# Patient Record
Sex: FEMALE | Race: WHITE | NOT HISPANIC OR LATINO | Employment: OTHER | URBAN - METROPOLITAN AREA
[De-identification: names, ages, dates, MRNs, and addresses within clinical notes are randomized per-mention and may not be internally consistent; named-entity substitution may affect disease eponyms.]

---

## 2017-01-07 ENCOUNTER — TRANSCRIBE ORDERS (OUTPATIENT)
Dept: ADMINISTRATIVE | Facility: HOSPITAL | Age: 82
End: 2017-01-07

## 2017-01-07 DIAGNOSIS — Z13.9 SCREENING: Primary | ICD-10-CM

## 2017-01-07 DIAGNOSIS — Z12.31 VISIT FOR SCREENING MAMMOGRAM: Primary | ICD-10-CM

## 2017-01-25 ENCOUNTER — PREP FOR SURGERY (OUTPATIENT)
Dept: GASTROENTEROLOGY | Facility: CLINIC | Age: 82
End: 2017-01-25

## 2017-01-25 ENCOUNTER — OFFICE VISIT (OUTPATIENT)
Dept: GASTROENTEROLOGY | Facility: CLINIC | Age: 82
End: 2017-01-25

## 2017-01-25 VITALS
RESPIRATION RATE: 15 BRPM | TEMPERATURE: 98.6 F | DIASTOLIC BLOOD PRESSURE: 71 MMHG | BODY MASS INDEX: 24.07 KG/M2 | SYSTOLIC BLOOD PRESSURE: 145 MMHG | HEART RATE: 70 BPM | HEIGHT: 64 IN | WEIGHT: 141 LBS

## 2017-01-25 DIAGNOSIS — R13.14 PHARYNGOESOPHAGEAL DYSPHAGIA: Primary | Chronic | ICD-10-CM

## 2017-01-25 DIAGNOSIS — R10.13 EPIGASTRIC PAIN: Chronic | ICD-10-CM

## 2017-01-25 DIAGNOSIS — R11.0 NAUSEA: ICD-10-CM

## 2017-01-25 DIAGNOSIS — R10.13 EPIGASTRIC PAIN: ICD-10-CM

## 2017-01-25 DIAGNOSIS — R13.14 PHARYNGOESOPHAGEAL DYSPHAGIA: Primary | ICD-10-CM

## 2017-01-25 PROCEDURE — 99214 OFFICE O/P EST MOD 30 MIN: CPT | Performed by: NURSE PRACTITIONER

## 2017-01-25 RX ORDER — MELATONIN
1000 DAILY
COMMUNITY
End: 2018-09-20

## 2017-01-25 RX ORDER — CARBOXYMETHYLCELLULOSE SODIUM 10 MG/ML
GEL OPHTHALMIC 3 TIMES DAILY PRN
COMMUNITY
End: 2017-01-26

## 2017-01-25 RX ORDER — SODIUM CHLORIDE 0.9 % (FLUSH) 0.9 %
1-10 SYRINGE (ML) INJECTION AS NEEDED
Status: CANCELLED | OUTPATIENT
Start: 2017-01-25

## 2017-01-25 RX ORDER — METHIMAZOLE 5 MG/1
5 TABLET ORAL EVERY OTHER DAY
COMMUNITY

## 2017-01-25 RX ORDER — LANOLIN ALCOHOL/MO/W.PET/CERES
1000 CREAM (GRAM) TOPICAL DAILY
COMMUNITY
End: 2018-09-20

## 2017-01-25 RX ORDER — SODIUM CHLORIDE 9 MG/ML
70 INJECTION, SOLUTION INTRAVENOUS CONTINUOUS PRN
Status: CANCELLED | OUTPATIENT
Start: 2017-01-25

## 2017-01-25 RX ORDER — METOPROLOL SUCCINATE 50 MG/1
50 TABLET, EXTENDED RELEASE ORAL DAILY
COMMUNITY

## 2017-01-25 RX ORDER — HYDROCHLOROTHIAZIDE 12.5 MG/1
12.5 CAPSULE, GELATIN COATED ORAL DAILY
COMMUNITY

## 2017-01-25 RX ORDER — ESTRADIOL 0.5 MG/1
0.5 TABLET ORAL 2 TIMES WEEKLY
COMMUNITY
End: 2023-03-02 | Stop reason: SDUPTHER

## 2017-01-25 NOTE — PATIENT INSTRUCTIONS
1. The patient has been advised to eat and take medications in upright position.  She should chew well.  After a few bites the patient should drink some water.  Avoid larger bites and chunks of meat.  2. Anti-reflux measures.  3. Continue Ranitidine 300 mg daily.  4. Upper endoscopy-EGD: Description of the procedure, risks, benefits, alternatives and options, including nonoperative options, were discussed with the patient in detail. The patient understands and wishes to proceed.  5. Possible colonoscopy in the future. Last colonoscopy was in 2012 with polyps.

## 2017-01-25 NOTE — LETTER
"January 25, 2017     Gary Oneil MD  [address]  [facility] [address]    Patient: Addie Perez   YOB: 1934   Date of Visit: 1/25/2017       Dear Dr. Thad MD:    Addie Perez was in my office today. Below is a copy of my note.    If you have questions, please do not hesitate to call me. I look forward to following Addie along with you.         Sincerely,        JOHAN Mclain        CC: No Recipients    [address] [address]    (H) [phone]  (W)     Chief Complaint   Patient presents with   • Difficulty Swallowing   • Nausea   • Abdominal Pain     The patient is here for evaluation of difficulty swallowing. She states she has a history of intermittent dysphagia with solids. No problems with liquids. The patient states this started over 1 month ago. She has not choked on foods in the past. She states she has a history of Schatzki's ring in the past with dilation, which helped significantly. She states she has a diverticulum and has to eat slowly, chew foods well and sit upright to eat. She has occasional nausea that is improved after eating. The patient states she has been having a cramping in her epigastric area, which is helped with eating. She is taking Ranitidine daily. She states she does not have heartburn symptoms. There is no history of bright red blood per rectum or melena. The patient denies change in bowel habits or constipation or diarrhea. Her last colonoscopy was in 2012. The patient is unsure of family history of colon cancer. Possibly her grandmother had colon cancer, but it could have been \"female cancer\", the patient's family is unsure.     Difficulty Swallowing   This is a recurrent problem. The current episode started more than 1 month ago. The problem occurs intermittently. The problem has been unchanged. Associated symptoms include abdominal pain, arthralgias, fatigue and nausea. Pertinent negatives include no chest pain, chills, coughing, " fever, headaches, joint swelling, myalgias, rash, vertigo or vomiting. The symptoms are aggravated by eating. She has tried nothing for the symptoms.   Nausea   This is a recurrent problem. The current episode started more than 1 month ago. The problem occurs intermittently. The problem has been unchanged. Associated symptoms include abdominal pain, arthralgias, fatigue and nausea. Pertinent negatives include no chest pain, chills, coughing, fever, headaches, joint swelling, myalgias, rash, vertigo or vomiting. Nothing aggravates the symptoms. She has tried eating for the symptoms. The treatment provided mild relief.   Abdominal Pain   This is a recurrent problem. The current episode started more than 1 month ago. The onset quality is gradual. The problem occurs intermittently (3-4 days per week). The most recent episode lasted 1 hour. The problem has been unchanged. The pain is located in the epigastric region. The pain is at a severity of 5/10. The pain is mild. The quality of the pain is cramping. The abdominal pain does not radiate. Associated symptoms include arthralgias and nausea. Pertinent negatives include no constipation, diarrhea, dysuria, fever, headaches, hematochezia, hematuria, melena, myalgias or vomiting. Nothing aggravates the pain. The pain is relieved by eating. She has tried H2 blockers for the symptoms. The treatment provided moderate relief.     Review of Systems   Constitutional: Positive for fatigue. Negative for appetite change, chills, fever and unexpected weight change.   HENT: Positive for trouble swallowing. Negative for mouth sores and nosebleeds.    Eyes: Negative for discharge and redness.   Respiratory: Negative for apnea, cough and shortness of breath.    Cardiovascular: Negative for chest pain, palpitations and leg swelling.   Gastrointestinal: Positive for abdominal pain and nausea. Negative for abdominal distention, anal bleeding, blood in stool, constipation, diarrhea,  hematochezia, melena and vomiting.   Endocrine: Positive for cold intolerance. Negative for heat intolerance and polydipsia.   Genitourinary: Negative for dysuria, hematuria and urgency.   Musculoskeletal: Positive for arthralgias. Negative for back pain, joint swelling and myalgias.   Skin: Negative for rash.   Allergic/Immunologic: Negative for food allergies and immunocompromised state.   Neurological: Positive for light-headedness. Negative for dizziness, vertigo, seizures, syncope and headaches.   Hematological: Negative for adenopathy. Does not bruise/bleed easily.   Psychiatric/Behavioral: Negative for dysphoric mood. The patient is not nervous/anxious and is not hyperactive.      Patient Active Problem List   Diagnosis   • Abdominal pain   • Back ache   • Can't get food down   • Chronic gastritis   • Elevated cholesterol   • BP (high blood pressure)   • Chronic nausea   • Controlled diabetes mellitus   • Pharyngoesophageal dysphagia   • Epigastric pain   • Nausea     Past Medical History   Diagnosis Date   • Back pain    • Cystitis    • Diabetes mellitus      pre diabetic   • History of colonic polyps    • Hypercholesterolemia    • Hypertension    • Neoplasm of kidney    • Sinusitis    • Urinary tract infection    • Vertigo      Past Surgical History   Procedure Laterality Date   • Back surgery     • Gallbladder surgery  2009   • Dilatation and curettage     • Hemorrhoidectomy  1973   • Tonsillectomy  1946   • Colonoscopy  2012   • Upper gastrointestinal endoscopy  2014     Family History   Problem Relation Age of Onset   • Colon cancer Paternal Grandmother      Social History   Substance Use Topics   • Smoking status: Never Smoker   • Smokeless tobacco: Never Used   • Alcohol use No       Current Outpatient Prescriptions:   •  acetaminophen (TYLENOL) 325 MG tablet, Take by mouth daily, Disp: , Rfl:   •  carboxymethylcellulose sod 1 % gel eye gel, 3 (Three) Times a Day As Needed., Disp: , Rfl:   •   "cholecalciferol (VITAMIN D3) 1000 UNITS tablet, Take 1,000 Units by mouth Daily., Disp: , Rfl:   •  CRANBERRY EXTRACT PO, Take  by mouth., Disp: , Rfl:   •  estradiol (ESTRACE) 0.5 MG tablet, Take 0.5 mg by mouth Daily., Disp: , Rfl:   •  fexofenadine (ALLEGRA) 180 MG tablet, Take by mouth daily, Disp: , Rfl:   •  Fish Oil-Cholecalciferol (FISH OIL + D3 PO), Take by mouth daily, Disp: , Rfl:   •  fluticasone (FLONASE) 50 MCG/ACT nasal spray, into each nostril daily, Disp: , Rfl:   •  hydrochlorothiazide (MICROZIDE) 12.5 MG capsule, Take 12.5 mg by mouth Daily., Disp: , Rfl:   •  methIMAzole (TAPAZOLE) 5 MG tablet, Take 5 mg by mouth Daily., Disp: , Rfl:   •  metoprolol succinate XL (TOPROL-XL) 50 MG 24 hr tablet, Take 50 mg by mouth Daily., Disp: , Rfl:   •  Multiple Vitamins-Minerals (OCUVITE ADULT 50+ PO), Take by mouth daily, Disp: , Rfl:   •  ranitidine (ZANTAC) 300 MG tablet, Take by mouth daily, Disp: , Rfl:   •  vitamin B-12 (CYANOCOBALAMIN) 1000 MCG tablet, Take 1,000 mcg by mouth Daily., Disp: , Rfl:   •  simvastatin (ZOCOR) 20 MG tablet, Take 10 mg by mouth Every Night., Disp: , Rfl:      Allergies   Allergen Reactions   • Codeine Nausea Only   • Escitalopram Nausea Only   • Gabapentin Nausea Only   • Ibuprofen Nausea Only   • Levofloxacin Nausea Only   • Lexapro [Escitalopram Oxalate]    • Naproxen Nausea Only   • Nitrofurantoin Nausea Only   • Other    • Sulfa Antibiotics Nausea Only     Visit Vitals   • /71   • Pulse 70   • Temp 98.6 °F (37 °C)   • Resp 15   • Ht 64\" (162.6 cm)   • Wt 141 lb (64 kg)   • BMI 24.2 kg/m2     Physical Exam   Constitutional: She is oriented to person, place, and time. She appears well-developed and well-nourished. No distress.   HENT:   Head: Normocephalic and atraumatic.   Right Ear: Hearing and external ear normal.   Left Ear: Hearing and external ear normal.   Nose: Nose normal.   Mouth/Throat: Oropharynx is clear and moist and mucous membranes are normal. Mucous " membranes are not pale, not dry and not cyanotic. No oral lesions. No oropharyngeal exudate.   Eyes: Conjunctivae and EOM are normal. Right eye exhibits no discharge. Left eye exhibits no discharge.   Neck: Trachea normal. Neck supple. No JVD present. No edema present. No thyroid mass and no thyromegaly present.   Cardiovascular: Normal rate, regular rhythm, S2 normal and normal heart sounds.  Exam reveals no gallop, no S3 and no friction rub.    No murmur heard.  Pulmonary/Chest: Effort normal and breath sounds normal. No respiratory distress. She exhibits no tenderness.   Abdominal: Normal appearance and bowel sounds are normal. She exhibits no distension, no ascites and no mass. There is no splenomegaly or hepatomegaly. There is no tenderness. There is no rigidity, no rebound and no guarding. No hernia.       Vascular Status -  Her exam exhibits no right foot edema. Her exam exhibits no left foot edema.  Lymphadenopathy:     She has no cervical adenopathy.        Left: No supraclavicular adenopathy present.   Neurological: She is alert and oriented to person, place, and time. She has normal strength. No cranial nerve deficit or sensory deficit.   Skin: No rash noted. She is not diaphoretic. No cyanosis. No pallor. Nails show no clubbing.   Psychiatric: She has a normal mood and affect.   Nursing note and vitals reviewed.    Procedures:    Upon review of records:    EGD dated 4/15/2014 reveals Schatzki's ring post dilation to 18 mm serially.  Small diverticulum within the lower portion of the esophagus.  Sliding hiatal hernia less than 3 cm.  Erythematous gastritis with antral deformity. Portion of duodenum biopsy reveals unremarkable small bowel mucosa, no increased inflammation or adenomatous changes are identified.  Antrum and body biopsy reveals mild chronic reactive gastritis, no atypia or organisms are identified.    5/21/2012 Colonoscopy per Dr. Gallegos with polyps. Pathology unavailable.    Ora was seen  today for difficulty swallowing, nausea and abdominal pain.    Diagnoses and all orders for this visit:    Pharyngoesophageal dysphagia  Comments:  Differentials include Schatzki's ring, esophagitis, esophageal dysmotility. Of interest, patient has history of esophageal diverticulum.    Epigastric pain  Comments:  Differentials include peptic ulcer disease, pancreatobiliary disease.    Nausea  Comments:  Differentials include peptic ulcer disease, pancreatobiliary disease.      Plan   Patient Instructions   1. The patient has been advised to eat and take medications in upright position.  She should chew well.  After a few bites the patient should drink some water.  Avoid larger bites and chunks of meat.  2. Anti-reflux measures.  3. Continue Ranitidine 300 mg daily.  4. Upper endoscopy-EGD: Description of the procedure, risks, benefits, alternatives and options, including nonoperative options, were discussed with the patient in detail. The patient understands and wishes to proceed.  5. Possible colonoscopy in the future. Last colonoscopy was in 2012 with polyps.    Patient Care Team:  Gary Oneil MD as PCP - General    JOHAN Mclain

## 2017-01-25 NOTE — PROGRESS NOTES
"[address] [address]    (H) [phone]  (W)     Chief Complaint   Patient presents with   • Difficulty Swallowing   • Nausea   • Abdominal Pain     The patient is here for evaluation of difficulty swallowing. She states she has a history of intermittent dysphagia with solids. No problems with liquids. The patient states this started over 1 month ago. She has not choked on foods in the past. She states she has a history of Schatzki's ring in the past with dilation, which helped significantly. She states she has a diverticulum and has to eat slowly, chew foods well and sit upright to eat. She has occasional nausea that is improved after eating. The patient states she has been having a cramping in her epigastric area, which is helped with eating. She is taking Ranitidine daily. She states she does not have heartburn symptoms. There is no history of bright red blood per rectum or melena. The patient denies change in bowel habits or constipation or diarrhea. Her last colonoscopy was in 2012. The patient is unsure of family history of colon cancer. Possibly her grandmother had colon cancer, but it could have been \"female cancer\", the patient's family is unsure.     Difficulty Swallowing   This is a recurrent problem. The current episode started more than 1 month ago. The problem occurs intermittently. The problem has been unchanged. Associated symptoms include abdominal pain, arthralgias, fatigue and nausea. Pertinent negatives include no chest pain, chills, coughing, fever, headaches, joint swelling, myalgias, rash, vertigo or vomiting. The symptoms are aggravated by eating. She has tried nothing for the symptoms.   Nausea   This is a recurrent problem. The current episode started more than 1 month ago. The problem occurs intermittently. The problem has been unchanged. Associated symptoms include abdominal pain, arthralgias, fatigue and nausea. Pertinent negatives include no chest pain, chills, coughing, " fever, headaches, joint swelling, myalgias, rash, vertigo or vomiting. Nothing aggravates the symptoms. She has tried eating for the symptoms. The treatment provided mild relief.   Abdominal Pain   This is a recurrent problem. The current episode started more than 1 month ago. The onset quality is gradual. The problem occurs intermittently (3-4 days per week). The most recent episode lasted 1 hour. The problem has been unchanged. The pain is located in the epigastric region. The pain is at a severity of 5/10. The pain is mild. The quality of the pain is cramping. The abdominal pain does not radiate. Associated symptoms include arthralgias and nausea. Pertinent negatives include no constipation, diarrhea, dysuria, fever, headaches, hematochezia, hematuria, melena, myalgias or vomiting. Nothing aggravates the pain. The pain is relieved by eating. She has tried H2 blockers for the symptoms. The treatment provided moderate relief.     Review of Systems   Constitutional: Positive for fatigue. Negative for appetite change, chills, fever and unexpected weight change.   HENT: Positive for trouble swallowing. Negative for mouth sores and nosebleeds.    Eyes: Negative for discharge and redness.   Respiratory: Negative for apnea, cough and shortness of breath.    Cardiovascular: Negative for chest pain, palpitations and leg swelling.   Gastrointestinal: Positive for abdominal pain and nausea. Negative for abdominal distention, anal bleeding, blood in stool, constipation, diarrhea, hematochezia, melena and vomiting.   Endocrine: Positive for cold intolerance. Negative for heat intolerance and polydipsia.   Genitourinary: Negative for dysuria, hematuria and urgency.   Musculoskeletal: Positive for arthralgias. Negative for back pain, joint swelling and myalgias.   Skin: Negative for rash.   Allergic/Immunologic: Negative for food allergies and immunocompromised state.   Neurological: Positive for light-headedness. Negative for  dizziness, vertigo, seizures, syncope and headaches.   Hematological: Negative for adenopathy. Does not bruise/bleed easily.   Psychiatric/Behavioral: Negative for dysphoric mood. The patient is not nervous/anxious and is not hyperactive.      Patient Active Problem List   Diagnosis   • Abdominal pain   • Back ache   • Can't get food down   • Chronic gastritis   • Elevated cholesterol   • BP (high blood pressure)   • Chronic nausea   • Controlled diabetes mellitus   • Pharyngoesophageal dysphagia   • Epigastric pain   • Nausea     Past Medical History   Diagnosis Date   • Back pain    • Cystitis    • Diabetes mellitus      pre diabetic   • History of colonic polyps    • Hypercholesterolemia    • Hypertension    • Neoplasm of kidney    • Sinusitis    • Urinary tract infection    • Vertigo      Past Surgical History   Procedure Laterality Date   • Back surgery     • Gallbladder surgery  2009   • Dilatation and curettage     • Hemorrhoidectomy  1973   • Tonsillectomy  1946   • Colonoscopy  2012   • Upper gastrointestinal endoscopy  2014     Family History   Problem Relation Age of Onset   • Colon cancer Paternal Grandmother      Social History   Substance Use Topics   • Smoking status: Never Smoker   • Smokeless tobacco: Never Used   • Alcohol use No       Current Outpatient Prescriptions:   •  acetaminophen (TYLENOL) 325 MG tablet, Take by mouth daily, Disp: , Rfl:   •  carboxymethylcellulose sod 1 % gel eye gel, 3 (Three) Times a Day As Needed., Disp: , Rfl:   •  cholecalciferol (VITAMIN D3) 1000 UNITS tablet, Take 1,000 Units by mouth Daily., Disp: , Rfl:   •  CRANBERRY EXTRACT PO, Take  by mouth., Disp: , Rfl:   •  estradiol (ESTRACE) 0.5 MG tablet, Take 0.5 mg by mouth Daily., Disp: , Rfl:   •  fexofenadine (ALLEGRA) 180 MG tablet, Take by mouth daily, Disp: , Rfl:   •  Fish Oil-Cholecalciferol (FISH OIL + D3 PO), Take by mouth daily, Disp: , Rfl:   •  fluticasone (FLONASE) 50 MCG/ACT nasal spray, into each  "nostril daily, Disp: , Rfl:   •  hydrochlorothiazide (MICROZIDE) 12.5 MG capsule, Take 12.5 mg by mouth Daily., Disp: , Rfl:   •  methIMAzole (TAPAZOLE) 5 MG tablet, Take 5 mg by mouth Daily., Disp: , Rfl:   •  metoprolol succinate XL (TOPROL-XL) 50 MG 24 hr tablet, Take 50 mg by mouth Daily., Disp: , Rfl:   •  Multiple Vitamins-Minerals (OCUVITE ADULT 50+ PO), Take by mouth daily, Disp: , Rfl:   •  ranitidine (ZANTAC) 300 MG tablet, Take by mouth daily, Disp: , Rfl:   •  vitamin B-12 (CYANOCOBALAMIN) 1000 MCG tablet, Take 1,000 mcg by mouth Daily., Disp: , Rfl:   •  simvastatin (ZOCOR) 20 MG tablet, Take 10 mg by mouth Every Night., Disp: , Rfl:      Allergies   Allergen Reactions   • Codeine Nausea Only   • Escitalopram Nausea Only   • Gabapentin Nausea Only   • Ibuprofen Nausea Only   • Levofloxacin Nausea Only   • Lexapro [Escitalopram Oxalate]    • Naproxen Nausea Only   • Nitrofurantoin Nausea Only   • Other    • Sulfa Antibiotics Nausea Only     Visit Vitals   • /71   • Pulse 70   • Temp 98.6 °F (37 °C)   • Resp 15   • Ht 64\" (162.6 cm)   • Wt 141 lb (64 kg)   • BMI 24.2 kg/m2     Physical Exam   Constitutional: She is oriented to person, place, and time. She appears well-developed and well-nourished. No distress.   HENT:   Head: Normocephalic and atraumatic.   Right Ear: Hearing and external ear normal.   Left Ear: Hearing and external ear normal.   Nose: Nose normal.   Mouth/Throat: Oropharynx is clear and moist and mucous membranes are normal. Mucous membranes are not pale, not dry and not cyanotic. No oral lesions. No oropharyngeal exudate.   Eyes: Conjunctivae and EOM are normal. Right eye exhibits no discharge. Left eye exhibits no discharge.   Neck: Trachea normal. Neck supple. No JVD present. No edema present. No thyroid mass and no thyromegaly present.   Cardiovascular: Normal rate, regular rhythm, S2 normal and normal heart sounds.  Exam reveals no gallop, no S3 and no friction rub.    No " murmur heard.  Pulmonary/Chest: Effort normal and breath sounds normal. No respiratory distress. She exhibits no tenderness.   Abdominal: Normal appearance and bowel sounds are normal. She exhibits no distension, no ascites and no mass. There is no splenomegaly or hepatomegaly. There is no tenderness. There is no rigidity, no rebound and no guarding. No hernia.       Vascular Status -  Her exam exhibits no right foot edema. Her exam exhibits no left foot edema.  Lymphadenopathy:     She has no cervical adenopathy.        Left: No supraclavicular adenopathy present.   Neurological: She is alert and oriented to person, place, and time. She has normal strength. No cranial nerve deficit or sensory deficit.   Skin: No rash noted. She is not diaphoretic. No cyanosis. No pallor. Nails show no clubbing.   Psychiatric: She has a normal mood and affect.   Nursing note and vitals reviewed.    Procedures:    Upon review of records:    EGD dated 4/15/2014 reveals Schatzki's ring post dilation to 18 mm serially.  Small diverticulum within the lower portion of the esophagus.  Sliding hiatal hernia less than 3 cm.  Erythematous gastritis with antral deformity. Portion of duodenum biopsy reveals unremarkable small bowel mucosa, no increased inflammation or adenomatous changes are identified.  Antrum and body biopsy reveals mild chronic reactive gastritis, no atypia or organisms are identified.    5/21/2012 Colonoscopy per Dr. Gallegos with polyps. Pathology unavailable.    Addie was seen today for difficulty swallowing, nausea and abdominal pain.    Diagnoses and all orders for this visit:    Pharyngoesophageal dysphagia  Comments:  Differentials include Schatzki's ring, esophagitis, esophageal dysmotility. Of interest, patient has history of esophageal diverticulum.    Epigastric pain  Comments:  Differentials include peptic ulcer disease, pancreatobiliary disease.    Nausea  Comments:  Differentials include peptic ulcer disease,  pancreatobiliary disease.      Plan   Patient Instructions   1. The patient has been advised to eat and take medications in upright position.  She should chew well.  After a few bites the patient should drink some water.  Avoid larger bites and chunks of meat.  2. Anti-reflux measures.  3. Continue Ranitidine 300 mg daily.  4. Upper endoscopy-EGD: Description of the procedure, risks, benefits, alternatives and options, including nonoperative options, were discussed with the patient in detail. The patient understands and wishes to proceed.  5. Possible colonoscopy in the future. Last colonoscopy was in 2012 with polyps.    Patient Care Team:  Gary Oneil MD as PCP - General    JOHAN Mclain

## 2017-01-31 ENCOUNTER — HOSPITAL ENCOUNTER (OUTPATIENT)
Facility: HOSPITAL | Age: 82
Setting detail: HOSPITAL OUTPATIENT SURGERY
Discharge: HOME | End: 2017-01-31
Attending: INTERNAL MEDICINE | Admitting: INTERNAL MEDICINE

## 2017-01-31 ENCOUNTER — ANESTHESIA (OUTPATIENT)
Dept: GASTROENTEROLOGY | Facility: HOSPITAL | Age: 82
End: 2017-01-31

## 2017-01-31 ENCOUNTER — ANESTHESIA EVENT (OUTPATIENT)
Dept: GASTROENTEROLOGY | Facility: HOSPITAL | Age: 82
End: 2017-01-31

## 2017-01-31 VITALS
OXYGEN SATURATION: 98 % | SYSTOLIC BLOOD PRESSURE: 150 MMHG | DIASTOLIC BLOOD PRESSURE: 76 MMHG | TEMPERATURE: 97.2 F | RESPIRATION RATE: 18 BRPM | BODY MASS INDEX: 24.07 KG/M2 | WEIGHT: 141 LBS | HEIGHT: 64 IN | HEART RATE: 59 BPM

## 2017-01-31 DIAGNOSIS — R11.0 NAUSEA: ICD-10-CM

## 2017-01-31 DIAGNOSIS — R13.14 PHARYNGOESOPHAGEAL DYSPHAGIA: ICD-10-CM

## 2017-01-31 DIAGNOSIS — R10.13 EPIGASTRIC PAIN: ICD-10-CM

## 2017-01-31 LAB — GLUCOSE BLDC GLUCOMTR-MCNC: 96 MG/DL (ref 70–130)

## 2017-01-31 PROCEDURE — 43239 EGD BIOPSY SINGLE/MULTIPLE: CPT | Performed by: INTERNAL MEDICINE

## 2017-01-31 PROCEDURE — 43249 ESOPH EGD DILATION <30 MM: CPT | Performed by: INTERNAL MEDICINE

## 2017-01-31 PROCEDURE — 25010000002 ONDANSETRON PER 1 MG: Performed by: NURSE ANESTHETIST, CERTIFIED REGISTERED

## 2017-01-31 PROCEDURE — C1726 CATH, BAL DIL, NON-VASCULAR: HCPCS | Performed by: INTERNAL MEDICINE

## 2017-01-31 PROCEDURE — 88305 TISSUE EXAM BY PATHOLOGIST: CPT | Performed by: INTERNAL MEDICINE

## 2017-01-31 PROCEDURE — 82962 GLUCOSE BLOOD TEST: CPT

## 2017-01-31 PROCEDURE — 25010000002 PROPOFOL 10 MG/ML EMULSION: Performed by: NURSE ANESTHETIST, CERTIFIED REGISTERED

## 2017-01-31 RX ORDER — SODIUM CHLORIDE 0.9 % (FLUSH) 0.9 %
1-10 SYRINGE (ML) INJECTION AS NEEDED
Status: DISCONTINUED | OUTPATIENT
Start: 2017-01-31 | End: 2017-01-31 | Stop reason: HOSPADM

## 2017-01-31 RX ORDER — PANTOPRAZOLE SODIUM 40 MG/10ML
INJECTION, POWDER, LYOPHILIZED, FOR SOLUTION INTRAVENOUS
Status: COMPLETED
Start: 2017-01-31 | End: 2017-01-31

## 2017-01-31 RX ORDER — ONDANSETRON 2 MG/ML
INJECTION INTRAMUSCULAR; INTRAVENOUS AS NEEDED
Status: DISCONTINUED | OUTPATIENT
Start: 2017-01-31 | End: 2017-01-31 | Stop reason: SURG

## 2017-01-31 RX ORDER — PANTOPRAZOLE SODIUM 40 MG/10ML
40 INJECTION, POWDER, LYOPHILIZED, FOR SOLUTION INTRAVENOUS ONCE
Status: COMPLETED | OUTPATIENT
Start: 2017-01-31 | End: 2017-01-31

## 2017-01-31 RX ORDER — PANTOPRAZOLE SODIUM 40 MG/1
TABLET, DELAYED RELEASE ORAL
Qty: 30 TABLET | Refills: 6 | Status: SHIPPED | OUTPATIENT
Start: 2017-01-31 | End: 2017-05-31

## 2017-01-31 RX ORDER — SODIUM CHLORIDE 9 MG/ML
70 INJECTION, SOLUTION INTRAVENOUS CONTINUOUS PRN
Status: DISCONTINUED | OUTPATIENT
Start: 2017-01-31 | End: 2017-01-31 | Stop reason: HOSPADM

## 2017-01-31 RX ORDER — PROPOFOL 10 MG/ML
VIAL (ML) INTRAVENOUS AS NEEDED
Status: DISCONTINUED | OUTPATIENT
Start: 2017-01-31 | End: 2017-01-31 | Stop reason: SURG

## 2017-01-31 RX ADMIN — PROPOFOL 20 MG: 10 INJECTION, EMULSION INTRAVENOUS at 09:46

## 2017-01-31 RX ADMIN — PROPOFOL 10 MG: 10 INJECTION, EMULSION INTRAVENOUS at 09:39

## 2017-01-31 RX ADMIN — PANTOPRAZOLE SODIUM 40 MG: 40 INJECTION, POWDER, LYOPHILIZED, FOR SOLUTION INTRAVENOUS at 10:11

## 2017-01-31 RX ADMIN — PROPOFOL 20 MG: 10 INJECTION, EMULSION INTRAVENOUS at 09:43

## 2017-01-31 RX ADMIN — PROPOFOL 20 MG: 10 INJECTION, EMULSION INTRAVENOUS at 09:38

## 2017-01-31 RX ADMIN — SODIUM CHLORIDE 70 ML/HR: 9 INJECTION, SOLUTION INTRAVENOUS at 08:47

## 2017-01-31 RX ADMIN — ONDANSETRON 4 MG: 2 INJECTION INTRAMUSCULAR; INTRAVENOUS at 09:28

## 2017-01-31 RX ADMIN — LIDOCAINE HYDROCHLORIDE 60 MG: 20 INJECTION, SOLUTION INTRAVENOUS at 09:32

## 2017-01-31 RX ADMIN — PANTOPRAZOLE SODIUM 40 MG: 40 INJECTION, POWDER, FOR SOLUTION INTRAVENOUS at 10:11

## 2017-01-31 RX ADMIN — PROPOFOL 40 MG: 10 INJECTION, EMULSION INTRAVENOUS at 09:33

## 2017-01-31 RX ADMIN — PROPOFOL 20 MG: 10 INJECTION, EMULSION INTRAVENOUS at 09:35

## 2017-01-31 NOTE — ANESTHESIA PREPROCEDURE EVALUATION
Anesthesia Evaluation     Patient summary reviewed and Nursing notes reviewed    No history of anesthetic complications (h/o dental injury with previous anesthesia.  chipped lower molar. patient does not recall if this was from general anesthesia .)   Airway   Mallampati: I  TM distance: <3 FB  Neck ROM: full  possible difficult intubation  Dental - normal exam     Pulmonary - negative pulmonary ROS and normal exam    breath sounds clear to auscultation  Cardiovascular - normal exam  Exercise tolerance: good (4-7 METS)  (+) hypertension well controlled, PVD (B/L LE swelling)    ECG reviewed  Patient on routine beta blocker and Beta blocker given within 24 hours of surgery  Rhythm: regular  Rate: normal  ROS comment: Dyslipidemia    Neuro/Psych  (+) dizziness/light headedness (Vertigo secondary to inner ear),    GI/Hepatic/Renal/Endo    (+)  GERD well controlled, diabetes mellitus (FSBS 96.. Pre-diabetic. Diet controlled) well controlled,     Musculoskeletal     (+) arthralgias, back pain,   Abdominal  - normal exam    Abdomen: soft.  Bowel sounds: normal.   Substance History      OB/GYN negative ob/gyn ROS         Other   (+) arthritis                      Anesthesia Plan    ASA 2     MAC     intravenous induction   Anesthetic plan and risks discussed with patient.

## 2017-01-31 NOTE — ANESTHESIA POSTPROCEDURE EVALUATION
Patient: Addie Perez    Procedure Summary     Date Anesthesia Start Anesthesia Stop Room / Location    01/31/17 0927  [facility] ENDOSCOPY 2 / [facility] ENDOSCOPY       Procedure Diagnosis Surgeon Provider    ESOPHAGOGASTRODUODENOSCOPY (N/A Esophagus) Nausea; Epigastric pain; Pharyngoesophageal dysphagia  (Nausea [R11.0]; Epigastric pain [R10.13]; Pharyngoesophageal dysphagia [R13.14]) MD Kirk Levine CRNA          Anesthesia Type: MAC  Last vitals  BP      Temp      Pulse     Resp      SpO2        Post Anesthesia Care and Evaluation    Patient location during evaluation: PHASE II  Patient participation: complete - patient participated  Level of consciousness: awake and alert  Pain score: 0  Pain management: satisfactory to patient  Airway patency: patent  Anesthetic complications: No anesthetic complications  PONV Status: none  Cardiovascular status: acceptable and stable  Respiratory status: acceptable and room air  Hydration status: acceptable

## 2017-02-02 LAB
LAB AP CASE REPORT: NORMAL
Lab: NORMAL
PATH REPORT.FINAL DX SPEC: NORMAL

## 2017-02-20 ENCOUNTER — OFFICE VISIT (OUTPATIENT)
Dept: GASTROENTEROLOGY | Facility: CLINIC | Age: 82
End: 2017-02-20

## 2017-02-20 VITALS
WEIGHT: 138 LBS | TEMPERATURE: 97.5 F | RESPIRATION RATE: 18 BRPM | BODY MASS INDEX: 23.56 KG/M2 | HEART RATE: 77 BPM | SYSTOLIC BLOOD PRESSURE: 121 MMHG | HEIGHT: 64 IN | DIASTOLIC BLOOD PRESSURE: 65 MMHG

## 2017-02-20 DIAGNOSIS — K29.60 EROSIVE GASTRITIS: ICD-10-CM

## 2017-02-20 DIAGNOSIS — R11.0 NAUSEA: ICD-10-CM

## 2017-02-20 DIAGNOSIS — R13.19 OTHER DYSPHAGIA: Primary | ICD-10-CM

## 2017-02-20 DIAGNOSIS — R12 HEARTBURN: ICD-10-CM

## 2017-02-20 PROCEDURE — 99214 OFFICE O/P EST MOD 30 MIN: CPT | Performed by: INTERNAL MEDICINE

## 2017-02-20 NOTE — PATIENT INSTRUCTIONS
1. Dietary instructions.  The patient should eat relatively soft diet.  She should eat in upright position and chew well.  The patient should drink water after to 3 bites and take medications with liberal amounts of water.  Generally medications that have a potential to cause pill esophagitis may be avoided or used in an alternative form (for example if the patient needs potassium it may be used in a liquid rather than pill form).  Furthermore after eating, and taking medications the patient should remain in upright position for 5-10 minutes.  2. Anti-antireflux measures.  3. Pantoprazole 40 mg 1 by mouth every morning one half hour before breakfast.  The patient may take for 3 months.  Thereafter the dose may be reduced to 20 mg one by mouth every morning one half hour before breakfast and follow the patient clinically.  4. Avoid NSAIDs.  The patient however may take Tylenol for arthritic pains.  5. Follow-up in 2 months.

## 2017-02-20 NOTE — PROGRESS NOTES
[address] [facility] [address]    (H) [phone]  (W)     Chief Complaint   Patient presents with   • Follow-up       History of Present Illness    The patient came back for follow visit today.  She feels better.  Her dysphagic symptoms have much improved.  The patient is able to eat both liquids and solids without problem now.  She denies abdominal pain.  There is no further nausea.  She denies vomiting.  She denies diarrhea or constipation.  There is no history of overt GI bleed (hematemesis, melena, or hematochezia).  The patient is sleeping much better.  She denies reflux.  The patient denies weight loss.    There is history of moderate dysphagia for the last several months.  This had been somewhat progressive.  The patient had trouble swallowing with solids and to some extent with liquids as well.  The patient points towards lower substernal area.  Frequency being several times a week.  The patient had undergone upper endoscopy on January 31, 2017.  She was found to have stricturing of the distal esophagus, and distal esophageal rings.  She was dilated to 18 mm progressively.  She was also found to have erosive distal esophagitis-LA class a, and mid esophageal diverticula.  The patient was also found to have erosive gastritis.  Biopsies from the esophagus were negative for eosinophilic esophagitis.  Biopsies from the gastric antrum and body of the stomach were negative for Helicobacter pylori.  Currently the patient feels much better.    Review of Systems   Constitutional: Negative for appetite change, chills, fatigue, fever and unexpected weight change.   HENT: Negative for mouth sores, nosebleeds and trouble swallowing.    Eyes: Negative for discharge and redness.   Respiratory: Negative for apnea, cough and shortness of breath.    Cardiovascular: Negative for chest pain, palpitations and leg swelling.   Gastrointestinal: Negative for abdominal distention, abdominal pain, anal bleeding, blood in stool,  constipation, diarrhea, nausea and vomiting.   Endocrine: Negative for cold intolerance, heat intolerance and polydipsia.   Genitourinary: Negative for dysuria, hematuria and urgency.   Musculoskeletal: Positive for arthralgias and back pain. Negative for joint swelling and myalgias.   Skin: Negative for rash.   Allergic/Immunologic: Negative for food allergies and immunocompromised state.   Neurological: Negative for dizziness, seizures, syncope and headaches.   Hematological: Negative for adenopathy. Does not bruise/bleed easily.   Psychiatric/Behavioral: Negative for dysphoric mood. The patient is not nervous/anxious and is not hyperactive.      Patient Active Problem List   Diagnosis   • Abdominal pain   • Back ache   • Can't get food down   • Chronic gastritis   • Elevated cholesterol   • BP (high blood pressure)   • Chronic nausea   • Controlled diabetes mellitus   • Pharyngoesophageal dysphagia   • Epigastric pain   • Nausea     Past Medical History   Diagnosis Date   • Back pain    • Cataract      PT REPORTS EARLY STAGES   • Cystitis    • Diabetes mellitus      PRE DIABETIC   • History of colonic polyps    • Hypercholesterolemia    • Hypertension    • Neoplasm of kidney    • Sinusitis    • Urinary tract infection    • Vertigo      Past Surgical History   Procedure Laterality Date   • Gallbladder surgery  2009   • Dilatation and curettage     • Hemorrhoidectomy  1973   • Tonsillectomy  1946   • Colonoscopy  2012   • Upper gastrointestinal endoscopy  2014   • Back surgery       REPORTS A SPUR WAS REMOVED THAT WAS HITTING SCIATIC NERVE   • Breast surgery Left      LUMPECTOMY, BENIGN    • Endoscopy N/A 1/31/2017     Procedure: ESOPHAGOGASTRODUODENOSCOPY;  Surgeon: Robbie Quezada MD;  Location: [facility] ENDOSCOPY;  Service:      Family History   Problem Relation Age of Onset   • Colon cancer Paternal Grandmother      Social History   Substance Use Topics   • Smoking status: Never Smoker   • Smokeless tobacco: Never  Used   • Alcohol use No       Current Outpatient Prescriptions:   •  acetaminophen (TYLENOL) 325 MG tablet, Take 325 mg by mouth Every 6 (Six) Hours As Needed for mild pain (1-3) or fever., Disp: , Rfl:   •  cholecalciferol (VITAMIN D3) 1000 UNITS tablet, Take 1,000 Units by mouth Daily., Disp: , Rfl:   •  CRANBERRY EXTRACT PO, Take 1 tablet by mouth Daily., Disp: , Rfl:   •  estradiol (ESTRACE) 0.5 MG tablet, Take 0.5 mg by mouth 2 (Two) Times a Week., Disp: , Rfl:   •  fexofenadine (ALLEGRA) 180 MG tablet, Take 180 mg by mouth Daily., Disp: , Rfl:   •  Fish Oil-Cholecalciferol (FISH OIL + D3 PO), Take 1 tablet by mouth Daily., Disp: , Rfl:   •  fluticasone (FLONASE) 50 MCG/ACT nasal spray, 1 spray into each nostril 2 (Two) Times a Day., Disp: , Rfl:   •  hydrochlorothiazide (MICROZIDE) 12.5 MG capsule, Take 12.5 mg by mouth Daily., Disp: , Rfl:   •  methIMAzole (TAPAZOLE) 5 MG tablet, Take 5 mg by mouth Take As Directed. PT REPORTS SHE TAKES THIS Monday THROUGH Friday AND SKIPS THE WEEKEND, Disp: , Rfl:   •  metoprolol succinate XL (TOPROL-XL) 50 MG 24 hr tablet, Take 50 mg by mouth Daily., Disp: , Rfl:   •  Multiple Vitamins-Minerals (OCUVITE ADULT 50+ PO), Take 1 tablet by mouth Daily., Disp: , Rfl:   •  pantoprazole (PROTONIX) 40 MG EC tablet, Take 1 tablet by mouth 30 minutes before breakfast daily., Disp: 30 tablet, Rfl: 6  •  Propylene Glycol (SYSTANE BALANCE OP), Apply 1 drop to eye 2 (Two) Times a Day As Needed (REPORTS SHE USES FOR DRY EYE AS NEEDED)., Disp: , Rfl:   •  simvastatin (ZOCOR) 20 MG tablet, Take 10 mg by mouth Every Night., Disp: , Rfl:   •  vitamin B-12 (CYANOCOBALAMIN) 1000 MCG tablet, Take 1,000 mcg by mouth Daily., Disp: , Rfl:   Allergies   Allergen Reactions   • Codeine Nausea Only   • Escitalopram Nausea Only   • Gabapentin Nausea Only   • Ibuprofen Nausea Only   • Levofloxacin Nausea Only   • Lexapro [Escitalopram Oxalate]    • Naproxen Nausea Only   • Nitrofurantoin Nausea Only   •  "Sulfa Antibiotics Nausea Only     Visit Vitals   • /65   • Pulse 77   • Temp 97.5 °F (36.4 °C)   • Resp 18   • Ht 64\" (162.6 cm)   • Wt 138 lb (62.6 kg)   • BMI 23.69 kg/m2     Physical Exam   Constitutional: She is oriented to person, place, and time. She appears well-developed and well-nourished. No distress.   HENT:   Head: Normocephalic and atraumatic.   Right Ear: Hearing and external ear normal.   Left Ear: Hearing and external ear normal.   Nose: Nose normal.   Mouth/Throat: Oropharynx is clear and moist and mucous membranes are normal. Mucous membranes are not pale, not dry and not cyanotic. No oral lesions. No oropharyngeal exudate.   Eyes: Conjunctivae and EOM are normal. Right eye exhibits no discharge. Left eye exhibits no discharge. No scleral icterus.   Neck: Trachea normal. Neck supple. No JVD present. No edema present. No thyroid mass and no thyromegaly present.   Cardiovascular: Normal rate, regular rhythm, S2 normal and normal heart sounds.  Exam reveals no gallop, no S3 and no friction rub.    No murmur heard.  Pulmonary/Chest: Effort normal and breath sounds normal. No respiratory distress. She has no wheezes. She has no rales. She exhibits no tenderness.   Abdominal: Soft. Normal appearance and bowel sounds are normal. She exhibits no distension, no ascites and no mass. There is no splenomegaly or hepatomegaly. There is no tenderness. There is no rigidity, no rebound and no guarding. No hernia.   Musculoskeletal: She exhibits no tenderness or deformity.       Vascular Status -  Her exam exhibits no right foot edema. Her exam exhibits no left foot edema.  Lymphadenopathy:     She has no cervical adenopathy.        Left: No supraclavicular adenopathy present.   Neurological: She is alert and oriented to person, place, and time. She has normal strength. No cranial nerve deficit or sensory deficit. She exhibits normal muscle tone. Coordination normal.   Skin: No rash noted. She is not " diaphoretic. No cyanosis. No pallor. Nails show no clubbing.   Psychiatric: She has a normal mood and affect. Her behavior is normal. Judgment and thought content normal.   Nursing note and vitals reviewed.      Laboratory Tests:    Upon review of medical records:    Dated December 15, 2015 sodium 141 potassium 3.7 chloride 101 CO2 30 BUN 14 creatinine 0.9 and glucose 115.  White count 7.6 hemoglobin 12.4 hematocrit 38 and a platelet count 273.    Procedures:    Upon review of medical records:    Dated January 31, 2017 the patient underwent an upper endoscopy which revealed distal esophageal stricture which was serially dilated to 18 mm.  A small sliding hiatal hernia less than 3 cm, erosive distal esophagitis-LA class a, 2 small early mid esophageal diverticula and erosive gastritis.  Significant tortuosity and tertiary contractions of esophageal body were noted.  Biopsies from the mid and distal esophageal revealed reactive mucosal changes consistent with reflux.  No specialized mucosa.  No increased eosinophils were noted.  Gastric biopsies from the antrum body and angularis were negative for Helicobacter pylori.  Biopsies from the second portion of duodenum were negative for celiac disease.    Assessment and Plan:      Addie was seen today for follow-up.    Diagnoses and all orders for this visit:    Other dysphagia  Comments:  Secondary to distal esophageal stricture.  Peptic type.  Improved.    Nausea  Comments:  Secondary to erosive gastritis.  Improved.     Erosive gastritis  Comments:   Helicobacter pylori negative.  The patient denies taking NSAIDs.    Heartburn  Comments:  Improved.        Discussion:       Plan     Patient Instructions     1. Dietary instructions.  The patient should eat relatively soft diet.  She should eat in upright position and chew well.  The patient should drink water after to 3 bites and take medications with liberal amounts of water.  Generally medications that have a potential to  cause pill esophagitis may be avoided or used in an alternative form (for example if the patient needs potassium it may be used in a liquid rather than pill form).  Furthermore after eating, and taking medications the patient should remain in upright position for 5-10 minutes.  2. Anti-antireflux measures.  3. Pantoprazole 40 mg 1 by mouth every morning one half hour before breakfast.  The patient may take for 3 months.  Thereafter the dose may be reduced to 20 mg one by mouth every morning one half hour before breakfast and follow the patient clinically.  4. Avoid NSAIDs.  The patient however may take Tylenol for arthritic pains.  5. Follow-up in 2 months.      Patient Care Team:  Gary Oneil MD as PCP - General    Robbie Quezada MD

## 2017-03-28 ENCOUNTER — OFFICE VISIT (OUTPATIENT)
Dept: UROLOGY | Facility: CLINIC | Age: 82
End: 2017-03-28

## 2017-03-28 VITALS
DIASTOLIC BLOOD PRESSURE: 71 MMHG | SYSTOLIC BLOOD PRESSURE: 110 MMHG | TEMPERATURE: 98.5 F | RESPIRATION RATE: 18 BRPM | OXYGEN SATURATION: 95 % | HEART RATE: 77 BPM

## 2017-03-28 DIAGNOSIS — Z87.440 HISTORY OF RECURRENT UTIS: Primary | ICD-10-CM

## 2017-03-28 DIAGNOSIS — N76.2 ATROPHIC VULVITIS: ICD-10-CM

## 2017-03-28 LAB
BILIRUB BLD-MCNC: NEGATIVE MG/DL
CLARITY, POC: CLEAR
COLOR UR: YELLOW
GLUCOSE UR STRIP-MCNC: NEGATIVE MG/DL
KETONES UR QL: NEGATIVE
LEUKOCYTE EST, POC: NEGATIVE
NITRITE UR-MCNC: NEGATIVE MG/ML
PH UR: 6 [PH] (ref 5–8)
PROT UR STRIP-MCNC: NEGATIVE MG/DL
RBC # UR STRIP: NEGATIVE /UL
SP GR UR: 1.01 (ref 1–1.03)
UROBILINOGEN UR QL: NORMAL

## 2017-03-28 PROCEDURE — 99213 OFFICE O/P EST LOW 20 MIN: CPT | Performed by: UROLOGY

## 2017-03-28 PROCEDURE — 81003 URINALYSIS AUTO W/O SCOPE: CPT | Performed by: UROLOGY

## 2017-03-28 NOTE — PROGRESS NOTES
Chief Complaint  Follow-up (annual fup for a history of uti's.)      ALANNA ePrez is a 83 y.o.female who returns today for an annual checkup with a history of recurrent urinary tract infections.  These were treated by parotid practitioners with antibiotics but typically were poorly documented.  A major problem for his atrophic vaginitis but she could never tolerate topical vaginal estrogen.  She saw Dr. Lewis who prescribed estradiol tablets 2 times per week and she is much improved.     Vitals:    03/28/17 1310   BP: 110/71   Pulse: 77   Resp: 18   Temp: 98.5 °F (36.9 °C)   SpO2: 95%       Past Medical History  Past Medical History:   Diagnosis Date   • Back pain    • Cataract     PT REPORTS EARLY STAGES   • Cystitis    • Diabetes mellitus     PRE DIABETIC   • History of colonic polyps    • Hypercholesterolemia    • Hypertension    • Neoplasm of kidney    • Sinusitis    • Urinary tract infection    • Vertigo        Past Surgical History  Past Surgical History:   Procedure Laterality Date   • BACK SURGERY      REPORTS A SPUR WAS REMOVED THAT WAS HITTING SCIATIC NERVE   • BREAST SURGERY Left     LUMPECTOMY, BENIGN    • COLONOSCOPY  2012   • DILATATION AND CURETTAGE     • ENDOSCOPY N/A 1/31/2017    Procedure: ESOPHAGOGASTRODUODENOSCOPY;  Surgeon: Robbie Quezada MD;  Location: [facility] ENDOSCOPY;  Service:    • GALLBLADDER SURGERY  2009   • HEMORRHOIDECTOMY  1973   • TONSILLECTOMY  1946   • UPPER GASTROINTESTINAL ENDOSCOPY  2014       Medications  has a current medication list which includes the following prescription(s): acetaminophen, cholecalciferol, cranberry, estradiol, fexofenadine, fish oil-cholecalciferol, fluticasone, hydrochlorothiazide, methimazole, metoprolol succinate xl, multiple vitamins-minerals, pantoprazole, propylene glycol, simvastatin, and vitamin b-12.    Allergies  Allergies   Allergen Reactions   • Codeine Nausea Only   • Escitalopram Nausea Only   • Gabapentin Nausea Only   • Ibuprofen  Nausea Only   • Levofloxacin Nausea Only   • Lexapro [Escitalopram Oxalate]    • Naproxen Nausea Only   • Nitrofurantoin Nausea Only   • Sulfa Antibiotics Nausea Only       Social History  Social History     Social History   • Marital status:      Spouse name: N/A   • Number of children: N/A   • Years of education: N/A     Occupational History   •  Retired     Social History Main Topics   • Smoking status: Never Smoker   • Smokeless tobacco: Never Used   • Alcohol use No   • Drug use: No   • Sexual activity: Defer     Other Topics Concern   • Not on file     Social History Narrative       Family History  Family History   Problem Relation Age of Onset   • Colon cancer Paternal Grandmother        Review of Systems  Review of Systems   Constitutional: Negative.    Genitourinary: Negative.    All other systems reviewed and are negative.      Physical Exam  Physical Exam    Labs recent and today in the office:  Results for orders placed or performed in visit on 03/28/17   POC Urinalysis Dipstick, Automated   Result Value Ref Range    Color Yellow Yellow, Straw, Dark Yellow, Sheryl    Clarity, UA Clear Clear    Glucose, UA Negative Negative, 1000 mg/dL (3+) mg/dL    Bilirubin Negative Negative    Ketones, UA Negative Negative    Specific Gravity  1.015 1.005 - 1.030    Blood, UA Negative Negative    pH, Urine 6.0 5.0 - 8.0    Protein, POC Negative Negative mg/dL    Urobilinogen, UA Normal Normal    Leukocytes Negative Negative    Nitrite, UA Negative Negative         Assessment & Plan  She is asymptomatic and her urine is clear so she can return on an annual basis and when necessary.

## 2017-05-15 ENCOUNTER — HOSPITAL ENCOUNTER (OUTPATIENT)
Dept: MAMMOGRAPHY | Facility: HOSPITAL | Age: 82
Discharge: HOME | End: 2017-05-15
Attending: SURGERY | Admitting: SURGERY

## 2017-05-15 DIAGNOSIS — Z13.9 SCREENING: ICD-10-CM

## 2017-05-15 PROCEDURE — G0202 SCR MAMMO BI INCL CAD: HCPCS

## 2017-05-15 PROCEDURE — 77063 BREAST TOMOSYNTHESIS BI: CPT

## 2017-05-24 ENCOUNTER — OFFICE VISIT (OUTPATIENT)
Dept: SURGERY | Facility: CLINIC | Age: 82
End: 2017-05-24

## 2017-05-24 VITALS
HEIGHT: 64 IN | WEIGHT: 140 LBS | DIASTOLIC BLOOD PRESSURE: 58 MMHG | TEMPERATURE: 95.7 F | HEART RATE: 62 BPM | OXYGEN SATURATION: 99 % | BODY MASS INDEX: 23.9 KG/M2 | SYSTOLIC BLOOD PRESSURE: 108 MMHG

## 2017-05-24 DIAGNOSIS — R92.8 ABNORMAL MAMMOGRAM OF LEFT BREAST: Primary | ICD-10-CM

## 2017-05-24 DIAGNOSIS — R92.8 ABNORMAL MAMMOGRAM OF RIGHT BREAST: ICD-10-CM

## 2017-05-24 DIAGNOSIS — N63.0 BREAST MASS: ICD-10-CM

## 2017-05-24 PROCEDURE — 99214 OFFICE O/P EST MOD 30 MIN: CPT | Performed by: SURGERY

## 2017-05-25 DIAGNOSIS — R92.8 ABNORMAL MAMMOGRAM OF BOTH BREASTS: Primary | ICD-10-CM

## 2017-05-31 ENCOUNTER — OFFICE VISIT (OUTPATIENT)
Dept: GASTROENTEROLOGY | Facility: CLINIC | Age: 82
End: 2017-05-31

## 2017-05-31 VITALS
TEMPERATURE: 97.6 F | RESPIRATION RATE: 18 BRPM | HEIGHT: 64 IN | DIASTOLIC BLOOD PRESSURE: 57 MMHG | BODY MASS INDEX: 24.07 KG/M2 | HEART RATE: 71 BPM | WEIGHT: 141 LBS | SYSTOLIC BLOOD PRESSURE: 119 MMHG

## 2017-05-31 DIAGNOSIS — M79.10 MUSCLE SORENESS: ICD-10-CM

## 2017-05-31 DIAGNOSIS — R13.19 OTHER DYSPHAGIA: Primary | ICD-10-CM

## 2017-05-31 DIAGNOSIS — K20.90 ESOPHAGITIS: ICD-10-CM

## 2017-05-31 PROCEDURE — 99214 OFFICE O/P EST MOD 30 MIN: CPT | Performed by: INTERNAL MEDICINE

## 2017-05-31 RX ORDER — PANTOPRAZOLE SODIUM 20 MG/1
TABLET, DELAYED RELEASE ORAL
Qty: 30 TABLET | Refills: 5 | Status: SHIPPED | OUTPATIENT
Start: 2017-05-31 | End: 2018-08-30

## 2017-06-02 ENCOUNTER — HOSPITAL ENCOUNTER (OUTPATIENT)
Dept: MAMMOGRAPHY | Facility: HOSPITAL | Age: 82
Discharge: HOME | End: 2017-06-02
Admitting: SURGERY

## 2017-06-02 DIAGNOSIS — R92.8 ABNORMAL MAMMOGRAM OF BOTH BREASTS: ICD-10-CM

## 2017-06-02 PROCEDURE — G0204 DX MAMMO INCL CAD BI: HCPCS

## 2017-06-02 PROCEDURE — G0279 TOMOSYNTHESIS, MAMMO: HCPCS

## 2017-12-20 ENCOUNTER — TRANSCRIBE ORDERS (OUTPATIENT)
Dept: ULTRASOUND IMAGING | Facility: HOSPITAL | Age: 82
End: 2017-12-20

## 2017-12-20 DIAGNOSIS — E04.2 NONTOXIC MULTINODULAR GOITER: Primary | ICD-10-CM

## 2018-01-09 ENCOUNTER — HOSPITAL ENCOUNTER (OUTPATIENT)
Dept: ULTRASOUND IMAGING | Facility: HOSPITAL | Age: 83
Discharge: HOME | End: 2018-01-09
Admitting: NURSE PRACTITIONER

## 2018-01-09 DIAGNOSIS — E04.2 NONTOXIC MULTINODULAR GOITER: ICD-10-CM

## 2018-01-09 PROCEDURE — 76536 US EXAM OF HEAD AND NECK: CPT

## 2018-04-26 ENCOUNTER — TRANSCRIBE ORDERS (OUTPATIENT)
Dept: MAMMOGRAPHY | Facility: HOSPITAL | Age: 83
End: 2018-04-26

## 2018-04-26 DIAGNOSIS — Z12.39 BREAST CANCER SCREENING: Primary | ICD-10-CM

## 2018-07-10 ENCOUNTER — HOSPITAL ENCOUNTER (OUTPATIENT)
Dept: MAMMOGRAPHY | Facility: HOSPITAL | Age: 83
Discharge: HOME | End: 2018-07-10
Admitting: INTERNAL MEDICINE

## 2018-07-10 ENCOUNTER — TRANSCRIBE ORDERS (OUTPATIENT)
Dept: ADMINISTRATIVE | Facility: HOSPITAL | Age: 83
End: 2018-07-10

## 2018-07-10 DIAGNOSIS — Z12.39 BREAST CANCER SCREENING: ICD-10-CM

## 2018-07-10 DIAGNOSIS — R92.8 ABNORMAL MAMMOGRAM: Primary | ICD-10-CM

## 2018-07-10 PROCEDURE — 77063 BREAST TOMOSYNTHESIS BI: CPT

## 2018-07-10 PROCEDURE — 77067 SCR MAMMO BI INCL CAD: CPT

## 2018-07-18 ENCOUNTER — HOSPITAL ENCOUNTER (OUTPATIENT)
Dept: ULTRASOUND IMAGING | Facility: HOSPITAL | Age: 83
Discharge: HOME | End: 2018-07-18

## 2018-07-18 ENCOUNTER — HOSPITAL ENCOUNTER (OUTPATIENT)
Dept: MAMMOGRAPHY | Facility: HOSPITAL | Age: 83
Discharge: HOME | End: 2018-07-18
Admitting: INTERNAL MEDICINE

## 2018-07-18 DIAGNOSIS — R92.8 ABNORMAL MAMMOGRAM: ICD-10-CM

## 2018-07-18 PROCEDURE — 76641 ULTRASOUND BREAST COMPLETE: CPT

## 2018-07-18 PROCEDURE — G0279 TOMOSYNTHESIS, MAMMO: HCPCS

## 2018-07-18 PROCEDURE — 77065 DX MAMMO INCL CAD UNI: CPT

## 2018-08-30 ENCOUNTER — PREP FOR SURGERY (OUTPATIENT)
Dept: OTHER | Facility: HOSPITAL | Age: 83
End: 2018-08-30

## 2018-08-30 ENCOUNTER — OFFICE VISIT (OUTPATIENT)
Dept: GASTROENTEROLOGY | Facility: CLINIC | Age: 83
End: 2018-08-30

## 2018-08-30 VITALS
HEART RATE: 62 BPM | HEIGHT: 64 IN | RESPIRATION RATE: 18 BRPM | TEMPERATURE: 97 F | SYSTOLIC BLOOD PRESSURE: 147 MMHG | WEIGHT: 140 LBS | DIASTOLIC BLOOD PRESSURE: 61 MMHG | BODY MASS INDEX: 23.9 KG/M2

## 2018-08-30 DIAGNOSIS — R10.13 EPIGASTRIC PAIN: Chronic | ICD-10-CM

## 2018-08-30 DIAGNOSIS — R12 HEARTBURN: ICD-10-CM

## 2018-08-30 DIAGNOSIS — R10.13 EPIGASTRIC PAIN: ICD-10-CM

## 2018-08-30 DIAGNOSIS — R13.10 DYSPHAGIA, UNSPECIFIED TYPE: Primary | Chronic | ICD-10-CM

## 2018-08-30 DIAGNOSIS — R12 HEARTBURN: Chronic | ICD-10-CM

## 2018-08-30 DIAGNOSIS — R13.10 DYSPHAGIA, UNSPECIFIED TYPE: Primary | ICD-10-CM

## 2018-08-30 PROBLEM — R11.0 NAUSEA: Status: RESOLVED | Noted: 2017-01-25 | Resolved: 2018-08-30

## 2018-08-30 PROBLEM — N18.9 CHRONIC KIDNEY DISEASE: Status: ACTIVE | Noted: 2017-04-11

## 2018-08-30 PROBLEM — K22.2 STRICTURE OF ESOPHAGUS: Status: ACTIVE | Noted: 2017-04-11

## 2018-08-30 PROBLEM — R30.0 DIFFICULT OR PAINFUL URINATION: Status: ACTIVE | Noted: 2018-03-28

## 2018-08-30 PROBLEM — M19.90 OSTEOARTHRITIS: Status: ACTIVE | Noted: 2017-05-24

## 2018-08-30 PROBLEM — M85.80 OSTEOPENIA: Status: ACTIVE | Noted: 2017-04-11

## 2018-08-30 PROBLEM — H35.30 MACULAR DEGENERATION: Status: ACTIVE | Noted: 2017-10-05

## 2018-08-30 PROBLEM — J30.9 ALLERGIC RHINITIS: Status: ACTIVE | Noted: 2017-10-05

## 2018-08-30 PROCEDURE — 99214 OFFICE O/P EST MOD 30 MIN: CPT | Performed by: NURSE PRACTITIONER

## 2018-08-30 RX ORDER — SODIUM CHLORIDE 9 MG/ML
70 INJECTION, SOLUTION INTRAVENOUS CONTINUOUS PRN
Status: CANCELLED | OUTPATIENT
Start: 2018-08-30

## 2018-08-30 RX ORDER — RANITIDINE 150 MG/1
300 TABLET ORAL 2 TIMES DAILY
COMMUNITY
End: 2018-09-21 | Stop reason: HOSPADM

## 2018-08-30 NOTE — PATIENT INSTRUCTIONS
1. Antireflux measures: Avoid fried, fatty foods, alcohol, chocolate, coffee, tea,  soft drinks, peppermint and spearmint, spicy foods, tomatoes and tomato based foods, onion based foods, and smoking. Other antireflux measures include weight reduction if overweight, avoiding tight clothing around the abdomen, elevating the head of the bed 6 inches with blocks under the head board, and don't drink or eat before going to bed and avoid lying down immediately after meals.  2. Ranitidine 150 mg 1 po twice a day.  3. Dietary instructions.  The patient should eat relatively soft diet, and should eat in upright position and chew well.  The patient should drink water after 2-3 bites and take medications with liberal amounts of water.  Generally medications that have a potential to cause pill esophagitis may be avoided or used in an alternative form (for example if the patient needs potassium it may be used in a liquid rather than pill form).  Furthermore after eating and taking medications, the patient should remain in upright position for 5-10 minutes.  4. Upper endoscopy-EGD: Description of the procedure, risks, benefits, alternatives and options, including nonoperative options, were discussed with the patient in detail. The patient understands and wishes to proceed.

## 2018-08-30 NOTE — PROGRESS NOTES
Chief Complaint   Patient presents with   • Heartburn   • Difficulty Swallowing     There is a history of difficulty swallowing starting in May 2018. The patient may have difficulty swallowing once per week. The patient has difficulty swallowing solids and liquids. She has an irritation in the back of her throat. She does have sinus drainage at times.     There is a long-standing history of heartburn. Heartburn is under reasonable control with Ranitidine. The patient denies regurgitative symptoms. Heartburn is mild. Heartburn does wake her up at night. The patient may have heartburn 2-3 times per month.  The patient tried taking PPI therapy in the past, but discontinued this due to abdominal cramping.    There is a history of epigastric pain starting in May 2018. The patient has the pain every day. The pain is mild and is cramping. Eating makes the pain better at times and worse at times. She has been taking Ranitidine with mild improvement of the pain.    There is no history of constipation or diarrhea. The patient denies bright red blood per rectum or melena. The patient's last colonoscopy was in 2012. The patient's grandmother may have had colon cancer, but is unsure.    Difficulty Swallowing   This is a recurrent problem. Episode onset: May 2018. The problem occurs intermittently. The problem has been unchanged. Associated symptoms include abdominal pain, arthralgias and a sore throat. Pertinent negatives include no chest pain, chills, coughing, fatigue, fever, headaches, joint swelling, myalgias, nausea, rash or vomiting. The symptoms are aggravated by drinking and eating. She has tried nothing for the symptoms.   Heartburn   She complains of abdominal pain, heartburn and a sore throat. She reports no chest pain, no coughing or no nausea. This is a chronic problem. Episode onset: over 20 years. The problem occurs occasionally. The problem has been unchanged. The heartburn duration is an hour. The heartburn is  located in the substernum. The heartburn is of mild intensity. The heartburn wakes her from sleep. Nothing aggravates the symptoms. Pertinent negatives include no fatigue. There are no known risk factors. She has tried a histamine-2 antagonist for the symptoms. The treatment provided significant relief.   Abdominal Pain   This is a recurrent problem. Episode onset: May 2018. The onset quality is gradual. The problem occurs daily. The problem has been unchanged. The pain is located in the epigastric region. The pain is mild. The quality of the pain is cramping. The abdominal pain does not radiate. Associated symptoms include arthralgias and diarrhea. Pertinent negatives include no constipation, dysuria, fever, headaches, hematuria, myalgias, nausea or vomiting. The pain is aggravated by eating. The pain is relieved by eating. She has tried H2 blockers for the symptoms. The treatment provided mild relief. Her past medical history is significant for GERD.     Review of Systems   Constitutional: Negative for appetite change, chills, fatigue, fever and unexpected weight change.   HENT: Positive for sore throat and trouble swallowing. Negative for mouth sores and nosebleeds.    Eyes: Negative for discharge and redness.   Respiratory: Negative for apnea, cough and shortness of breath.    Cardiovascular: Negative for chest pain, palpitations and leg swelling.   Gastrointestinal: Positive for abdominal pain, diarrhea and heartburn. Negative for abdominal distention, anal bleeding, blood in stool, constipation, nausea and vomiting.   Endocrine: Negative for cold intolerance, heat intolerance and polydipsia.   Genitourinary: Negative for dysuria, hematuria and urgency.   Musculoskeletal: Positive for arthralgias. Negative for joint swelling and myalgias.   Skin: Negative for rash.   Allergic/Immunologic: Negative for food allergies and immunocompromised state.   Neurological: Negative for dizziness, seizures, syncope and  headaches.   Hematological: Negative for adenopathy. Does not bruise/bleed easily.   Psychiatric/Behavioral: Negative for dysphoric mood. The patient is not nervous/anxious and is not hyperactive.      Patient Active Problem List   Diagnosis   • Abdominal pain   • Back ache   • Chronic gastritis   • Hyperlipidemia   • Hypertensive disorder   • Chronic nausea   • Controlled diabetes mellitus (CMS/HCC)   • Dysphagia   • Epigastric pain   • Allergic rhinitis   • Chronic kidney disease   • Macular degeneration   • Difficult or painful urination   • Fibrocystic disease of breast   • Gout   • Lumbosacral radiculopathy   • Osteoarthritis   • Osteopenia   • Prediabetes   • Stricture of esophagus   • Toxic multinodular goiter with no crisis   • Heartburn     Past Medical History:   Diagnosis Date   • Back pain    • Cataract     PT REPORTS EARLY STAGES   • Cystitis    • Diabetes mellitus (CMS/HCC)     PRE DIABETIC   • History of colonic polyps    • Hypercholesterolemia    • Hypertension    • Sinusitis    • Urinary tract infection    • Vertigo      Past Surgical History:   Procedure Laterality Date   • BACK SURGERY      REPORTS A SPUR WAS REMOVED THAT WAS HITTING SCIATIC NERVE   • BREAST SURGERY Left     LUMPECTOMY, BENIGN    • COLONOSCOPY  2012   • DILATATION AND CURETTAGE     • ENDOSCOPY N/A 1/31/2017    Procedure: ESOPHAGOGASTRODUODENOSCOPY;  Surgeon: Robbie Quezada MD;  Location: [facility] ENDOSCOPY;  Service:    • GALLBLADDER SURGERY  2009   • HEMORRHOIDECTOMY  1973   • TONSILLECTOMY  1946   • UPPER GASTROINTESTINAL ENDOSCOPY  2014   • UPPER GASTROINTESTINAL ENDOSCOPY  01/31/2017     Family History   Problem Relation Age of Onset   • Colon cancer Paternal Grandmother         possibly colon cancer   • Diabetes Mother    • Cancer Brother         lung   • Cancer Brother         brain     Social History   Substance Use Topics   • Smoking status: Never Smoker   • Smokeless tobacco: Never Used   • Alcohol use No       Current  "Outpatient Prescriptions:   •  raNITIdine (ZANTAC) 150 MG tablet, Take 150 mg by mouth 2 (Two) Times a Day., Disp: , Rfl:   •  acetaminophen (TYLENOL) 325 MG tablet, Take 325 mg by mouth Every 6 (Six) Hours As Needed for mild pain (1-3) or fever., Disp: , Rfl:   •  cholecalciferol (VITAMIN D3) 1000 UNITS tablet, Take 1,000 Units by mouth Daily., Disp: , Rfl:   •  CRANBERRY EXTRACT PO, Take 1 tablet by mouth Daily., Disp: , Rfl:   •  estradiol (ESTRACE) 0.5 MG tablet, Take 0.5 mg by mouth 2 (Two) Times a Week., Disp: , Rfl:   •  fexofenadine (ALLEGRA) 180 MG tablet, Take 180 mg by mouth Daily., Disp: , Rfl:   •  fluticasone (FLONASE) 50 MCG/ACT nasal spray, 1 spray into each nostril 2 (Two) Times a Day., Disp: , Rfl:   •  hydrochlorothiazide (MICROZIDE) 12.5 MG capsule, Take 12.5 mg by mouth Daily., Disp: , Rfl:   •  methIMAzole (TAPAZOLE) 5 MG tablet, Take 5 mg by mouth Take As Directed. PT REPORTS SHE TAKES THIS Monday THROUGH Friday AND SKIPS THE WEEKEND, Disp: , Rfl:   •  metoprolol succinate XL (TOPROL-XL) 50 MG 24 hr tablet, Take 50 mg by mouth Daily., Disp: , Rfl:   •  Multiple Vitamins-Minerals (OCUVITE ADULT 50+ PO), Take 1 tablet by mouth Daily., Disp: , Rfl:   •  Propylene Glycol (SYSTANE BALANCE OP), Apply 1 drop to eye 2 (Two) Times a Day As Needed (REPORTS SHE USES FOR DRY EYE AS NEEDED)., Disp: , Rfl:   •  vitamin B-12 (CYANOCOBALAMIN) 1000 MCG tablet, Take 1,000 mcg by mouth Daily., Disp: , Rfl:     Allergies   Allergen Reactions   • Codeine Nausea Only   • Escitalopram Nausea Only   • Gabapentin Nausea Only   • Ibuprofen Nausea Only   • Levofloxacin Nausea Only   • Lexapro [Escitalopram Oxalate]    • Naproxen Nausea Only   • Nitrofurantoin Nausea Only   • Sulfa Antibiotics Nausea Only     /61   Pulse 62   Temp 97 °F (36.1 °C)   Resp 18   Ht 162.6 cm (64.02\")   Wt 63.5 kg (140 lb)   BMI 24.02 kg/m²     Physical Exam   Constitutional: She is oriented to person, place, and time. She appears " well-developed and well-nourished. No distress.   HENT:   Head: Normocephalic and atraumatic.   Right Ear: Hearing and external ear normal.   Left Ear: Hearing and external ear normal.   Nose: Nose normal.   Mouth/Throat: Oropharynx is clear and moist and mucous membranes are normal. Mucous membranes are not pale, not dry and not cyanotic. No oral lesions. No oropharyngeal exudate.   Eyes: Conjunctivae and EOM are normal. Right eye exhibits no discharge. Left eye exhibits no discharge.   Neck: Trachea normal. Neck supple. No JVD present. No edema present. No thyroid mass and no thyromegaly present.   Cardiovascular: Normal rate, regular rhythm, S2 normal and normal heart sounds.  Exam reveals no gallop, no S3 and no friction rub.    No murmur heard.  Pulmonary/Chest: Effort normal and breath sounds normal. No respiratory distress. She exhibits no tenderness.   Abdominal: Normal appearance and bowel sounds are normal. She exhibits no distension, no ascites and no mass. There is no splenomegaly or hepatomegaly. There is tenderness (mild) in the epigastric area. There is no rigidity, no rebound and no guarding. No hernia.     Vascular Status -  Her right foot exhibits no edema. Her left foot exhibits no edema.  Lymphadenopathy:     She has no cervical adenopathy.        Left: No supraclavicular adenopathy present.   Neurological: She is alert and oriented to person, place, and time. She has normal strength. No cranial nerve deficit or sensory deficit.   Skin: No rash noted. She is not diaphoretic. No cyanosis. No pallor. Nails show no clubbing.   Psychiatric: She has a normal mood and affect.   Nursing note and vitals reviewed.  Stigmata of chronic liver disease:  None.  Asterixis:  None.    Laboratory Testing:  Upon review of medical records:     Dated December 15, 2015 sodium 141 potassium 3.7 chloride 101 CO2 30 BUN 14 creatinine 0.9 and glucose 115. White count 7.6 hemoglobin 12.4 hematocrit 38 and a platelet count  273.     Procedures:    Upon review of medical records:     Dated January 31, 2017 the patient underwent an upper endoscopy which revealed distal esophageal stricture which was serially dilated to 18 mm. A small sliding hiatal hernia less than 3 cm, erosive distal esophagitis-LA class a, 2 small early mid esophageal diverticula and erosive gastritis. Significant tortuosity and tertiary contractions of esophageal body were noted. Biopsies from the mid and distal esophageal revealed reactive mucosal changes consistent with reflux. No specialized mucosa. No increased eosinophils were noted. Gastric biopsies from the antrum body and angularis were negative for Helicobacter pylori. Biopsies from the second portion of duodenum were negative for celiac disease.    Assessment:      ICD-10-CM ICD-9-CM   1. Dysphagia, unspecified type R13.10 787.20   2. Heartburn R12 787.1   3. Epigastric pain R10.13 789.06     Discussion:  1. History of recurrent difficulty swallowing. Differentials include Schatzki's ring, esophagitis, esophageal dysmotility. Of interest, the patient has a history of esophageal ring with dilation in 2017.  2. On standing history of heartburn.  Mild improvement with ranitidine.  Concerns for underlying Valdes's.  Of interest, patient could not tolerate PPI therapy in the past due to abdominal cramping.  3. History of recurrent epigastric pain.  Differentials include peptic ulcer disease, pancreatobiliary disease.    Plan/  Patient Instructions   1. Antireflux measures: Avoid fried, fatty foods, alcohol, chocolate, coffee, tea,  soft drinks, peppermint and spearmint, spicy foods, tomatoes and tomato based foods, onion based foods, and smoking. Other antireflux measures include weight reduction if overweight, avoiding tight clothing around the abdomen, elevating the head of the bed 6 inches with blocks under the head board, and don't drink or eat before going to bed and avoid lying down immediately after  meals.  2. Ranitidine 150 mg 1 po twice a day.  3. Dietary instructions.  The patient should eat relatively soft diet, and should eat in upright position and chew well.  The patient should drink water after 2-3 bites and take medications with liberal amounts of water.  Generally medications that have a potential to cause pill esophagitis may be avoided or used in an alternative form (for example if the patient needs potassium it may be used in a liquid rather than pill form).  Furthermore after eating and taking medications, the patient should remain in upright position for 5-10 minutes.  4. Upper endoscopy-EGD: Description of the procedure, risks, benefits, alternatives and options, including nonoperative options, were discussed with the patient in detail. The patient understands and wishes to proceed.     Jack Murdock, JOHAN

## 2018-09-10 ENCOUNTER — TRANSCRIBE ORDERS (OUTPATIENT)
Dept: ADMINISTRATIVE | Facility: HOSPITAL | Age: 83
End: 2018-09-10

## 2018-09-10 DIAGNOSIS — Z09 FOLLOW-UP EXAM, 3-6 MONTHS SINCE PREVIOUS EXAM: Primary | ICD-10-CM

## 2018-09-20 ENCOUNTER — ANESTHESIA EVENT (OUTPATIENT)
Dept: GASTROENTEROLOGY | Facility: HOSPITAL | Age: 83
End: 2018-09-20

## 2018-09-20 RX ORDER — SENNOSIDES 8.6 MG
650 CAPSULE ORAL 2 TIMES DAILY
COMMUNITY

## 2018-09-20 RX ORDER — LORATADINE 10 MG/1
10 TABLET ORAL DAILY
COMMUNITY
End: 2021-08-09

## 2018-09-20 NOTE — ANESTHESIA PREPROCEDURE EVALUATION
Anesthesia Evaluation     Patient summary reviewed and Nursing notes reviewed   history of anesthetic complications (h/o dental injury with previous anesthesia. chipped lower molar. patient does not recall if this was from general anesthesia , also hx PDPH):               Airway   Mallampati: I  TM distance: <3 FB  Neck ROM: full  possible difficult intubation  Dental - normal exam     Pulmonary - negative pulmonary ROS and normal exam    breath sounds clear to auscultation  (-) not a smoker  Cardiovascular - normal exam  Exercise tolerance: good (4-7 METS)    ECG reviewed  Patient on routine beta blocker and Beta blocker given within 24 hours of surgery  Rhythm: regular  Rate: normal    (+) hypertension well controlled, PVD (B/L LE swelling),     ROS comment: Dyslipidemia    Neuro/Psych  (+) dizziness/light headedness (Vertigo secondary to inner ear),     GI/Hepatic/Renal/Endo    (+)  GERD well controlled,  renal disease CRI, diabetes mellitus (FSBS 96.. Pre-diabetic. Diet controlled) well controlled,     Musculoskeletal     (+) arthralgias, back pain,   Abdominal  - normal exam    Abdomen: soft.  Bowel sounds: normal.   Substance History      OB/GYN negative ob/gyn ROS         Other   (+) arthritis                       Anesthesia Plan    ASA 3     MAC   (Pt told that intravenous sedation will be used as the primary anesthetic. Every effort will be made to make sure the patient is comfortable.     The patient was told that they may experience recall for the procedure. Risks and benefits discussed including risk of aspiration and dental damage. All patient questions answered. Pt verbalized understanding and agrees to plan of care.)  intravenous induction   Anesthetic plan, all risks, benefits, and alternatives have been provided, discussed and informed consent has been obtained with: patient.

## 2018-09-21 ENCOUNTER — ANESTHESIA (OUTPATIENT)
Dept: GASTROENTEROLOGY | Facility: HOSPITAL | Age: 83
End: 2018-09-21

## 2018-09-21 ENCOUNTER — HOSPITAL ENCOUNTER (OUTPATIENT)
Facility: HOSPITAL | Age: 83
Setting detail: HOSPITAL OUTPATIENT SURGERY
Discharge: HOME | End: 2018-09-21
Attending: INTERNAL MEDICINE | Admitting: INTERNAL MEDICINE

## 2018-09-21 VITALS
DIASTOLIC BLOOD PRESSURE: 68 MMHG | WEIGHT: 140 LBS | OXYGEN SATURATION: 98 % | HEIGHT: 65 IN | SYSTOLIC BLOOD PRESSURE: 138 MMHG | HEART RATE: 75 BPM | TEMPERATURE: 97 F | BODY MASS INDEX: 23.32 KG/M2 | RESPIRATION RATE: 18 BRPM

## 2018-09-21 DIAGNOSIS — R13.10 DYSPHAGIA, UNSPECIFIED TYPE: ICD-10-CM

## 2018-09-21 DIAGNOSIS — R12 HEARTBURN: ICD-10-CM

## 2018-09-21 DIAGNOSIS — R10.13 EPIGASTRIC PAIN: ICD-10-CM

## 2018-09-21 LAB — GLUCOSE BLDC GLUCOMTR-MCNC: 106 MG/DL (ref 70–130)

## 2018-09-21 PROCEDURE — C1726 CATH, BAL DIL, NON-VASCULAR: HCPCS | Performed by: INTERNAL MEDICINE

## 2018-09-21 PROCEDURE — 43239 EGD BIOPSY SINGLE/MULTIPLE: CPT | Performed by: INTERNAL MEDICINE

## 2018-09-21 PROCEDURE — 43249 ESOPH EGD DILATION <30 MM: CPT | Performed by: INTERNAL MEDICINE

## 2018-09-21 PROCEDURE — 82962 GLUCOSE BLOOD TEST: CPT

## 2018-09-21 PROCEDURE — 25010000002 PROPOFOL 200 MG/20ML EMULSION: Performed by: NURSE ANESTHETIST, CERTIFIED REGISTERED

## 2018-09-21 RX ORDER — PROPOFOL 10 MG/ML
INJECTION, EMULSION INTRAVENOUS AS NEEDED
Status: DISCONTINUED | OUTPATIENT
Start: 2018-09-21 | End: 2018-09-21 | Stop reason: SURG

## 2018-09-21 RX ORDER — SODIUM CHLORIDE 0.9 % (FLUSH) 0.9 %
3 SYRINGE (ML) INJECTION AS NEEDED
Status: DISCONTINUED | OUTPATIENT
Start: 2018-09-21 | End: 2018-09-21 | Stop reason: HOSPADM

## 2018-09-21 RX ORDER — SODIUM CHLORIDE 9 MG/ML
70 INJECTION, SOLUTION INTRAVENOUS CONTINUOUS PRN
Status: DISCONTINUED | OUTPATIENT
Start: 2018-09-21 | End: 2018-09-21 | Stop reason: HOSPADM

## 2018-09-21 RX ORDER — PANTOPRAZOLE SODIUM 40 MG/10ML
40 INJECTION, POWDER, LYOPHILIZED, FOR SOLUTION INTRAVENOUS ONCE
Status: COMPLETED | OUTPATIENT
Start: 2018-09-21 | End: 2018-09-21

## 2018-09-21 RX ORDER — MAGNESIUM HYDROXIDE 1200 MG/15ML
LIQUID ORAL AS NEEDED
Status: DISCONTINUED | OUTPATIENT
Start: 2018-09-21 | End: 2018-09-21 | Stop reason: HOSPADM

## 2018-09-21 RX ADMIN — PANTOPRAZOLE SODIUM 40 MG: 40 INJECTION, POWDER, FOR SOLUTION INTRAVENOUS at 09:30

## 2018-09-21 RX ADMIN — PROPOFOL 50 MG: 10 INJECTION, EMULSION INTRAVENOUS at 08:47

## 2018-09-21 RX ADMIN — PROPOFOL 50 MG: 10 INJECTION, EMULSION INTRAVENOUS at 08:43

## 2018-09-21 RX ADMIN — SODIUM CHLORIDE 70 ML/HR: 9 INJECTION, SOLUTION INTRAVENOUS at 07:20

## 2018-09-21 RX ADMIN — LIDOCAINE HYDROCHLORIDE 50 MG: 20 INJECTION, SOLUTION INTRAVENOUS at 08:35

## 2018-09-21 RX ADMIN — PROPOFOL 50 MG: 10 INJECTION, EMULSION INTRAVENOUS at 08:35

## 2018-09-21 NOTE — INTERVAL H&P NOTE
"H&P reviewed. The patient was examined and there are no changes to the H&P.       No recent shortness of breath or chest pains.      Blood pressure 147/72, pulse 61, temperature 97.4 °F (36.3 °C), temperature source Temporal Artery , resp. rate 16, height 163.8 cm (64.5\"), weight 63.5 kg (140 lb), SpO2 98 %, not currently breastfeeding.    Chest: clear to auscultation.  Cardiac exam: No S3 no murmurs.   Abdomen: soft bowel sounds present nondistended nontender.        "

## 2018-09-21 NOTE — H&P (VIEW-ONLY)
Chief Complaint   Patient presents with   • Heartburn   • Difficulty Swallowing     There is a history of difficulty swallowing starting in May 2018. The patient may have difficulty swallowing once per week. The patient has difficulty swallowing solids and liquids. She has an irritation in the back of her throat. She does have sinus drainage at times.     There is a long-standing history of heartburn. Heartburn is under reasonable control with Ranitidine. The patient denies regurgitative symptoms. Heartburn is mild. Heartburn does wake her up at night. The patient may have heartburn 2-3 times per month.  The patient tried taking PPI therapy in the past, but discontinued this due to abdominal cramping.    There is a history of epigastric pain starting in May 2018. The patient has the pain every day. The pain is mild and is cramping. Eating makes the pain better at times and worse at times. She has been taking Ranitidine with mild improvement of the pain.    There is no history of constipation or diarrhea. The patient denies bright red blood per rectum or melena. The patient's last colonoscopy was in 2012. The patient's grandmother may have had colon cancer, but is unsure.    Difficulty Swallowing   This is a recurrent problem. Episode onset: May 2018. The problem occurs intermittently. The problem has been unchanged. Associated symptoms include abdominal pain, arthralgias and a sore throat. Pertinent negatives include no chest pain, chills, coughing, fatigue, fever, headaches, joint swelling, myalgias, nausea, rash or vomiting. The symptoms are aggravated by drinking and eating. She has tried nothing for the symptoms.   Heartburn   She complains of abdominal pain, heartburn and a sore throat. She reports no chest pain, no coughing or no nausea. This is a chronic problem. Episode onset: over 20 years. The problem occurs occasionally. The problem has been unchanged. The heartburn duration is an hour. The heartburn is  located in the substernum. The heartburn is of mild intensity. The heartburn wakes her from sleep. Nothing aggravates the symptoms. Pertinent negatives include no fatigue. There are no known risk factors. She has tried a histamine-2 antagonist for the symptoms. The treatment provided significant relief.   Abdominal Pain   This is a recurrent problem. Episode onset: May 2018. The onset quality is gradual. The problem occurs daily. The problem has been unchanged. The pain is located in the epigastric region. The pain is mild. The quality of the pain is cramping. The abdominal pain does not radiate. Associated symptoms include arthralgias and diarrhea. Pertinent negatives include no constipation, dysuria, fever, headaches, hematuria, myalgias, nausea or vomiting. The pain is aggravated by eating. The pain is relieved by eating. She has tried H2 blockers for the symptoms. The treatment provided mild relief. Her past medical history is significant for GERD.     Review of Systems   Constitutional: Negative for appetite change, chills, fatigue, fever and unexpected weight change.   HENT: Positive for sore throat and trouble swallowing. Negative for mouth sores and nosebleeds.    Eyes: Negative for discharge and redness.   Respiratory: Negative for apnea, cough and shortness of breath.    Cardiovascular: Negative for chest pain, palpitations and leg swelling.   Gastrointestinal: Positive for abdominal pain, diarrhea and heartburn. Negative for abdominal distention, anal bleeding, blood in stool, constipation, nausea and vomiting.   Endocrine: Negative for cold intolerance, heat intolerance and polydipsia.   Genitourinary: Negative for dysuria, hematuria and urgency.   Musculoskeletal: Positive for arthralgias. Negative for joint swelling and myalgias.   Skin: Negative for rash.   Allergic/Immunologic: Negative for food allergies and immunocompromised state.   Neurological: Negative for dizziness, seizures, syncope and  headaches.   Hematological: Negative for adenopathy. Does not bruise/bleed easily.   Psychiatric/Behavioral: Negative for dysphoric mood. The patient is not nervous/anxious and is not hyperactive.      Patient Active Problem List   Diagnosis   • Abdominal pain   • Back ache   • Chronic gastritis   • Hyperlipidemia   • Hypertensive disorder   • Chronic nausea   • Controlled diabetes mellitus (CMS/HCC)   • Dysphagia   • Epigastric pain   • Allergic rhinitis   • Chronic kidney disease   • Macular degeneration   • Difficult or painful urination   • Fibrocystic disease of breast   • Gout   • Lumbosacral radiculopathy   • Osteoarthritis   • Osteopenia   • Prediabetes   • Stricture of esophagus   • Toxic multinodular goiter with no crisis   • Heartburn     Past Medical History:   Diagnosis Date   • Back pain    • Cataract     PT REPORTS EARLY STAGES   • Cystitis    • Diabetes mellitus (CMS/HCC)     PRE DIABETIC   • History of colonic polyps    • Hypercholesterolemia    • Hypertension    • Sinusitis    • Urinary tract infection    • Vertigo      Past Surgical History:   Procedure Laterality Date   • BACK SURGERY      REPORTS A SPUR WAS REMOVED THAT WAS HITTING SCIATIC NERVE   • BREAST SURGERY Left     LUMPECTOMY, BENIGN    • COLONOSCOPY  2012   • DILATATION AND CURETTAGE     • ENDOSCOPY N/A 1/31/2017    Procedure: ESOPHAGOGASTRODUODENOSCOPY;  Surgeon: Robbie Quezada MD;  Location: [facility] ENDOSCOPY;  Service:    • GALLBLADDER SURGERY  2009   • HEMORRHOIDECTOMY  1973   • TONSILLECTOMY  1946   • UPPER GASTROINTESTINAL ENDOSCOPY  2014   • UPPER GASTROINTESTINAL ENDOSCOPY  01/31/2017     Family History   Problem Relation Age of Onset   • Colon cancer Paternal Grandmother         possibly colon cancer   • Diabetes Mother    • Cancer Brother         lung   • Cancer Brother         brain     Social History   Substance Use Topics   • Smoking status: Never Smoker   • Smokeless tobacco: Never Used   • Alcohol use No       Current  "Outpatient Prescriptions:   •  raNITIdine (ZANTAC) 150 MG tablet, Take 150 mg by mouth 2 (Two) Times a Day., Disp: , Rfl:   •  acetaminophen (TYLENOL) 325 MG tablet, Take 325 mg by mouth Every 6 (Six) Hours As Needed for mild pain (1-3) or fever., Disp: , Rfl:   •  cholecalciferol (VITAMIN D3) 1000 UNITS tablet, Take 1,000 Units by mouth Daily., Disp: , Rfl:   •  CRANBERRY EXTRACT PO, Take 1 tablet by mouth Daily., Disp: , Rfl:   •  estradiol (ESTRACE) 0.5 MG tablet, Take 0.5 mg by mouth 2 (Two) Times a Week., Disp: , Rfl:   •  fexofenadine (ALLEGRA) 180 MG tablet, Take 180 mg by mouth Daily., Disp: , Rfl:   •  fluticasone (FLONASE) 50 MCG/ACT nasal spray, 1 spray into each nostril 2 (Two) Times a Day., Disp: , Rfl:   •  hydrochlorothiazide (MICROZIDE) 12.5 MG capsule, Take 12.5 mg by mouth Daily., Disp: , Rfl:   •  methIMAzole (TAPAZOLE) 5 MG tablet, Take 5 mg by mouth Take As Directed. PT REPORTS SHE TAKES THIS Monday THROUGH Friday AND SKIPS THE WEEKEND, Disp: , Rfl:   •  metoprolol succinate XL (TOPROL-XL) 50 MG 24 hr tablet, Take 50 mg by mouth Daily., Disp: , Rfl:   •  Multiple Vitamins-Minerals (OCUVITE ADULT 50+ PO), Take 1 tablet by mouth Daily., Disp: , Rfl:   •  Propylene Glycol (SYSTANE BALANCE OP), Apply 1 drop to eye 2 (Two) Times a Day As Needed (REPORTS SHE USES FOR DRY EYE AS NEEDED)., Disp: , Rfl:   •  vitamin B-12 (CYANOCOBALAMIN) 1000 MCG tablet, Take 1,000 mcg by mouth Daily., Disp: , Rfl:     Allergies   Allergen Reactions   • Codeine Nausea Only   • Escitalopram Nausea Only   • Gabapentin Nausea Only   • Ibuprofen Nausea Only   • Levofloxacin Nausea Only   • Lexapro [Escitalopram Oxalate]    • Naproxen Nausea Only   • Nitrofurantoin Nausea Only   • Sulfa Antibiotics Nausea Only     /61   Pulse 62   Temp 97 °F (36.1 °C)   Resp 18   Ht 162.6 cm (64.02\")   Wt 63.5 kg (140 lb)   BMI 24.02 kg/m²     Physical Exam   Constitutional: She is oriented to person, place, and time. She appears " well-developed and well-nourished. No distress.   HENT:   Head: Normocephalic and atraumatic.   Right Ear: Hearing and external ear normal.   Left Ear: Hearing and external ear normal.   Nose: Nose normal.   Mouth/Throat: Oropharynx is clear and moist and mucous membranes are normal. Mucous membranes are not pale, not dry and not cyanotic. No oral lesions. No oropharyngeal exudate.   Eyes: Conjunctivae and EOM are normal. Right eye exhibits no discharge. Left eye exhibits no discharge.   Neck: Trachea normal. Neck supple. No JVD present. No edema present. No thyroid mass and no thyromegaly present.   Cardiovascular: Normal rate, regular rhythm, S2 normal and normal heart sounds.  Exam reveals no gallop, no S3 and no friction rub.    No murmur heard.  Pulmonary/Chest: Effort normal and breath sounds normal. No respiratory distress. She exhibits no tenderness.   Abdominal: Normal appearance and bowel sounds are normal. She exhibits no distension, no ascites and no mass. There is no splenomegaly or hepatomegaly. There is tenderness (mild) in the epigastric area. There is no rigidity, no rebound and no guarding. No hernia.     Vascular Status -  Her right foot exhibits no edema. Her left foot exhibits no edema.  Lymphadenopathy:     She has no cervical adenopathy.        Left: No supraclavicular adenopathy present.   Neurological: She is alert and oriented to person, place, and time. She has normal strength. No cranial nerve deficit or sensory deficit.   Skin: No rash noted. She is not diaphoretic. No cyanosis. No pallor. Nails show no clubbing.   Psychiatric: She has a normal mood and affect.   Nursing note and vitals reviewed.  Stigmata of chronic liver disease:  None.  Asterixis:  None.    Laboratory Testing:  Upon review of medical records:     Dated December 15, 2015 sodium 141 potassium 3.7 chloride 101 CO2 30 BUN 14 creatinine 0.9 and glucose 115. White count 7.6 hemoglobin 12.4 hematocrit 38 and a platelet count  273.     Procedures:    Upon review of medical records:     Dated January 31, 2017 the patient underwent an upper endoscopy which revealed distal esophageal stricture which was serially dilated to 18 mm. A small sliding hiatal hernia less than 3 cm, erosive distal esophagitis-LA class a, 2 small early mid esophageal diverticula and erosive gastritis. Significant tortuosity and tertiary contractions of esophageal body were noted. Biopsies from the mid and distal esophageal revealed reactive mucosal changes consistent with reflux. No specialized mucosa. No increased eosinophils were noted. Gastric biopsies from the antrum body and angularis were negative for Helicobacter pylori. Biopsies from the second portion of duodenum were negative for celiac disease.    Assessment:      ICD-10-CM ICD-9-CM   1. Dysphagia, unspecified type R13.10 787.20   2. Heartburn R12 787.1   3. Epigastric pain R10.13 789.06     Discussion:  1. History of recurrent difficulty swallowing. Differentials include Schatzki's ring, esophagitis, esophageal dysmotility. Of interest, the patient has a history of esophageal ring with dilation in 2017.  2. On standing history of heartburn.  Mild improvement with ranitidine.  Concerns for underlying Valdes's.  Of interest, patient could not tolerate PPI therapy in the past due to abdominal cramping.  3. History of recurrent epigastric pain.  Differentials include peptic ulcer disease, pancreatobiliary disease.    Plan/  Patient Instructions   1. Antireflux measures: Avoid fried, fatty foods, alcohol, chocolate, coffee, tea,  soft drinks, peppermint and spearmint, spicy foods, tomatoes and tomato based foods, onion based foods, and smoking. Other antireflux measures include weight reduction if overweight, avoiding tight clothing around the abdomen, elevating the head of the bed 6 inches with blocks under the head board, and don't drink or eat before going to bed and avoid lying down immediately after  meals.  2. Ranitidine 150 mg 1 po twice a day.  3. Dietary instructions.  The patient should eat relatively soft diet, and should eat in upright position and chew well.  The patient should drink water after 2-3 bites and take medications with liberal amounts of water.  Generally medications that have a potential to cause pill esophagitis may be avoided or used in an alternative form (for example if the patient needs potassium it may be used in a liquid rather than pill form).  Furthermore after eating and taking medications, the patient should remain in upright position for 5-10 minutes.  4. Upper endoscopy-EGD: Description of the procedure, risks, benefits, alternatives and options, including nonoperative options, were discussed with the patient in detail. The patient understands and wishes to proceed.     Jack Murdock, JOHAN

## 2018-09-21 NOTE — ANESTHESIA POSTPROCEDURE EVALUATION
Patient: Addie Perez    Procedure Summary     Date:  09/21/18 Room / Location:  [facility] ENDOSCOPY 2 / [facility] ENDOSCOPY    Anesthesia Start:  0829 Anesthesia Stop:  0851    Procedure:  ESOPHAGOGASTRODUODENOSCOPY WITH BIOPSY, DILITATION (N/A Esophagus) Diagnosis:       Heartburn      Epigastric pain      Dysphagia, unspecified type      (Heartburn [R12])      (Epigastric pain [R10.13])      (Dysphagia, unspecified type [R13.10])    Surgeon:  Robbie Quezada MD Provider:  Charly Terry CRNA    Anesthesia Type:  MAC ASA Status:  3          Anesthesia Type: MAC  Last vitals  BP   138/68 (09/21/18 0936)   Temp   97 °F (36.1 °C) (09/21/18 0858)   Pulse   75 (09/21/18 0936)   Resp   18 (09/21/18 0936)     SpO2   98 % (09/21/18 0936)     Post Anesthesia Care and Evaluation    Patient location during evaluation: PHASE II  Patient participation: complete - patient participated  Level of consciousness: awake and awake and alert  Pain management: satisfactory to patient  Airway patency: patent  Anesthetic complications: No anesthetic complications  PONV Status: none  Cardiovascular status: acceptable  Respiratory status: acceptable and room air  Hydration status: acceptable

## 2018-09-21 NOTE — OP NOTE
PROCEDURE:  Upper Endoscopy with serial dilation of the distal esophagus using 15-16.5-18 mm CRE balloon to 18 mm.    DATE OF PROCEDURE: September 21, 2018.    REFERRING PROVIDER:  Gary Oneil MD.     INSTRUMENT:   Olympus GIF H 190 video endoscope     INDICATIONS OF THE PROCEDURE:         BIOPSIES: Second portion of duodenum.  Gastric antrum, body and fundus.       MEDICATIONS:  MAC.     PHOTOGRAPHS:  Photographs were included in the medical records.     CONSENT/PREPROCEDURE EVALUATION:  Risks, benefits, alternatives and options of the procedure including risks of anesthesia/sedation were discussed and informed consent was obtained prior to the procedure. History and physical examination were performed and nothing precluded the test.     REPORT:  The patient was placed in left lateral decubitus position. Once under the influence of IV sedation, the instrument was inserted into the mouth and esophagus was intubated under direct vision without difficulty.     Esophagus:  Z line was noted to be around  37 cm.  Erythematous distal esophagitis was seen. Schatzki's-type ring was noted.  A small sliding hiatal hernia less than 3 cm was noted.  No Valdes's esophagus was seen.     Stomach:  Antrum:  Erythematous-erosive gastritis.  Angulus, lesser and greater curves: Normal.  Retroflex examination: Sliding hiatal hernia.  Cardia and fundus:  Normal.     Body of the stomach: Erythematous-erosive gastritis.  Good distensibility of the stomach was achieved no giant folds were noted.   Biopsies were obtained from the gastric antrum,  body and fundus.    Pylorus and pyloric channel:  normal.     Duodenum:  Bulb: Normal.  Second portion: normal.  No scalloping was seen in the second portion of duodenum.  Biopsies were obtained from the second portion of duodenum.    Intervention:  Distal esophagus was dilated using 15-16.5-18 mm CRE balloon to 18 mm under vision without difficulty.  Some blood was noted no active  bleeding was seen.   The upper GI tract was decompressed and the scope was pulled out of the patient. The patient tolerated the procedure well.     DIAGNOSES:     1. Schatzki's-type ring. Status post serial dilation to 18 mm.  2. Small sliding hiatal hernia less than 3 cm.  3. Erythematous distal esophagitis.  4. Erythematous-erosive gastritis.  5. No Valdes's esophagus.      RECOMMENDATIONS:  1.  Dietary instructions.  2.  Nexium capsule (esomeprazole) 20 mg. Take one capsule orally in the morning half an hour before eating daily.  3.  Follow biopsies.  4.  Follow up in office.  5.  Discontinue ranitidine.     Thank you very much for letting me participate in the care of this patient. Please do not hesitate to call me if you have any questions.

## 2018-09-21 NOTE — DISCHARGE INSTRUCTIONS
Rest today  No pushing,pulling,tugging,heavy lifting, or strenuous activity   No major decision making,driving,or drinking alcoholic beverages for 24 hours due to the sedation you received  Always use good hand hygiene/washing technique  No driving on pain medication.    To assist you in voiding:  Drink plenty of fluids  Listen to running water while attempting to void.    If you are unable to urinate and you have an uncomfortable urge to void or it has been   6 hours since you were discharged, return to the Emergency Room.        Postprocedure instructions:  Nothing by mouth until fully alert.  Bedrest until fully alert.  Vital signs as routine.    Diet:   Nothing by mouth for 60 minutes, then if no chest pains the patient may have Clear liquids diet (No Sodas) for 6 hours.  May advance to soft diet in 6 hours if no chest pains, Fever or chills, nausea vomiting or bleeding. Avoid cold beverages.    The patient may eat in upright position, chew well, take small bites and take medications in upright position.   The patient should drink water after 3-4 bites, and liberally with medications.   The patient should remain upright for about 10 minutes after eating and taking oral medications.      Blood Thinner and other medications Directions:  Avoid Aspirin & other NSAIDS for 7 days.  Tylenol is okay.    Other instructions:  The patient should avoid medications that have a potential to cause pill esophagitis including potassium pills, tetracycline capsules, iron pills, NSAIDs and bisphosphonates.      Follow-up:    DR. SHAYNE SHELTON in 4 weeks.Office phone # [phone].        *********************************************************************************************************************************************************      Notes to the patient and the family from your Doctor.    Dear patient/family member,    Findings on today's procedure are as follows:    1. Schatzki's ring.  This was stretched. of the  stomach.   2. Gastritis. Inflammation of the stomach.   3. Small sliding hiatal hernia.  4. No cancer. No active ulcers.    Recommendations:    1. Nexium capsule (esomeprazole) 20 mg. Take one capsule orally in the morning half an hour before eating daily.  2. Discontinue Zantac.  3. Other instructions as above.      Should you have more questions please do not hesitate to talk to the nurse who can call me and let me talk to you.  I hope you feel better.    Robbie Quezada M.D., FACP, FACG.

## 2018-09-28 LAB
LAB AP CASE REPORT: NORMAL
PATH REPORT.FINAL DX SPEC: NORMAL

## 2018-10-17 ENCOUNTER — TELEPHONE (OUTPATIENT)
Dept: GASTROENTEROLOGY | Facility: CLINIC | Age: 83
End: 2018-10-17

## 2018-10-17 NOTE — TELEPHONE ENCOUNTER
Pt calls and asking for results of her path, expalined the gastritis and to take the nexium, is having trouble with her eyesight and cannot fine a  to bring her. Would like phone call about her proc and if any changes

## 2018-10-17 NOTE — TELEPHONE ENCOUNTER
Returned patient's call. Discussed results. She is feeling much better. She has not had further difficulty swallowing and heartburn has improved. She has made changes in her diet.  She is taking Nexium 20 mg. All questions answered. Patient wants to cancel appointment tomorrow and will call back to reschedule.

## 2018-11-20 ENCOUNTER — TRANSCRIBE ORDERS (OUTPATIENT)
Dept: ADMINISTRATIVE | Facility: HOSPITAL | Age: 83
End: 2018-11-20

## 2018-11-20 DIAGNOSIS — E04.2 NONTOXIC MULTINODULAR GOITER: Primary | ICD-10-CM

## 2018-11-26 ENCOUNTER — HOSPITAL ENCOUNTER (OUTPATIENT)
Dept: ULTRASOUND IMAGING | Facility: HOSPITAL | Age: 83
Discharge: HOME | End: 2018-11-26
Admitting: NURSE PRACTITIONER

## 2018-11-26 DIAGNOSIS — E04.2 NONTOXIC MULTINODULAR GOITER: ICD-10-CM

## 2018-11-26 PROCEDURE — 76536 US EXAM OF HEAD AND NECK: CPT

## 2019-01-14 ENCOUNTER — APPOINTMENT (OUTPATIENT)
Dept: ULTRASOUND IMAGING | Facility: HOSPITAL | Age: 84
End: 2019-01-14

## 2019-01-14 ENCOUNTER — APPOINTMENT (OUTPATIENT)
Dept: MAMMOGRAPHY | Facility: HOSPITAL | Age: 84
End: 2019-01-14

## 2019-01-18 ENCOUNTER — TRANSCRIBE ORDERS (OUTPATIENT)
Dept: MAMMOGRAPHY | Facility: HOSPITAL | Age: 84
End: 2019-01-18

## 2019-01-18 ENCOUNTER — HOSPITAL ENCOUNTER (OUTPATIENT)
Dept: MAMMOGRAPHY | Facility: HOSPITAL | Age: 84
Discharge: HOME | End: 2019-01-18
Admitting: FAMILY MEDICINE

## 2019-01-18 ENCOUNTER — APPOINTMENT (OUTPATIENT)
Dept: MAMMOGRAPHY | Facility: HOSPITAL | Age: 84
End: 2019-01-18

## 2019-01-18 ENCOUNTER — HOSPITAL ENCOUNTER (OUTPATIENT)
Dept: ULTRASOUND IMAGING | Facility: HOSPITAL | Age: 84
Discharge: HOME | End: 2019-01-18

## 2019-01-18 ENCOUNTER — HOSPITAL ENCOUNTER (OUTPATIENT)
Dept: ULTRASOUND IMAGING | Facility: HOSPITAL | Age: 84
End: 2019-01-18

## 2019-01-18 DIAGNOSIS — R92.8 ABNORMAL MAMMOGRAM: ICD-10-CM

## 2019-01-18 DIAGNOSIS — R92.8 ABNORMAL MAMMOGRAM: Primary | ICD-10-CM

## 2019-01-18 PROCEDURE — G0279 TOMOSYNTHESIS, MAMMO: HCPCS

## 2019-01-18 PROCEDURE — 77065 DX MAMMO INCL CAD UNI: CPT

## 2019-01-18 PROCEDURE — 76642 ULTRASOUND BREAST LIMITED: CPT

## 2019-01-23 ENCOUNTER — TRANSCRIBE ORDERS (OUTPATIENT)
Dept: MAMMOGRAPHY | Facility: HOSPITAL | Age: 84
End: 2019-01-23

## 2019-01-23 DIAGNOSIS — R92.8 ABNORMAL MAMMOGRAM OF RIGHT BREAST: Primary | ICD-10-CM

## 2019-06-20 ENCOUNTER — TRANSCRIBE ORDERS (OUTPATIENT)
Dept: ULTRASOUND IMAGING | Facility: HOSPITAL | Age: 84
End: 2019-06-20

## 2019-06-20 DIAGNOSIS — E04.9 GOITER: Primary | ICD-10-CM

## 2019-07-12 ENCOUNTER — APPOINTMENT (OUTPATIENT)
Dept: ULTRASOUND IMAGING | Facility: HOSPITAL | Age: 84
End: 2019-07-12

## 2019-07-18 ENCOUNTER — HOSPITAL ENCOUNTER (OUTPATIENT)
Dept: ULTRASOUND IMAGING | Facility: HOSPITAL | Age: 84
Discharge: HOME | End: 2019-07-18

## 2019-07-18 ENCOUNTER — HOSPITAL ENCOUNTER (OUTPATIENT)
Dept: MAMMOGRAPHY | Facility: HOSPITAL | Age: 84
Discharge: HOME | End: 2019-07-18
Admitting: FAMILY MEDICINE

## 2019-07-18 DIAGNOSIS — R92.8 ABNORMAL MAMMOGRAM OF RIGHT BREAST: ICD-10-CM

## 2019-07-18 PROCEDURE — 76642 ULTRASOUND BREAST LIMITED: CPT

## 2019-07-18 PROCEDURE — 77066 DX MAMMO INCL CAD BI: CPT

## 2019-07-18 PROCEDURE — G0279 TOMOSYNTHESIS, MAMMO: HCPCS

## 2019-07-25 ENCOUNTER — TRANSCRIBE ORDERS (OUTPATIENT)
Dept: ADMINISTRATIVE | Facility: HOSPITAL | Age: 84
End: 2019-07-25

## 2019-07-25 DIAGNOSIS — R92.8 ABNORMAL MAMMOGRAM: Primary | ICD-10-CM

## 2019-11-15 RX ORDER — IPRATROPIUM BROMIDE 42 UG/1
SPRAY, METERED NASAL
COMMUNITY

## 2019-11-15 RX ORDER — MONTELUKAST SODIUM 10 MG/1
TABLET ORAL
COMMUNITY

## 2019-11-15 NOTE — PROGRESS NOTES
"Patient: Addie Perez    YOB: 1934    Date: 11/20/2019    Primary Care Provider: Gary Oneil MD    Chief Complaint   Patient presents with   • Abnormal Breast Imaging       Subjective .     History of present illness:  I saw the patient in the office  today for evaluation and treatment of bi-rads 3. Patient denies nipple drainage, pain or discoloration. She states she has had mammogram every six months an states she feeling it is to much.     She has persistent need for special views of both breasts and a long-standing history of dense breast tissue.  She does not have a significant history of breast carcinoma.    Review of Systems   Constitutional: Negative for chills, fever and unexpected weight change.   HENT: Negative for hearing loss, trouble swallowing and voice change.    Eyes: Negative for visual disturbance.   Respiratory: Negative for apnea, cough, chest tightness, shortness of breath and wheezing.    Cardiovascular: Negative for chest pain, palpitations and leg swelling.   Gastrointestinal: Negative for abdominal distention, abdominal pain, anal bleeding, blood in stool, constipation, diarrhea, nausea, rectal pain and vomiting.   Endocrine: Negative for cold intolerance and heat intolerance.   Genitourinary: Negative for difficulty urinating, dysuria and flank pain.   Musculoskeletal: Negative for back pain and gait problem.   Skin: Negative for color change, rash and wound.   Neurological: Negative for dizziness, syncope, speech difficulty, weakness, light-headedness, numbness and headaches.   Hematological: Negative for adenopathy. Does not bruise/bleed easily.   Psychiatric/Behavioral: Negative for confusion. The patient is not nervous/anxious.        History:  Past Medical History:   Diagnosis Date   • Arthritis    • Back pain    • Cataract     PT REPORTS EARLY STAGES   • Cystitis    • Diabetes mellitus (CMS/[facility])      REPORTS \"I AM PRE DIABETIC\" AND REPORTS THAT SHE WATCHS HER " "DIET AND PCP WATCHES A1C CLOSELY   • Disease of thyroid gland    • Dysphagia     REPORTS MORE TROUBLE SWALLOWING WHEN EATING OR TAKING PILLS. REPORTS PAIN AND THAT SOMETIMES THINGS FEEL LIKE THEY \"STICK\"   • Elevated cholesterol 09/20/2018    REPORTS SHE HAS TAKEN MEDICATION IN THE PAST BUT THAT SHE THINKS ALL IS WNL'S AT PRESENT TIME WITHOUT MEDICATION   • GERD (gastroesophageal reflux disease)    • Hearing loss     REPORTS MILD AND NO USE OF HEARING AIDS   • History of cardiac murmur     REPORTS \"I USED TO HAVE ONE BUT NOW THEY SAY THEY DON'T HEAR IT\"   • History of colonic polyps    • History of pneumonia    • Hypercholesterolemia    • Hypertension    • Irregular heart beat    • Macular degeneration    • Osteoporosis    • Seasonal allergies    • Sinusitis    • Spinal headache 09/20/2018    REPORTS AFTER DELIVERY OF HER SON   • Urinary tract infection    • Vertigo    • Wears glasses           Past Surgical History:   Procedure Laterality Date   • BACK SURGERY      REPORTS A SPUR WAS REMOVED THAT WAS HITTING SCIATIC NERVE   • BREAST SURGERY Left     LUMPECTOMY, BENIGN    • COLONOSCOPY  2012    REPORTS LAST DONE IN 2012 BUT HAS A HX OF SEVERAL OF THESE PROCEDURES   • DILATATION AND CURETTAGE      REPORTS \"I HAD A COUPLE OF THOSE\"   • ENDOSCOPY N/A 1/31/2017    Procedure: ESOPHAGOGASTRODUODENOSCOPY;  Surgeon: Robbie Quezada MD;  Location: [facility] ENDOSCOPY;  Service:    • ENDOSCOPY N/A 9/21/2018    Procedure: ESOPHAGOGASTRODUODENOSCOPY WITH BIOPSY, DILITATION;  Surgeon: Robbie Quezada MD;  Location: [facility] ENDOSCOPY;  Service: Gastroenterology   • FOOT SURGERY Left     REPORTS SURGICAL INTERVENTION TO CORRECT GREAT TOE ALIGNMENT   • GALLBLADDER SURGERY  2009   • HEMORRHOIDECTOMY  1973   • TONSILLECTOMY  1946   • UPPER GASTROINTESTINAL ENDOSCOPY  2014   • UPPER GASTROINTESTINAL ENDOSCOPY  01/31/2017       Family History   Problem Relation Age of Onset   • Colon cancer Paternal Grandmother         possibly colon cancer " "  • Diabetes Mother    • Cancer Brother         lung   • Cancer Brother         brain       Social History     Tobacco Use   • Smoking status: Never Smoker   • Smokeless tobacco: Never Used   Substance Use Topics   • Alcohol use: No   • Drug use: No       Allergies:  Allergies   Allergen Reactions   • Codeine Nausea Only   • Escitalopram Nausea Only     REPORTS \"LEXAPRO\"   • Gabapentin Other (See Comments)     REPORTS \"MAKES MY EYES HURT BECAUSE OF MY MACULAR DEGENERATION\"   • Ibuprofen Palpitations     REPORTS \"IT CAUSES MY HEART TO BEAT FAST\"   • Levofloxacin Nausea Only   • Naproxen Nausea Only   • Nitrofurantoin Nausea Only   • Sulfa Antibiotics Nausea Only       Medications:     Current Outpatient Medications:   •  acetaminophen (TYLENOL) 650 MG 8 hr tablet, Take 650 mg by mouth 2 (Two) Times a Day., Disp: , Rfl:   •  Carboxymethylcellulose Sodium (THERATEARS) 0.25 % solution, Apply 1 drop to eye(s) as directed by provider 2 (Two) Times a Day. REPORTS USES TO BOTH EYES, Disp: , Rfl:   •  CRANBERRY EXTRACT PO, Take 1 tablet by mouth Daily., Disp: , Rfl:   •  esomeprazole (NEXIUM) 20 MG capsule, Take 1 capsule every morning 30 minutes before breakfast, Disp: 30 capsule, Rfl: 3  •  estradiol (ESTRACE) 0.5 MG tablet, Take 0.5 mg by mouth 2 (Two) Times a Week. REPORTS TAKES THIS ON Monday AND Friday AT NIGHT TIME, Disp: , Rfl:   •  fluticasone (FLONASE) 50 MCG/ACT nasal spray, 1 spray into each nostril 2 (Two) Times a Day., Disp: , Rfl:   •  hydrochlorothiazide (MICROZIDE) 12.5 MG capsule, Take 12.5 mg by mouth Daily., Disp: , Rfl:   •  ipratropium (ATROVENT) 0.06 % nasal spray, ipratropium bromide 42 mcg (0.06 %) nasal spray, Disp: , Rfl:   •  loratadine (CLARITIN) 10 MG tablet, Take 10 mg by mouth Daily., Disp: , Rfl:   •  methIMAzole (TAPAZOLE) 5 MG tablet, Take 5 mg by mouth Every Other Day., Disp: , Rfl:   •  metoprolol succinate XL (TOPROL-XL) 50 MG 24 hr tablet, Take 50 mg by mouth Daily., Disp: , Rfl:   •  " "montelukast (SINGULAIR) 10 MG tablet, montelukast 10 mg tablet  Take 1 tablet every day by oral route for 30 days., Disp: , Rfl:     Objective     Vital Signs:   Vitals:    11/20/19 1421   BP: 126/64   Pulse: 70   Temp: 96.7 °F (35.9 °C)   SpO2: 98%   Weight: 64.9 kg (143 lb)   Height: 162.6 cm (64\")       Physical Exam:     General Appearance:    Alert, cooperative, in no acute distress   Head:    Normocephalic, without obvious abnormality, atraumatic   Eyes:            Lids and lashes normal, conjunctivae and sclerae normal, no   icterus, no pallor, corneas clear,   Ears:    Ears appear intact with no abnormalities noted   Throat:   No oral lesions, no thrush, oral mucosa moist   Breast:    Dense breast tissue bilaterally but no evidence of suspicious masses   Lungs:     Clear to auscultation,respirations regular, even and                  Unlabored    Heart:    Regular rhythm and normal rate, no murmur, no gallop.   Chest Wall:    No abnormalities observed   Abdomen:     Normal bowel sounds, no masses, no organomegaly, soft        non-tender, non-distended, no guarding.   Extremities:   Moves all extremities well, no edema, no cyanosis, no             redness   Pulses:   Pulses palpable and equal bilaterally   Skin:   No bleeding, bruising or rash   Lymph nodes:   No palpable adenopathy   Neurologic:   Cranial nerves 2 - 12 grossly intact.           Results Review:   I reviewed the patient's new clinical results.  I reviewed the patient's new imaging results and agree with the interpretation.  I reviewed the patient's other test results and agree with the interpretation      Assessment / Plan:    1. Abnormal finding on breast imaging        I did have a detailed and extensive discussion with the patient in the office today and reviewed her recent workup.  I have told the patient that I do not think she needs to have continued six-month follow-up and special views of the breast on mammography.  She does not want " to do this anyway, I do not think she is going to be compliant with follow-up, I would like to see her back in the office next July after her next scheduled bilateral mammogram.  I have asked her to allow me to perform the ultrasound in the office, ultrasound performed today showed some small cysts bilaterally but certainly no evidence of suspicious masses.    Electronically signed by Jose Eduardo Gallegos MD  11/20/19  3:47 PM

## 2019-11-20 ENCOUNTER — OFFICE VISIT (OUTPATIENT)
Dept: SURGERY | Facility: CLINIC | Age: 84
End: 2019-11-20

## 2019-11-20 VITALS
SYSTOLIC BLOOD PRESSURE: 126 MMHG | DIASTOLIC BLOOD PRESSURE: 64 MMHG | WEIGHT: 143 LBS | OXYGEN SATURATION: 98 % | TEMPERATURE: 96.7 F | BODY MASS INDEX: 24.41 KG/M2 | HEIGHT: 64 IN | HEART RATE: 70 BPM

## 2019-11-20 DIAGNOSIS — R92.8 ABNORMAL FINDING ON BREAST IMAGING: Primary | ICD-10-CM

## 2019-11-20 PROCEDURE — 99213 OFFICE O/P EST LOW 20 MIN: CPT | Performed by: SURGERY

## 2019-12-12 ENCOUNTER — HOSPITAL ENCOUNTER (OUTPATIENT)
Dept: ULTRASOUND IMAGING | Facility: HOSPITAL | Age: 84
Discharge: HOME | End: 2019-12-12
Admitting: NURSE PRACTITIONER

## 2019-12-12 DIAGNOSIS — E04.9 GOITER: ICD-10-CM

## 2019-12-12 PROCEDURE — 76536 US EXAM OF HEAD AND NECK: CPT

## 2020-01-16 ENCOUNTER — APPOINTMENT (OUTPATIENT)
Dept: MAMMOGRAPHY | Facility: HOSPITAL | Age: 85
End: 2020-01-16

## 2020-01-16 ENCOUNTER — HOSPITAL ENCOUNTER (OUTPATIENT)
Dept: ULTRASOUND IMAGING | Facility: HOSPITAL | Age: 85
End: 2020-01-16

## 2020-07-20 ENCOUNTER — HOSPITAL ENCOUNTER (OUTPATIENT)
Dept: MAMMOGRAPHY | Facility: HOSPITAL | Age: 85
Discharge: HOME | End: 2020-07-20
Admitting: FAMILY MEDICINE

## 2020-07-20 DIAGNOSIS — R92.8 ABNORMAL MAMMOGRAM: ICD-10-CM

## 2020-07-20 DIAGNOSIS — N63.0 BREAST MASS: Primary | ICD-10-CM

## 2020-07-20 DIAGNOSIS — R92.1 MAMMOGRAPHIC CALCIFICATION FOUND ON DIAGNOSTIC IMAGING OF BREAST: ICD-10-CM

## 2020-07-20 DIAGNOSIS — N63.0 BREAST MASS: ICD-10-CM

## 2020-07-20 PROCEDURE — G0279 TOMOSYNTHESIS, MAMMO: HCPCS

## 2020-07-20 PROCEDURE — 77066 DX MAMMO INCL CAD BI: CPT

## 2020-08-03 ENCOUNTER — OFFICE VISIT (OUTPATIENT)
Dept: SURGERY | Facility: CLINIC | Age: 85
End: 2020-08-03

## 2020-08-03 VITALS — BODY MASS INDEX: 24.5 KG/M2 | TEMPERATURE: 98.2 F | WEIGHT: 143.5 LBS | OXYGEN SATURATION: 98 % | HEIGHT: 64 IN

## 2020-08-03 DIAGNOSIS — N63.0 LUMP OR MASS IN BREAST: ICD-10-CM

## 2020-08-03 DIAGNOSIS — R92.8 ABNORMAL FINDING ON BREAST IMAGING: Primary | ICD-10-CM

## 2020-08-03 PROCEDURE — 99213 OFFICE O/P EST LOW 20 MIN: CPT | Performed by: SURGERY

## 2020-08-03 NOTE — PROGRESS NOTES
"Patient: Addie Perez  YOB: 1934    Date: 08/03/2020    Primary Care Provider: Gary Oneil MD    Chief Complaint   Patient presents with   • Follow-up       History: I saw the patient in the office today for a follow up mammogram.  Patient had a previous ultrasound that showed some small cysts bilaterally but certainly no evidence of suspicious masses.Patient denies nipple drainage,breast pain or changes in skin color.      She has no other complaints at this time.    The following portions of the patient's history were reviewed and updated as appropriate: allergies, current medications, past family history, past medical history, past social history, past surgical history and problem list.    Review of Systems   Constitutional: Negative for chills, fever and unexpected weight change.   HENT: Negative for hearing loss, trouble swallowing and voice change.    Eyes: Negative for visual disturbance.   Respiratory: Negative for apnea, cough, chest tightness, shortness of breath and wheezing.    Cardiovascular: Negative for chest pain, palpitations and leg swelling.   Gastrointestinal: Negative for abdominal distention, abdominal pain, anal bleeding, blood in stool, constipation, diarrhea, nausea, rectal pain and vomiting.   Endocrine: Negative for cold intolerance and heat intolerance.   Genitourinary: Negative for difficulty urinating, dysuria and flank pain.   Musculoskeletal: Negative for back pain and gait problem.   Skin: Negative for color change, rash and wound.   Neurological: Negative for dizziness, syncope, speech difficulty, weakness, light-headedness, numbness and headaches.   Hematological: Negative for adenopathy. Does not bruise/bleed easily.   Psychiatric/Behavioral: Negative for confusion. The patient is not nervous/anxious.        Vital Signs  Vitals:    08/03/20 1025   Temp: 98.2 °F (36.8 °C)   SpO2: 98%   Weight: 65.1 kg (143 lb 8 oz)   Height: 162.6 cm (64\") " "      Allergies:  Allergies   Allergen Reactions   • Codeine Nausea Only   • Escitalopram Nausea Only     REPORTS \"LEXAPRO\"   • Gabapentin Other (See Comments)     REPORTS \"MAKES MY EYES HURT BECAUSE OF MY MACULAR DEGENERATION\"   • Ibuprofen Palpitations     REPORTS \"IT CAUSES MY HEART TO BEAT FAST\"   • Levofloxacin Nausea Only   • Naproxen Nausea Only   • Nitrofurantoin Nausea Only   • Sulfa Antibiotics Nausea Only       Medications:    Current Outpatient Medications:   •  acetaminophen (TYLENOL) 650 MG 8 hr tablet, Take 650 mg by mouth 2 (Two) Times a Day., Disp: , Rfl:   •  Carboxymethylcellulose Sodium (THERATEARS) 0.25 % solution, Apply 1 drop to eye(s) as directed by provider 2 (Two) Times a Day. REPORTS USES TO BOTH EYES, Disp: , Rfl:   •  CRANBERRY EXTRACT PO, Take 1 tablet by mouth Daily., Disp: , Rfl:   •  esomeprazole (NEXIUM) 20 MG capsule, Take 1 capsule every morning 30 minutes before breakfast, Disp: 30 capsule, Rfl: 3  •  estradiol (ESTRACE) 0.5 MG tablet, Take 0.5 mg by mouth 2 (Two) Times a Week. REPORTS TAKES THIS ON Monday AND Friday AT NIGHT TIME, Disp: , Rfl:   •  fluticasone (FLONASE) 50 MCG/ACT nasal spray, 1 spray into each nostril 2 (Two) Times a Day., Disp: , Rfl:   •  hydrochlorothiazide (MICROZIDE) 12.5 MG capsule, Take 12.5 mg by mouth Daily., Disp: , Rfl:   •  ipratropium (ATROVENT) 0.06 % nasal spray, ipratropium bromide 42 mcg (0.06 %) nasal spray, Disp: , Rfl:   •  loratadine (CLARITIN) 10 MG tablet, Take 10 mg by mouth Daily., Disp: , Rfl:   •  methIMAzole (TAPAZOLE) 5 MG tablet, Take 5 mg by mouth Every Other Day., Disp: , Rfl:   •  metoprolol succinate XL (TOPROL-XL) 50 MG 24 hr tablet, Take 50 mg by mouth Daily., Disp: , Rfl:   •  montelukast (SINGULAIR) 10 MG tablet, montelukast 10 mg tablet  Take 1 tablet every day by oral route for 30 days., Disp: , Rfl:     Physical Exam:   General Appearance:    Alert, cooperative, in no acute distress   Head:    Normocephalic, without " obvious abnormality, atraumatic   Lungs:     Clear to auscultation,respirations regular, even and                  unlabored    Heart:    Regular rhythm and normal rate, normal S1 and S2, no            murmur, no gallop, no rub, no click   Abdomen:     Normal bowel sounds, no masses, no organomegaly, soft        non-tender, non-distended, no guarding, no rebound                tenderness   Extremities:   Moves all extremities well, no edema, no cyanosis, no             redness   Pulses:   Pulses palpable and equal bilaterally   Skin:   No bleeding, bruising or rash, both breasts are palpably normal     Results Review:   I reviewed the patient's new clinical results.  I reviewed the patient's new imaging results and agree with the interpretation.  I reviewed the patient's other test results and agree with the interpretation     Review of Systems was reviewed and confirmed as accurate as documented by the MA.    ASSESSMENT/PLAN:    1. Abnormal finding on breast imaging    2. Lump or mass in breast       The patient seems to be doing well and I need to see her back in one year.  She knows to see me sooner if she has any further problems.    Electronically signed by Jose Eduardo Gallegos MD  08/05/20

## 2021-04-19 ENCOUNTER — TRANSCRIBE ORDERS (OUTPATIENT)
Dept: ADMINISTRATIVE | Facility: HOSPITAL | Age: 86
End: 2021-04-19

## 2021-04-19 DIAGNOSIS — Z12.31 VISIT FOR SCREENING MAMMOGRAM: Primary | ICD-10-CM

## 2021-05-12 ENCOUNTER — TRANSCRIBE ORDERS (OUTPATIENT)
Dept: ADMINISTRATIVE | Facility: HOSPITAL | Age: 86
End: 2021-05-12

## 2021-05-12 DIAGNOSIS — M54.10 RADICULOPATHY, UNSPECIFIED SPINAL REGION: Primary | ICD-10-CM

## 2021-06-04 ENCOUNTER — HOSPITAL ENCOUNTER (OUTPATIENT)
Dept: MRI IMAGING | Facility: HOSPITAL | Age: 86
Discharge: HOME | End: 2021-06-04
Admitting: ORTHOPAEDIC SURGERY

## 2021-06-04 DIAGNOSIS — M54.10 RADICULOPATHY, UNSPECIFIED SPINAL REGION: ICD-10-CM

## 2021-06-04 PROCEDURE — 72148 MRI LUMBAR SPINE W/O DYE: CPT

## 2021-07-22 ENCOUNTER — HOSPITAL ENCOUNTER (OUTPATIENT)
Dept: MAMMOGRAPHY | Facility: HOSPITAL | Age: 86
Discharge: HOME | End: 2021-07-22
Admitting: SURGERY

## 2021-07-22 DIAGNOSIS — Z12.31 VISIT FOR SCREENING MAMMOGRAM: ICD-10-CM

## 2021-07-22 PROCEDURE — 77063 BREAST TOMOSYNTHESIS BI: CPT

## 2021-07-22 PROCEDURE — 77067 SCR MAMMO BI INCL CAD: CPT

## 2021-08-05 NOTE — PROGRESS NOTES
Patient: Addie Perez    YOB: 1934    Date: 08/09/2021    Primary Care Provider: Ariela Hampton DO    Chief Complaint   Patient presents with   • Follow-up     Follow up mammogram        Subjective .     History of present illness:  I saw the patient in the office  today for a one year follow up of bilateral breast cysts. Her most recent mammogram was performed on 07/22/21 and was benign. Patient states that she is doing well.  She denies breast pain, skin changes, nipple discharge or palpable breast lump.     The patient has no other problems at this time.      The following portions of the patient's history were reviewed and updated as appropriate: allergies, current medications, past family history, past medical history, past social history, past surgical history and problem list.    Review of Systems   Constitutional: Negative for chills, fever and unexpected weight change.   HENT: Negative for hearing loss, trouble swallowing and voice change.    Eyes: Negative for visual disturbance.   Respiratory: Positive for shortness of breath. Negative for apnea, cough, chest tightness and wheezing.    Cardiovascular: Negative for chest pain, palpitations and leg swelling.   Gastrointestinal: Negative for abdominal distention, abdominal pain, anal bleeding, blood in stool, constipation, diarrhea, nausea, rectal pain and vomiting.   Endocrine: Negative for cold intolerance and heat intolerance.   Genitourinary: Negative for difficulty urinating, dysuria and flank pain.   Musculoskeletal: Positive for back pain. Negative for gait problem.   Skin: Negative for color change, rash and wound.   Neurological: Negative for dizziness, syncope, speech difficulty, weakness, light-headedness, numbness and headaches.   Hematological: Negative for adenopathy. Does not bruise/bleed easily.   Psychiatric/Behavioral: Negative for confusion. The patient is not nervous/anxious.        History:  Past Medical History:  "  Diagnosis Date   • Arthritis    • Back pain    • Cataract     PT REPORTS EARLY STAGES   • Cystitis    • Diabetes mellitus (CMS/[facility])      REPORTS \"I AM PRE DIABETIC\" AND REPORTS THAT SHE WATCHS HER DIET AND PCP WATCHES A1C CLOSELY   • Disease of thyroid gland    • Dysphagia     REPORTS MORE TROUBLE SWALLOWING WHEN EATING OR TAKING PILLS. REPORTS PAIN AND THAT SOMETIMES THINGS FEEL LIKE THEY \"STICK\"   • Elevated cholesterol 09/20/2018    REPORTS SHE HAS TAKEN MEDICATION IN THE PAST BUT THAT SHE THINKS ALL IS WNL'S AT PRESENT TIME WITHOUT MEDICATION   • GERD (gastroesophageal reflux disease)    • Hearing loss     REPORTS MILD AND NO USE OF HEARING AIDS   • History of cardiac murmur     REPORTS \"I USED TO HAVE ONE BUT NOW THEY SAY THEY DON'T HEAR IT\"   • History of colonic polyps    • History of pneumonia    • Hypercholesterolemia    • Hypertension    • Irregular heart beat    • Macular degeneration    • Osteoporosis    • Seasonal allergies    • Sinusitis    • Spinal headache 09/20/2018    REPORTS AFTER DELIVERY OF HER SON   • Urinary tract infection    • Vertigo    • Wears glasses           Past Surgical History:   Procedure Laterality Date   • BACK SURGERY      REPORTS A SPUR WAS REMOVED THAT WAS HITTING SCIATIC NERVE   • BREAST SURGERY Left     LUMPECTOMY, BENIGN    • COLONOSCOPY  2012    REPORTS LAST DONE IN 2012 BUT HAS A HX OF SEVERAL OF THESE PROCEDURES   • DILATATION AND CURETTAGE      REPORTS \"I HAD A COUPLE OF THOSE\"   • ENDOSCOPY N/A 1/31/2017    Procedure: ESOPHAGOGASTRODUODENOSCOPY;  Surgeon: Robbie Quezada MD;  Location: [facility] ENDOSCOPY;  Service:    • ENDOSCOPY N/A 9/21/2018    Procedure: ESOPHAGOGASTRODUODENOSCOPY WITH BIOPSY, DILITATION;  Surgeon: Robbie Quezada MD;  Location: [facility] ENDOSCOPY;  Service: Gastroenterology   • FOOT SURGERY Left     REPORTS SURGICAL INTERVENTION TO CORRECT GREAT TOE ALIGNMENT   • GALLBLADDER SURGERY  2009   • HEMORRHOIDECTOMY  1973   • TONSILLECTOMY  1946   • UPPER " "GASTROINTESTINAL ENDOSCOPY  2014   • UPPER GASTROINTESTINAL ENDOSCOPY  01/31/2017       Family History   Problem Relation Age of Onset   • Colon cancer Paternal Grandmother         possibly colon cancer   • Diabetes Mother    • Cancer Brother         lung   • Cancer Brother         brain       Social History     Tobacco Use   • Smoking status: Never Smoker   • Smokeless tobacco: Never Used   Vaping Use   • Vaping Use: Never used   Substance Use Topics   • Alcohol use: No   • Drug use: No       Allergies:  Allergies   Allergen Reactions   • Codeine Nausea Only   • Escitalopram Nausea Only     REPORTS \"LEXAPRO\"   • Gabapentin Other (See Comments)     REPORTS \"MAKES MY EYES HURT BECAUSE OF MY MACULAR DEGENERATION\"   • Ibuprofen Palpitations     REPORTS \"IT CAUSES MY HEART TO BEAT FAST\"   • Levofloxacin Nausea Only   • Naproxen Nausea Only   • Nitrofurantoin Nausea Only   • Sulfa Antibiotics Nausea Only       Medications:     Current Outpatient Medications:   •  acetaminophen (TYLENOL) 650 MG 8 hr tablet, Take 650 mg by mouth 2 (Two) Times a Day., Disp: , Rfl:   •  allopurinol (ZYLOPRIM) 100 MG tablet, , Disp: , Rfl:   •  Carboxymethylcellulose Sodium (THERATEARS) 0.25 % solution, Apply 1 drop to eye(s) as directed by provider 2 (Two) Times a Day. REPORTS USES TO BOTH EYES, Disp: , Rfl:   •  cetirizine (zyrTEC) 10 MG tablet, cetirizine 10 mg tablet  Take 1 tablet every day by oral route for 30 days., Disp: , Rfl:   •  CRANBERRY EXTRACT PO, Take 1 tablet by mouth Daily., Disp: , Rfl:   •  estradiol (ESTRACE) 0.5 MG tablet, Take 0.5 mg by mouth 2 (Two) Times a Week. REPORTS TAKES THIS ON Monday AND Friday AT NIGHT TIME, Disp: , Rfl:   •  fexofenadine (ALLEGRA) 180 MG tablet, Take 180 mg by mouth Daily., Disp: , Rfl:   •  fluticasone (FLONASE) 50 MCG/ACT nasal spray, 1 spray into each nostril 2 (Two) Times a Day., Disp: , Rfl:   •  hydrochlorothiazide (MICROZIDE) 12.5 MG capsule, Take 12.5 mg by mouth Daily., Disp: , Rfl: " "  •  ipratropium (ATROVENT) 0.06 % nasal spray, ipratropium bromide 42 mcg (0.06 %) nasal spray, Disp: , Rfl:   •  losartan (COZAAR) 25 MG tablet, , Disp: , Rfl:   •  metoprolol succinate XL (TOPROL-XL) 50 MG 24 hr tablet, Take 50 mg by mouth Daily., Disp: , Rfl:   •  omeprazole (prilOSEC) 10 MG capsule, , Disp: , Rfl:   •  methIMAzole (TAPAZOLE) 5 MG tablet, Take 5 mg by mouth Every Other Day., Disp: , Rfl:   •  montelukast (SINGULAIR) 10 MG tablet, montelukast 10 mg tablet  Take 1 tablet every day by oral route for 30 days., Disp: , Rfl:     Objective     Vital Signs:   Vitals:    08/09/21 1001   BP: 120/62   Pulse: 67   Temp: 96.4 °F (35.8 °C)   TempSrc: Temporal   SpO2: 98%   Weight: 67 kg (147 lb 9.6 oz)   Height: 162.6 cm (64\")       Physical Exam:     General Appearance:    Alert, cooperative, in no acute distress   Head:    Normocephalic, without obvious abnormality, atraumatic   Eyes:            Lids and lashes normal, conjunctivae and sclerae normal, no   icterus, no pallor, corneas clear,   Ears:    Ears appear intact with no abnormalities noted   Throat:   No oral lesions, no thrush, oral mucosa moist   Breast:    Bilateral breast examination was fairly normal with no evidence of palpable abnormalities that would be suspicious in nature   Lungs:     Clear to auscultation,respirations regular, even and                  Unlabored    Heart:    Regular rhythm and normal rate, no murmur, no gallop.   Chest Wall:    No abnormalities observed   Abdomen:     Normal bowel sounds, no masses, no organomegaly, soft        non-tender, non-distended, no guarding.   Extremities:   Moves all extremities well, no edema, no cyanosis, no             redness   Pulses:   Pulses palpable and equal bilaterally   Skin:   No bleeding, bruising or rash   Lymph nodes:   No palpable adenopathy   Neurologic:   Cranial nerves 2 - 12 grossly intact.           Results Review:   I reviewed the patient's new clinical results.  I reviewed " the patient's new imaging results and agree with the interpretation.  I reviewed the patient's other test results and agree with the interpretation    Review of Systems was reviewed and confirmed as accurate as documented by the MA.    Assessment / Plan:    1. Lump or mass in breast    2. Other abnormal and inconclusive findings on diagnostic imaging of breast     3. Screening mammogram, encounter for        I did have a detailed and extensive discussion with the patient in the office today and reviewed her recent workup.  I have told the patient that she needs no further intervention at this time.  I gave her the option of not undergoing any further mammography but she was very nervous about this, she wants to see me back in the office in 1 year with a follow-up bilateral mammogram which I believe is reasonable.    Electronically signed by Jose Eduardo Gallegos MD  08/18/21  20:12 EDT

## 2021-08-09 ENCOUNTER — OFFICE VISIT (OUTPATIENT)
Dept: SURGERY | Facility: CLINIC | Age: 86
End: 2021-08-09

## 2021-08-09 VITALS
BODY MASS INDEX: 25.2 KG/M2 | SYSTOLIC BLOOD PRESSURE: 120 MMHG | TEMPERATURE: 96.4 F | WEIGHT: 147.6 LBS | HEIGHT: 64 IN | DIASTOLIC BLOOD PRESSURE: 62 MMHG | OXYGEN SATURATION: 98 % | HEART RATE: 67 BPM

## 2021-08-09 DIAGNOSIS — R92.8 OTHER ABNORMAL AND INCONCLUSIVE FINDINGS ON DIAGNOSTIC IMAGING OF BREAST: ICD-10-CM

## 2021-08-09 DIAGNOSIS — Z12.31 SCREENING MAMMOGRAM, ENCOUNTER FOR: ICD-10-CM

## 2021-08-09 DIAGNOSIS — N63.0 LUMP OR MASS IN BREAST: Primary | ICD-10-CM

## 2021-08-09 PROCEDURE — 99213 OFFICE O/P EST LOW 20 MIN: CPT | Performed by: SURGERY

## 2021-08-09 RX ORDER — CETIRIZINE HYDROCHLORIDE 10 MG/1
TABLET ORAL
COMMUNITY

## 2021-08-09 RX ORDER — LOSARTAN POTASSIUM 25 MG/1
25 TABLET ORAL 2 TIMES DAILY
COMMUNITY
Start: 2021-06-18

## 2021-08-09 RX ORDER — FEXOFENADINE HCL 180 MG/1
180 TABLET ORAL DAILY
COMMUNITY

## 2021-08-09 RX ORDER — OMEPRAZOLE 10 MG/1
CAPSULE, DELAYED RELEASE ORAL
COMMUNITY
Start: 2021-08-01

## 2021-08-09 RX ORDER — ALLOPURINOL 100 MG/1
TABLET ORAL
COMMUNITY
Start: 2021-07-15

## 2022-02-25 ENCOUNTER — PREP FOR SURGERY (OUTPATIENT)
Dept: OTHER | Facility: HOSPITAL | Age: 87
End: 2022-02-25

## 2022-02-25 DIAGNOSIS — H25.12 NUCLEAR SCLEROTIC CATARACT OF LEFT EYE: Primary | ICD-10-CM

## 2022-02-25 RX ORDER — SODIUM CHLORIDE 0.9 % (FLUSH) 0.9 %
10 SYRINGE (ML) INJECTION EVERY 12 HOURS SCHEDULED
Status: CANCELLED | OUTPATIENT
Start: 2022-02-25

## 2022-02-25 RX ORDER — TETRACAINE HYDROCHLORIDE 5 MG/ML
1 SOLUTION OPHTHALMIC SEE ADMIN INSTRUCTIONS
Status: CANCELLED | OUTPATIENT
Start: 2022-02-25

## 2022-02-25 RX ORDER — CYCLOPENT/TROPIC/PHEN/KETR/WAT 1%-1%-2.5%
1 DROPS (EA) OPHTHALMIC (EYE)
Status: CANCELLED | OUTPATIENT
Start: 2022-02-25 | End: 2022-02-25

## 2022-02-25 RX ORDER — SODIUM CHLORIDE 0.9 % (FLUSH) 0.9 %
10 SYRINGE (ML) INJECTION AS NEEDED
Status: CANCELLED | OUTPATIENT
Start: 2022-02-25

## 2022-02-25 RX ORDER — PREDNISOLONE ACETATE 10 MG/ML
1 SUSPENSION/ DROPS OPHTHALMIC SEE ADMIN INSTRUCTIONS
Status: CANCELLED | OUTPATIENT
Start: 2022-02-25

## 2022-03-04 ENCOUNTER — HOSPITAL ENCOUNTER (OUTPATIENT)
Facility: HOSPITAL | Age: 87
Setting detail: HOSPITAL OUTPATIENT SURGERY
Discharge: HOME | End: 2022-03-04
Attending: OPHTHALMOLOGY | Admitting: OPHTHALMOLOGY

## 2022-03-04 ENCOUNTER — ANESTHESIA EVENT (OUTPATIENT)
Dept: PERIOP | Facility: HOSPITAL | Age: 87
End: 2022-03-04

## 2022-03-04 ENCOUNTER — ANESTHESIA (OUTPATIENT)
Dept: PERIOP | Facility: HOSPITAL | Age: 87
End: 2022-03-04

## 2022-03-04 VITALS
RESPIRATION RATE: 18 BRPM | SYSTOLIC BLOOD PRESSURE: 166 MMHG | HEART RATE: 65 BPM | DIASTOLIC BLOOD PRESSURE: 71 MMHG | OXYGEN SATURATION: 98 % | TEMPERATURE: 97.2 F

## 2022-03-04 DIAGNOSIS — H25.12 NUCLEAR SCLEROTIC CATARACT OF LEFT EYE: ICD-10-CM

## 2022-03-04 PROCEDURE — V2632 POST CHMBR INTRAOCULAR LENS: HCPCS | Performed by: OPHTHALMOLOGY

## 2022-03-04 PROCEDURE — 25010000002 MIDAZOLAM PER 1MG: Performed by: NURSE ANESTHETIST, CERTIFIED REGISTERED

## 2022-03-04 PROCEDURE — 25010000002 FENTANYL CITRATE (PF) 50 MCG/ML SOLUTION: Performed by: NURSE ANESTHETIST, CERTIFIED REGISTERED

## 2022-03-04 PROCEDURE — 25010000002 EPINEPHRINE PER 0.1 MG: Performed by: OPHTHALMOLOGY

## 2022-03-04 PROCEDURE — 25010000002 CEFUROXIME 3 MG/0.3 ML SOLUTION 0.3 ML SYRINGE: Performed by: OPHTHALMOLOGY

## 2022-03-04 DEVICE — IMPLANTABLE DEVICE: Type: IMPLANTABLE DEVICE | Site: POSTERIOR CHAMBER | Status: FUNCTIONAL

## 2022-03-04 RX ORDER — PREDNISOLONE ACETATE 10 MG/ML
SUSPENSION/ DROPS OPHTHALMIC
Qty: 2 ML | Refills: 0
Start: 2022-03-04

## 2022-03-04 RX ORDER — SODIUM CHLORIDE 0.9 % (FLUSH) 0.9 %
10 SYRINGE (ML) INJECTION AS NEEDED
Status: DISCONTINUED | OUTPATIENT
Start: 2022-03-04 | End: 2022-03-04 | Stop reason: HOSPADM

## 2022-03-04 RX ORDER — SODIUM CHLORIDE, SODIUM LACTATE, POTASSIUM CHLORIDE, CALCIUM CHLORIDE 600; 310; 30; 20 MG/100ML; MG/100ML; MG/100ML; MG/100ML
1000 INJECTION, SOLUTION INTRAVENOUS CONTINUOUS
Status: DISCONTINUED | OUTPATIENT
Start: 2022-03-04 | End: 2022-03-04 | Stop reason: HOSPADM

## 2022-03-04 RX ORDER — LIDOCAINE HYDROCHLORIDE 40 MG/ML
INJECTION, SOLUTION RETROBULBAR; TOPICAL AS NEEDED
Status: DISCONTINUED | OUTPATIENT
Start: 2022-03-04 | End: 2022-03-04 | Stop reason: HOSPADM

## 2022-03-04 RX ORDER — PREDNISOLONE ACETATE 10 MG/ML
SUSPENSION/ DROPS OPHTHALMIC AS NEEDED
Status: DISCONTINUED | OUTPATIENT
Start: 2022-03-04 | End: 2022-03-04 | Stop reason: HOSPADM

## 2022-03-04 RX ORDER — SODIUM CHLORIDE 0.9 % (FLUSH) 0.9 %
10 SYRINGE (ML) INJECTION EVERY 12 HOURS SCHEDULED
Status: DISCONTINUED | OUTPATIENT
Start: 2022-03-04 | End: 2022-03-04 | Stop reason: HOSPADM

## 2022-03-04 RX ORDER — TETRACAINE HYDROCHLORIDE 5 MG/ML
1 SOLUTION OPHTHALMIC SEE ADMIN INSTRUCTIONS
Status: COMPLETED | OUTPATIENT
Start: 2022-03-04 | End: 2022-03-04

## 2022-03-04 RX ORDER — PREDNISOLONE ACETATE 10 MG/ML
1 SUSPENSION/ DROPS OPHTHALMIC SEE ADMIN INSTRUCTIONS
Status: DISCONTINUED | OUTPATIENT
Start: 2022-03-04 | End: 2022-03-04 | Stop reason: HOSPADM

## 2022-03-04 RX ORDER — FENTANYL CITRATE 50 UG/ML
INJECTION, SOLUTION INTRAMUSCULAR; INTRAVENOUS AS NEEDED
Status: DISCONTINUED | OUTPATIENT
Start: 2022-03-04 | End: 2022-03-04 | Stop reason: SURG

## 2022-03-04 RX ORDER — MIDAZOLAM HYDROCHLORIDE 2 MG/2ML
INJECTION, SOLUTION INTRAMUSCULAR; INTRAVENOUS AS NEEDED
Status: DISCONTINUED | OUTPATIENT
Start: 2022-03-04 | End: 2022-03-04 | Stop reason: SURG

## 2022-03-04 RX ORDER — BALANCED SALT SOLUTION ENRICHED WITH BICARBONATE, DEXTROSE, AND GLUTATHIONE
KIT INTRAOCULAR AS NEEDED
Status: DISCONTINUED | OUTPATIENT
Start: 2022-03-04 | End: 2022-03-04 | Stop reason: HOSPADM

## 2022-03-04 RX ORDER — CYCLOPENT/TROPIC/PHEN/KETR/WAT 1%-1%-2.5%
1 DROPS (EA) OPHTHALMIC (EYE)
Status: COMPLETED | OUTPATIENT
Start: 2022-03-04 | End: 2022-03-04

## 2022-03-04 RX ORDER — TETRACAINE HYDROCHLORIDE 5 MG/ML
SOLUTION OPHTHALMIC AS NEEDED
Status: DISCONTINUED | OUTPATIENT
Start: 2022-03-04 | End: 2022-03-04 | Stop reason: HOSPADM

## 2022-03-04 RX ADMIN — Medication 1 DROP: at 06:50

## 2022-03-04 RX ADMIN — TETRACAINE HYDROCHLORIDE 1 DROP: 5 SOLUTION OPHTHALMIC at 06:48

## 2022-03-04 RX ADMIN — TETRACAINE HYDROCHLORIDE 1 DROP: 5 SOLUTION OPHTHALMIC at 06:49

## 2022-03-04 RX ADMIN — Medication 1 DROP: at 07:00

## 2022-03-04 RX ADMIN — FENTANYL CITRATE 25 MCG: 50 INJECTION INTRAMUSCULAR; INTRAVENOUS at 07:34

## 2022-03-04 RX ADMIN — Medication 1 DROP: at 06:55

## 2022-03-04 RX ADMIN — MIDAZOLAM HYDROCHLORIDE 1 MG: 1 INJECTION, SOLUTION INTRAMUSCULAR; INTRAVENOUS at 07:34

## 2022-03-04 RX ADMIN — SODIUM CHLORIDE, POTASSIUM CHLORIDE, SODIUM LACTATE AND CALCIUM CHLORIDE 1000 ML: 600; 310; 30; 20 INJECTION, SOLUTION INTRAVENOUS at 06:54

## 2022-03-04 NOTE — OP NOTE
OPERATIVE NOTE    Date of Procedure: 3/4/2022  Patient Name: Addie Perez  Patient MRN: [id number]  YOB: 1934     Preoperative Diagnosis: Left nuclear sclerotic cataract.     Postoperative Diagnosis: Left nuclear sclerotic cataract.     Procedure Performed: Phacoemulsification with implantation of a  foldable posterior chamber intraocular lens, Left eye.     Surgeon: Pritesh Mcnally MD     Anesthesia:  Monitored Anesthesia Care (MAC)      Brief History and Indication: The patient presents with a history of past progressive loss of vision.  Patient was diagnosed with cataract and requests removal for increased ability to read and see.     Operation Description: The patient was taken to the OR and prepped and draped in the usual sterile ophthalmic fashion. A lid speculum was placed in the eye.  A 0.8 mm blade was then used to make a stab incision two o’clock hours from the intended temporal clear cornea groove. The anterior chamber was then inflated with a Viscoelastic. A metal microkeratome blade was then used to enter the anterior chamber at the temporal clear cornea site. A three level tunnel incision was made. A curvilinear capsulorrhexis was then performed with a bent cystotome needle and capsulorrhexis forceps.  BSS on a 30 gauge bent cannula was used to hydro-dissect the lens. Good fluid waves were noted. Phacoemulsification was then used to remove nuclear material without complications. The residual cortical and lenticular material was then removed with irrigation and aspiration. Viscoelastics were then used to inflate the bag in a soft shell technique. A PCIOL was injected into the bag. Post-implantation, there were no rents or tears in the bag and the lens was noted to be stable and centered. The residual Viscoelastic was then removed with irrigation and aspiration.  The wound was checked and found to be without leaks. One drop of a Prednisilone was placed in the eye.     Blood Loss of  less than 1cc occurred during the procedure.    Implant Information:   Implant Name Type Inv. Item Serial No.  Lot No. LRB No. Used Action   LENS ACRYSOF IQ [id number] - [id number] - [id number] Implant LENS ACRYSOF IQ [id number]  Left 1 Implanted       Complications: None    Discharge and Condition  The patient was transported to same day surgery in excellent condition and scheduled for follow-up appointment. The patient was given instructions on use of eye drops for the operative eye and was specifically instructed to call for any concerns.    Pritesh Mcnally MD  3/4/2022

## 2022-03-04 NOTE — H&P
"      [facility]         History and Physical    Patient Name: Addie Perez  MRN: [id number]  : 1934  Gender: female     HPI: Patient complaint of PPLOV Left eye diagnosed with cataract. Patient requests PHACO PCIOL for Increase of VA/ADL.    History:    Past Medical History:   Diagnosis Date   • Arthritis    • Back pain    • Cataract     PT REPORTS EARLY STAGES   • Cystitis    • Diabetes mellitus (HCC)      REPORTS \"I AM PRE DIABETIC\" AND REPORTS THAT SHE WATCHS HER DIET AND PCP WATCHES A1C CLOSELY   • Disease of thyroid gland    • Dysphagia     REPORTS MORE TROUBLE SWALLOWING WHEN EATING OR TAKING PILLS. REPORTS PAIN AND THAT SOMETIMES THINGS FEEL LIKE THEY \"STICK\"   • Elevated cholesterol 2018    REPORTS SHE HAS TAKEN MEDICATION IN THE PAST BUT THAT SHE THINKS ALL IS WNL'S AT PRESENT TIME WITHOUT MEDICATION   • GERD (gastroesophageal reflux disease)    • Hearing loss     REPORTS MILD AND NO USE OF HEARING AIDS   • History of cardiac murmur     REPORTS \"I USED TO HAVE ONE BUT NOW THEY SAY THEY DON'T HEAR IT\"   • History of colonic polyps    • History of pneumonia    • Hypercholesterolemia    • Hypertension    • Irregular heart beat    • Macular degeneration    • Osteoporosis    • Seasonal allergies    • Sinusitis    • Spinal headache 2018    REPORTS AFTER DELIVERY OF HER SON   • Urinary tract infection    • Vertigo    • Wears glasses        Past Surgical History:   Procedure Laterality Date   • BACK SURGERY      REPORTS A SPUR WAS REMOVED THAT WAS HITTING SCIATIC NERVE   • BREAST SURGERY Left     LUMPECTOMY, BENIGN    • COLONOSCOPY      REPORTS LAST DONE IN  BUT HAS A HX OF SEVERAL OF THESE PROCEDURES   • DILATATION AND CURETTAGE      REPORTS \"I HAD A COUPLE OF THOSE\"   • ENDOSCOPY N/A 2017    Procedure: ESOPHAGOGASTRODUODENOSCOPY;  Surgeon: Robbie Quezada MD;  Location: [facility] ENDOSCOPY;  Service:    • ENDOSCOPY N/A 2018    Procedure: " ESOPHAGOGASTRODUODENOSCOPY WITH BIOPSY, DILITATION;  Surgeon: Robbie Quezada MD;  Location: [facility] ENDOSCOPY;  Service: Gastroenterology   • FOOT SURGERY Left     REPORTS SURGICAL INTERVENTION TO CORRECT GREAT TOE ALIGNMENT   • GALLBLADDER SURGERY  2009   • HEMORRHOIDECTOMY  1973   • TONSILLECTOMY  1946   • UPPER GASTROINTESTINAL ENDOSCOPY  2014   • UPPER GASTROINTESTINAL ENDOSCOPY  01/31/2017       Social History     Socioeconomic History   • Marital status:    Tobacco Use   • Smoking status: Never Smoker   • Smokeless tobacco: Never Used   Vaping Use   • Vaping Use: Never used   Substance and Sexual Activity   • Alcohol use: No   • Drug use: No   • Sexual activity: Defer       Family History   Problem Relation Age of Onset   • Colon cancer Paternal Grandmother         possibly colon cancer   • Diabetes Mother    • Cancer Brother         lung   • Cancer Brother         brain       Prior to Admission Medications:  Medications Prior to Admission   Medication Sig Dispense Refill Last Dose   • acetaminophen (TYLENOL) 650 MG 8 hr tablet Take 650 mg by mouth 2 (Two) Times a Day.   3/3/2022 at Unknown time   • allopurinol (ZYLOPRIM) 100 MG tablet    3/3/2022 at Unknown time   • Carboxymethylcellulose Sodium (THERATEARS) 0.25 % solution Apply 1 drop to eye(s) as directed by provider 2 (Two) Times a Day. REPORTS USES TO BOTH EYES   3/3/2022 at Unknown time   • cetirizine (zyrTEC) 10 MG tablet cetirizine 10 mg tablet   Take 1 tablet every day by oral route for 30 days.   3/3/2022 at Unknown time   • CRANBERRY EXTRACT PO Take 1 tablet by mouth Daily.   3/3/2022 at Unknown time   • estradiol (ESTRACE) 0.5 MG tablet Take 0.5 mg by mouth 2 (Two) Times a Week. REPORTS TAKES THIS ON Monday AND Friday AT NIGHT TIME   3/3/2022 at Unknown time   • fexofenadine (ALLEGRA) 180 MG tablet Take 180 mg by mouth Daily.   3/3/2022 at Unknown time   • fluticasone (FLONASE) 50 MCG/ACT nasal spray 1 spray into each nostril 2 (Two) Times  "a Day.   3/3/2022 at Unknown time   • hydrochlorothiazide (MICROZIDE) 12.5 MG capsule Take 12.5 mg by mouth Daily.   3/3/2022 at Unknown time   • ipratropium (ATROVENT) 0.06 % nasal spray ipratropium bromide 42 mcg (0.06 %) nasal spray   3/3/2022 at Unknown time   • losartan (COZAAR) 25 MG tablet    3/3/2022 at Unknown time   • methIMAzole (TAPAZOLE) 5 MG tablet Take 5 mg by mouth Every Other Day.   3/3/2022 at Unknown time   • metoprolol succinate XL (TOPROL-XL) 50 MG 24 hr tablet Take 50 mg by mouth Daily.   3/3/2022 at Unknown time   • montelukast (SINGULAIR) 10 MG tablet montelukast 10 mg tablet   Take 1 tablet every day by oral route for 30 days.   3/3/2022 at Unknown time   • omeprazole (prilOSEC) 10 MG capsule    3/3/2022 at Unknown time       Allergies:  Allergies   Allergen Reactions   • Codeine Nausea Only   • Escitalopram Nausea Only     REPORTS \"LEXAPRO\"   • Gabapentin Other (See Comments)     REPORTS \"MAKES MY EYES HURT BECAUSE OF MY MACULAR DEGENERATION\"   • Ibuprofen Palpitations     REPORTS \"IT CAUSES MY HEART TO BEAT FAST\"   • Levofloxacin Nausea Only   • Naproxen Nausea Only   • Nitrofurantoin Nausea Only   • Sulfa Antibiotics Nausea Only        Vitals: Temp:  [97.2 °F (36.2 °C)] 97.2 °F (36.2 °C)  Heart Rate:  [81] 81  Resp:  [18] 18  BP: (162)/(79) 162/79    Review of Systems:   Within Normal Limits Abnormal   HEENT [x]    []     Cardiovascular [x]   []     Gastrointestinal [x]   []     Genitourinary [x]   []     Neurologic [x]   []     Pulmonary [x]   []       Physical Exam:   Within Normal Limits Abnormal   HEENT [x]    []     Heart [x]   []     Lungs [x]   []     Abdomen [x]   []     Extremities [x]   []       Impression: Left nuclear sclerotic cataract.     Plan: CATARACT PHACO EXTRACTION WITH INTRAOCULAR LENS IMPLANT left (Left)     Pritesh Mcnally MD  3/4/2022    "

## 2022-03-04 NOTE — ANESTHESIA PREPROCEDURE EVALUATION
Anesthesia Evaluation     Patient summary reviewed and Nursing notes reviewed   history of anesthetic complications (h/o dental injury with previous anesthesia. chipped lower molar. patient does not recall if this was from general anesthesia .): PONV  NPO Solid Status: > 8 hours  NPO Liquid Status: > 8 hours           Airway   Mallampati: I  TM distance: <3 FB  Neck ROM: full  Possible difficult intubation  Dental - normal exam     Pulmonary - negative pulmonary ROS and normal exam    breath sounds clear to auscultation  Cardiovascular - normal exam  Exercise tolerance: good (4-7 METS)    ECG reviewed  Patient on routine beta blocker and Beta blocker given within 24 hours of surgery  Rhythm: regular  Rate: normal    (+) hypertension well controlled, PVD (B/L LE swelling), hyperlipidemia,     ROS comment: Dyslipidemia    Neuro/Psych  (+) dizziness/light headedness (Vertigo secondary to inner ear),    GI/Hepatic/Renal/Endo    (+)  GERD well controlled,  renal disease CRI, diabetes mellitus (FSBS 96.. Pre-diabetic. Diet controlled) well controlled,     Musculoskeletal     (+) arthralgias, back pain,   Abdominal  - normal exam    Abdomen: soft.  Bowel sounds: normal.   Substance History      OB/GYN negative ob/gyn ROS         Other   arthritis,                        Anesthesia Plan    ASA 3     MAC   (Risks and benefits discussed including risk of aspiration, recall and dental damage. All patient questions answered. Will continue with POC.)  intravenous induction     Anesthetic plan, all risks, benefits, and alternatives have been provided, discussed and informed consent has been obtained with: patient.

## 2022-03-04 NOTE — ANESTHESIA POSTPROCEDURE EVALUATION
Patient: Addie Perez    Procedure Summary     Date: 03/04/22 Room / Location: [facility] OR 1 / [facility] OR    Anesthesia Start: 0732 Anesthesia Stop:     Procedure: CATARACT PHACO EXTRACTION WITH INTRAOCULAR LENS IMPLANT left (Left Eye) Diagnosis:       Nuclear sclerotic cataract of left eye      (Nuclear sclerotic cataract of left eye [H25.12])    Surgeons: Pritesh Mcnally MD Provider: Kirk Cutler CRNA    Anesthesia Type: MAC ASA Status: 3          Anesthesia Type: MAC    Vitals  HR 71  Sat 98  \71  Resp 16  Temp 97.3        Post Anesthesia Care and Evaluation    Patient location during evaluation: bedside  Patient participation: complete - patient participated  Level of consciousness: awake and alert  Pain score: 0  Pain management: satisfactory to patient  Airway patency: patent  Anesthetic complications: No anesthetic complications  PONV Status: none  Cardiovascular status: acceptable and stable  Respiratory status: acceptable and room air  Hydration status: acceptable

## 2022-03-04 NOTE — DISCHARGE INSTRUCTIONS
Post Operative Cataract Instructions     Left Eye  POST OPERATIVE INSTRUCTIONS  • You have received anesthesia today. DO NOT drive, drink alcohol, sign legal documents.   • After surgery, your eye will not hurt. It may feel scratchy, sticky or uncomfortable. Your eye will be sensitive to light.  • Most people see better 1-3 days after the procedure, but it could take 3 weeks to get the full benefits and reach your visual potential. If your vision is blurry for a few days it is normal, and means you may have swelling outside or inside the eye. For some patients, a bubble is placed and there will be blurriness.   • You should receive a post-op kit with tape and an eye shield. Wear the shield for the first 3 nights after surgery to keep you from rubbing the eye.  • Most people are able to return to their normal routine 1-3 days after surgery, however, due to the brain adjusting to your new vision you may have trouble judging distances and want to be careful when driving and going up and downstairs.   • You can shower and wash your hair the day after surgery. Keep water, shampoo, hair spray and shaving lotion out of the eye, especially for the first week.  • During the first week, you should AVOID:   1. Rubbing or putting pressure on your eye.  2. Eye make-up, face cream or lotion, hair coloring or perms  3. Strenuous activities, such as running or lifting weights, as to avoid sweat from getting in the eye. Avoid swimming, hot tubs, fumes or porter conditions.   4. Keep your head above your heart (no hanging the head down for periods of time).    Some discomfort and blurred vision after surgery are normal, but if you have any unusual pain, swelling, bleeding or sudden decrease in vision, contact our office immediately.     Emergency assistance is available at any time by calling:    Dr.Mark Benton Bhardwaj  [phone]    If unable to reach call [place]  [facility] @ [phone]    You have been prescribed an eye drop to use after surgery, please follow these instructions:    Prednisolone Acetate: Shake well before use    USE 1 DROP 4 (FOUR) TIMES A DAY FOR 1 WEEK THEN STARTING WEEK 2, ONLY USE 2 (TWO) TIMES A DAY UNTIL YOU RUN OUT OF DROPS.     PLACE A TRISTIN IN THE DAY COLUMN EACH TIME YOU USE TO KEEP ON SCHEDULE    WEEK 1-USE 4  (FOUR) TIMES A DAY DAY 1   DAY 2 DAY 3 DAY 4 DAY 5 DAY 6 DAY 7     WEEK 2-USE 2 (TWO)  TIMES A DAY DAY 1 DAY 2 DAY 3 DAY 4 DAY 5 DAY 6 DAY 7     WEEK 3-USE 2 (TWO) TIMES A DAY DAY 1 DAY 2 DAY 3 DAY 4 DAY 5 DAY 6 DAY 7     WEEK 4-USE 2 (TWO)TIMES A DAY DAY 1 DAY 2 DAY 3 DAY 4 DAY 5 DAY 6 DAY 7       Please follow all post op instructions and follow up appointment time from your physician's office included in your discharge packet.  .   No pushing, pulling, tugging,  heavy lifting, or strenuous activity.  No major decision making, driving, or drinking alcoholic beverages for 24 hours. ( due to the medications you have  received)  Always use good hand hygiene/washing techniques.  NO driving while taking pain medications.    * if you have an incision:  Check your incision area every day for signs of infection.   Check for:  * more redness, swelling, or pain  *more fluid or blood  *warmth  *pus or bad smell  To assist you in voiding:  Drink plenty of fluids  Listen to running water while attempting to void.    If you are unable to urinate and you have an uncomfortable urge to void or it has been   6 hours since you were discharged, return to the Emergency Room

## 2022-03-08 ENCOUNTER — PREP FOR SURGERY (OUTPATIENT)
Dept: OTHER | Facility: HOSPITAL | Age: 87
End: 2022-03-08

## 2022-03-08 DIAGNOSIS — H25.11 NUCLEAR SCLEROTIC CATARACT OF RIGHT EYE: Primary | ICD-10-CM

## 2022-03-08 RX ORDER — TETRACAINE HYDROCHLORIDE 5 MG/ML
1 SOLUTION OPHTHALMIC SEE ADMIN INSTRUCTIONS
Status: CANCELLED | OUTPATIENT
Start: 2022-03-08

## 2022-03-08 RX ORDER — SODIUM CHLORIDE 0.9 % (FLUSH) 0.9 %
10 SYRINGE (ML) INJECTION AS NEEDED
Status: CANCELLED | OUTPATIENT
Start: 2022-03-08

## 2022-03-08 RX ORDER — CYCLOPENT/TROPIC/PHEN/KETR/WAT 1%-1%-2.5%
1 DROPS (EA) OPHTHALMIC (EYE)
Status: CANCELLED | OUTPATIENT
Start: 2022-03-08 | End: 2022-03-08

## 2022-03-08 RX ORDER — SODIUM CHLORIDE 0.9 % (FLUSH) 0.9 %
10 SYRINGE (ML) INJECTION EVERY 12 HOURS SCHEDULED
Status: CANCELLED | OUTPATIENT
Start: 2022-03-08

## 2022-03-08 RX ORDER — PREDNISOLONE ACETATE 10 MG/ML
1 SUSPENSION/ DROPS OPHTHALMIC SEE ADMIN INSTRUCTIONS
Status: CANCELLED | OUTPATIENT
Start: 2022-03-08

## 2022-03-17 RX ORDER — MULTIPLE VITAMINS W/ MINERALS TAB 9MG-400MCG
1 TAB ORAL DAILY
COMMUNITY

## 2022-03-17 NOTE — PRE-PROCEDURE INSTRUCTIONS
PAT phone history completed with pt for upcoming procedure on 3/18/22 with Dr. Mcnally.     PAT PASS GIVEN/REVIEWED WITH PT.  VERBALIZED UNDERSTANDING OF THE FOLLOWING:  DO NOT EAT, DRINK, SMOKE, USE SMOKELESS TOBACCO OR CHEW GUM AFTER MIDNIGHT THE NIGHT BEFORE SURGERY.  THIS ALSO INCLUDES HARD CANDIES AND MINTS.    DO NOT SHAVE THE AREA TO BE OPERATED ON AT LEAST 48 HOURS PRIOR TO THE PROCEDURE.  DO NOT WEAR MAKE UP OR NAIL POLISH.  DO NOT LEAVE IN ANY PIERCING OR WEAR JEWELRY THE DAY OF SURGERY.      DO NOT USE ADHESIVES IF YOU WEAR DENTURES.    DO NOT WEAR EYE CONTACTS; BRING IN YOUR GLASSES.    ONLY TAKE MEDICATION THE MORNING OF YOUR PROCEDURE IF INSTRUCTED BY YOUR SURGEON WITH ENOUGH WATER TO SWALLOW THE MEDICATION.  IF YOUR SURGEON DID NOT SPECIFY WHICH MEDICATIONS TO TAKE, YOU WILL NEED TO CALL THEIR OFFICE FOR FURTHER INSTRUCTIONS AND DO AS THEY INSTRUCT.    LEAVE ANYTHING YOU CONSIDER VALUABLE AT HOME.    YOU WILL NEED TO ARRANGE FOR SOMEONE TO DRIVE YOU HOME AFTER SURGERY.  IT IS RECOMMENDED THAT YOU DO NOT DRIVE, WORK, DRINK ALCOHOL OR MAKE MAJOR DECISIONS FOR AT LEAST 24 HOURS AFTER YOUR PROCEDURE IS COMPLETE.      THE DAY OF YOUR PROCEDURE, BRING IN THE FOLLOWING IF APPLICABLE:   PICTURE ID AND INSURANCE/MEDICARE OR MEDICAID CARDS/ANY CO-PAY THAT MAY BE DUE   COPY OF ADVANCED DIRECTIVE/LIVING WILL/POWER OR    CPAP/BIPAP/INHALERS   SKIN PREP SHEET   YOUR PREADMISSION TESTING PASS (IF NOT A PHONE HISTORY)

## 2022-03-18 ENCOUNTER — ANESTHESIA EVENT (OUTPATIENT)
Dept: PERIOP | Facility: HOSPITAL | Age: 87
End: 2022-03-18

## 2022-03-18 ENCOUNTER — ANESTHESIA (OUTPATIENT)
Dept: PERIOP | Facility: HOSPITAL | Age: 87
End: 2022-03-18

## 2022-03-18 ENCOUNTER — HOSPITAL ENCOUNTER (OUTPATIENT)
Facility: HOSPITAL | Age: 87
Setting detail: HOSPITAL OUTPATIENT SURGERY
Discharge: HOME | End: 2022-03-18
Attending: OPHTHALMOLOGY | Admitting: OPHTHALMOLOGY

## 2022-03-18 VITALS
RESPIRATION RATE: 16 BRPM | HEIGHT: 65 IN | WEIGHT: 146 LBS | OXYGEN SATURATION: 96 % | BODY MASS INDEX: 24.32 KG/M2 | TEMPERATURE: 97 F | HEART RATE: 64 BPM | DIASTOLIC BLOOD PRESSURE: 80 MMHG | SYSTOLIC BLOOD PRESSURE: 183 MMHG

## 2022-03-18 DIAGNOSIS — H25.11 NUCLEAR SCLEROTIC CATARACT OF RIGHT EYE: ICD-10-CM

## 2022-03-18 LAB — GLUCOSE BLDC GLUCOMTR-MCNC: 95 MG/DL (ref 70–130)

## 2022-03-18 PROCEDURE — 25010000002 CEFUROXIME 3 MG/0.3 ML SOLUTION 0.3 ML SYRINGE: Performed by: OPHTHALMOLOGY

## 2022-03-18 PROCEDURE — V2632 POST CHMBR INTRAOCULAR LENS: HCPCS | Performed by: OPHTHALMOLOGY

## 2022-03-18 PROCEDURE — 82962 GLUCOSE BLOOD TEST: CPT

## 2022-03-18 PROCEDURE — 25010000002 EPINEPHRINE PER 0.1 MG: Performed by: OPHTHALMOLOGY

## 2022-03-18 PROCEDURE — 25010000002 MIDAZOLAM PER 1MG: Performed by: NURSE ANESTHETIST, CERTIFIED REGISTERED

## 2022-03-18 DEVICE — IMPLANTABLE DEVICE: Type: IMPLANTABLE DEVICE | Site: POSTERIOR CHAMBER | Status: FUNCTIONAL

## 2022-03-18 RX ORDER — PREDNISOLONE ACETATE 10 MG/ML
1 SUSPENSION/ DROPS OPHTHALMIC SEE ADMIN INSTRUCTIONS
Status: DISCONTINUED | OUTPATIENT
Start: 2022-03-18 | End: 2022-03-18 | Stop reason: HOSPADM

## 2022-03-18 RX ORDER — TETRACAINE HYDROCHLORIDE 5 MG/ML
1 SOLUTION OPHTHALMIC SEE ADMIN INSTRUCTIONS
Status: DISCONTINUED | OUTPATIENT
Start: 2022-03-18 | End: 2022-03-18 | Stop reason: HOSPADM

## 2022-03-18 RX ORDER — SODIUM CHLORIDE 0.9 % (FLUSH) 0.9 %
10 SYRINGE (ML) INJECTION EVERY 12 HOURS SCHEDULED
Status: DISCONTINUED | OUTPATIENT
Start: 2022-03-18 | End: 2022-03-18 | Stop reason: HOSPADM

## 2022-03-18 RX ORDER — LIDOCAINE HYDROCHLORIDE 40 MG/ML
INJECTION, SOLUTION RETROBULBAR; TOPICAL AS NEEDED
Status: DISCONTINUED | OUTPATIENT
Start: 2022-03-18 | End: 2022-03-18 | Stop reason: HOSPADM

## 2022-03-18 RX ORDER — PREDNISOLONE ACETATE 10 MG/ML
SUSPENSION/ DROPS OPHTHALMIC
Qty: 2 ML | Refills: 0
Start: 2022-03-18

## 2022-03-18 RX ORDER — TETRACAINE HYDROCHLORIDE 5 MG/ML
SOLUTION OPHTHALMIC AS NEEDED
Status: DISCONTINUED | OUTPATIENT
Start: 2022-03-18 | End: 2022-03-18 | Stop reason: HOSPADM

## 2022-03-18 RX ORDER — MIDAZOLAM HYDROCHLORIDE 2 MG/2ML
INJECTION, SOLUTION INTRAMUSCULAR; INTRAVENOUS AS NEEDED
Status: DISCONTINUED | OUTPATIENT
Start: 2022-03-18 | End: 2022-03-18 | Stop reason: SURG

## 2022-03-18 RX ORDER — SODIUM CHLORIDE 0.9 % (FLUSH) 0.9 %
10 SYRINGE (ML) INJECTION AS NEEDED
Status: DISCONTINUED | OUTPATIENT
Start: 2022-03-18 | End: 2022-03-18 | Stop reason: HOSPADM

## 2022-03-18 RX ORDER — CYCLOPENT/TROPIC/PHEN/KETR/WAT 1%-1%-2.5%
1 DROPS (EA) OPHTHALMIC (EYE)
Status: COMPLETED | OUTPATIENT
Start: 2022-03-18 | End: 2022-03-18

## 2022-03-18 RX ORDER — BALANCED SALT SOLUTION ENRICHED WITH BICARBONATE, DEXTROSE, AND GLUTATHIONE
KIT INTRAOCULAR AS NEEDED
Status: DISCONTINUED | OUTPATIENT
Start: 2022-03-18 | End: 2022-03-18 | Stop reason: HOSPADM

## 2022-03-18 RX ORDER — PREDNISOLONE ACETATE 10 MG/ML
SUSPENSION/ DROPS OPHTHALMIC AS NEEDED
Status: DISCONTINUED | OUTPATIENT
Start: 2022-03-18 | End: 2022-03-18 | Stop reason: HOSPADM

## 2022-03-18 RX ORDER — SODIUM CHLORIDE, SODIUM LACTATE, POTASSIUM CHLORIDE, CALCIUM CHLORIDE 600; 310; 30; 20 MG/100ML; MG/100ML; MG/100ML; MG/100ML
1000 INJECTION, SOLUTION INTRAVENOUS CONTINUOUS
Status: DISCONTINUED | OUTPATIENT
Start: 2022-03-18 | End: 2022-03-18 | Stop reason: HOSPADM

## 2022-03-18 RX ORDER — BALANCED SALT SOLUTION 6.4; .75; .48; .3; 3.9; 1.7 MG/ML; MG/ML; MG/ML; MG/ML; MG/ML; MG/ML
SOLUTION OPHTHALMIC AS NEEDED
Status: DISCONTINUED | OUTPATIENT
Start: 2022-03-18 | End: 2022-03-18 | Stop reason: HOSPADM

## 2022-03-18 RX ADMIN — MIDAZOLAM HYDROCHLORIDE 1 MG: 1 INJECTION, SOLUTION INTRAMUSCULAR; INTRAVENOUS at 07:50

## 2022-03-18 RX ADMIN — SODIUM CHLORIDE, POTASSIUM CHLORIDE, SODIUM LACTATE AND CALCIUM CHLORIDE 1000 ML: 600; 310; 30; 20 INJECTION, SOLUTION INTRAVENOUS at 06:44

## 2022-03-18 RX ADMIN — Medication 1 DROP: at 06:52

## 2022-03-18 RX ADMIN — Medication 1 DROP: at 06:47

## 2022-03-18 RX ADMIN — TETRACAINE HYDROCHLORIDE 1 DROP: 5 SOLUTION OPHTHALMIC at 06:41

## 2022-03-18 RX ADMIN — TETRACAINE HYDROCHLORIDE 1 DROP: 5 SOLUTION OPHTHALMIC at 06:40

## 2022-03-18 RX ADMIN — Medication 1 DROP: at 06:42

## 2022-03-18 RX ADMIN — MIDAZOLAM HYDROCHLORIDE 1 MG: 1 INJECTION, SOLUTION INTRAMUSCULAR; INTRAVENOUS at 07:39

## 2022-03-18 NOTE — H&P
"      [facility]         History and Physical    Patient Name: Addie Perez  MRN: [id number]  : 1934  Gender: female     HPI: Patient complaint of PPLOV Right eye diagnosed with cataract. Patient requests PHACO PCIOL for Increase of VA/ADL.    History:    Past Medical History:   Diagnosis Date   • Arthritis    • Back pain    • Cataract     PT REPORTS EARLY STAGES   • Cystitis    • Diabetes mellitus (HCC)      REPORTS \"I AM PRE DIABETIC\" AND REPORTS THAT SHE WATCHS HER DIET AND PCP WATCHES A1C CLOSELY   • Disease of thyroid gland    • Dysphagia     REPORTS MORE TROUBLE SWALLOWING WHEN EATING OR TAKING PILLS. REPORTS PAIN AND THAT SOMETIMES THINGS FEEL LIKE THEY \"STICK\"   • Elevated cholesterol 2018    REPORTS SHE HAS TAKEN MEDICATION IN THE PAST BUT THAT SHE THINKS ALL IS WNL'S AT PRESENT TIME WITHOUT MEDICATION   • Fracture of right ankle    • GERD (gastroesophageal reflux disease)    • Hearing loss     REPORTS MILD AND NO USE OF HEARING AIDS   • History of cardiac murmur     REPORTS \"I USED TO HAVE ONE BUT NOW THEY SAY THEY DON'T HEAR IT\"   • History of colonic polyps    • History of pneumonia    • Hx of echocardiogram    • Hypercholesterolemia    • Hypertension    • Irregular heart beat    • Macular degeneration    • Osteoporosis    • Seasonal allergies    • Seasonal allergies    • Sinusitis    • Spinal headache 2018    REPORTS AFTER DELIVERY OF HER SON   • Urinary tract infection    • Vertigo    • Wears glasses        Past Surgical History:   Procedure Laterality Date   • BACK SURGERY      REPORTS A SPUR WAS REMOVED THAT WAS HITTING SCIATIC NERVE   • BREAST SURGERY Left     LUMPECTOMY, BENIGN    • CATARACT EXTRACTION W/ INTRAOCULAR LENS IMPLANT Left 3/4/2022    Procedure: CATARACT PHACO EXTRACTION WITH INTRAOCULAR LENS IMPLANT left;  Surgeon: Pritesh Mcnally MD;  Location: [facility];  Service: Ophthalmology;  Laterality: Left;   • COLONOSCOPY      " "REPORTS LAST DONE IN 2012 BUT HAS A HX OF SEVERAL OF THESE PROCEDURES   • DILATATION AND CURETTAGE      REPORTS \"I HAD A COUPLE OF THOSE\"   • ENDOSCOPY N/A 1/31/2017    Procedure: ESOPHAGOGASTRODUODENOSCOPY;  Surgeon: Robbie Quezada MD;  Location: [facility] ENDOSCOPY;  Service:    • ENDOSCOPY N/A 9/21/2018    Procedure: ESOPHAGOGASTRODUODENOSCOPY WITH BIOPSY, DILITATION;  Surgeon: Robbie Quezada MD;  Location: [facility] ENDOSCOPY;  Service: Gastroenterology   • FOOT SURGERY Left     REPORTS SURGICAL INTERVENTION TO CORRECT GREAT TOE ALIGNMENT   • GALLBLADDER SURGERY  2009   • HEMORRHOIDECTOMY  1973   • TONSILLECTOMY  1946   • UPPER GASTROINTESTINAL ENDOSCOPY  2014   • UPPER GASTROINTESTINAL ENDOSCOPY  01/31/2017       Social History     Socioeconomic History   • Marital status:    Tobacco Use   • Smoking status: Never Smoker   • Smokeless tobacco: Never Used   Vaping Use   • Vaping Use: Never used   Substance and Sexual Activity   • Alcohol use: No   • Drug use: No   • Sexual activity: Defer       Family History   Problem Relation Age of Onset   • Colon cancer Paternal Grandmother         possibly colon cancer   • Diabetes Mother    • Cancer Brother         lung   • Cancer Brother         brain       Prior to Admission Medications:  Medications Prior to Admission   Medication Sig Dispense Refill Last Dose   • acetaminophen (TYLENOL) 650 MG 8 hr tablet Take 650 mg by mouth 2 (Two) Times a Day.   3/17/2022 at 1800   • Carboxymethylcellulose Sodium 0.25 % solution Apply 1 drop to eye(s) as directed by provider 2 (Two) Times a Day. REPORTS USES TO BOTH EYES   3/17/2022 at Unknown time   • cetirizine (zyrTEC) 10 MG tablet cetirizine 10 mg tablet   Take 1 tablet every day by oral route for 30 days.   3/17/2022 at 0800   • Cholecalciferol (VITAMIN D3 PO) Take  by mouth.   3/17/2022 at 0800   • CRANBERRY EXTRACT PO Take 1 tablet by mouth Daily.   3/17/2022 at 0800   • estradiol (ESTRACE) 0.5 MG tablet Take 0.5 mg by mouth " "2 (Two) Times a Week. REPORTS TAKES THIS ON Monday AND Friday AT NIGHT TIME   3/14/2022   • fluticasone (FLONASE) 50 MCG/ACT nasal spray 1 spray into each nostril 2 (Two) Times a Day.   3/17/2022 at 1800   • ipratropium (ATROVENT) 0.06 % nasal spray ipratropium bromide 42 mcg (0.06 %) nasal spray   3/17/2022 at 1800   • losartan (COZAAR) 25 MG tablet    3/17/2022 at 1800   • methIMAzole (TAPAZOLE) 5 MG tablet Take 5 mg by mouth Every Other Day.   3/17/2022 at 1800   • metoprolol succinate XL (TOPROL-XL) 50 MG 24 hr tablet Take 50 mg by mouth Daily.   3/17/2022 at 1800   • multivitamin with minerals tablet tablet Take 1 tablet by mouth Daily.   3/17/2022 at 0800   • omeprazole (prilOSEC) 10 MG capsule    3/17/2022 at 1800   • prednisoLONE acetate (Pred Forte) 1 % ophthalmic suspension Follow instructions on the After Visit Summary. 2 mL 0 3/17/2022 at Unknown time   • allopurinol (ZYLOPRIM) 100 MG tablet  (Patient not taking: Reported on 3/17/2022)   Not Taking at Unknown time   • fexofenadine (ALLEGRA) 180 MG tablet Take 180 mg by mouth Daily.   Unknown at Unknown time   • hydrochlorothiazide (MICROZIDE) 12.5 MG capsule Take 12.5 mg by mouth Daily. (Patient not taking: Reported on 3/17/2022)   Not Taking at Unknown time   • montelukast (SINGULAIR) 10 MG tablet montelukast 10 mg tablet   Take 1 tablet every day by oral route for 30 days. (Patient not taking: Reported on 3/17/2022)   Not Taking at Unknown time       Allergies:  Allergies   Allergen Reactions   • Codeine Nausea Only   • Escitalopram Nausea Only     REPORTS \"LEXAPRO\"   • Gabapentin Other (See Comments)     REPORTS \"MAKES MY EYES HURT BECAUSE OF MY MACULAR DEGENERATION\"   • Ibuprofen Palpitations     REPORTS \"IT CAUSES MY HEART TO BEAT FAST\"   • Levofloxacin Nausea Only   • Naproxen Nausea Only   • Nitrofurantoin Nausea Only   • Sulfa Antibiotics Nausea Only        Vitals: Temp:  [97.6 °F (36.4 °C)] 97.6 °F (36.4 °C)  Heart Rate:  [67] 67  Resp:  [18] " 18  BP: (167)/(72) 167/72    Review of Systems:   Within Normal Limits Abnormal   HEENT [x]    []     Cardiovascular [x]   []     Gastrointestinal [x]   []     Genitourinary [x]   []     Neurologic [x]   []     Pulmonary [x]   []       Physical Exam:   Within Normal Limits Abnormal   HEENT [x]    []     Heart [x]   []     Lungs [x]   []     Abdomen [x]   []     Extremities [x]   []       Impression: Right nuclear sclerotic cataract.     Plan: CATARACT PHACO EXTRACTION WITH INTRAOCULAR LENS IMPLANT RIGHT (Right)     Pritesh Mcnally MD  3/18/2022

## 2022-03-18 NOTE — OP NOTE
OPERATIVE NOTE    Date of Procedure: 3/18/2022  Patient Name: Addie Perez  Patient MRN: [id number]  YOB: 1934     Preoperative Diagnosis: Right nuclear sclerotic cataract.     Postoperative Diagnosis: Right nuclear sclerotic cataract.     Procedure Performed: Phacoemulsification with implantation of a  foldable posterior chamber intraocular lens, Right eye.     Surgeon: Pritesh Mcnally MD     Anesthesia:  Monitored Anesthesia Care (MAC)      Brief History and Indication: The patient presents with a history of past progressive loss of vision.  Patient was diagnosed with cataract and requests removal for increased ability to read and see.     Operation Description: The patient was taken to the OR and prepped and draped in the usual sterile ophthalmic fashion. A lid speculum was placed in the eye.  A 0.8 mm blade was then used to make a stab incision two o’clock hours from the intended temporal clear cornea groove. The anterior chamber was then inflated with a Viscoelastic. A metal microkeratome blade was then used to enter the anterior chamber at the temporal clear cornea site. A three level tunnel incision was made. A curvilinear capsulorrhexis was then performed with a bent cystotome needle and capsulorrhexis forceps.  BSS on a 30 gauge bent cannula was used to hydro-dissect the lens. Good fluid waves were noted. Phacoemulsification was then used to remove nuclear material without complications. The residual cortical and lenticular material was then removed with irrigation and aspiration. Viscoelastics were then used to inflate the bag in a soft shell technique. A PCIOL was injected into the bag. Post-implantation, there were no rents or tears in the bag and the lens was noted to be stable and centered. The residual Viscoelastic was then removed with irrigation and aspiration.  The wound was checked and found to be without leaks. One drop of a Prednisilone was placed in the eye.     Implant  Information:   Implant Name Type Inv. Item Serial No.  Lot No. LRB No. Used Action   LENS ACRYSOF IQ [id number] 27.5 - [id number] - [id number] Implant LENS ACRYSOF IQ 6X13MM [id number] 27.5 [id number] ELI  Right 1 Implanted       Complications: None    Estimated Blood Loss:  Less than 1 cc.      Discharge and Condition  The patient was transported to same day surgery in excellent condition and scheduled for follow-up appointment. The patient was given instructions on use of eye drops for the operative eye and was specifically instructed to call for any concerns.    Pritesh Mcnally MD  3/18/2022

## 2022-03-18 NOTE — ANESTHESIA POSTPROCEDURE EVALUATION
Patient: Addie Perez    Procedure Summary     Date: 03/18/22 Room / Location: [facility] OR 1 / [facility] OR    Anesthesia Start: 0737 Anesthesia Stop: 0801    Procedure: CATARACT PHACO EXTRACTION WITH INTRAOCULAR LENS IMPLANT RIGHT (Right Eye) Diagnosis:       Nuclear sclerotic cataract of right eye      (Nuclear sclerotic cataract of right eye [H25.11])    Surgeons: Pritesh Mcnally MD Provider: Gerardo Silva CRNA    Anesthesia Type: MAC ASA Status: 3          Anesthesia Type: MAC    Vitals  No vitals data found for the desired time range.          Post Anesthesia Care and Evaluation    Patient location during evaluation: bedside  Patient participation: complete - patient participated  Level of consciousness: awake and alert  Pain score: 0  Pain management: adequate  Airway patency: patent  Anesthetic complications: No anesthetic complications  PONV Status: none  Cardiovascular status: acceptable  Respiratory status: acceptable  Hydration status: acceptable

## 2022-03-18 NOTE — ANESTHESIA PREPROCEDURE EVALUATION
Anesthesia Evaluation     Patient summary reviewed and Nursing notes reviewed   history of anesthetic complications (h/o dental injury with previous anesthesia. chipped lower molar. patient does not recall if this was from general anesthesia .): PONV  NPO Solid Status: > 8 hours  NPO Liquid Status: > 8 hours           Airway   Mallampati: I  TM distance: <3 FB  Neck ROM: full  Possible difficult intubation  Dental - normal exam     Pulmonary - negative pulmonary ROS and normal exam    breath sounds clear to auscultation  Cardiovascular - normal exam  Exercise tolerance: good (4-7 METS)    ECG reviewed  Patient on routine beta blocker and Beta blocker given within 24 hours of surgery  Rhythm: regular  Rate: normal    (+) hypertension well controlled, PVD (B/L LE swelling), hyperlipidemia,     ROS comment: Dyslipidemia    Neuro/Psych  (+) dizziness/light headedness (Vertigo secondary to inner ear),    GI/Hepatic/Renal/Endo    (+)  GERD well controlled,  renal disease CRI, diabetes mellitus (FSBS 96.. Pre-diabetic. Diet controlled) well controlled, thyroid problem     Musculoskeletal     (+) arthralgias, back pain,   Abdominal  - normal exam    Abdomen: soft.  Bowel sounds: normal.   Substance History      OB/GYN negative ob/gyn ROS         Other   arthritis,                        Anesthesia Plan    ASA 3     MAC   (Risks and benefits discussed including risk of aspiration, recall and dental damage. All patient questions answered. Will continue with POC.)  intravenous induction     Anesthetic plan, all risks, benefits, and alternatives have been provided, discussed and informed consent has been obtained with: patient.

## 2022-03-18 NOTE — DISCHARGE INSTRUCTIONS
Post Operative Cataract Instructions     Right Eye  POST OPERATIVE INSTRUCTIONS  You have received anesthesia today. DO NOT drive, drink alcohol, sign legal documents.   After surgery, your eye will not hurt. It may feel scratchy, sticky or uncomfortable. Your eye will be sensitive to light.  Most people see better 1-3 days after the procedure, but it could take 3 weeks to get the full benefits and reach your visual potential. If your vision is blurry for a few days it is normal, and means you may have swelling outside or inside the eye. For some patients, a bubble is placed and there will be blurriness.   You should receive a post-op kit with tape and an eye shield. Wear the shield for the first 3 nights after surgery to keep you from rubbing the eye.  Most people are able to return to their normal routine 1-3 days after surgery, however, due to the brain adjusting to your new vision you may have trouble judging distances and want to be careful when driving and going up and downstairs.   You can shower and wash your hair the day after surgery. Keep water, shampoo, hair spray and shaving lotion out of the eye, especially for the first week.  During the first week, you should AVOID:   Rubbing or putting pressure on your eye.  Eye make-up, face cream or lotion, hair coloring or perms  Strenuous activities, such as running or lifting weights, as to avoid sweat from getting in the eye. Avoid swimming, hot tubs, fumes or porter conditions.   Keep your head above your heart (no hanging the head down for periods of time).    Some discomfort and blurred vision after surgery are normal, but if you have any unusual pain, swelling, bleeding or sudden decrease in vision, contact our office immediately.     Emergency assistance is available at any time by calling:    Dr.Mark Benton Bhardwaj  [phone]    If unable to reach call [facility] @  [phone]    You have been prescribed an eye drop to use after surgery, please follow these instructions:    Prednisolone Acetate: Shake well before use    USE 1 DROP 4 (FOUR) TIMES A DAY FOR 1 WEEK THEN STARTING WEEK 2, ONLY USE 2 (TWO) TIMES A DAY UNTIL YOU RUN OUT OF DROPS.     PLACE A TRISTIN IN THE DAY COLUMN EACH TIME YOU USE TO KEEP ON SCHEDULE    WEEK 1-USE 4  (FOUR) TIMES A DAY DAY 1   DAY 2 DAY 3 DAY 4 DAY 5 DAY 6 DAY 7     WEEK 2-USE 2 (TWO)  TIMES A DAY DAY 1 DAY 2 DAY 3 DAY 4 DAY 5 DAY 6 DAY 7     WEEK 3-USE 2 (TWO) TIMES A DAY DAY 1 DAY 2 DAY 3 DAY 4 DAY 5 DAY 6 DAY 7     WEEK 4-USE 2 (TWO)TIMES A DAY DAY 1 DAY 2 DAY 3 DAY 4 DAY 5 DAY 6 DAY 7

## 2022-06-03 ENCOUNTER — TRANSCRIBE ORDERS (OUTPATIENT)
Dept: ADMINISTRATIVE | Facility: HOSPITAL | Age: 87
End: 2022-06-03

## 2022-06-03 DIAGNOSIS — R07.9 CHEST PAIN, UNSPECIFIED TYPE: Primary | ICD-10-CM

## 2022-06-10 ENCOUNTER — TRANSCRIBE ORDERS (OUTPATIENT)
Dept: ADMINISTRATIVE | Facility: HOSPITAL | Age: 87
End: 2022-06-10

## 2022-06-10 DIAGNOSIS — R07.9 CHEST PAIN, UNSPECIFIED TYPE: Primary | ICD-10-CM

## 2022-06-16 ENCOUNTER — HOSPITAL ENCOUNTER (OUTPATIENT)
Dept: NUCLEAR MEDICINE | Facility: HOSPITAL | Age: 87
Discharge: HOME | End: 2022-06-16

## 2022-06-16 DIAGNOSIS — R07.9 CHEST PAIN, UNSPECIFIED TYPE: ICD-10-CM

## 2022-06-16 PROCEDURE — A9500 TC99M SESTAMIBI: HCPCS | Performed by: NURSE PRACTITIONER

## 2022-06-16 PROCEDURE — 0 TECHNETIUM SESTAMIBI: Performed by: NURSE PRACTITIONER

## 2022-06-16 PROCEDURE — 93017 CV STRESS TEST TRACING ONLY: CPT

## 2022-06-16 PROCEDURE — 93018 CV STRESS TEST I&R ONLY: CPT | Performed by: INTERNAL MEDICINE

## 2022-06-16 PROCEDURE — 25010000002 REGADENOSON 0.4 MG/5ML SOLUTION: Performed by: NURSE PRACTITIONER

## 2022-06-16 PROCEDURE — 78452 HT MUSCLE IMAGE SPECT MULT: CPT

## 2022-06-16 PROCEDURE — 78452 HT MUSCLE IMAGE SPECT MULT: CPT | Performed by: INTERNAL MEDICINE

## 2022-06-16 RX ADMIN — REGADENOSON 0.4 MG: 0.08 INJECTION, SOLUTION INTRAVENOUS at 08:47

## 2022-06-16 RX ADMIN — TECHNETIUM TC 99M SESTAMIBI 1 DOSE: 1 INJECTION INTRAVENOUS at 08:47

## 2022-06-16 RX ADMIN — TECHNETIUM TC 99M SESTAMIBI 1 DOSE: 1 INJECTION INTRAVENOUS at 06:50

## 2022-06-17 LAB
BH CV REST NUCLEAR ISOTOPE DOSE: 10.6 MCI
BH CV STRESS COMMENTS STAGE 1: NORMAL
BH CV STRESS DOSE REGADENOSON STAGE 1: 0.4
BH CV STRESS DURATION MIN STAGE 1: 0
BH CV STRESS DURATION SEC STAGE 1: 10
BH CV STRESS NUCLEAR ISOTOPE DOSE: 34.8 MCI
BH CV STRESS PROTOCOL 1: NORMAL
BH CV STRESS RECOVERY BP: NORMAL MMHG
BH CV STRESS RECOVERY HR: 86 BPM
BH CV STRESS STAGE 1: 1
LV EF NUC BP: 74 %
MAXIMAL PREDICTED HEART RATE: 132 BPM
PERCENT MAX PREDICTED HR: 68.18 %
STRESS BASELINE BP: NORMAL MMHG
STRESS BASELINE HR: 69 BPM
STRESS PERCENT HR: 80 %
STRESS POST PEAK BP: NORMAL MMHG
STRESS POST PEAK HR: 90 BPM
STRESS TARGET HR: 112 BPM

## 2022-08-04 ENCOUNTER — HOSPITAL ENCOUNTER (OUTPATIENT)
Dept: MAMMOGRAPHY | Facility: HOSPITAL | Age: 87
Discharge: HOME | End: 2022-08-04
Admitting: SURGERY

## 2022-08-04 DIAGNOSIS — Z12.31 SCREENING MAMMOGRAM, ENCOUNTER FOR: ICD-10-CM

## 2022-08-04 PROCEDURE — 77063 BREAST TOMOSYNTHESIS BI: CPT

## 2022-08-04 PROCEDURE — 77067 SCR MAMMO BI INCL CAD: CPT

## 2022-08-05 NOTE — PROGRESS NOTES
"Patient: Addie Perez    YOB: 1934    Date: 08/09/2022    Primary Care Provider: Arieal Hampton DO    Chief Complaint   Patient presents with   • Follow-up     mammogram       SUBJECTIVE:    History of present illness:  Patient is s/p left breast biopsy which was done in the remote past, it was benign.  Patient is here for follow-up mammogram which was done 08/04/2022, it was read BI-RADS category 2 with benign findings.    Patient reports no problems at this time otherwise.    The following portions of the patient's history were reviewed and updated as appropriate: allergies, current medications, past family history, past medical history, past social history, past surgical history and problem list.      Review of Systems   Constitutional: Negative for chills, fever and unexpected weight change.   HENT: Negative for hearing loss, trouble swallowing and voice change.    Eyes: Negative for visual disturbance.   Respiratory: Negative for apnea, cough, chest tightness, shortness of breath and wheezing.    Cardiovascular: Negative for chest pain, palpitations and leg swelling.   Gastrointestinal: Negative for abdominal distention, abdominal pain, anal bleeding, blood in stool, constipation, diarrhea, nausea, rectal pain and vomiting.   Endocrine: Negative for cold intolerance and heat intolerance.   Genitourinary: Negative for difficulty urinating, dysuria and flank pain.   Musculoskeletal: Negative for back pain and gait problem.   Skin: Negative for color change, rash and wound.   Neurological: Negative for dizziness, syncope, speech difficulty, weakness, light-headedness, numbness and headaches.   Hematological: Negative for adenopathy. Does not bruise/bleed easily.   Psychiatric/Behavioral: Negative for confusion. The patient is not nervous/anxious.        Allergies:  Allergies   Allergen Reactions   • Codeine Nausea Only   • Escitalopram Nausea Only     REPORTS \"LEXAPRO\"   • Gabapentin Other " "(See Comments)     REPORTS \"MAKES MY EYES HURT BECAUSE OF MY MACULAR DEGENERATION\"   • Ibuprofen Palpitations     REPORTS \"IT CAUSES MY HEART TO BEAT FAST\"   • Levofloxacin Nausea Only   • Naproxen Nausea Only   • Nitrofurantoin Nausea Only   • Sulfa Antibiotics Nausea Only       Medications:    Current Outpatient Medications:   •  acetaminophen (TYLENOL) 650 MG 8 hr tablet, Take 650 mg by mouth 2 (Two) Times a Day., Disp: , Rfl:   •  Cholecalciferol (VITAMIN D3 PO), Take  by mouth., Disp: , Rfl:   •  CRANBERRY EXTRACT PO, Take 1 tablet by mouth Daily., Disp: , Rfl:   •  Diclofenac Sodium (VOLTAREN) 1 % gel gel, diclofenac 1 % topical gel, Disp: , Rfl:   •  estradiol (ESTRACE) 0.5 MG tablet, Take 0.5 mg by mouth 2 (Two) Times a Week. REPORTS TAKES THIS ON Monday AND Friday AT NIGHT TIME, Disp: , Rfl:   •  famotidine (PEPCID) 20 MG tablet, , Disp: , Rfl:   •  fexofenadine (ALLEGRA) 180 MG tablet, Take 180 mg by mouth Daily., Disp: , Rfl:   •  fexofenadine (ALLEGRA) 180 MG tablet, Allegra Allergy 180 mg tablet  Take 1 tablet every day by oral route for 30 days., Disp: , Rfl:   •  fluticasone (FLONASE) 50 MCG/ACT nasal spray, 1 spray into each nostril 2 (Two) Times a Day., Disp: , Rfl:   •  ipratropium (ATROVENT) 0.06 % nasal spray, ipratropium bromide 42 mcg (0.06 %) nasal spray, Disp: , Rfl:   •  multivitamin with minerals tablet tablet, Take 1 tablet by mouth Daily., Disp: , Rfl:   •  allopurinol (ZYLOPRIM) 100 MG tablet, , Disp: , Rfl:   •  Carboxymethylcellulose Sodium 0.25 % solution, Apply 1 drop to eye(s) as directed by provider 2 (Two) Times a Day. REPORTS USES TO BOTH EYES, Disp: , Rfl:   •  cetirizine (zyrTEC) 10 MG tablet, cetirizine 10 mg tablet  Take 1 tablet every day by oral route for 30 days., Disp: , Rfl:   •  hydrochlorothiazide (MICROZIDE) 12.5 MG capsule, Take 12.5 mg by mouth Daily. (Patient not taking: No sig reported), Disp: , Rfl:   •  losartan (COZAAR) 25 MG tablet, 25 mg 2 (Two) Times a Day., " "Disp: , Rfl:   •  methIMAzole (TAPAZOLE) 5 MG tablet, Take 5 mg by mouth Every Other Day., Disp: , Rfl:   •  metoprolol succinate XL (TOPROL-XL) 50 MG 24 hr tablet, Take 50 mg by mouth Daily., Disp: , Rfl:   •  montelukast (SINGULAIR) 10 MG tablet, montelukast 10 mg tablet  Take 1 tablet every day by oral route for 30 days. (Patient not taking: Reported on 3/17/2022), Disp: , Rfl:   •  omeprazole (prilOSEC) 10 MG capsule, , Disp: , Rfl:   •  prednisoLONE acetate (Pred Forte) 1 % ophthalmic suspension, Follow instructions on the After Visit Summary., Disp: 2 mL, Rfl: 0  •  prednisoLONE acetate (Pred Forte) 1 % ophthalmic suspension, Follow instructions on the After Visit Summary., Disp: 2 mL, Rfl: 0    History:  Past Medical History:   Diagnosis Date   • Arthritis    • Back pain    • Cataract     PT REPORTS EARLY STAGES   • Cystitis    • Diabetes mellitus (HCC)      REPORTS \"I AM PRE DIABETIC\" AND REPORTS THAT SHE WATCHS HER DIET AND PCP WATCHES A1C CLOSELY   • Disease of thyroid gland    • Dysphagia     REPORTS MORE TROUBLE SWALLOWING WHEN EATING OR TAKING PILLS. REPORTS PAIN AND THAT SOMETIMES THINGS FEEL LIKE THEY \"STICK\"   • Elevated cholesterol 09/20/2018    REPORTS SHE HAS TAKEN MEDICATION IN THE PAST BUT THAT SHE THINKS ALL IS WNL'S AT PRESENT TIME WITHOUT MEDICATION   • Fracture of right ankle 2020   • GERD (gastroesophageal reflux disease)    • Hearing loss     REPORTS MILD AND NO USE OF HEARING AIDS   • History of cardiac murmur     REPORTS \"I USED TO HAVE ONE BUT NOW THEY SAY THEY DON'T HEAR IT\"   • History of colonic polyps    • History of pneumonia    • Hx of echocardiogram    • Hypercholesterolemia    • Hypertension    • Irregular heart beat    • Macular degeneration    • Osteoporosis    • Seasonal allergies    • Seasonal allergies    • Sinusitis    • Spinal headache 09/20/2018    REPORTS AFTER DELIVERY OF HER SON   • Urinary tract infection    • Vertigo    • Wears glasses        Past Surgical History: " "  Procedure Laterality Date   • BACK SURGERY      REPORTS A SPUR WAS REMOVED THAT WAS HITTING SCIATIC NERVE   • BREAST BIOPSY     • BREAST SURGERY Left     LUMPECTOMY, BENIGN    • CATARACT EXTRACTION W/ INTRAOCULAR LENS IMPLANT Left 03/04/2022    Procedure: CATARACT PHACO EXTRACTION WITH INTRAOCULAR LENS IMPLANT left;  Surgeon: Pritesh Mcnally MD;  Location: [facility] OR;  Service: Ophthalmology;  Laterality: Left;   • CATARACT EXTRACTION W/ INTRAOCULAR LENS IMPLANT Right 03/18/2022    Procedure: CATARACT PHACO EXTRACTION WITH INTRAOCULAR LENS IMPLANT RIGHT;  Surgeon: Pritesh Mcnally MD;  Location: [facility] OR;  Service: Ophthalmology;  Laterality: Right;   • COLONOSCOPY  2012    REPORTS LAST DONE IN 2012 BUT HAS A HX OF SEVERAL OF THESE PROCEDURES   • DILATATION AND CURETTAGE      REPORTS \"I HAD A COUPLE OF THOSE\"   • ENDOSCOPY N/A 01/31/2017    Procedure: ESOPHAGOGASTRODUODENOSCOPY;  Surgeon: Robbie Quezada MD;  Location: [facility] ENDOSCOPY;  Service:    • ENDOSCOPY N/A 09/21/2018    Procedure: ESOPHAGOGASTRODUODENOSCOPY WITH BIOPSY, DILITATION;  Surgeon: Robbie Quezada MD;  Location: [facility] ENDOSCOPY;  Service: Gastroenterology   • FOOT SURGERY Left     REPORTS SURGICAL INTERVENTION TO CORRECT GREAT TOE ALIGNMENT   • GALLBLADDER SURGERY  2009   • HEMORRHOIDECTOMY  1973   • TONSILLECTOMY  1946   • UPPER GASTROINTESTINAL ENDOSCOPY  2014   • UPPER GASTROINTESTINAL ENDOSCOPY  01/31/2017       Family History   Problem Relation Age of Onset   • Diabetes Mother    • Cancer Brother         lung   • Cancer Brother         brain   • Colon cancer Paternal Grandmother         possibly colon cancer   • Breast cancer Neg Hx        Social History     Tobacco Use   • Smoking status: Never Smoker   • Smokeless tobacco: Never Used   Vaping Use   • Vaping Use: Never used   Substance Use Topics   • Alcohol use: No   • Drug use: No        OBJECTIVE:    Vital Signs:   Vitals:    08/09/22 1512   BP: 158/82   Pulse: 71   Temp: 98.2 " "°F (36.8 °C)   SpO2: 96%   Weight: 65.3 kg (144 lb)   Height: 165.1 cm (65\")       Physical Exam:   General Appearance:    Alert, cooperative, in no acute distress   Head:    Normocephalic, without obvious abnormality, atraumatic   Eyes:            Lids and lashes normal, conjunctivae and sclerae normal, no   icterus, no pallor, corneas clear, PERRLA   Ears:    Ears appear intact with no abnormalities noted   Throat:   No oral lesions, no thrush, oral mucosa moist   Neck:   No adenopathy, supple, trachea midline, no thyromegaly, no   carotid bruit, no JVD   Lungs:     Clear to auscultation,respirations regular, even and                  unlabored    Heart:    Regular rhythm and normal rate, normal S1 and S2, no            murmur, no gallop, no rub, no click   Chest Wall:    No abnormalities observed   Abdomen:     Normal bowel sounds, no masses, no organomegaly, soft        non-tender, non-distended, no guarding, no rebound                tenderness   Extremities:   Moves all extremities well, no edema, no cyanosis, no             redness   Pulses:   Pulses palpable and equal bilaterally   Skin:   No bleeding, bruising or rash, bilateral breast examination was normal   Lymph nodes:   No palpable adenopathy   Neurologic:   Cranial nerves 2 - 12 grossly intact, sensation intact, DTR       present and equal bilaterally     Results Review:   I reviewed the patient's new clinical results.  I reviewed the patient's new imaging results and agree with the interpretation.  I reviewed the patient's other test results and agree with the interpretation    Review of Systems was reviewed and confirmed as accurate as documented by the MA.    ASSESSMENT/PLAN:    1. Mass of breast, unspecified laterality    2. Other abnormal and inconclusive findings on diagnostic imaging of breast         I did have a detailed and extensive discussion with the patient in the office today.  I want to see her back in the office in 1 year for " follow-up physical examination, I do not know that were going to need to undergo bilateral mammography anymore given her age.    I discussed the patients findings and my recommendations with patient        Electronically signed by Jose Eduardo Gallegos MD  08/16/22

## 2022-08-09 ENCOUNTER — OFFICE VISIT (OUTPATIENT)
Dept: SURGERY | Facility: CLINIC | Age: 87
End: 2022-08-09

## 2022-08-09 VITALS
BODY MASS INDEX: 23.99 KG/M2 | OXYGEN SATURATION: 96 % | WEIGHT: 144 LBS | TEMPERATURE: 98.2 F | DIASTOLIC BLOOD PRESSURE: 82 MMHG | HEIGHT: 65 IN | HEART RATE: 71 BPM | SYSTOLIC BLOOD PRESSURE: 158 MMHG

## 2022-08-09 DIAGNOSIS — R92.8 OTHER ABNORMAL AND INCONCLUSIVE FINDINGS ON DIAGNOSTIC IMAGING OF BREAST: ICD-10-CM

## 2022-08-09 DIAGNOSIS — N63.0 MASS OF BREAST, UNSPECIFIED LATERALITY: Primary | ICD-10-CM

## 2022-08-09 PROCEDURE — 99213 OFFICE O/P EST LOW 20 MIN: CPT | Performed by: SURGERY

## 2022-08-09 RX ORDER — FAMOTIDINE 20 MG/1
TABLET, FILM COATED ORAL
COMMUNITY
Start: 2022-06-30

## 2022-08-09 RX ORDER — FEXOFENADINE HCL 180 MG/1
TABLET ORAL
COMMUNITY

## 2023-03-02 ENCOUNTER — OFFICE VISIT (OUTPATIENT)
Dept: OBSTETRICS AND GYNECOLOGY | Facility: CLINIC | Age: 88
End: 2023-03-02
Payer: MEDICARE

## 2023-03-02 VITALS
HEIGHT: 65 IN | BODY MASS INDEX: 23.99 KG/M2 | WEIGHT: 144 LBS | SYSTOLIC BLOOD PRESSURE: 146 MMHG | DIASTOLIC BLOOD PRESSURE: 84 MMHG

## 2023-03-02 DIAGNOSIS — Z79.890 HORMONE REPLACEMENT THERAPY (HRT): ICD-10-CM

## 2023-03-02 DIAGNOSIS — N95.2 POSTMENOPAUSAL ATROPHIC VAGINITIS: ICD-10-CM

## 2023-03-02 DIAGNOSIS — N95.1 MENOPAUSAL SYMPTOMS: ICD-10-CM

## 2023-03-02 DIAGNOSIS — Z12.31 ENCOUNTER FOR SCREENING MAMMOGRAM FOR BREAST CANCER: ICD-10-CM

## 2023-03-02 DIAGNOSIS — Z01.411 ENCOUNTER FOR GYNECOLOGICAL EXAMINATION WITH ABNORMAL FINDING: Primary | ICD-10-CM

## 2023-03-02 DIAGNOSIS — N39.0 FREQUENT UTI: ICD-10-CM

## 2023-03-02 PROCEDURE — G0101 CA SCREEN;PELVIC/BREAST EXAM: HCPCS | Performed by: OBSTETRICS & GYNECOLOGY

## 2023-03-02 PROCEDURE — 99214 OFFICE O/P EST MOD 30 MIN: CPT | Performed by: OBSTETRICS & GYNECOLOGY

## 2023-03-02 PROCEDURE — 3015F CERV CANCER SCREEN DOCD: CPT | Performed by: OBSTETRICS & GYNECOLOGY

## 2023-03-02 RX ORDER — LOSARTAN POTASSIUM 50 MG/1
TABLET ORAL
COMMUNITY
Start: 2023-01-09

## 2023-03-02 RX ORDER — TORSEMIDE 5 MG/1
TABLET ORAL
COMMUNITY

## 2023-03-02 RX ORDER — TRAMADOL HYDROCHLORIDE 50 MG/1
TABLET ORAL
COMMUNITY

## 2023-03-02 RX ORDER — ESTRADIOL 0.5 MG/1
0.5 TABLET ORAL 2 TIMES WEEKLY
Qty: 24 TABLET | Refills: 3 | Status: SHIPPED | OUTPATIENT
Start: 2023-03-02

## 2023-03-02 RX ORDER — AMLODIPINE BESYLATE 5 MG/1
TABLET ORAL
COMMUNITY
Start: 2023-01-13

## 2023-03-02 RX ORDER — CLONIDINE HYDROCHLORIDE 0.1 MG/1
TABLET ORAL
COMMUNITY

## 2023-03-02 NOTE — PROGRESS NOTES
"Chief Complaint  Gynecologic Exam     History of Present Illness:  Patient is 88 y.o.  who presents to [facility] here as a new patient for her annual examination.  Patient also with complaints of vaginal dryness as well as frequent urinary tract infections.  Patient has previously seen Dr. Lewis.  She reports it has been several years since she last saw him.  Patient had also seen Dr. Rae in the past for frequent UTIs.  Patient had cystoscopy.  Patient was referred to Dr. Lewis.  She was initially given estrogen cream.  Patient reports she continued to have urinary symptoms.  Patient has now been on estradiol 0.5 mg twice weekly.  She denies any vaginal bleeding or spotting.  She has not been on any progesterone supplementation.  She continues to have vaginal dryness.  Her last Pap smear was in 2019.  Patient did have a mammogram last year.  She was also seen by Dr. Gallegos.  She also had a breast ultrasound.  Patient is to follow-up with him this year for repeat breast ultrasound.  Patient sees Dr. Hampton for her primary care.    History  Past Medical History:   Diagnosis Date   • Arthritis    • Back pain    • Cataract     PT REPORTS EARLY STAGES   • Cystitis    • Diabetes mellitus (HCC)      REPORTS \"I AM PRE DIABETIC\" AND REPORTS THAT SHE WATCHS HER DIET AND PCP WATCHES A1C CLOSELY   • Disease of thyroid gland    • Dysphagia     REPORTS MORE TROUBLE SWALLOWING WHEN EATING OR TAKING PILLS. REPORTS PAIN AND THAT SOMETIMES THINGS FEEL LIKE THEY \"STICK\"   • Elevated cholesterol 2018    REPORTS SHE HAS TAKEN MEDICATION IN THE PAST BUT THAT SHE THINKS ALL IS WNL'S AT PRESENT TIME WITHOUT MEDICATION   • Fracture of right ankle    • GERD (gastroesophageal reflux disease)    • Hearing loss     REPORTS MILD AND NO USE OF HEARING AIDS   • History of cardiac murmur     REPORTS \"I USED TO HAVE ONE BUT NOW THEY SAY THEY DON'T HEAR IT\"   • History of colonic polyps    • History of " pneumonia    • Hx of echocardiogram    • Hypercholesterolemia    • Hypertension    • Irregular heart beat    • Macular degeneration    • Osteoporosis    • Seasonal allergies    • Seasonal allergies    • Sinusitis    • Spinal headache 09/20/2018    REPORTS AFTER DELIVERY OF HER SON   • Urinary tract infection    • Vertigo    • Wears glasses      Current Outpatient Medications on File Prior to Visit   Medication Sig Dispense Refill   • losartan (COZAAR) 50 MG tablet losartan 50 mg tablet   Take 1 tablet twice a day by oral route for 90 days.     • acetaminophen (TYLENOL) 650 MG 8 hr tablet Take 650 mg by mouth 2 (Two) Times a Day.     • allopurinol (ZYLOPRIM) 100 MG tablet  (Patient not taking: No sig reported)     • amLODIPine (NORVASC) 5 MG tablet      • Carboxymethylcellulose Sodium 0.25 % solution Apply 1 drop to eye(s) as directed by provider 2 (Two) Times a Day. REPORTS USES TO BOTH EYES     • cetirizine (zyrTEC) 10 MG tablet cetirizine 10 mg tablet   Take 1 tablet every day by oral route for 30 days.     • Cholecalciferol (VITAMIN D3 PO) Take  by mouth.     • cloNIDine (CATAPRES) 0.1 MG tablet clonidine HCl 0.1 mg tablet     • CRANBERRY EXTRACT PO Take 1 tablet by mouth Daily.     • Diclofenac Sodium (VOLTAREN) 1 % gel gel diclofenac 1 % topical gel     • famotidine (PEPCID) 20 MG tablet      • fexofenadine (ALLEGRA) 180 MG tablet Take 180 mg by mouth Daily.     • fexofenadine (ALLEGRA) 180 MG tablet Allegra Allergy 180 mg tablet   Take 1 tablet every day by oral route for 30 days.     • fluticasone (FLONASE) 50 MCG/ACT nasal spray 1 spray into each nostril 2 (Two) Times a Day.     • hydrochlorothiazide (MICROZIDE) 12.5 MG capsule Take 12.5 mg by mouth Daily. (Patient not taking: No sig reported)     • ipratropium (ATROVENT) 0.06 % nasal spray ipratropium bromide 42 mcg (0.06 %) nasal spray     • losartan (COZAAR) 25 MG tablet 25 mg 2 (Two) Times a Day.     • methIMAzole (TAPAZOLE) 5 MG tablet Take 5 mg by  "mouth Every Other Day.     • metoprolol succinate XL (TOPROL-XL) 50 MG 24 hr tablet Take 50 mg by mouth Daily.     • montelukast (SINGULAIR) 10 MG tablet montelukast 10 mg tablet   Take 1 tablet every day by oral route for 30 days. (Patient not taking: Reported on 3/17/2022)     • multivitamin with minerals tablet tablet Take 1 tablet by mouth Daily.     • omeprazole (prilOSEC) 10 MG capsule      • prednisoLONE acetate (Pred Forte) 1 % ophthalmic suspension Follow instructions on the After Visit Summary. 2 mL 0   • prednisoLONE acetate (Pred Forte) 1 % ophthalmic suspension Follow instructions on the After Visit Summary. 2 mL 0   • torsemide (DEMADEX) 5 MG tablet torsemide 5 mg tablet     • traMADol (ULTRAM) 50 MG tablet tramadol 50 mg tablet     • [DISCONTINUED] estradiol (ESTRACE) 0.5 MG tablet Take 0.5 mg by mouth 2 (Two) Times a Week. REPORTS TAKES THIS ON Monday AND Friday AT NIGHT TIME       No current facility-administered medications on file prior to visit.     Allergies   Allergen Reactions   • Codeine Nausea Only   • Escitalopram Nausea Only     REPORTS \"LEXAPRO\"   • Gabapentin Other (See Comments)     REPORTS \"MAKES MY EYES HURT BECAUSE OF MY MACULAR DEGENERATION\"   • Ibuprofen Palpitations     REPORTS \"IT CAUSES MY HEART TO BEAT FAST\"   • Levofloxacin Nausea Only   • Naproxen Nausea Only   • Nitrofurantoin Nausea Only   • Sulfa Antibiotics Nausea Only     Past Surgical History:   Procedure Laterality Date   • BACK SURGERY      REPORTS A SPUR WAS REMOVED THAT WAS HITTING SCIATIC NERVE   • BREAST BIOPSY     • BREAST SURGERY Left     LUMPECTOMY, BENIGN    • CATARACT EXTRACTION W/ INTRAOCULAR LENS IMPLANT Left 03/04/2022    Procedure: CATARACT PHACO EXTRACTION WITH INTRAOCULAR LENS IMPLANT left;  Surgeon: Pritesh Mcnally MD;  [facility];  Service: Ophthalmology;  Laterality: Left;   • CATARACT EXTRACTION W/ INTRAOCULAR LENS IMPLANT Right 03/18/2022    Procedure: CATARACT PHACO EXTRACTION WITH " "INTRAOCULAR LENS IMPLANT RIGHT;  Surgeon: Pritesh Mcnally MD;  Location: [facility] OR;  Service: Ophthalmology;  Laterality: Right;   • COLONOSCOPY  2012    REPORTS LAST DONE IN 2012 BUT HAS A HX OF SEVERAL OF THESE PROCEDURES   • DILATATION AND CURETTAGE      REPORTS \"I HAD A COUPLE OF THOSE\"   • ENDOSCOPY N/A 01/31/2017    Procedure: ESOPHAGOGASTRODUODENOSCOPY;  Surgeon: Robbie Quezada MD;  Location: [facility] ENDOSCOPY;  Service:    • ENDOSCOPY N/A 09/21/2018    Procedure: ESOPHAGOGASTRODUODENOSCOPY WITH BIOPSY, DILITATION;  Surgeon: Robbie Quezada MD;  Location: [facility] ENDOSCOPY;  Service: Gastroenterology   • FOOT SURGERY Left     REPORTS SURGICAL INTERVENTION TO CORRECT GREAT TOE ALIGNMENT   • GALLBLADDER SURGERY  2009   • HEMORRHOIDECTOMY  1973   • TONSILLECTOMY  1946   • UPPER GASTROINTESTINAL ENDOSCOPY  2014   • UPPER GASTROINTESTINAL ENDOSCOPY  01/31/2017     Family History   Problem Relation Age of Onset   • Diabetes Mother    • Cancer Brother         lung   • Cancer Brother         brain   • Colon cancer Paternal Grandmother         possibly colon cancer   • Breast cancer Neg Hx      Social History     Socioeconomic History   • Marital status:    Tobacco Use   • Smoking status: Never   • Smokeless tobacco: Never   Vaping Use   • Vaping Use: Never used   Substance and Sexual Activity   • Alcohol use: No   • Drug use: No   • Sexual activity: Defer       Physical Examination:  Vital Signs: /84   Ht 165.1 cm (65\")   Wt 65.3 kg (144 lb)   BMI 23.96 kg/m²     General Appearance: alert, appears stated age, and cooperative  Breasts: Examined in supine position  Symmetric without masses or skin dimpling  Nipples normal without inversion, lesions or discharge  There are no palpable axillary nodes  Abdomen: no masses, no hepatomegaly, no splenomegaly, soft non-tender, no guarding, and no rebound tenderness  Pelvic: Clinical staff was present for exam  External genitalia:  normal appearance of the " external genitalia including Bartholin's and Brocket's glands.  :  urethral meatus normal;  Vaginal: Atrophic changes noted  Cervix:  normal appearance.  Uterus:  normal size, shape and consistency.  Adnexa:  normal bimanual exam of the adnexa.  Pap smear done and specimen sent using Thin-Prep technique    Data Review:  The following data was reviewed by: Margoth Chaudhari MD on 03/02/2023:     Labs:    Imaging:  US Breast Bilateral Limited (08/10/2022 07:37)  Mammo Screening Digital Tomosynthesis Bilateral With CAD (08/04/2022 14:54)  Medical Records:  Progress Notes by Jose Eduardo Gallegos MD (08/09/2022 15:40)  PROGRESS NOTES - SCAN by [employer], [place] (02/21/2023 00:00)      Assessment and Plan   1. Encounter for gynecological examination with abnormal finding  I explained to Addie that Pap smears are no longer recommended in patients after 65 years of age who have had adequate negative screening with three consecutive negative pap smears within the previous 10 years and the most recent test performed within the past 5 years. Women who have a history of CODY 2, CODY 3, or ACIS should continue routine screening for a total of 20 years after regression or treatment.   I stressed to Addie that she still should be seen yearly for a full physical including breast and pelvic exam.  I informed her that women aged 65 and older still do get cervical cancer representing 14.1% of the [place] female population and 19.6% of new cases of cervical cancer.  I also informed the patient regarding medicare guidelines on coverage for pap smear screening.  For this reason, Addie has elected pap smear screening this year.  - LIQUID-BASED PAP SMEAR, [facility] ([facility])    2. Encounter for screening mammogram for breast cancer  It is recommended per ACOG, for women at average risk to start annual mammogram screening at the age of 40 until the age of 75 and an individualized decision be made for women after age 75.  She was encouraged to continue  getting yearly mammograms.  She should report any palpable breast lump(s) or skin changes regardless of mammographic findings.  I explained to Addie that notification regarding her mammogram results will come from the center performing the study.  Our office will not be routinely calling with mammogram results.  It is her responsibility to make sure that the results from the mammogram are communicated to her by the breast center.  If she has any questions about the results, she is welcome to call our office anytime.  The patient reports she will follow up with Dr. Gallegos.    Addie was counseled regarding having clinical breast exams and breast self-awareness.  Women aged 29-39 years of age should have clinical breast exams every 1-3 years and yearly aged 40 and older.  The patient was counseled regarding breast self-awareness focusing on having a sense of what is normal for her breasts so that she can tell if there are changes.  Even small changes should be reported to provider.  - Mammo Screening Digital Tomosynthesis Bilateral With CAD; Future    3. Postmenopausal atrophic vaginitis  Discussed various options for relief of atrophic vaginal symptoms related to menopause. Discussed local therapy for treatment of vaginal symptoms only.  Discussed the different formulation options including cream, ring, and tablets.  Discussed the low risk of systemic absorption in postmenopausal women with atrophy using 25 mcgs of estradiol on a daily basis.  Recommend low dose use 2-3x/wk for maintenance of treatment.  Other treatment options were discussed including the use of water-based and silicone-based vaginal lubricants and moisturizers.  Also discussed was the FDA approved treatment option of ospemifene for moderate to severe dyspareunia.  - estradiol (ESTRACE) 0.5 MG tablet; Take 1 tablet by mouth 2 (Two) Times a Week. REPORTS TAKES THIS ON Monday AND Friday AT NIGHT TIME  Dispense: 24 tablet; Refill: 3    4. Menopausal  symptoms  The various options for the management of menopausal symptoms was discussed.  The medical treatment options discussed include HRT, SSRIs, SSNRIs, clonidine, and gabapentin.  The risks and benefits were discussed including the findings from the WHI study.  The increased risk of breast cancer, CAD, stroke, and VTE events were discussed for combination therapy vs the increased risk of CV events and breast cancer not being seen in the estrogen only group.  The lowest effective dose for the shortest duration of treatment was discussed in regards to HRT.  Other alternatives including otc supplements and lifestyle changes were also discussed.  Local estrogen therapy to relieve atrophic vaginal symptoms was discussed was well as other alternatives.    5. Hormone replacement therapy (HRT)  I had a long detailed discussion with the patient regarding hormone replacement therapy.  I have discussed with the patient the risk of unopposed estrogen therapy.  Patient reports the estrogen cream did not help her symptoms in the past.  She denies any vaginal bleeding or spotting.  She desires to continue with her estradiol twice weekly.  Instructions and precautions are given.  Patient is to call if any vaginal bleeding as discussed.  - estradiol (ESTRACE) 0.5 MG tablet; Take 1 tablet by mouth 2 (Two) Times a Week. REPORTS TAKES THIS ON Monday AND Friday AT NIGHT TIME  Dispense: 24 tablet; Refill: 3    6. Frequent UTI  Patient with frequent UTIs.  She desires to continue with estradiol 0.5 mg.  Instructions and precautions are given.  Patient is to call or follow-up if any changes in her symptoms as discussed.  - estradiol (ESTRACE) 0.5 MG tablet; Take 1 tablet by mouth 2 (Two) Times a Week. REPORTS TAKES THIS ON Monday AND Friday AT NIGHT TIME  Dispense: 24 tablet; Refill: 3    Follow Up/Instructions:  Follow up as noted.  Patient was given instructions and counseling regarding her condition or for health maintenance advice.  Please see specific information pulled into the AVS if appropriate.     Note: Speech recognition transcription software may have been used to dictate portions of this document.  An attempt at proofreading has been made though minor errors in transcription may still be present.    This note was electronically signed.  Margoth Chaudhari M.D.

## 2023-03-06 LAB — REF LAB TEST METHOD: NORMAL

## 2023-03-14 NOTE — MR AVS SNAPSHOT
Addie Perez   1/25/2017 12:00 PM   Office Visit    Dept Phone:  [phone]   Encounter #:  [id number]    Provider:  JOHAN Stephens   Department:  [facility] GASTROENTEROLOGY                Your Full Care Plan              Today's Medication Changes          These changes are accurate as of: 1/25/17  1:08 PM.  If you have any questions, ask your nurse or doctor.               Stop taking medication(s)listed here:     CALCIUM 500 + D3 500-600 MG-UNIT tablet   Generic drug:  Calcium Carb-Cholecalciferol   Stopped by:  JOHAN Stephens           EYE DROPS 0.05 % ophthalmic solution   Generic drug:  tetrahydrozoline   Stopped by:  JOHAN Stephens           losartan 25 MG tablet   Commonly known as:  COZAAR   Stopped by:  JOHAN Stephens           montelukast 10 MG tablet   Commonly known as:  SINGULAIR   Stopped by:  JOHAN Stephens           pantoprazole 40 MG EC tablet   Commonly known as:  PROTONIX   Stopped by:  JOHAN Stephens           PROBIOTIC DAILY PO   Stopped by:  JOHAN Stephens                      Your Updated Medication List          This list is accurate as of: 1/25/17  1:08 PM.  Always use your most recent med list.                carboxymethylcellulose sod 1 % gel eye gel       cholecalciferol 1000 UNITS tablet   Commonly known as:  VITAMIN D3       CRANBERRY EXTRACT PO       estradiol 0.5 MG tablet   Commonly known as:  ESTRACE       fexofenadine 180 MG tablet   Commonly known as:  ALLEGRA       FISH OIL + D3 PO       fluticasone 50 MCG/ACT nasal spray   Commonly known as:  FLONASE       hydrochlorothiazide 12.5 MG capsule   Commonly known as:  MICROZIDE       methIMAzole 5 MG tablet   Commonly known as:  TAPAZOLE       metoprolol succinate XL 50 MG 24 hr tablet   Commonly known as:  TOPROL-XL       OCUVITE ADULT 50+ PO       raNITIdine 300 MG tablet   Commonly known as:  ZANTAC       simvastatin 20 MG  Mara Fowler, ELYSSAS         tablet   Commonly known as:  ZOCOR       TYLENOL 325 MG tablet   Generic drug:  acetaminophen       vitamin B-12 1000 MCG tablet   Commonly known as:  CYANOCOBALAMIN               You Were Diagnosed With        Codes Comments    Pharyngoesophageal dysphagia    -  Primary ICD-10-CM: R13.14  ICD-9-CM: 787.24 Differentials include Schatzki's ring, esophagitis, esophageal dysmotility. Of interest, patient has history of esophageal diverticulum.    Epigastric pain     ICD-10-CM: R10.13  ICD-9-CM: 789.06 Differentials include peptic ulcer disease, pancreatobiliary disease.    Nausea     ICD-10-CM: R11.0  ICD-9-CM: 787.02 Differentials include peptic ulcer disease, pancreatobiliary disease.      Instructions    1. The patient has been advised to eat and take medications in upright position.  She should chew well.  After a few bites the patient should drink some water.  Avoid larger bites and chunks of meat.  2. Anti-reflux measures.  3. Continue Ranitidine 300 mg daily.  4. Upper endoscopy-EGD: Description of the procedure, risks, benefits, alternatives and options, including nonoperative options, were discussed with the patient in detail. The patient understands and wishes to proceed.     Patient Instructions History      Upcoming Appointments     Visit Type Date Time Department    FOLLOW UP 2017 12:00 PM E GASTRO [place]    FOLLOW UP 3/28/2017  1:15 PM E UROLOGY [place]    MAMMO [facility] SCREEN ZENON BILATERAL 5/15/2017  1:30 PM  [facility] MAMMO    FOLLOW UP 2017  1:00 PM MGE GEN [facility] Signup     [facility] [employer] allows you to send messages to your doctor, view your test results, renew your prescriptions, schedule appointments, and more. To sign up, go to [employer] and click on the Sign Up Now link in the New User? box. Enter your [employer] Activation Code exactly as it appears below along with the last four digits of your Social Security Number and your Date of Birth ()  "to complete the sign-up process. If you do not sign up before the expiration date, you must request a new code.    [employer] Activation Code: [email or web address]  Expires: 2/8/2017  1:08 PM    If you have questions, you can email [email or web address] or call [phone] to talk to our [employer] staff. Remember, [employer] is NOT to be used for urgent needs. For medical emergencies, dial 911.               Other Info from Your Visit           Your Appointments     Mar 28, 2017  1:15 PM EDT   Follow Up with Mannie Rae MD   [facility] UROLOGY (--)    [address]  [facility] [address] [phone]           Arrive 15 minutes prior to appointment.            May 15, 2017  1:30 PM EDT   Mammo [facility] screen bharathi bilateral with [facility] MAMM 1   [facility] MAMMO ([employer])    [address] [facility] [address] [phone]           Bring any outside films/reports to your appointment. Do not apply any deodorant or powder prior to your appointment on the day of your exam.            May 24, 2017  1:00 PM EDT   Follow Up with Jose Eduardo Gallegos MD   [facility] GENERAL SURGERY (--)    [address]  [facility] [address] [phone]           Arrive 15 minutes prior to appointment.              Allergies     Codeine  Nausea Only    Escitalopram  Nausea Only    Gabapentin  Nausea Only    Ibuprofen  Nausea Only    Levofloxacin  Nausea Only    Lexapro [Escitalopram Oxalate]      Naproxen  Nausea Only    Nitrofurantoin  Nausea Only    Other      Sulfa Antibiotics  Nausea Only      Reason for Visit     Difficulty Swallowing     Nausea     Abdominal Pain           Vital Signs     Blood Pressure Pulse Temperature Respirations Height Weight    145/71 70 98.6 °F (37 °C) 15 64\" (162.6 cm) 141 lb (64 kg)    Body Mass Index Smoking Status                24.2 kg/m2 Never Smoker          Problems and Diagnoses Noted     Controlled diabetes mellitus    " Abdominal pain, pit of stomach    Nauseous    Difficulty swallowing

## 2023-06-06 ENCOUNTER — OFFICE VISIT (OUTPATIENT)
Dept: CARDIOLOGY | Facility: CLINIC | Age: 88
End: 2023-06-06
Payer: MEDICARE

## 2023-06-06 ENCOUNTER — LAB (OUTPATIENT)
Dept: LAB | Facility: HOSPITAL | Age: 88
End: 2023-06-06
Payer: MEDICARE

## 2023-06-06 VITALS
WEIGHT: 145 LBS | OXYGEN SATURATION: 98 % | DIASTOLIC BLOOD PRESSURE: 70 MMHG | RESPIRATION RATE: 16 BRPM | BODY MASS INDEX: 24.16 KG/M2 | HEART RATE: 56 BPM | SYSTOLIC BLOOD PRESSURE: 116 MMHG | HEIGHT: 65 IN

## 2023-06-06 DIAGNOSIS — I51.89 DIASTOLIC DYSFUNCTION: ICD-10-CM

## 2023-06-06 DIAGNOSIS — I10 PRIMARY HYPERTENSION: ICD-10-CM

## 2023-06-06 DIAGNOSIS — I50.32 CHRONIC DIASTOLIC CHF (CONGESTIVE HEART FAILURE): ICD-10-CM

## 2023-06-06 DIAGNOSIS — I51.89 DIASTOLIC DYSFUNCTION: Primary | ICD-10-CM

## 2023-06-06 LAB — NT-PROBNP SERPL-MCNC: 723 PG/ML (ref 0–1800)

## 2023-06-06 PROCEDURE — 83880 ASSAY OF NATRIURETIC PEPTIDE: CPT

## 2023-06-06 PROCEDURE — 36415 COLL VENOUS BLD VENIPUNCTURE: CPT

## 2023-06-06 NOTE — PROGRESS NOTES
[facility] Cardiology OP Consult Note    Addie Perez  [id number]  2023    Referred By: Ariela Hampton DO    Chief Complaint: Lower extremity edema    History of Present Illness:   Mrs. Addie Perez is a 89 y.o. female who presents to the Cardiology Clinic for evaluation of lower extremity swelling.  The patient has a past medical history significant for hyperlipidemia, hypertension, and type 2 diabetes mellitus.  She does not have any significant past cardiac history, though there is some question of rheumatic fever in childhood.  She presents today for evaluation of lower extremity edema.  The patient reports her systolic BP has recently been uncontrolled.  She has had several changes in her antihypertensives, and her BP is now well controlled on review of her home BP log.  She does report a recent history of lower extremity edema, which has now resolved with the addition of torsemide.  She currently denies any orthopnea or PND.  No significant palpitations.  No significant chest pain or exertional angina.  No other specific complaints today.    Past Cardiac Testin. Last Coronary Angio: None  2. Prior Stress Testing: MPS   1. Normal pharmacologic MPS with no evidence of inducible ischemia.   2. Hyperdynamic LV systolic function, LVEF 74%.   3. Last Echo: None  4. Prior Holter Monitor: None    Review of Systems:   Review of Systems   Constitutional:  Negative for activity change, appetite change, chills, diaphoresis, fatigue, fever, unexpected weight gain and unexpected weight loss.   Eyes:  Negative for blurred vision and double vision.   Respiratory:  Negative for cough, chest tightness, shortness of breath and wheezing.    Cardiovascular:  Negative for chest pain, palpitations and leg swelling.   Gastrointestinal:  Negative for abdominal pain, anal bleeding, blood in stool and GERD.   Endocrine: Negative for cold intolerance and heat intolerance.   Genitourinary:   "Negative for hematuria.   Neurological:  Negative for dizziness, syncope, weakness and light-headedness.   Hematological:  Does not bruise/bleed easily.   Psychiatric/Behavioral:  Negative for depressed mood and stress. The patient is not nervous/anxious.      Past Medical History:   Past Medical History:   Diagnosis Date    Arthritis     Back pain     Cataract     PT REPORTS EARLY STAGES    Cystitis     Diabetes mellitus      REPORTS \"I AM PRE DIABETIC\" AND REPORTS THAT SHE WATCHS HER DIET AND PCP WATCHES A1C CLOSELY    Disease of thyroid gland     Dysphagia     REPORTS MORE TROUBLE SWALLOWING WHEN EATING OR TAKING PILLS. REPORTS PAIN AND THAT SOMETIMES THINGS FEEL LIKE THEY \"STICK\"    Elevated cholesterol 09/20/2018    REPORTS SHE HAS TAKEN MEDICATION IN THE PAST BUT THAT SHE THINKS ALL IS WNL'S AT PRESENT TIME WITHOUT MEDICATION    Fracture of right ankle 2020    GERD (gastroesophageal reflux disease)     Hearing loss     REPORTS MILD AND NO USE OF HEARING AIDS    History of cardiac murmur     REPORTS \"I USED TO HAVE ONE BUT NOW THEY SAY THEY DON'T HEAR IT\"    History of colonic polyps     History of pneumonia     Hx of echocardiogram     Hypercholesterolemia     Hypertension     Irregular heart beat     Macular degeneration     Osteoporosis     Seasonal allergies     Seasonal allergies     Sinusitis     Spinal headache 09/20/2018    REPORTS AFTER DELIVERY OF HER SON    Urinary tract infection     Vertigo     Wears glasses        Past Surgical History:   Past Surgical History:   Procedure Laterality Date    BACK SURGERY      REPORTS A SPUR WAS REMOVED THAT WAS HITTING SCIATIC NERVE    BREAST BIOPSY      BREAST SURGERY Left     LUMPECTOMY, BENIGN     CATARACT EXTRACTION W/ INTRAOCULAR LENS IMPLANT Left 03/04/2022    Procedure: CATARACT PHACO EXTRACTION WITH INTRAOCULAR LENS IMPLANT left;  Surgeon: Pritesh Mcnally MD;  Location: [facility];  Service: Ophthalmology;  Laterality: Left;    CATARACT EXTRACTION W/ " "INTRAOCULAR LENS IMPLANT Right 03/18/2022    Procedure: CATARACT PHACO EXTRACTION WITH INTRAOCULAR LENS IMPLANT RIGHT;  Surgeon: Pritesh Mcnally MD;  Location: [facility] OR;  Service: Ophthalmology;  Laterality: Right;    COLONOSCOPY  2012    REPORTS LAST DONE IN 2012 BUT HAS A HX OF SEVERAL OF THESE PROCEDURES    DILATATION AND CURETTAGE      REPORTS \"I HAD A COUPLE OF THOSE\"    ENDOSCOPY N/A 01/31/2017    Procedure: ESOPHAGOGASTRODUODENOSCOPY;  Surgeon: Robbie Quezada MD;  Location: [facility] ENDOSCOPY;  Service:     ENDOSCOPY N/A 09/21/2018    Procedure: ESOPHAGOGASTRODUODENOSCOPY WITH BIOPSY, DILITATION;  Surgeon: Robbie Quezada MD;  Location: [facility] ENDOSCOPY;  Service: Gastroenterology    FOOT SURGERY Left     REPORTS SURGICAL INTERVENTION TO CORRECT GREAT TOE ALIGNMENT    GALLBLADDER SURGERY  2009    HEMORRHOIDECTOMY  1973    TONSILLECTOMY  1946    UPPER GASTROINTESTINAL ENDOSCOPY  2014    UPPER GASTROINTESTINAL ENDOSCOPY  01/31/2017       Family History:   Family History   Problem Relation Age of Onset    Diabetes Mother     Cancer Brother         lung    Cancer Brother         brain    Colon cancer Paternal Grandmother         possibly colon cancer    Breast cancer Neg Hx        Social History:   Social History     Socioeconomic History    Marital status:    Tobacco Use    Smoking status: Never     Passive exposure: Never    Smokeless tobacco: Never   Vaping Use    Vaping Use: Never used   Substance and Sexual Activity    Alcohol use: No    Drug use: No    Sexual activity: Defer       Medications:     Current Outpatient Medications:     acetaminophen (TYLENOL) 650 MG 8 hr tablet, Take 1 tablet by mouth 2 (Two) Times a Day., Disp: , Rfl:     CRANBERRY EXTRACT PO, Take 1 tablet by mouth Daily., Disp: , Rfl:     Diclofenac Sodium (VOLTAREN) 1 % gel gel, diclofenac 1 % topical gel, Disp: , Rfl:     estradiol (ESTRACE) 0.5 MG tablet, Take 1 tablet by mouth 2 (Two) Times a Week. REPORTS TAKES THIS ON " Monday AND Friday AT NIGHT TIME, Disp: 24 tablet, Rfl: 3    fexofenadine (ALLEGRA) 180 MG tablet, Allegra Allergy 180 mg tablet  Take 1 tablet every day by oral route for 30 days., Disp: , Rfl:     losartan (COZAAR) 50 MG tablet, losartan 50 mg tablet  Take 1 tablet twice a day by oral route for 90 days., Disp: , Rfl:     metoprolol succinate XL (TOPROL-XL) 50 MG 24 hr tablet, Take 1 tablet by mouth Daily., Disp: , Rfl:     multivitamin with minerals tablet tablet, Take 1 tablet by mouth Daily., Disp: , Rfl:     omeprazole (prilOSEC) 10 MG capsule, , Disp: , Rfl:     torsemide (DEMADEX) 5 MG tablet, Daily., Disp: , Rfl:     allopurinol (ZYLOPRIM) 100 MG tablet, , Disp: , Rfl:     amLODIPine (NORVASC) 5 MG tablet, , Disp: , Rfl:     Carboxymethylcellulose Sodium 0.25 % solution, Apply 1 drop to eye(s) as directed by provider 2 (Two) Times a Day. REPORTS USES TO BOTH EYES, Disp: , Rfl:     cetirizine (zyrTEC) 10 MG tablet, cetirizine 10 mg tablet  Take 1 tablet every day by oral route for 30 days., Disp: , Rfl:     Cholecalciferol (VITAMIN D3 PO), Take  by mouth., Disp: , Rfl:     cloNIDine (CATAPRES) 0.1 MG tablet, clonidine HCl 0.1 mg tablet, Disp: , Rfl:     famotidine (PEPCID) 20 MG tablet, , Disp: , Rfl:     fexofenadine (ALLEGRA) 180 MG tablet, Take 1 tablet by mouth Daily., Disp: , Rfl:     fluticasone (FLONASE) 50 MCG/ACT nasal spray, 1 spray into the nostril(s) as directed by provider 2 (Two) Times a Day., Disp: , Rfl:     hydrochlorothiazide (MICROZIDE) 12.5 MG capsule, Take 1 capsule by mouth Daily., Disp: , Rfl:     ipratropium (ATROVENT) 0.06 % nasal spray, ipratropium bromide 42 mcg (0.06 %) nasal spray, Disp: , Rfl:     losartan (COZAAR) 25 MG tablet, 1 tablet 2 (Two) Times a Day., Disp: , Rfl:     methIMAzole (TAPAZOLE) 5 MG tablet, Take 1 tablet by mouth Every Other Day., Disp: , Rfl:     montelukast (SINGULAIR) 10 MG tablet, , Disp: , Rfl:     prednisoLONE acetate (Pred Forte) 1 % ophthalmic  "suspension, Follow instructions on the After Visit Summary., Disp: 2 mL, Rfl: 0    prednisoLONE acetate (Pred Forte) 1 % ophthalmic suspension, Follow instructions on the After Visit Summary., Disp: 2 mL, Rfl: 0    traMADol (ULTRAM) 50 MG tablet, tramadol 50 mg tablet, Disp: , Rfl:     Allergies:   Allergies   Allergen Reactions    Codeine Nausea Only    Escitalopram Nausea Only     REPORTS \"LEXAPRO\"    Gabapentin Other (See Comments)     REPORTS \"MAKES MY EYES HURT BECAUSE OF MY MACULAR DEGENERATION\"    Ibuprofen Palpitations     REPORTS \"IT CAUSES MY HEART TO BEAT FAST\"    Levofloxacin Nausea Only    Naproxen Nausea Only    Nitrofurantoin Nausea Only    Sulfa Antibiotics Nausea Only       Physical Exam:  Vital Signs:   Vitals:    06/06/23 1042   BP: 116/70   BP Location: Right arm   Patient Position: Sitting   Pulse: 56   Resp: 16   SpO2: 98%   Weight: 65.8 kg (145 lb)   Height: 165.1 cm (65\")       Physical Exam  Constitutional:       General: She is not in acute distress.     Appearance: Normal appearance. She is well-developed. She is not diaphoretic.   HENT:      Head: Normocephalic and atraumatic.   Eyes:      General: No scleral icterus.     Pupils: Pupils are equal, round, and reactive to light.   Neck:      Trachea: No tracheal deviation.   Cardiovascular:      Rate and Rhythm: Normal rate and regular rhythm.      Heart sounds: Normal heart sounds. No murmur heard.    No friction rub. No gallop.      Comments: Normal JVD.  Pulmonary:      Effort: Pulmonary effort is normal. No respiratory distress.      Breath sounds: Normal breath sounds. No stridor. No wheezing or rales.   Chest:      Chest wall: No tenderness.   Abdominal:      General: Bowel sounds are normal. There is no distension.      Palpations: Abdomen is soft.      Tenderness: There is no abdominal tenderness. There is no guarding or rebound.   Musculoskeletal:         General: No swelling. Normal range of motion.      Cervical back: Neck " supple. No tenderness.   Lymphadenopathy:      Cervical: No cervical adenopathy.   Skin:     General: Skin is warm and dry.      Findings: No erythema.   Neurological:      General: No focal deficit present.      Mental Status: She is alert and oriented to person, place, and time.   Psychiatric:         Mood and Affect: Mood normal.         Behavior: Behavior normal.       Results Review:   I reviewed the patient's new clinical results.  I personally viewed and interpreted the patient's EKG/Telemetry data      ECG 12 Lead    Date/Time: 6/6/2023 11:08 AM  Performed by: Je Nunez MD  Authorized by: Je Nunez MD   Comparison: not compared with previous ECG   Previous ECG: no previous ECG available  Rhythm: sinus rhythm and sinus arrhythmia  Rate: normal  QRS axis: normal    Clinical impression: normal ECG        Assessment / Plan:     1.  Lower extremity edema  --Recent history of lower extremity edema, several potential underlying etiologies including diastolic dysfunction, venous insufficiency  --Interval resolution of lower extremity edema with the addition of torsemide  --Will obtain echocardiogram to evaluate systolic and diastolic function  -- proBNP to further evaluate for cardiac volume overload  -- Will change torsemide to as needed dosing for lower extremity edema  -- Follow-up as needed, pending results of cardiac testing    2.  Primary hypertension  -- BP now overall well controlled on review of home BP log  --Continue losartan and metoprolol          Follow Up:   Return if symptoms worsen or fail to improve.      Thank you for allowing me to participate in the care of your patient. Please to not hesitate to contact me with additional questions or concerns.     MITZY Nunez MD  Interventional Cardiology   06/06/2023  10:22 EDT

## 2023-08-08 NOTE — PROGRESS NOTES
"Patient: Addie Perez    YOB: 1934    Date: 08/09/2023    Primary Care Provider: Ariela Hampton DO    Chief Complaint   Patient presents with    Follow-up     One year follow up of both breast       SUBJECTIVE:    History of present illness:  Patient is s/p left breast biopsy which was benign, it was performed in the remote past.  Patient is here for \"yearly\" breast examination.  Patient's last mammogram was performed 08/04/2022, it was read BI-RADS category 2 with benign findings.    She reports no problems at this time.    The following portions of the patient's history were reviewed and updated as appropriate: allergies, current medications, past family history, past medical history, past social history, past surgical history and problem list.      Review of Systems   Constitutional:  Negative for chills, fever and unexpected weight change.   HENT:  Negative for hearing loss, trouble swallowing and voice change.    Eyes:  Negative for visual disturbance.   Respiratory:  Negative for apnea, cough, chest tightness, shortness of breath and wheezing.    Cardiovascular:  Negative for chest pain, palpitations and leg swelling.   Gastrointestinal:  Negative for abdominal distention, abdominal pain, anal bleeding, blood in stool, constipation, diarrhea, nausea, rectal pain and vomiting.   Endocrine: Negative for cold intolerance and heat intolerance.   Genitourinary:  Negative for difficulty urinating, dysuria and flank pain.   Musculoskeletal:  Negative for back pain and gait problem.   Skin:  Negative for color change, rash and wound.   Neurological:  Negative for dizziness, syncope, speech difficulty, weakness, light-headedness, numbness and headaches.   Hematological:  Negative for adenopathy. Does not bruise/bleed easily.   Psychiatric/Behavioral:  Negative for confusion. The patient is not nervous/anxious.      Allergies:  Allergies   Allergen Reactions    Codeine Nausea Only    Escitalopram " "Nausea Only     REPORTS \"LEXAPRO\"    Gabapentin Other (See Comments)     REPORTS \"MAKES MY EYES HURT BECAUSE OF MY MACULAR DEGENERATION\"    Ibuprofen Palpitations     REPORTS \"IT CAUSES MY HEART TO BEAT FAST\"    Levofloxacin Nausea Only    Naproxen Nausea Only    Nitrofurantoin Nausea Only    Sulfa Antibiotics Nausea Only       Medications:    Current Outpatient Medications:     acetaminophen (TYLENOL) 650 MG 8 hr tablet, Take 1 tablet by mouth 2 (Two) Times a Day., Disp: , Rfl:     allopurinol (ZYLOPRIM) 100 MG tablet, , Disp: , Rfl:     amLODIPine (NORVASC) 5 MG tablet, , Disp: , Rfl:     Carboxymethylcellulose Sodium 0.25 % solution, Apply 1 drop to eye(s) as directed by provider 2 (Two) Times a Day. REPORTS USES TO BOTH EYES, Disp: , Rfl:     cetirizine (zyrTEC) 10 MG tablet, cetirizine 10 mg tablet  Take 1 tablet every day by oral route for 30 days., Disp: , Rfl:     Cholecalciferol (VITAMIN D3 PO), Take  by mouth., Disp: , Rfl:     cloNIDine (CATAPRES) 0.1 MG tablet, clonidine HCl 0.1 mg tablet, Disp: , Rfl:     CRANBERRY EXTRACT PO, Take 1 tablet by mouth Daily., Disp: , Rfl:     Diclofenac Sodium (VOLTAREN) 1 % gel gel, diclofenac 1 % topical gel, Disp: , Rfl:     estradiol (ESTRACE) 0.5 MG tablet, Take 1 tablet by mouth 2 (Two) Times a Week. REPORTS TAKES THIS ON Monday AND Friday AT NIGHT TIME, Disp: 24 tablet, Rfl: 3    famotidine (PEPCID) 20 MG tablet, , Disp: , Rfl:     fexofenadine (ALLEGRA) 180 MG tablet, Take 1 tablet by mouth Daily., Disp: , Rfl:     fexofenadine (ALLEGRA) 180 MG tablet, Allegra Allergy 180 mg tablet  Take 1 tablet every day by oral route for 30 days., Disp: , Rfl:     fluticasone (FLONASE) 50 MCG/ACT nasal spray, 1 spray into the nostril(s) as directed by provider 2 (Two) Times a Day., Disp: , Rfl:     hydrochlorothiazide (MICROZIDE) 12.5 MG capsule, Take 1 capsule by mouth Daily., Disp: , Rfl:     ipratropium (ATROVENT) 0.06 % nasal spray, ipratropium bromide 42 mcg (0.06 %) nasal " "spray, Disp: , Rfl:     losartan (COZAAR) 25 MG tablet, 1 tablet 2 (Two) Times a Day., Disp: , Rfl:     losartan (COZAAR) 50 MG tablet, losartan 50 mg tablet  Take 1 tablet twice a day by oral route for 90 days., Disp: , Rfl:     methIMAzole (TAPAZOLE) 5 MG tablet, Take 1 tablet by mouth Every Other Day., Disp: , Rfl:     metoprolol succinate XL (TOPROL-XL) 50 MG 24 hr tablet, Take 1 tablet by mouth Daily., Disp: , Rfl:     montelukast (SINGULAIR) 10 MG tablet, , Disp: , Rfl:     multivitamin with minerals tablet tablet, Take 1 tablet by mouth Daily., Disp: , Rfl:     omeprazole (prilOSEC) 10 MG capsule, , Disp: , Rfl:     prednisoLONE acetate (Pred Forte) 1 % ophthalmic suspension, Follow instructions on the After Visit Summary., Disp: 2 mL, Rfl: 0    prednisoLONE acetate (Pred Forte) 1 % ophthalmic suspension, Follow instructions on the After Visit Summary., Disp: 2 mL, Rfl: 0    torsemide (DEMADEX) 5 MG tablet, Daily., Disp: , Rfl:     traMADol (ULTRAM) 50 MG tablet, tramadol 50 mg tablet, Disp: , Rfl:     History:  Past Medical History:   Diagnosis Date    Arthritis     Back pain     Cataract     PT REPORTS EARLY STAGES    Cystitis     Diabetes mellitus      REPORTS \"I AM PRE DIABETIC\" AND REPORTS THAT SHE WATCHS HER DIET AND PCP WATCHES A1C CLOSELY    Disease of thyroid gland     Dysphagia     REPORTS MORE TROUBLE SWALLOWING WHEN EATING OR TAKING PILLS. REPORTS PAIN AND THAT SOMETIMES THINGS FEEL LIKE THEY \"STICK\"    Elevated cholesterol 09/20/2018    REPORTS SHE HAS TAKEN MEDICATION IN THE PAST BUT THAT SHE THINKS ALL IS WNL'S AT PRESENT TIME WITHOUT MEDICATION    Fracture of right ankle 2020    GERD (gastroesophageal reflux disease)     Hearing loss     REPORTS MILD AND NO USE OF HEARING AIDS    History of cardiac murmur     REPORTS \"I USED TO HAVE ONE BUT NOW THEY SAY THEY DON'T HEAR IT\"    History of colonic polyps     History of pneumonia     Hx of echocardiogram     Hypercholesterolemia     Hypertension  " "   Irregular heart beat     Macular degeneration     Osteoporosis     Seasonal allergies     Seasonal allergies     Sinusitis     Spinal headache 09/20/2018    REPORTS AFTER DELIVERY OF HER SON    Urinary tract infection     Vertigo     Wears glasses        Past Surgical History:   Procedure Laterality Date    BACK SURGERY      REPORTS A SPUR WAS REMOVED THAT WAS HITTING SCIATIC NERVE    BREAST BIOPSY      BREAST SURGERY Left     LUMPECTOMY, BENIGN     CATARACT EXTRACTION W/ INTRAOCULAR LENS IMPLANT Left 03/04/2022    Procedure: CATARACT PHACO EXTRACTION WITH INTRAOCULAR LENS IMPLANT left;  Surgeon: Pritesh Mcnally MD;  Location: [facility] OR;  Service: Ophthalmology;  Laterality: Left;    CATARACT EXTRACTION W/ INTRAOCULAR LENS IMPLANT Right 03/18/2022    Procedure: CATARACT PHACO EXTRACTION WITH INTRAOCULAR LENS IMPLANT RIGHT;  Surgeon: Pritesh Mcnally MD;  Location: [facility] OR;  Service: Ophthalmology;  Laterality: Right;    COLONOSCOPY  2012    REPORTS LAST DONE IN 2012 BUT HAS A HX OF SEVERAL OF THESE PROCEDURES    DILATATION AND CURETTAGE      REPORTS \"I HAD A COUPLE OF THOSE\"    ENDOSCOPY N/A 01/31/2017    Procedure: ESOPHAGOGASTRODUODENOSCOPY;  Surgeon: Robbie Quezada MD;  Location: [facility] ENDOSCOPY;  Service:     ENDOSCOPY N/A 09/21/2018    Procedure: ESOPHAGOGASTRODUODENOSCOPY WITH BIOPSY, DILITATION;  Surgeon: Robbie Quezada MD;  Location: [facility] ENDOSCOPY;  Service: Gastroenterology    FOOT SURGERY Left     REPORTS SURGICAL INTERVENTION TO CORRECT GREAT TOE ALIGNMENT    GALLBLADDER SURGERY  2009    HEMORRHOIDECTOMY  1973    TONSILLECTOMY  1946    UPPER GASTROINTESTINAL ENDOSCOPY  2014    UPPER GASTROINTESTINAL ENDOSCOPY  01/31/2017       Family History   Problem Relation Age of Onset    Diabetes Mother     Cancer Brother         lung    Cancer Brother         brain    Colon cancer Paternal Grandmother         possibly colon cancer    Breast cancer Neg Hx        Social History     Tobacco Use    " "Smoking status: Never     Passive exposure: Never    Smokeless tobacco: Never   Vaping Use    Vaping Use: Never used   Substance Use Topics    Alcohol use: No    Drug use: No        OBJECTIVE:    Vital Signs:   Vitals:    08/09/23 1312   BP: 118/62   Pulse: 62   Temp: 97.2 øF (36.2 øC)   SpO2: 96%   Weight: 65.1 kg (143 lb 9.6 oz)   Height: 167.6 cm (66\")       Physical Exam:   General Appearance:    Alert, cooperative, in no acute distress   Head:    Normocephalic, without obvious abnormality, atraumatic   Eyes:            Lids and lashes normal, conjunctivae and sclerae normal, no   icterus, no pallor, corneas clear, PERRLA   Ears:    Ears appear intact with no abnormalities noted   Throat:   No oral lesions, no thrush, oral mucosa moist   Neck:   No adenopathy, supple, trachea midline, no thyromegaly, no   carotid bruit, no JVD   Lungs:     Clear to auscultation,respirations regular, even and                  unlabored    Heart:    Regular rhythm and normal rate, normal S1 and S2, no            murmur, no gallop, no rub, no click   Chest Wall:    No abnormalities observed   Abdomen:     Normal bowel sounds, no masses, no organomegaly, soft        non-tender, non-distended, no guarding, no rebound                tenderness   Extremities:   Moves all extremities well, no edema, no cyanosis, no             redness   Pulses:   Pulses palpable and equal bilaterally   Skin:   No bleeding, bruising or rash, no palpable masses of either breast   Lymph nodes:   No palpable adenopathy   Neurologic:   Cranial nerves 2 - 12 grossly intact, sensation intact, DTR       present and equal bilaterally     Results Review:   I reviewed the patient's new clinical results.  I reviewed the patient's new imaging results and agree with the interpretation.  I reviewed the patient's other test results and agree with the interpretation    Review of Systems was reviewed and confirmed as accurate as documented by the " MA.    ASSESSMENT/PLAN:    1. Other abnormal and inconclusive findings on diagnostic imaging of breast    2. Mass of breast, unspecified laterality        In short, I did have a detailed and extensive discussion with the patient in the office today.  I do not think she needs any further intervention, ultrasound was performed that shows benign cystic changes of the left breast.  I have asked to see her back in the office in 1 year with a follow-up bilateral mammogram.    I discussed the patients findings and my recommendations with patient        Electronically signed by Jose Eduardo Gallegos MD  08/09/23

## 2023-08-09 ENCOUNTER — OFFICE VISIT (OUTPATIENT)
Dept: SURGERY | Facility: CLINIC | Age: 88
End: 2023-08-09
Payer: MEDICARE

## 2023-08-09 VITALS
HEART RATE: 62 BPM | BODY MASS INDEX: 23.08 KG/M2 | WEIGHT: 143.6 LBS | DIASTOLIC BLOOD PRESSURE: 62 MMHG | HEIGHT: 66 IN | TEMPERATURE: 97.2 F | SYSTOLIC BLOOD PRESSURE: 118 MMHG | OXYGEN SATURATION: 96 %

## 2023-08-09 DIAGNOSIS — R92.8 OTHER ABNORMAL AND INCONCLUSIVE FINDINGS ON DIAGNOSTIC IMAGING OF BREAST: Primary | ICD-10-CM

## 2023-08-09 DIAGNOSIS — N63.0 MASS OF BREAST, UNSPECIFIED LATERALITY: ICD-10-CM

## 2023-08-09 PROCEDURE — 99213 OFFICE O/P EST LOW 20 MIN: CPT | Performed by: SURGERY

## 2023-08-09 PROCEDURE — 1159F MED LIST DOCD IN RCRD: CPT | Performed by: SURGERY

## 2023-08-09 PROCEDURE — 1160F RVW MEDS BY RX/DR IN RCRD: CPT | Performed by: SURGERY

## 2023-08-22 ENCOUNTER — TELEPHONE (OUTPATIENT)
Dept: CARDIOLOGY | Facility: CLINIC | Age: 88
End: 2023-08-22
Payer: MEDICARE

## 2023-08-22 NOTE — TELEPHONE ENCOUNTER
Pt called stating that she has been having a low HR, Pt states that since yesterday her Hr has been as low as 49 and in the last week in the lower 50s. Pt states she is a little dizzy and nauseous. Pt states that her BP has been running okay as well.

## 2023-08-22 NOTE — TELEPHONE ENCOUNTER
She can trial decreased dose of metoprolol to 25 mg daily.  Please have her call back to the office in 1 week and let us know if this improves her symptoms.  Have her keep a blood pressure log and record HR 1-2 hours after morning medication.    And/Or    Schedule her a follow-up appt in the office, whichever she prefers.  If she experiences HR < 45 with dizziness, fainting or near fainting, or altered mental status, she she should be seen in the ED.

## 2023-10-05 ENCOUNTER — OFFICE VISIT (OUTPATIENT)
Dept: GASTROENTEROLOGY | Facility: CLINIC | Age: 88
End: 2023-10-05
Payer: MEDICARE

## 2023-10-05 VITALS
WEIGHT: 143 LBS | HEART RATE: 62 BPM | HEIGHT: 66 IN | SYSTOLIC BLOOD PRESSURE: 138 MMHG | DIASTOLIC BLOOD PRESSURE: 82 MMHG | OXYGEN SATURATION: 96 % | RESPIRATION RATE: 18 BRPM | BODY MASS INDEX: 22.98 KG/M2

## 2023-10-05 DIAGNOSIS — R73.03 PREDIABETES: ICD-10-CM

## 2023-10-05 DIAGNOSIS — R13.19 ESOPHAGEAL DYSPHAGIA: Primary | Chronic | ICD-10-CM

## 2023-10-05 DIAGNOSIS — K21.9 GASTROESOPHAGEAL REFLUX DISEASE, UNSPECIFIED WHETHER ESOPHAGITIS PRESENT: Chronic | ICD-10-CM

## 2023-10-05 PROCEDURE — 99204 OFFICE O/P NEW MOD 45 MIN: CPT | Performed by: NURSE PRACTITIONER

## 2023-10-05 RX ORDER — SODIUM CHLORIDE 9 MG/ML
70 INJECTION, SOLUTION INTRAVENOUS CONTINUOUS PRN
OUTPATIENT
Start: 2023-10-05

## 2023-10-05 NOTE — PROGRESS NOTES
"     New Patient Consult      Date: 10/05/2023   Patient Name: Addie Perez  MRN: [id number]  : 1934     Primary Care Provider: Ariela Hampton DO    Chief Complaint   Patient presents with    Difficulty Swallowing     History of Present Illness: Addie Perez is a 89 y.o. female who is here today to establish care with gastroenterology for difficulty swallowing.    She has been having difficulty swallowing for the past 2 years that occurs 2-3 times per week. She has difficulty swallowing solids and liquids. She feels it gets \"backed up\" in her esophagus and takes a long time to go down. No history of weight loss. She has reflux for many years that is controlled with Omeprazole daily.     Denies changes in bowel habits, constipation or diarrhea. Denies GI bleeding. Her last colonoscopy was in  by Dr. Gallegos. Her last EGD was in  by Dr. Quezada.    Subjective      Past Medical History:   Diagnosis Date    Arthritis     Back pain     Cataract     PT REPORTS EARLY STAGES    Cystitis     Diabetes mellitus      REPORTS \"I AM PRE DIABETIC\" AND REPORTS THAT SHE WATCHS HER DIET AND PCP WATCHES A1C CLOSELY    Disease of thyroid gland     Dysphagia     REPORTS MORE TROUBLE SWALLOWING WHEN EATING OR TAKING PILLS. REPORTS PAIN AND THAT SOMETIMES THINGS FEEL LIKE THEY \"STICK\"    Elevated cholesterol 2018    REPORTS SHE HAS TAKEN MEDICATION IN THE PAST BUT THAT SHE THINKS ALL IS WNL'S AT PRESENT TIME WITHOUT MEDICATION    Fracture of right ankle     GERD (gastroesophageal reflux disease)     Hearing loss     REPORTS MILD AND NO USE OF HEARING AIDS    History of cardiac murmur     REPORTS \"I USED TO HAVE ONE BUT NOW THEY SAY THEY DON'T HEAR IT\"    History of colonic polyps     History of pneumonia     Hx of echocardiogram     Hypercholesterolemia     Hypertension     Irregular heart beat     Macular degeneration     Osteoporosis     Seasonal allergies     Seasonal allergies     Sinusitis     Spinal " "headache 09/20/2018    REPORTS AFTER DELIVERY OF HER SON    Urinary tract infection     Vertigo     Wears glasses      Past Surgical History:   Procedure Laterality Date    BACK SURGERY      REPORTS A SPUR WAS REMOVED THAT WAS HITTING SCIATIC NERVE    BREAST BIOPSY      BREAST SURGERY Left     LUMPECTOMY, BENIGN     CATARACT EXTRACTION W/ INTRAOCULAR LENS IMPLANT Left 03/04/2022    Procedure: CATARACT PHACO EXTRACTION WITH INTRAOCULAR LENS IMPLANT left;  Surgeon: Pritesh Mcnally MD;  Location: [facility] OR;  Service: Ophthalmology;  Laterality: Left;    CATARACT EXTRACTION W/ INTRAOCULAR LENS IMPLANT Right 03/18/2022    Procedure: CATARACT PHACO EXTRACTION WITH INTRAOCULAR LENS IMPLANT RIGHT;  Surgeon: Pritesh Mcnally MD;  Location: [facility] OR;  Service: Ophthalmology;  Laterality: Right;    COLONOSCOPY  2012    REPORTS LAST DONE IN 2012 BUT HAS A HX OF SEVERAL OF THESE PROCEDURES    DILATATION AND CURETTAGE      REPORTS \"I HAD A COUPLE OF THOSE\"    ENDOSCOPY N/A 01/31/2017    Procedure: ESOPHAGOGASTRODUODENOSCOPY;  Surgeon: Robbie Quezada MD;  Location: [facility] ENDOSCOPY;  Service:     ENDOSCOPY N/A 09/21/2018    Procedure: ESOPHAGOGASTRODUODENOSCOPY WITH BIOPSY, DILITATION;  Surgeon: Robbie Quezada MD;  Location: [facility] ENDOSCOPY;  Service: Gastroenterology    FOOT SURGERY Left     REPORTS SURGICAL INTERVENTION TO CORRECT GREAT TOE ALIGNMENT    GALLBLADDER SURGERY  2009    HEMORRHOIDECTOMY  1973    TONSILLECTOMY  1946    UPPER GASTROINTESTINAL ENDOSCOPY  2014    UPPER GASTROINTESTINAL ENDOSCOPY  01/31/2017     Family History   Problem Relation Age of Onset    Diabetes Mother     Cancer Brother         lung    Cancer Brother         brain    Colon cancer Paternal Grandmother         possibly colon cancer    Breast cancer Neg Hx      Social History     Socioeconomic History    Marital status:    Tobacco Use    Smoking status: Never     Passive exposure: Never    Smokeless tobacco: Never   Vaping Use    " Vaping Use: Never used   Substance and Sexual Activity    Alcohol use: No    Drug use: No    Sexual activity: Defer       Current Outpatient Medications:     acetaminophen (TYLENOL) 650 MG 8 hr tablet, Take 1 tablet by mouth 2 (Two) Times a Day., Disp: , Rfl:     Carboxymethylcellulose Sodium 0.25 % solution, Apply 1 drop to eye(s) as directed by provider 2 (Two) Times a Day. REPORTS USES TO BOTH EYES, Disp: , Rfl:     cetirizine (zyrTEC) 10 MG tablet, cetirizine 10 mg tablet  Take 1 tablet every day by oral route for 30 days., Disp: , Rfl:     Cholecalciferol (VITAMIN D3 PO), Take  by mouth., Disp: , Rfl:     CRANBERRY EXTRACT PO, Take 1 tablet by mouth Daily., Disp: , Rfl:     Diclofenac Sodium (VOLTAREN) 1 % gel gel, diclofenac 1 % topical gel, Disp: , Rfl:     estradiol (ESTRACE) 0.5 MG tablet, Take 1 tablet by mouth 2 (Two) Times a Week. REPORTS TAKES THIS ON Monday AND Friday AT NIGHT TIME, Disp: 24 tablet, Rfl: 3    fexofenadine (ALLEGRA) 180 MG tablet, Allegra Allergy 180 mg tablet  Take 1 tablet every day by oral route for 30 days., Disp: , Rfl:     fluticasone (FLONASE) 50 MCG/ACT nasal spray, 1 spray into the nostril(s) as directed by provider 2 (Two) Times a Day., Disp: , Rfl:     ipratropium (ATROVENT) 0.06 % nasal spray, ipratropium bromide 42 mcg (0.06 %) nasal spray, Disp: , Rfl:     losartan (COZAAR) 50 MG tablet, losartan 50 mg tablet  Take 1 tablet twice a day by oral route for 90 days., Disp: , Rfl:     metoprolol succinate XL (TOPROL-XL) 50 MG 24 hr tablet, Take 0.5 tablets by mouth Daily., Disp: , Rfl:     montelukast (SINGULAIR) 10 MG tablet, , Disp: , Rfl:     multivitamin with minerals tablet tablet, Take 1 tablet by mouth Daily., Disp: , Rfl:     omeprazole (priLOSEC) 20 MG capsule, 1 capsule., Disp: , Rfl:     prednisoLONE acetate (Pred Forte) 1 % ophthalmic suspension, Follow instructions on the After Visit Summary., Disp: 2 mL, Rfl: 0    torsemide (DEMADEX) 5 MG tablet, Daily., Disp: ,  "Rfl:      Allergies   Allergen Reactions    Codeine Nausea Only    Escitalopram Nausea Only     REPORTS \"LEXAPRO\"    Gabapentin Other (See Comments)     REPORTS \"MAKES MY EYES HURT BECAUSE OF MY MACULAR DEGENERATION\"    Ibuprofen Palpitations     REPORTS \"IT CAUSES MY HEART TO BEAT FAST\"    Levofloxacin Nausea Only    Naproxen Nausea Only    Nitrofurantoin Nausea Only    Sulfa Antibiotics Nausea Only     The following portions of the patient's history were reviewed and updated as appropriate: allergies, current medications, past family history, past medical history, past social history, past surgical history and problem list.    Objective     Physical Exam  Vitals and nursing note reviewed.   Constitutional:       General: She is not in acute distress.     Appearance: Normal appearance. She is well-developed.   HENT:      Head: Normocephalic and atraumatic.      Mouth/Throat:      Mouth: Mucous membranes are not pale, not dry and not cyanotic.   Eyes:      General: Lids are normal.   Neck:      Trachea: Trachea normal.   Cardiovascular:      Rate and Rhythm: Normal rate.   Pulmonary:      Effort: Pulmonary effort is normal. No respiratory distress.      Breath sounds: Normal breath sounds.   Abdominal:      Tenderness: There is no abdominal tenderness.   Skin:     General: Skin is warm and dry.   Neurological:      Mental Status: She is alert and oriented to person, place, and time.   Psychiatric:         Mood and Affect: Mood normal.         Speech: Speech normal.         Behavior: Behavior normal. Behavior is cooperative.     Vitals:    10/05/23 1303   BP: 138/82   Pulse: 62   Resp: 18   SpO2: 96%   Weight: 64.9 kg (143 lb)   Height: 167.6 cm (66\")     Body mass index is 23.08 kg/m².     Results Review:   I have reviewed the patient's new clinical and imaging results.    Labs dated 1/11/2023 alkaline phosphatase 139 AST 22 ALT 17 total bilirubin 0.7 magnesium 1.8 hemoglobin 12.3 hematocrit 37.8 platelet count 256 " TSH 1.680 vitamin B12 884 folic acid >20.0    EGD dated January 31, 2017 per Dr. Quezada   - Distal esophageal stricture which was serially dilated to 18 mm.   - A small sliding hiatal hernia less than 3 cm  - Erosive distal esophagitis-LA class A  - 2 small early mid esophageal diverticula and erosive gastritis.   - Significant tortuosity and tertiary contractions of esophageal body were noted.   - Biopsies from the mid and distal esophageal revealed reactive mucosal changes consistent with reflux. No specialized mucosa. No increased eosinophils were noted. Gastric biopsies from the antrum body and angularis were negative for Helicobacter pylori. Biopsies from the second portion of duodenum were negative for celiac disease.     EGD dated 9/21/2018 per Dr. Damien Montilla's-type ring. Status post serial dilation to 18 mm.  Small sliding hiatal hernia less than 3 cm.  Erythematous distal esophagitis.  Erythematous-erosive gastritis.  No Valdes's esophagus.  -Duodenum biopsy unremarkable.  Antrum body and fundus biopsy with mild chronic gastritis, no H. pylori.    Assessment / Plan      1. Esophageal dysphagia  2. Gastroesophageal reflux disease, unspecified whether esophagitis present  She has had difficulty swallowing solids and liquids over the past 2 years that occurs a couple of times per week. Reflux reasonably controlled with low dose PPI daily. Continue same. EGD in 2017 and 2018 by Dr. Quezada with esophageal stricture dilated.   Anti-reflux measures  EGD to rule out esophageal stricture.    - Case Request    Patient Instructions   Antireflux measures: Avoid fried, fatty foods, alcohol, chocolate, coffee, tea,  soft drinks, all carbonated beverages (including sparkling water), peppermint and spearmint, spicy foods, tomatoes and tomato based foods, onions, peppers, and garlic.   Other antireflux measures include weight reduction if overweight, avoiding tight clothing around the abdomen, elevating the head of the bed  6 inches with blocks under the head board, and don't drink or eat before going to bed and avoid lying down immediately after meals.  Omeprazole 20 mg 1 by mouth in the am 30 minutes before breakfast.  Eat relatively soft diet, eat in upright position and chew food well.  Drink water after 2-3 bites and take medications with liberal amounts of water.   After eating and taking medications, remain in upright position for 5-10 minutes.  Upper endoscopy-EGD: The indications, technique, alternatives and potential risk and complications were discussed with the patient including but not limited to bleeding, perforations, missing lesions and anesthetic complications. The patient understands and wishes to proceed with the procedure and has given their verbal consent. Written patient education information was given to the patient.   The patient will call if they have further questions before procedure.    Jack Murdock, APRN  10/5/2023    Please note that portions of this note may have been completed with a voice recognition program.

## 2023-10-05 NOTE — PATIENT INSTRUCTIONS
Antireflux measures: Avoid fried, fatty foods, alcohol, chocolate, coffee, tea,  soft drinks, all carbonated beverages (including sparkling water), peppermint and spearmint, spicy foods, tomatoes and tomato based foods, onions, peppers, and garlic.   Other antireflux measures include weight reduction if overweight, avoiding tight clothing around the abdomen, elevating the head of the bed 6 inches with blocks under the head board, and don't drink or eat before going to bed and avoid lying down immediately after meals.  Omeprazole 20 mg 1 by mouth in the am 30 minutes before breakfast.  Eat relatively soft diet, eat in upright position and chew food well.  Drink water after 2-3 bites and take medications with liberal amounts of water.   After eating and taking medications, remain in upright position for 5-10 minutes.  Upper endoscopy-EGD: The indications, technique, alternatives and potential risk and complications were discussed with the patient including but not limited to bleeding, perforations, missing lesions and anesthetic complications. The patient understands and wishes to proceed with the procedure and has given their verbal consent. Written patient education information was given to the patient.   The patient will call if they have further questions before procedure.

## 2023-11-09 ENCOUNTER — TELEPHONE (OUTPATIENT)
Dept: GASTROENTEROLOGY | Facility: CLINIC | Age: 88
End: 2023-11-09
Payer: MEDICARE

## 2023-11-09 NOTE — TELEPHONE ENCOUNTER
Called patient re: confirmation of appt for procedure on 11/15/23.    Left voicemail for patient to call back to confirm.  Confirmation can be left with the H[facility].

## 2023-11-13 NOTE — NURSING NOTE
Spoke with CONNIE Parikh CRNA about patient seeing cardiology in June. Discussed that it was her initial visit. Patient did have uncontrolled SBP and bilateral lower extremity edema. Cardiology put her on a diuretic and made adjustments to her medicine. Patient states now she is not having any issues. CRNA states patient is ok to proceed.

## 2023-11-15 ENCOUNTER — HOSPITAL ENCOUNTER (OUTPATIENT)
Facility: HOSPITAL | Age: 88
Setting detail: HOSPITAL OUTPATIENT SURGERY
Discharge: HOME | End: 2023-11-15
Attending: INTERNAL MEDICINE | Admitting: INTERNAL MEDICINE
Payer: MEDICARE

## 2023-11-15 ENCOUNTER — ANESTHESIA EVENT (OUTPATIENT)
Dept: GASTROENTEROLOGY | Facility: HOSPITAL | Age: 88
End: 2023-11-15
Payer: MEDICARE

## 2023-11-15 ENCOUNTER — ANESTHESIA (OUTPATIENT)
Dept: GASTROENTEROLOGY | Facility: HOSPITAL | Age: 88
End: 2023-11-15
Payer: MEDICARE

## 2023-11-15 VITALS
HEIGHT: 66 IN | HEART RATE: 67 BPM | TEMPERATURE: 97.1 F | DIASTOLIC BLOOD PRESSURE: 72 MMHG | BODY MASS INDEX: 22.96 KG/M2 | WEIGHT: 142.86 LBS | OXYGEN SATURATION: 99 % | RESPIRATION RATE: 16 BRPM | SYSTOLIC BLOOD PRESSURE: 147 MMHG

## 2023-11-15 DIAGNOSIS — R73.03 PREDIABETES: ICD-10-CM

## 2023-11-15 DIAGNOSIS — R13.19 ESOPHAGEAL DYSPHAGIA: ICD-10-CM

## 2023-11-15 PROCEDURE — 25810000003 SODIUM CHLORIDE 0.9 % SOLUTION: Performed by: NURSE ANESTHETIST, CERTIFIED REGISTERED

## 2023-11-15 PROCEDURE — 25810000003 SODIUM CHLORIDE 0.9 % SOLUTION: Performed by: NURSE PRACTITIONER

## 2023-11-15 PROCEDURE — 25010000002 PROPOFOL 200 MG/20ML EMULSION: Performed by: NURSE ANESTHETIST, CERTIFIED REGISTERED

## 2023-11-15 PROCEDURE — C1726 CATH, BAL DIL, NON-VASCULAR: HCPCS | Performed by: INTERNAL MEDICINE

## 2023-11-15 RX ORDER — LIDOCAINE HCL/PF 100 MG/5ML
SYRINGE (ML) INJECTION AS NEEDED
Status: DISCONTINUED | OUTPATIENT
Start: 2023-11-15 | End: 2023-11-15 | Stop reason: SURG

## 2023-11-15 RX ORDER — SODIUM CHLORIDE 9 MG/ML
70 INJECTION, SOLUTION INTRAVENOUS CONTINUOUS PRN
Status: DISCONTINUED | OUTPATIENT
Start: 2023-11-15 | End: 2023-11-15 | Stop reason: HOSPADM

## 2023-11-15 RX ORDER — PROPOFOL 10 MG/ML
INJECTION, EMULSION INTRAVENOUS CONTINUOUS PRN
Status: DISCONTINUED | OUTPATIENT
Start: 2023-11-15 | End: 2023-11-15 | Stop reason: SURG

## 2023-11-15 RX ORDER — SODIUM CHLORIDE 9 MG/ML
INJECTION, SOLUTION INTRAVENOUS CONTINUOUS PRN
Status: DISCONTINUED | OUTPATIENT
Start: 2023-11-15 | End: 2023-11-15 | Stop reason: SURG

## 2023-11-15 RX ADMIN — SODIUM CHLORIDE 70 ML/HR: 9 INJECTION, SOLUTION INTRAVENOUS at 08:25

## 2023-11-15 RX ADMIN — PROPOFOL 100 MCG/KG/MIN: 10 INJECTION, EMULSION INTRAVENOUS at 09:16

## 2023-11-15 RX ADMIN — SODIUM CHLORIDE: 9 INJECTION, SOLUTION INTRAVENOUS at 09:08

## 2023-11-15 RX ADMIN — LIDOCAINE HYDROCHLORIDE 50 MG: 20 INJECTION INTRAVENOUS at 09:16

## 2023-11-15 NOTE — ANESTHESIA PREPROCEDURE EVALUATION
Anesthesia Evaluation     Patient summary reviewed and Nursing notes reviewed   history of anesthetic complications (h/o dental injury with previous anesthesia.  chipped lower molar. patient does not recall if this was from general anesthesia .):  PONV  NPO Solid Status: > 8 hours  NPO Liquid Status: > 8 hours           Airway   Mallampati: I  TM distance: <3 FB  Neck ROM: full  Possible difficult intubation  Dental - normal exam     Pulmonary - negative pulmonary ROS and normal exam    breath sounds clear to auscultation  Cardiovascular - normal exam  Exercise tolerance: good (4-7 METS)    ECG reviewed  Patient on routine beta blocker and Beta blocker given within 24 hours of surgery  Rhythm: regular  Rate: normal    (+) hypertension well controlled, PVD (B/L LE swelling), hyperlipidemia    ROS comment: Dyslipidemia    Neuro/Psych  (+) headaches, dizziness/light headedness (Vertigo secondary to inner ear), numbness  GI/Hepatic/Renal/Endo    (+) GERD well controlled, renal disease- CRI, diabetes mellitus (FSBS 96.. Pre-diabetic. Diet controlled) well controlled, thyroid problem     Musculoskeletal     (+) arthralgias, back pain  Abdominal  - normal exam    Abdomen: soft.  Bowel sounds: normal.   Substance History      OB/GYN negative ob/gyn ROS         Other   arthritis,                   Anesthesia Plan    ASA 3     MAC     (Risks and benefits discussed including risk of aspiration, recall and dental damage. All patient questions answered. Will continue with POC.)  intravenous induction     Anesthetic plan, risks, benefits, and alternatives have been provided, discussed and informed consent has been obtained with: patient.  Pre-procedure education provided

## 2023-11-15 NOTE — ANESTHESIA POSTPROCEDURE EVALUATION
Patient: Addie Perez    Procedure Summary       Date: 11/15/23 Room / Location: [facility] ENDOSCOPY 2 / [facility] ENDOSCOPY    Anesthesia Start: 0908 Anesthesia Stop: 0929    Procedure: ESOPHAGOGASTRODUODENOSCOPY WITH BIOPSY AND DILATION (Esophagus) Diagnosis:       Esophageal dysphagia      (Esophageal dysphagia [R13.19])    Surgeons: Mil Ma MD Provider: Kirk Zendejas CRNA    Anesthesia Type: MAC ASA Status: 3            Anesthesia Type: MAC    Vitals  No vitals data found for the desired time range.          Post Anesthesia Care and Evaluation    Patient location during evaluation: PHASE II  Patient participation: complete - patient participated  Level of consciousness: awake and alert  Pain score: 0  Pain management: satisfactory to patient    Airway patency: patent  Anesthetic complications: No anesthetic complications  PONV Status: none  Cardiovascular status: acceptable and stable  Respiratory status: acceptable and spontaneous ventilation  Hydration status: acceptable    Comments: Vitals signs as noted in nursing documentation as per protocol.

## 2023-11-15 NOTE — H&P
"    [facility]  HISTORY AND PHYSICAL    Patient Name: Addie Perez  : 1934  MRN: [id number]    Chief Complaint:   For EGD    History Of Presenting Illness:      GERD  Dysphagia    Past Medical History:   Diagnosis Date    Arthritis     Back pain     Cataract     PT REPORTS EARLY STAGES    Cystitis     Diabetes mellitus      REPORTS \"I AM PRE DIABETIC\" AND REPORTS THAT SHE WATCHS HER DIET AND PCP WATCHES A1C CLOSELY    Disease of thyroid gland     Dysphagia     REPORTS MORE TROUBLE SWALLOWING WHEN EATING OR TAKING PILLS. REPORTS PAIN AND THAT SOMETIMES THINGS FEEL LIKE THEY \"STICK\"    Elevated cholesterol 2018    REPORTS SHE HAS TAKEN MEDICATION IN THE PAST BUT THAT SHE THINKS ALL IS WNL'S AT PRESENT TIME WITHOUT MEDICATION    Fracture of right ankle     GERD (gastroesophageal reflux disease)     Hearing loss     REPORTS MILD AND NO USE OF HEARING AIDS    History of cardiac murmur     REPORTS \"I USED TO HAVE ONE BUT NOW THEY SAY THEY DON'T HEAR IT\"    History of colonic polyps     History of pneumonia     Hx of echocardiogram     Hypercholesterolemia     Hypertension     Irregular heart beat     Macular degeneration     Osteoporosis     Seasonal allergies     Seasonal allergies     Sinusitis     Spinal headache 2018    REPORTS AFTER DELIVERY OF HER SON    Urinary tract infection     Vertigo     Wears glasses        Past Surgical History:   Procedure Laterality Date    BACK SURGERY      REPORTS A SPUR WAS REMOVED THAT WAS HITTING SCIATIC NERVE    BREAST BIOPSY      BREAST SURGERY Left     LUMPECTOMY, BENIGN     CATARACT EXTRACTION W/ INTRAOCULAR LENS IMPLANT Left 2022    Procedure: CATARACT PHACO EXTRACTION WITH INTRAOCULAR LENS IMPLANT left;  Surgeon: Pritesh Mcnally MD;  Location: [facility];  Service: Ophthalmology;  Laterality: Left;    CATARACT EXTRACTION W/ INTRAOCULAR LENS IMPLANT Right 2022    Procedure: CATARACT PHACO EXTRACTION WITH INTRAOCULAR LENS " "IMPLANT RIGHT;  Surgeon: Pritesh Mcnally MD;  Location: [facility] OR;  Service: Ophthalmology;  Laterality: Right;    COLONOSCOPY  2012    REPORTS LAST DONE IN 2012 BUT HAS A HX OF SEVERAL OF THESE PROCEDURES    DILATATION AND CURETTAGE      REPORTS \"I HAD A COUPLE OF THOSE\"    ENDOSCOPY N/A 01/31/2017    Procedure: ESOPHAGOGASTRODUODENOSCOPY;  Surgeon: Robbie Quezada MD;  Location: [facility] ENDOSCOPY;  Service:     ENDOSCOPY N/A 09/21/2018    Procedure: ESOPHAGOGASTRODUODENOSCOPY WITH BIOPSY, DILITATION;  Surgeon: Robbie Quezada MD;  Location: [facility] ENDOSCOPY;  Service: Gastroenterology    FOOT SURGERY Left     REPORTS SURGICAL INTERVENTION TO CORRECT GREAT TOE ALIGNMENT    GALLBLADDER SURGERY  2009    HEMORRHOIDECTOMY  1973    TONSILLECTOMY  1946    UPPER GASTROINTESTINAL ENDOSCOPY  2014    UPPER GASTROINTESTINAL ENDOSCOPY  01/31/2017       Social History     Socioeconomic History    Marital status:    Tobacco Use    Smoking status: Never     Passive exposure: Never    Smokeless tobacco: Never   Vaping Use    Vaping Use: Never used   Substance and Sexual Activity    Alcohol use: No    Drug use: No    Sexual activity: Defer       Family History   Problem Relation Age of Onset    Diabetes Mother     Cancer Brother         lung    Cancer Brother         brain    Colon cancer Paternal Grandmother         possibly colon cancer    Breast cancer Neg Hx        Prior to Admission Medications:  Medications Prior to Admission   Medication Sig Dispense Refill Last Dose    acetaminophen (TYLENOL) 650 MG 8 hr tablet Take 1 tablet by mouth 2 (Two) Times a Day.   Past Month    Carboxymethylcellulose Sodium 0.25 % solution Apply 1 drop to eye(s) as directed by provider 2 (Two) Times a Day. REPORTS USES TO BOTH EYES   11/14/2023    cetirizine (zyrTEC) 10 MG tablet cetirizine 10 mg tablet   Take 1 tablet every day by oral route for 30 days.   Past Week    CRANBERRY EXTRACT PO Take 1 tablet by mouth Daily.   Past Month    " "Diclofenac Sodium (VOLTAREN) 1 % gel gel diclofenac 1 % topical gel   Past Month    estradiol (ESTRACE) 0.5 MG tablet Take 1 tablet by mouth 2 (Two) Times a Week. REPORTS TAKES THIS ON Monday AND Friday AT NIGHT TIME 24 tablet 3 Past Week    losartan (COZAAR) 50 MG tablet losartan 50 mg tablet   Take 1 tablet twice a day by oral route for 90 days.   11/14/2023    metoprolol succinate XL (TOPROL-XL) 50 MG 24 hr tablet Take 0.5 tablets by mouth Daily.   11/14/2023    multivitamin with minerals tablet tablet Take 1 tablet by mouth Daily.   Past Month    omeprazole (priLOSEC) 20 MG capsule 1 capsule.   Past Week    torsemide (DEMADEX) 5 MG tablet Daily.   Past Week    Cholecalciferol (VITAMIN D3 PO) Take  by mouth. (Patient not taking: Reported on 11/13/2023)   Not Taking    fexofenadine (ALLEGRA) 180 MG tablet Allegra Allergy 180 mg tablet   Take 1 tablet every day by oral route for 30 days. (Patient not taking: Reported on 11/13/2023)   Not Taking    fluticasone (FLONASE) 50 MCG/ACT nasal spray 1 spray into the nostril(s) as directed by provider 2 (Two) Times a Day. (Patient not taking: Reported on 11/13/2023)   Not Taking    ipratropium (ATROVENT) 0.06 % nasal spray ipratropium bromide 42 mcg (0.06 %) nasal spray (Patient not taking: Reported on 11/13/2023)   Not Taking    montelukast (SINGULAIR) 10 MG tablet  (Patient not taking: Reported on 11/13/2023)   Not Taking    prednisoLONE acetate (Pred Forte) 1 % ophthalmic suspension Follow instructions on the After Visit Summary. (Patient not taking: Reported on 11/13/2023) 2 mL 0 Not Taking       Allergies:  Allergies   Allergen Reactions    Codeine Nausea Only    Escitalopram Nausea Only     REPORTS \"LEXAPRO\"    Gabapentin Other (See Comments)     REPORTS \"MAKES MY EYES HURT BECAUSE OF MY MACULAR DEGENERATION\"    Ibuprofen Palpitations     REPORTS \"IT CAUSES MY HEART TO BEAT FAST\"    Levofloxacin Nausea Only    Naproxen Nausea Only    Nitrofurantoin Nausea Only    " Sulfa Antibiotics Nausea Only        Vitals: Temp:  [97.1 °F (36.2 °C)] 97.1 °F (36.2 °C)  Heart Rate:  [67] 67  Resp:  [16] 16  BP: (170)/(96) 170/96    Review Of Systems:  Constitutional:  Negative for chills, fever, and unexpected weight change.  Respiratory:  Negative for cough, chest tightness, shortness of breath, and wheezing.  Cardiovascular:  Negative for chest pain, palpitations, and leg swelling.  Gastrointestinal:  Negative for abdominal distention, abdominal pain, nausea, vomiting.  Neurological:  Negative for weakness, numbness, and headaches.     Physical Exam:    General Appearance:  Alert, cooperative, in no acute distress.   Lungs:   Clear to auscultation, respirations regular, even and                 unlabored.   Heart:  Regular rhythm and normal rate.   Abdomen:   Normal bowel sounds, no masses, no organomegaly. Soft, nontender, nondistended   Neurologic: Alert and oriented x 3. Moves all four limbs equally       Assessment & Plan     Assessment:  Principal Problem:    Dysphagia      Plan: ESOPHAGOGASTRODUODENOSCOPY (N/A)     Mil Ma MD  11/15/2023

## 2023-11-15 NOTE — DISCHARGE INSTRUCTIONS
- Discharge patient to home (ambulatory).   - Mechanical soft diet today.   - Antireflux measures  - Longterm low dose PPI  - Await pathology results.   - Return to my office in 8 weeks.    No pushing, pulling, tugging,  heavy lifting, or strenuous activity.  No major decision making, driving, or drinking alcoholic beverages for 24 hours. ( due to the medications you have  received)  Always use good hand hygiene/washing techniques.  NO driving while taking pain medications.    * if you have an incision:  Check your incision area every day for signs of infection.   Check for:  * more redness, swelling, or pain  *more fluid or blood  *warmth  *pus or bad smellRest today  No pushing,pulling,tugging,heavy lifting, or strenuous activity   No major decision making,driving,or drinking alcoholic beverages for 24 hours due to the sedation you received  Always use good hand hygiene/washing technique  No driving on pain medication.To assist you in voiding:  Drink plenty of fluids  Listen to running water while attempting to void.    If you are unable to urinate and you have an uncomfortable urge to void or it has been   6 hours since you were discharged, return to the Emergency Room

## 2023-11-16 LAB — REF LAB TEST METHOD: NORMAL

## 2023-12-01 ENCOUNTER — TELEPHONE (OUTPATIENT)
Dept: GASTROENTEROLOGY | Facility: CLINIC | Age: 88
End: 2023-12-01
Payer: MEDICARE

## 2023-12-01 NOTE — TELEPHONE ENCOUNTER
The patient called for biopsy results.  Biopsies looked okay, no cancer, mild inflammation noted.  Keep FUP in February.  The patient states understanding.

## 2024-02-05 ENCOUNTER — TRANSCRIBE ORDERS (OUTPATIENT)
Dept: ADMINISTRATIVE | Facility: HOSPITAL | Age: 89
End: 2024-02-05
Payer: MEDICARE

## 2024-02-05 DIAGNOSIS — Z12.31 SCREENING MAMMOGRAM FOR BREAST CANCER: Primary | ICD-10-CM

## 2024-02-06 DIAGNOSIS — N39.0 FREQUENT UTI: ICD-10-CM

## 2024-02-06 DIAGNOSIS — N95.2 POSTMENOPAUSAL ATROPHIC VAGINITIS: ICD-10-CM

## 2024-02-06 DIAGNOSIS — Z79.890 HORMONE REPLACEMENT THERAPY (HRT): ICD-10-CM

## 2024-02-06 RX ORDER — ESTRADIOL 0.5 MG/1
0.5 TABLET ORAL 2 TIMES WEEKLY
Qty: 24 TABLET | Refills: 10 | Status: SHIPPED | OUTPATIENT
Start: 2024-02-08

## 2024-02-08 ENCOUNTER — OFFICE VISIT (OUTPATIENT)
Dept: GASTROENTEROLOGY | Facility: CLINIC | Age: 89
End: 2024-02-08
Payer: MEDICARE

## 2024-02-08 VITALS
DIASTOLIC BLOOD PRESSURE: 76 MMHG | OXYGEN SATURATION: 98 % | SYSTOLIC BLOOD PRESSURE: 122 MMHG | RESPIRATION RATE: 12 BRPM | HEIGHT: 66 IN | HEART RATE: 63 BPM | BODY MASS INDEX: 23.46 KG/M2 | WEIGHT: 146 LBS

## 2024-02-08 DIAGNOSIS — K21.9 GASTROESOPHAGEAL REFLUX DISEASE WITHOUT ESOPHAGITIS: Primary | Chronic | ICD-10-CM

## 2024-02-08 DIAGNOSIS — K22.2 SCHATZKI'S RING: Chronic | ICD-10-CM

## 2024-02-08 DIAGNOSIS — R13.19 ESOPHAGEAL DYSPHAGIA: Chronic | ICD-10-CM

## 2024-02-08 PROCEDURE — 1159F MED LIST DOCD IN RCRD: CPT | Performed by: NURSE PRACTITIONER

## 2024-02-08 PROCEDURE — 99214 OFFICE O/P EST MOD 30 MIN: CPT | Performed by: NURSE PRACTITIONER

## 2024-02-08 PROCEDURE — 1160F RVW MEDS BY RX/DR IN RCRD: CPT | Performed by: NURSE PRACTITIONER

## 2024-02-08 NOTE — PROGRESS NOTES
"     Follow Up Note     Date: 2024   Patient Name: Addie Perez  MRN: [id number]  : 1934     Primary Care Provider: Ariela Hampton DO     Chief Complaint   Patient presents with    Follow-up     History of present illness:   2024  Addie Perez is a 89 y.o. female who is here today for follow up for difficulty swallowing. It has gotten better since her EGD, but she still feels like her food \"takes too long\" to go down. No constipation or diarrhea. Denies GI bleeding.     Interval History:  10/5/2023  She has been having difficulty swallowing for the past 2 years that occurs 2-3 times per week. She has difficulty swallowing solids and liquids. She feels it gets \"backed up\" in her esophagus and takes a long time to go down. No history of weight loss. She has reflux for many years that is controlled with Omeprazole daily.      Denies changes in bowel habits, constipation or diarrhea. Denies GI bleeding. Her last colonoscopy was in  by Dr. Gallegos. Her last EGD was in  by Dr. Quezada.    Subjective      Past Medical History:   Diagnosis Date    Arthritis     Back pain     Cataract     PT REPORTS EARLY STAGES    Cystitis     Diabetes mellitus      REPORTS \"I AM PRE DIABETIC\" AND REPORTS THAT SHE WATCHS HER DIET AND PCP WATCHES A1C CLOSELY    Disease of thyroid gland     Dysphagia     REPORTS MORE TROUBLE SWALLOWING WHEN EATING OR TAKING PILLS. REPORTS PAIN AND THAT SOMETIMES THINGS FEEL LIKE THEY \"STICK\"    Elevated cholesterol 2018    REPORTS SHE HAS TAKEN MEDICATION IN THE PAST BUT THAT SHE THINKS ALL IS WNL'S AT PRESENT TIME WITHOUT MEDICATION    Fracture of right ankle     GERD (gastroesophageal reflux disease)     Hearing loss     REPORTS MILD AND NO USE OF HEARING AIDS    History of cardiac murmur     REPORTS \"I USED TO HAVE ONE BUT NOW THEY SAY THEY DON'T HEAR IT\"    History of colonic polyps     History of pneumonia     Hx of echocardiogram     Hypercholesterolemia     " "Hypertension     Irregular heart beat     Macular degeneration     Osteoporosis     Seasonal allergies     Seasonal allergies     Sinusitis     Spinal headache 09/20/2018    REPORTS AFTER DELIVERY OF HER SON    Urinary tract infection     Vertigo     Wears glasses      Past Surgical History:   Procedure Laterality Date    BACK SURGERY      REPORTS A SPUR WAS REMOVED THAT WAS HITTING SCIATIC NERVE    BREAST BIOPSY      BREAST SURGERY Left     LUMPECTOMY, BENIGN     CATARACT EXTRACTION W/ INTRAOCULAR LENS IMPLANT Left 03/04/2022    Procedure: CATARACT PHACO EXTRACTION WITH INTRAOCULAR LENS IMPLANT left;  Surgeon: Pritesh Mcnally MD;  Location: [facility] OR;  Service: Ophthalmology;  Laterality: Left;    CATARACT EXTRACTION W/ INTRAOCULAR LENS IMPLANT Right 03/18/2022    Procedure: CATARACT PHACO EXTRACTION WITH INTRAOCULAR LENS IMPLANT RIGHT;  Surgeon: Pritesh Mcnally MD;  Location: [facility] OR;  Service: Ophthalmology;  Laterality: Right;    COLONOSCOPY  2012    REPORTS LAST DONE IN 2012 BUT HAS A HX OF SEVERAL OF THESE PROCEDURES    DILATATION AND CURETTAGE      REPORTS \"I HAD A COUPLE OF THOSE\"    ENDOSCOPY N/A 01/31/2017    Procedure: ESOPHAGOGASTRODUODENOSCOPY;  Surgeon: Robbie Quezada MD;  Location: [facility] ENDOSCOPY;  Service:     ENDOSCOPY N/A 09/21/2018    Procedure: ESOPHAGOGASTRODUODENOSCOPY WITH BIOPSY, DILITATION;  Surgeon: Robbie Quezada MD;  Location: [facility] ENDOSCOPY;  Service: Gastroenterology    ENDOSCOPY N/A 11/15/2023    Procedure: ESOPHAGOGASTRODUODENOSCOPY WITH BIOPSY AND DILATION;  Surgeon: Mil Ma MD;  Location: [facility] ENDOSCOPY;  Service: Gastroenterology;  Laterality: N/A;    FOOT SURGERY Left     REPORTS SURGICAL INTERVENTION TO CORRECT GREAT TOE ALIGNMENT    GALLBLADDER SURGERY  2009    HEMORRHOIDECTOMY  1973    TONSILLECTOMY  1946    UPPER GASTROINTESTINAL ENDOSCOPY  2014    UPPER GASTROINTESTINAL ENDOSCOPY  01/31/2017     Family History   Problem Relation Age of Onset " "   Diabetes Mother     Cancer Brother         lung    Cancer Brother         brain    Colon cancer Paternal Grandmother         possibly colon cancer    Breast cancer Neg Hx      Social History     Socioeconomic History    Marital status:    Tobacco Use    Smoking status: Never     Passive exposure: Never    Smokeless tobacco: Never   Vaping Use    Vaping Use: Never used   Substance and Sexual Activity    Alcohol use: No    Drug use: No    Sexual activity: Defer       Current Outpatient Medications:     acetaminophen (TYLENOL) 650 MG 8 hr tablet, Take 1 tablet by mouth 2 (Two) Times a Day., Disp: , Rfl:     Carboxymethylcellulose Sodium 0.25 % solution, Apply 1 drop to eye(s) as directed by provider 2 (Two) Times a Day. REPORTS USES TO BOTH EYES, Disp: , Rfl:     cetirizine (zyrTEC) 10 MG tablet, cetirizine 10 mg tablet  Take 1 tablet every day by oral route for 30 days., Disp: , Rfl:     CRANBERRY EXTRACT PO, Take 1 tablet by mouth Daily., Disp: , Rfl:     Diclofenac Sodium (VOLTAREN) 1 % gel gel, diclofenac 1 % topical gel, Disp: , Rfl:     estradiol (ESTRACE) 0.5 MG tablet, Take 1 tablet by mouth 2 (Two) Times a Week. REPORTS TAKES THIS ON Monday AND Friday AT NIGHT TIME, Disp: 24 tablet, Rfl: 10    fluticasone (FLONASE) 50 MCG/ACT nasal spray, 1 spray into the nostril(s) as directed by provider 2 (Two) Times a Day., Disp: , Rfl:     losartan (COZAAR) 50 MG tablet, losartan 50 mg tablet  Take 1 tablet twice a day by oral route for 90 days., Disp: , Rfl:     metoprolol succinate XL (TOPROL-XL) 50 MG 24 hr tablet, Take 0.5 tablets by mouth Daily., Disp: , Rfl:     multivitamin with minerals tablet tablet, Take 1 tablet by mouth Daily., Disp: , Rfl:     omeprazole (priLOSEC) 20 MG capsule, 1 capsule., Disp: , Rfl:     torsemide (DEMADEX) 5 MG tablet, Daily., Disp: , Rfl:     Allergies   Allergen Reactions    Codeine Nausea Only    Escitalopram Nausea Only     REPORTS \"LEXAPRO\"    Gabapentin Other (See " "Comments)     REPORTS \"MAKES MY EYES HURT BECAUSE OF MY MACULAR DEGENERATION\"    Ibuprofen Palpitations     REPORTS \"IT CAUSES MY HEART TO BEAT FAST\"    Levofloxacin Nausea Only    Naproxen Nausea Only    Nitrofurantoin Nausea Only    Sulfa Antibiotics Nausea Only     The following portions of the patient's history were reviewed and updated as appropriate: allergies, current medications, past family history, past medical history, past social history, past surgical history and problem list.  Objective     Physical Exam  Vitals and nursing note reviewed.   Constitutional:       General: She is not in acute distress.     Appearance: Normal appearance. She is well-developed.   HENT:      Head: Normocephalic and atraumatic.      Mouth/Throat:      Mouth: Mucous membranes are not pale, not dry and not cyanotic.   Eyes:      General: Lids are normal.   Neck:      Trachea: Trachea normal.   Cardiovascular:      Rate and Rhythm: Normal rate.   Pulmonary:      Effort: Pulmonary effort is normal. No respiratory distress.      Breath sounds: Normal breath sounds.   Abdominal:      Tenderness: There is no abdominal tenderness.   Skin:     General: Skin is warm and dry.   Neurological:      Mental Status: She is alert and oriented to person, place, and time.   Psychiatric:         Mood and Affect: Mood normal.         Speech: Speech normal.         Behavior: Behavior normal. Behavior is cooperative.       Vitals:    02/08/24 1447   BP: 122/76   Pulse: 63   Resp: 12   SpO2: 98%   Weight: 66.2 kg (146 lb)   Height: 167.6 cm (66\")     Body mass index is 23.57 kg/m².     Results Review:   I reviewed the patient's new clinical results.    Admission on 11/15/2023, Discharged on 11/15/2023   Component Date Value Ref Range Status    Reference Lab Report 11/15/2023    Final                    Value:[facility]  [address] [place] [address]  Phone: [phone] or [phone]  Fax: [phone]  Ariel " JADE Dimas M.D., Medical Director    PATIENT NAME                                     LABORATORY [address]  [id number]   MARIA ANTONIA BLANK                                 [id number]  [id number]                                 AGE                    SEX   N              CLIENT REF #  [facility] [place]                    89        1934      F     [id number]      [id number]    [address] [place] [address] BY-PASS                        REQUESTING M.D.           ATTENDING M.D.         COPY TO.   [address]                                [place] [address]                         KAMLAOMID JEREZANDRA  DATE COLLECTED            DATE RECEIVED          DATE REPORTED  11/15/2023                11/15/2023             2023    DIAGNOSIS:  A.     ANTRUM AND BODY, BIOPSY:  Gastric antral type mucosa with mild chronic                           inactive gastritis  Negative for H. pylori organisms on routine stain  Negative for intestinal metaplasia, dysplasia, or malignancy  B.     ESOPHAGUS, BIOPSY, RANDOM:  Squamous epithelium with reactive changes  Negative for intraepithelial eosinophils, intestinal metaplasia, dysplasia,  or malignancy    [facility]                          REVIEWED, DIAGNOSED AND ELECTRONICALLY  SIGNED BY:    Kandy Reis D.O.  CPT CODES:  88305x2        Labs dated 2023 alkaline phosphatase 139 AST 22 ALT 17 total bilirubin 0.7 magnesium 1.8 hemoglobin 12.3 hematocrit 37.8 platelet count 256 TSH 1.680 vitamin B12 884 folic acid >20.0     EGD dated 2017 per Dr. Quezada   - Distal esophageal stricture which was serially dilated to 18 mm.   - A small sliding hiatal hernia less than 3 cm  - Erosive distal esophagitis-LA class A  - 2 small early mid esophageal diverticula and erosive gastritis.   - Significant tortuosity and tertiary contractions of esophageal body were noted.   - Biopsies from the mid and distal esophageal revealed reactive mucosal changes  consistent with reflux. No specialized mucosa. No increased eosinophils were noted. Gastric biopsies from the antrum body and angularis were negative for Helicobacter pylori. Biopsies from the second portion of duodenum were negative for celiac disease.      EGD dated 9/21/2018 per Dr. Damien Landas-type ring. Status post serial dilation to 18 mm.  Small sliding hiatal hernia less than 3 cm.  Erythematous distal esophagitis.  Erythematous-erosive gastritis.  No Valdes's esophagus.  -Duodenum biopsy unremarkable.  Antrum body and fundus biopsy with mild chronic gastritis, no H. pylori.    EGD dated 11/15/2023 per Dr. Ma  - Normal oropharynx.  - Z-line regular, 37 cm from the incisors.  - 2 cm hiatal hernia.  - Obstructing and mild Schatzki ring. Dilated. Biopsied.  - Erythematous mucosa in the posterior wall of the stomach and antrum. Biopsied.  - Normal duodenal bulb, first portion of the duodenum, second portion of the duodenum and third portion of the duodenum.  A.     ANTRUM AND BODY, BIOPSY:  Gastric antral type mucosa with mild chronic inactive gastritis  Negative for H. pylori organisms on routine stain  Negative for intestinal metaplasia, dysplasia, or malignancy  B.     ESOPHAGUS, BIOPSY, RANDOM:  Squamous epithelium with reactive changes  Negative for intraepithelial eosinophils, intestinal metaplasia, dysplasia,  or malignancy     Assessment / Plan      1. Gastroesophageal reflux disease without esophagitis  2. Esophageal dysphagia  3. Schatzki's ring  Reflux reasonably controlled with low-dose PPI daily.  Difficulty swallowing is better, but she still feels it takes longer for food to go down.  EGD dated 11/15/2023 with obstructing mild Schatzki's ring, dilated.  Biopsies unremarkable.  Suspect some element of age-related dysmotility.  Antireflux measures.  Continue low-dose PPI daily long-term.  Advised to eat a softer diet, chew food very well and take sips of water between  bites.  Esophagram-patient wishes to wait at this time.  She will call back if she wants to schedule.    Patient Instructions   Antireflux measures: Avoid fried, fatty foods, alcohol, chocolate, coffee, tea,  soft drinks, all carbonated beverages (including sparkling water), peppermint and spearmint, spicy foods, tomatoes and tomato based foods, onions, peppers, and garlic.   Other antireflux measures include weight reduction if overweight, avoiding tight clothing around the abdomen, elevating the head of the bed 6 inches with blocks under the head board, and don't drink or eat before going to bed and avoid lying down immediately after meals.  Omeprazole 20 mg 1 by mouth in the am 30 minutes before breakfast.  Eat relatively soft diet, eat in upright position and chew food well.  Drink water after 2-3 bites and take medications with liberal amounts of water.   After eating and taking medications, remain in upright position for 5-10 minutes.  Patient declines esophagram at this time. She will call back if she wants ir ordered.   Follow up: 6 months    Jack Murdock, APRN  2/8/2024    Please note that portions of this note were completed with a voice recognition program.

## 2024-02-08 NOTE — PATIENT INSTRUCTIONS
Antireflux measures: Avoid fried, fatty foods, alcohol, chocolate, coffee, tea,  soft drinks, all carbonated beverages (including sparkling water), peppermint and spearmint, spicy foods, tomatoes and tomato based foods, onions, peppers, and garlic.   Other antireflux measures include weight reduction if overweight, avoiding tight clothing around the abdomen, elevating the head of the bed 6 inches with blocks under the head board, and don't drink or eat before going to bed and avoid lying down immediately after meals.  Omeprazole 20 mg 1 by mouth in the am 30 minutes before breakfast.  Eat relatively soft diet, eat in upright position and chew food well.  Drink water after 2-3 bites and take medications with liberal amounts of water.   After eating and taking medications, remain in upright position for 5-10 minutes.  Patient declines esophagram at this time. She will call back if she wants ir ordered.   Follow up: 6 months

## 2024-03-03 ENCOUNTER — APPOINTMENT (OUTPATIENT)
Dept: CARDIOLOGY | Facility: HOSPITAL | Age: 89
End: 2024-03-03
Payer: MEDICARE

## 2024-03-03 ENCOUNTER — APPOINTMENT (OUTPATIENT)
Dept: GENERAL RADIOLOGY | Facility: HOSPITAL | Age: 89
End: 2024-03-03
Payer: MEDICARE

## 2024-03-03 ENCOUNTER — HOSPITAL ENCOUNTER (OUTPATIENT)
Facility: HOSPITAL | Age: 89
Setting detail: OBSERVATION
Discharge: HOME | End: 2024-03-04
Attending: EMERGENCY MEDICINE | Admitting: FAMILY MEDICINE
Payer: MEDICARE

## 2024-03-03 DIAGNOSIS — I48.91 NEW ONSET ATRIAL FIBRILLATION: Primary | ICD-10-CM

## 2024-03-03 DIAGNOSIS — I48.91 ATRIAL FIBRILLATION WITH RAPID VENTRICULAR RESPONSE: ICD-10-CM

## 2024-03-03 LAB
ALBUMIN SERPL-MCNC: 4.4 G/DL (ref 3.5–5.2)
ALBUMIN/GLOB SERPL: 1.5 G/DL
ALP SERPL-CCNC: 117 U/L (ref 39–117)
ALT SERPL W P-5'-P-CCNC: 16 U/L (ref 1–33)
ANION GAP SERPL CALCULATED.3IONS-SCNC: 14.6 MMOL/L (ref 5–15)
APTT PPP: 29.8 SECONDS (ref 70–100)
AST SERPL-CCNC: 24 U/L (ref 1–32)
BASOPHILS # BLD AUTO: 0.03 10*3/MM3 (ref 0–0.2)
BASOPHILS NFR BLD AUTO: 0.4 % (ref 0–1.5)
BH CV ECHO MEAS - AO MAX PG: 4.9 MMHG
BH CV ECHO MEAS - AO MEAN PG: 3 MMHG
BH CV ECHO MEAS - AO ROOT DIAM: 3.3 CM
BH CV ECHO MEAS - AO V2 MAX: 111 CM/SEC
BH CV ECHO MEAS - AO V2 VTI: 22.9 CM
BH CV ECHO MEAS - AVA(I,D): 1.47 CM2
BH CV ECHO MEAS - EDV(CUBED): 59.3 ML
BH CV ECHO MEAS - EDV(MOD-SP2): 37.2 ML
BH CV ECHO MEAS - EDV(MOD-SP4): 40 ML
BH CV ECHO MEAS - EF(MOD-BP): 55 %
BH CV ECHO MEAS - EF(MOD-SP2): 50.3 %
BH CV ECHO MEAS - EF(MOD-SP4): 59.3 %
BH CV ECHO MEAS - EF_3D-VOL: 59 %
BH CV ECHO MEAS - ESV(CUBED): 23.4 ML
BH CV ECHO MEAS - ESV(MOD-SP2): 18.5 ML
BH CV ECHO MEAS - ESV(MOD-SP4): 16.3 ML
BH CV ECHO MEAS - FS: 26.7 %
BH CV ECHO MEAS - IVS/LVPW: 0.7 CM
BH CV ECHO MEAS - IVSD: 0.8 CM
BH CV ECHO MEAS - LA DIMENSION: 4.2 CM
BH CV ECHO MEAS - LAT PEAK E' VEL: 9 CM/SEC
BH CV ECHO MEAS - LV DIASTOLIC VOL/BSA (35-75): 22.9 CM2
BH CV ECHO MEAS - LV MASS(C)D: 117.8 GRAMS
BH CV ECHO MEAS - LV MAX PG: 2.9 MMHG
BH CV ECHO MEAS - LV MEAN PG: 1 MMHG
BH CV ECHO MEAS - LV SYSTOLIC VOL/BSA (12-30): 9.3 CM2
BH CV ECHO MEAS - LV V1 MAX: 84.9 CM/SEC
BH CV ECHO MEAS - LV V1 VTI: 16.7 CM
BH CV ECHO MEAS - LVIDD: 3.9 CM
BH CV ECHO MEAS - LVIDS: 2.9 CM
BH CV ECHO MEAS - LVOT AREA: 2.01 CM2
BH CV ECHO MEAS - LVOT DIAM: 1.6 CM
BH CV ECHO MEAS - LVPWD: 1.15 CM
BH CV ECHO MEAS - MED PEAK E' VEL: 8.1 CM/SEC
BH CV ECHO MEAS - MR MAX PG: 137.1 MMHG
BH CV ECHO MEAS - MR MAX VEL: 585.5 CM/SEC
BH CV ECHO MEAS - MR MEAN PG: 88 MMHG
BH CV ECHO MEAS - MR MEAN VEL: 441 CM/SEC
BH CV ECHO MEAS - MR VTI: 187.5 CM
BH CV ECHO MEAS - MV DEC SLOPE: 764 CM/SEC2
BH CV ECHO MEAS - MV DEC TIME: 0.15 SEC
BH CV ECHO MEAS - MV E MAX VEL: 116 CM/SEC
BH CV ECHO MEAS - MV MAX PG: 7.8 MMHG
BH CV ECHO MEAS - MV MEAN PG: 3 MMHG
BH CV ECHO MEAS - MV V2 VTI: 31.9 CM
BH CV ECHO MEAS - MVA(VTI): 1.05 CM2
BH CV ECHO MEAS - PA ACC TIME: 0.14 SEC
BH CV ECHO MEAS - PA V2 MAX: 94.7 CM/SEC
BH CV ECHO MEAS - RAP SYSTOLE: 8 MMHG
BH CV ECHO MEAS - RV MAX PG: 0.84 MMHG
BH CV ECHO MEAS - RV V1 MAX: 45.7 CM/SEC
BH CV ECHO MEAS - RV V1 VTI: 8.5 CM
BH CV ECHO MEAS - RVDD: 3.3 CM
BH CV ECHO MEAS - RVSP: 45.5 MMHG
BH CV ECHO MEAS - SI(MOD-SP2): 10.7 ML/M2
BH CV ECHO MEAS - SI(MOD-SP4): 13.5 ML/M2
BH CV ECHO MEAS - SV(LVOT): 33.6 ML
BH CV ECHO MEAS - SV(MOD-SP2): 18.7 ML
BH CV ECHO MEAS - SV(MOD-SP4): 23.7 ML
BH CV ECHO MEAS - TR MAX PG: 37.5 MMHG
BH CV ECHO MEAS - TR MAX VEL: 306 CM/SEC
BH CV ECHO MEASUREMENTS AVERAGE E/E' RATIO: 13.57
BH CV XLRA - RV BASE: 3.4 CM
BH CV XLRA - RV LENGTH: 6.4 CM
BH CV XLRA - RV MID: 3.4 CM
BH CV XLRA - TDI S': 10.4 CM/SEC
BILIRUB SERPL-MCNC: 0.8 MG/DL (ref 0–1.2)
BUN SERPL-MCNC: 18 MG/DL (ref 8–23)
BUN/CREAT SERPL: 16.5 (ref 7–25)
CALCIUM SPEC-SCNC: 9.2 MG/DL (ref 8.6–10.5)
CHLORIDE SERPL-SCNC: 98 MMOL/L (ref 98–107)
CHOLEST SERPL-MCNC: 164 MG/DL (ref 0–200)
CO2 SERPL-SCNC: 26.4 MMOL/L (ref 22–29)
CREAT SERPL-MCNC: 1.09 MG/DL (ref 0.57–1)
DEPRECATED RDW RBC AUTO: 42.4 FL (ref 37–54)
EGFRCR SERPLBLD CKD-EPI 2021: 48.7 ML/MIN/1.73
EOSINOPHIL # BLD AUTO: 0.16 10*3/MM3 (ref 0–0.4)
EOSINOPHIL NFR BLD AUTO: 2.3 % (ref 0.3–6.2)
ERYTHROCYTE [DISTWIDTH] IN BLOOD BY AUTOMATED COUNT: 12.4 % (ref 12.3–15.4)
GLOBULIN UR ELPH-MCNC: 2.9 GM/DL
GLUCOSE SERPL-MCNC: 107 MG/DL (ref 65–99)
HBA1C MFR BLD: 5.9 % (ref 4.8–5.6)
HCT VFR BLD AUTO: 40 % (ref 34–46.6)
HDLC SERPL-MCNC: 61 MG/DL (ref 40–60)
HGB BLD-MCNC: 13.4 G/DL (ref 12–15.9)
HOLD SPECIMEN: NORMAL
IMM GRANULOCYTES # BLD AUTO: 0.03 10*3/MM3 (ref 0–0.05)
IMM GRANULOCYTES NFR BLD AUTO: 0.4 % (ref 0–0.5)
INR PPP: 1.03 (ref 0.9–1.1)
LDLC SERPL CALC-MCNC: 84 MG/DL (ref 0–100)
LDLC/HDLC SERPL: 1.34 {RATIO}
LEFT ATRIUM VOLUME INDEX: 25.6 ML/M2
LYMPHOCYTES # BLD AUTO: 1.92 10*3/MM3 (ref 0.7–3.1)
LYMPHOCYTES NFR BLD AUTO: 27 % (ref 19.6–45.3)
MAGNESIUM SERPL-MCNC: 1.9 MG/DL (ref 1.6–2.4)
MCH RBC QN AUTO: 31.2 PG (ref 26.6–33)
MCHC RBC AUTO-ENTMCNC: 33.5 G/DL (ref 31.5–35.7)
MCV RBC AUTO: 93 FL (ref 79–97)
MONOCYTES # BLD AUTO: 0.73 10*3/MM3 (ref 0.1–0.9)
MONOCYTES NFR BLD AUTO: 10.3 % (ref 5–12)
NEUTROPHILS NFR BLD AUTO: 4.24 10*3/MM3 (ref 1.7–7)
NEUTROPHILS NFR BLD AUTO: 59.6 % (ref 42.7–76)
NRBC BLD AUTO-RTO: 0 /100 WBC (ref 0–0.2)
NT-PROBNP SERPL-MCNC: 2324 PG/ML (ref 0–1800)
PLATELET # BLD AUTO: 268 10*3/MM3 (ref 140–450)
PMV BLD AUTO: 9.6 FL (ref 6–12)
POTASSIUM SERPL-SCNC: 4 MMOL/L (ref 3.5–5.2)
PROT SERPL-MCNC: 7.3 G/DL (ref 6–8.5)
PROTHROMBIN TIME: 14 SECONDS (ref 12.3–15.1)
RBC # BLD AUTO: 4.3 10*6/MM3 (ref 3.77–5.28)
SODIUM SERPL-SCNC: 139 MMOL/L (ref 136–145)
TRIGL SERPL-MCNC: 105 MG/DL (ref 0–150)
TROPONIN T SERPL HS-MCNC: 16 NG/L
TSH SERPL DL<=0.05 MIU/L-ACNC: 1.23 UIU/ML (ref 0.27–4.2)
UFH PPP CHRO-ACNC: 0.1 IU/ML (ref 0.3–0.7)
VLDLC SERPL-MCNC: 19 MG/DL (ref 5–40)
WBC NRBC COR # BLD AUTO: 7.11 10*3/MM3 (ref 3.4–10.8)
WHOLE BLOOD HOLD COAG: NORMAL
WHOLE BLOOD HOLD SPECIMEN: NORMAL

## 2024-03-03 PROCEDURE — 99291 CRITICAL CARE FIRST HOUR: CPT

## 2024-03-03 PROCEDURE — 99215 OFFICE O/P EST HI 40 MIN: CPT | Performed by: INTERNAL MEDICINE

## 2024-03-03 PROCEDURE — 80053 COMPREHEN METABOLIC PANEL: CPT | Performed by: EMERGENCY MEDICINE

## 2024-03-03 PROCEDURE — 25010000002 HEPARIN (PORCINE) PER 1000 UNITS: Performed by: EMERGENCY MEDICINE

## 2024-03-03 PROCEDURE — G0378 HOSPITAL OBSERVATION PER HR: HCPCS

## 2024-03-03 PROCEDURE — 71045 X-RAY EXAM CHEST 1 VIEW: CPT

## 2024-03-03 PROCEDURE — 25010000002 FUROSEMIDE PER 20 MG: Performed by: INTERNAL MEDICINE

## 2024-03-03 PROCEDURE — 85730 THROMBOPLASTIN TIME PARTIAL: CPT | Performed by: EMERGENCY MEDICINE

## 2024-03-03 PROCEDURE — 80061 LIPID PANEL: CPT | Performed by: INTERNAL MEDICINE

## 2024-03-03 PROCEDURE — 85610 PROTHROMBIN TIME: CPT | Performed by: EMERGENCY MEDICINE

## 2024-03-03 PROCEDURE — 93306 TTE W/DOPPLER COMPLETE: CPT

## 2024-03-03 PROCEDURE — 83880 ASSAY OF NATRIURETIC PEPTIDE: CPT | Performed by: EMERGENCY MEDICINE

## 2024-03-03 PROCEDURE — 93005 ELECTROCARDIOGRAM TRACING: CPT | Performed by: EMERGENCY MEDICINE

## 2024-03-03 PROCEDURE — 85520 HEPARIN ASSAY: CPT | Performed by: EMERGENCY MEDICINE

## 2024-03-03 PROCEDURE — 96365 THER/PROPH/DIAG IV INF INIT: CPT

## 2024-03-03 PROCEDURE — 93306 TTE W/DOPPLER COMPLETE: CPT | Performed by: INTERNAL MEDICINE

## 2024-03-03 PROCEDURE — 96376 TX/PRO/DX INJ SAME DRUG ADON: CPT

## 2024-03-03 PROCEDURE — 36415 COLL VENOUS BLD VENIPUNCTURE: CPT

## 2024-03-03 PROCEDURE — 83036 HEMOGLOBIN GLYCOSYLATED A1C: CPT | Performed by: INTERNAL MEDICINE

## 2024-03-03 PROCEDURE — 84443 ASSAY THYROID STIM HORMONE: CPT | Performed by: EMERGENCY MEDICINE

## 2024-03-03 PROCEDURE — 83735 ASSAY OF MAGNESIUM: CPT | Performed by: EMERGENCY MEDICINE

## 2024-03-03 PROCEDURE — 85025 COMPLETE CBC W/AUTO DIFF WBC: CPT | Performed by: EMERGENCY MEDICINE

## 2024-03-03 PROCEDURE — 84484 ASSAY OF TROPONIN QUANT: CPT | Performed by: EMERGENCY MEDICINE

## 2024-03-03 PROCEDURE — 96375 TX/PRO/DX INJ NEW DRUG ADDON: CPT

## 2024-03-03 RX ORDER — METOPROLOL SUCCINATE 25 MG/1
25 TABLET, EXTENDED RELEASE ORAL
Status: DISCONTINUED | OUTPATIENT
Start: 2024-03-03 | End: 2024-03-04 | Stop reason: HOSPADM

## 2024-03-03 RX ORDER — HEPARIN SODIUM 1000 [USP'U]/ML
25 INJECTION, SOLUTION INTRAVENOUS; SUBCUTANEOUS AS NEEDED
Status: DISCONTINUED | OUTPATIENT
Start: 2024-03-03 | End: 2024-03-03

## 2024-03-03 RX ORDER — METOPROLOL TARTRATE 1 MG/ML
5 INJECTION, SOLUTION INTRAVENOUS ONCE
Status: COMPLETED | OUTPATIENT
Start: 2024-03-03 | End: 2024-03-03

## 2024-03-03 RX ORDER — TORSEMIDE 10 MG/1
10 TABLET ORAL DAILY
Status: DISCONTINUED | OUTPATIENT
Start: 2024-03-04 | End: 2024-03-04 | Stop reason: HOSPADM

## 2024-03-03 RX ORDER — HEPARIN SODIUM 1000 [USP'U]/ML
50 INJECTION, SOLUTION INTRAVENOUS; SUBCUTANEOUS AS NEEDED
Status: DISCONTINUED | OUTPATIENT
Start: 2024-03-03 | End: 2024-03-03

## 2024-03-03 RX ORDER — FUROSEMIDE 10 MG/ML
40 INJECTION INTRAMUSCULAR; INTRAVENOUS ONCE
Status: COMPLETED | OUTPATIENT
Start: 2024-03-03 | End: 2024-03-03

## 2024-03-03 RX ORDER — SODIUM CHLORIDE 0.9 % (FLUSH) 0.9 %
10 SYRINGE (ML) INJECTION AS NEEDED
Status: DISCONTINUED | OUTPATIENT
Start: 2024-03-03 | End: 2024-03-04 | Stop reason: HOSPADM

## 2024-03-03 RX ORDER — LOSARTAN POTASSIUM 50 MG/1
100 TABLET ORAL
Status: DISCONTINUED | OUTPATIENT
Start: 2024-03-03 | End: 2024-03-04 | Stop reason: HOSPADM

## 2024-03-03 RX ORDER — HEPARIN SODIUM 10000 [USP'U]/100ML
12 INJECTION, SOLUTION INTRAVENOUS
Status: DISCONTINUED | OUTPATIENT
Start: 2024-03-03 | End: 2024-03-03

## 2024-03-03 RX ORDER — HEPARIN SODIUM 1000 [USP'U]/ML
60 INJECTION, SOLUTION INTRAVENOUS; SUBCUTANEOUS ONCE
Status: COMPLETED | OUTPATIENT
Start: 2024-03-03 | End: 2024-03-03

## 2024-03-03 RX ADMIN — HEPARIN SODIUM AND DEXTROSE 12 UNITS/KG/HR: 10000; 5 INJECTION INTRAVENOUS at 15:44

## 2024-03-03 RX ADMIN — APIXABAN 5 MG: 2.5 TABLET, FILM COATED ORAL at 17:04

## 2024-03-03 RX ADMIN — HEPARIN SODIUM 3970 UNITS: 1000 INJECTION INTRAVENOUS; SUBCUTANEOUS at 15:44

## 2024-03-03 RX ADMIN — FUROSEMIDE 40 MG: 10 INJECTION, SOLUTION INTRAMUSCULAR; INTRAVENOUS at 16:52

## 2024-03-03 RX ADMIN — METOPROLOL SUCCINATE 25 MG: 25 TABLET, EXTENDED RELEASE ORAL at 16:51

## 2024-03-03 RX ADMIN — LOSARTAN POTASSIUM 100 MG: 50 TABLET, FILM COATED ORAL at 16:52

## 2024-03-03 RX ADMIN — METOPROLOL TARTRATE 5 MG: 1 INJECTION, SOLUTION INTRAVENOUS at 13:50

## 2024-03-03 NOTE — CONSULTS
"  Referring Provider: Freddie Thurman MD    Reason for Consultation: Atrial fibrillation with rapid ventricular rate      Patient Care Team:  Ariela Hampton DO as PCP - General (Family Medicine)  Jack Murdock APRN as Nurse Practitioner (Gastroenterology)      SUBJECTIVE     Chief Complaint: Palpitations    History of present illness:  Addie Perez is a 89 y.o. female, patient of Dr. Nunez, with hypertension, hyperlipidemia, diabetes,?  Rheumatic fever in childhood, lower extremity edema secondary to diastolic dysfunction treated with diuretics who presented to the emergency room with complaints of palpitations and was noted to be in atrial fibrillation with rapid ventricular rate.  She also reports worsening shortness of breath.  She has no previous history of A-fib and is not on anticoagulation.  Cardiology has been consulted for further evaluation and management.  With rapid ventricular rate      Review of systems:    Constitutional: No weakness, fatigue, fever, rigors, chills   Eyes: No vision changes, eye pain   ENT/oropharynx: No difficulty swallowing, sore throat, epistaxis, changes in hearing   Cardiovascular: No chest pain, chest tightness, + palpitations, paroxysmal nocturnal dyspnea, orthopnea, diaphoresis, dizziness / syncopal episode   Respiratory: No shortness of breath, dyspnea on exertion, cough, wheezing, hemoptysis   Gastrointestinal: No abdominal pain, nausea, vomiting, diarrhea, bloody stools   Genitourinary: No hematuria, dysuria   Neurological: No headache, tremors, numbness, one-sided weakness    Musculoskeletal: No cramps, myalgias, joint pain, joint swelling   Integument: No rash, edema        Personal History:      Past Medical History:   Diagnosis Date    Arthritis     Back pain     Cataract     PT REPORTS EARLY STAGES    Cystitis     Diabetes mellitus      REPORTS \"I AM PRE DIABETIC\" AND REPORTS THAT SHE WATCHS HER DIET AND PCP WATCHES A1C CLOSELY    Disease of thyroid gland " "    Dysphagia     REPORTS MORE TROUBLE SWALLOWING WHEN EATING OR TAKING PILLS. REPORTS PAIN AND THAT SOMETIMES THINGS FEEL LIKE THEY \"STICK\"    Elevated cholesterol 09/20/2018    REPORTS SHE HAS TAKEN MEDICATION IN THE PAST BUT THAT SHE THINKS ALL IS WNL'S AT PRESENT TIME WITHOUT MEDICATION    Fracture of right ankle 2020    GERD (gastroesophageal reflux disease)     Hearing loss     REPORTS MILD AND NO USE OF HEARING AIDS    History of cardiac murmur     REPORTS \"I USED TO HAVE ONE BUT NOW THEY SAY THEY DON'T HEAR IT\"    History of colonic polyps     History of pneumonia     Hx of echocardiogram     Hypercholesterolemia     Hypertension     Irregular heart beat     Macular degeneration     Osteoporosis     Seasonal allergies     Seasonal allergies     Sinusitis     Spinal headache 09/20/2018    REPORTS AFTER DELIVERY OF HER SON    Urinary tract infection     Vertigo     Wears glasses        Past Surgical History:   Procedure Laterality Date    BACK SURGERY      REPORTS A SPUR WAS REMOVED THAT WAS HITTING SCIATIC NERVE    BREAST BIOPSY      BREAST SURGERY Left     LUMPECTOMY, BENIGN     CATARACT EXTRACTION W/ INTRAOCULAR LENS IMPLANT Left 03/04/2022    Procedure: CATARACT PHACO EXTRACTION WITH INTRAOCULAR LENS IMPLANT left;  Surgeon: Pritesh Mcnally MD;  Location: [facility] OR;  Service: Ophthalmology;  Laterality: Left;    CATARACT EXTRACTION W/ INTRAOCULAR LENS IMPLANT Right 03/18/2022    Procedure: CATARACT PHACO EXTRACTION WITH INTRAOCULAR LENS IMPLANT RIGHT;  Surgeon: Pritesh Mcnally MD;  Location: [facility] OR;  Service: Ophthalmology;  Laterality: Right;    COLONOSCOPY  2012    REPORTS LAST DONE IN 2012 BUT HAS A HX OF SEVERAL OF THESE PROCEDURES    DILATATION AND CURETTAGE      REPORTS \"I HAD A COUPLE OF THOSE\"    ENDOSCOPY N/A 01/31/2017    Procedure: ESOPHAGOGASTRODUODENOSCOPY;  Surgeon: Robbie Quezada MD;  Location: [facility] ENDOSCOPY;  Service:     ENDOSCOPY N/A 09/21/2018    Procedure: " ESOPHAGOGASTRODUODENOSCOPY WITH BIOPSY, DILITATION;  Surgeon: Robbie Quezada MD;  Location: [facility] ENDOSCOPY;  Service: Gastroenterology    ENDOSCOPY N/A 11/15/2023    Procedure: ESOPHAGOGASTRODUODENOSCOPY WITH BIOPSY AND DILATION;  Surgeon: Mil Ma MD;  Location: [facility] ENDOSCOPY;  Service: Gastroenterology;  Laterality: N/A;    FOOT SURGERY Left     REPORTS SURGICAL INTERVENTION TO CORRECT GREAT TOE ALIGNMENT    GALLBLADDER SURGERY  2009    HEMORRHOIDECTOMY  1973    TONSILLECTOMY  1946    UPPER GASTROINTESTINAL ENDOSCOPY  2014    UPPER GASTROINTESTINAL ENDOSCOPY  01/31/2017       Family History   Problem Relation Age of Onset    Diabetes Mother     Cancer Brother         lung    Cancer Brother         brain    Colon cancer Paternal Grandmother         possibly colon cancer    Breast cancer Neg Hx        Social History     Tobacco Use    Smoking status: Never     Passive exposure: Never    Smokeless tobacco: Never   Vaping Use    Vaping status: Never Used   Substance Use Topics    Alcohol use: No    Drug use: No        Home meds:  Prior to Admission medications    Medication Sig Start Date End Date Taking? Authorizing Provider   acetaminophen (TYLENOL) 650 MG 8 hr tablet Take 1 tablet by mouth 2 (Two) Times a Day.    Renu Nolen MD   Carboxymethylcellulose Sodium 0.25 % solution Apply 1 drop to eye(s) as directed by provider 2 (Two) Times a Day. REPORTS USES TO BOTH EYES    Renu Nolen MD   cetirizine (zyrTEC) 10 MG tablet cetirizine 10 mg tablet   Take 1 tablet every day by oral route for 30 days.    Renu Nolen MD   CRANBERRY EXTRACT PO Take 1 tablet by mouth Daily.    Renu Nolen MD   Diclofenac Sodium (VOLTAREN) 1 % gel gel diclofenac 1 % topical gel    Renu Nolen MD   estradiol (ESTRACE) 0.5 MG tablet Take 1 tablet by mouth 2 (Two) Times a Week. REPORTS TAKES THIS ON Monday AND Friday AT NIGHT TIME 2/8/24   Margoth Chaudhari MD   fluticasone  "(FLONASE) 50 MCG/ACT nasal spray 1 spray into the nostril(s) as directed by provider 2 (Two) Times a Day. 4/30/14   Renu Nolen MD   losartan (COZAAR) 50 MG tablet losartan 50 mg tablet   Take 1 tablet twice a day by oral route for 90 days. 1/9/23   Renu Nolen MD   metoprolol succinate XL (TOPROL-XL) 50 MG 24 hr tablet Take 0.5 tablets by mouth Daily.    Renu Nolen MD   multivitamin with minerals tablet tablet Take 1 tablet by mouth Daily.    Renu Nolen MD   omeprazole (priLOSEC) 20 MG capsule 1 capsule. 8/1/21   Renu Nolen MD   torsemide (DEMADEX) 5 MG tablet Daily.    Renu Nolen MD       Allergies:     Codeine, Escitalopram, Gabapentin, Ibuprofen, Levofloxacin, Naproxen, Nitrofurantoin, and Sulfa antibiotics    Scheduled Meds:   Continuous Infusions:heparin, 12 Units/kg/hr, Last Rate: 12 Units/kg/hr (03/03/24 1544)  Pharmacy to Dose Heparin,       PRN Meds:  Pharmacy to Dose Heparin    sodium chloride      OBJECTIVE    Vital Signs  Vitals:    03/03/24 1451 03/03/24 1501 03/03/24 1517 03/03/24 1542   BP: 140/86 140/85 140/85 153/88   Pulse: 85 87  115   Resp:    15   Temp:       SpO2: 96% 99%  98%   Weight:   66.2 kg (145 lb 15.1 oz)    Height:   167.6 cm (65.98\")        Flowsheet Rows      Flowsheet Row First Filed Value   Admission Height 167.6 cm (66\") Documented at 03/03/2024 1243   Admission Weight 66.2 kg (146 lb) Documented at 03/03/2024 1243            No intake or output data in the 24 hours ending 03/03/24 1600     Telemetry: Atrial fibrillation     Physical Exam:  The patient is alert, oriented and in no distress.  Vital signs as noted above.  Head and neck revealed no carotid bruits or jugular venous distention.  No thyromegaly or lymphadenopathy is present  Lungs clear.  No wheezing.  Breath sounds are normal bilaterally.  Heart: Normal first and second heart sounds. No murmur.  No precordial rub is present.  No gallop is " present.  Abdomen: Soft and nontender.  No organomegaly is present.  Extremities with good peripheral pulses without any pedal edema.  Skin: Warm and dry.  Musculoskeletal system is grossly normal.  CNS grossly normal.       Results Review:  I have personally reviewed the results from the time of this admission to 3/3/2024 16:00 EST and agree with these findings:  []  Laboratory  []  Microbiology  []  Radiology  []  EKG/Telemetry   []  Cardiology/Vascular   []  Pathology  []  Old records  []  Other:    Most notable findings include:     Lab Results (last 24 hours)       Procedure Component Value Units Date/Time    Heparin Anti-Xa [[id number]]  (Abnormal) Collected: 03/03/24 1522    Specimen: Blood Updated: 03/03/24 1550     Heparin Anti-Xa (UFH) 0.10 IU/ml     Lipid Panel [[id number]]  (Abnormal) Collected: 03/03/24 1311    Specimen: Blood Updated: 03/03/24 1529     Total Cholesterol 164 mg/dL      Triglycerides 105 mg/dL      HDL Cholesterol 61 mg/dL      LDL Cholesterol  84 mg/dL      VLDL Cholesterol 19 mg/dL      LDL/HDL Ratio 1.34    Narrative:      Cholesterol Reference Ranges  (U.S. Department of Health and Human Services ATP III Classifications)    Desirable          <200 mg/dL  Borderline High    200-239 mg/dL  High Risk          >240 mg/dL      Triglyceride Reference Ranges  (U.S. Department of Health and Human Services ATP III Classifications)    Normal           <150 mg/dL  Borderline High  150-199 mg/dL  High             200-499 mg/dL  Very High        >500 mg/dL    HDL Reference Ranges  (U.S. Department of Health and Human Services ATP III Classifications)    Low     <40 mg/dl (major risk factor for CHD)  High    >60 mg/dl ('negative' risk factor for CHD)        LDL Reference Ranges  (U.S. Department of Health and Human Services ATP III Classifications)    Optimal          <100 mg/dL  Near Optimal     100-129 mg/dL  Borderline High  130-159 mg/dL  High             160-189 mg/dL  Very High        >189  mg/dL    Hemoglobin A1c [[id number]]  (Abnormal) Collected: 03/03/24 1311    Specimen: Blood Updated: 03/03/24 1528     Hemoglobin A1C 5.90 %     Narrative:      Hemoglobin A1C Ranges:    Increased Risk for Diabetes  5.7% to 6.4%  Diabetes                     >= 6.5%  Diabetic Goal                < 7.0%    Protime-INR [[id number]]  (Normal) Collected: 03/03/24 1311    Specimen: Blood Updated: 03/03/24 1524     Protime 14.0 Seconds      INR 1.03    Narrative:      Suggested INR therapeutic range for stable oral anticoagulant therapy:    Low Intensity therapy:   1.5-2.0  Moderate Intensity therapy:   2.0-3.0  High Intensity therapy:   2.5-4.0    aPTT [[id number]]  (Abnormal) Collected: 03/03/24 1311    Specimen: Blood Updated: 03/03/24 1524     PTT 29.8 seconds     [place] Draw [[id number]] Collected: 03/03/24 1311    Specimen: Blood Updated: 03/03/24 1415    Narrative:      The following orders were created for panel order [place] Draw.  Procedure                               Abnormality         Status                     ---------                               -----------         ------                     Green Top (Gel)[[id number]]                                  Final result               Lavender Top[[id number]]                                     Final result               Gold Top - SST[[id number]]                                                              Light Blue Top[[id number]]                                   Final result                 Please view results for these tests on the individual orders.    Green Top (Gel) [[id number]] Collected: 03/03/24 1311    Specimen: Blood Updated: 03/03/24 1415     Extra Tube Hold for add-ons.     Comment: Auto resulted.       Light Blue Top [[id number]] Collected: 03/03/24 1311    Specimen: Blood Updated: 03/03/24 1415     Extra Tube Hold for add-ons.     Comment: Auto resulted       Lavender Top [[id number]] Collected: 03/03/24 1311    Specimen: Blood Updated: 03/03/24 1415      Extra Tube hold for add-on     Comment: Auto resulted       BNP [[id number]]  (Abnormal) Collected: 03/03/24 1311    Specimen: Blood Updated: 03/03/24 1345     proBNP 2,324.0 pg/mL     Narrative:      This assay is used as an aid in the diagnosis of individuals suspected of having heart failure. It can be used as an aid in the diagnosis of acute decompensated heart failure (ADHF) in patients presenting with signs and symptoms of ADHF to the emergency department (ED). In addition, NT-proBNP of <300 pg/mL indicates ADHF is not likely.    Age Range Result Interpretation  NT-proBNP Concentration (pg/mL:      <50             Positive            >450                   Gray                 300-450                    Negative             <300    50-75           Positive            >900                  Gray                300-900                  Negative            <300      >75             Positive            >1800                  Gray                300-1800                  Negative            <300    TSH [[id number]]  (Normal) Collected: 03/03/24 1311    Specimen: Blood Updated: 03/03/24 1343     TSH 1.230 uIU/mL     Single High Sensitivity Troponin T [[id number]]  (Abnormal) Collected: 03/03/24 1311    Specimen: Blood Updated: 03/03/24 1342     HS Troponin T 16 ng/L     Narrative:      High Sensitive Troponin T Reference Range:  <14.0 ng/L- Negative Female for AMI  <22.0 ng/L- Negative Male for AMI  >=14 - Abnormal Female indicating possible myocardial injury.  >=22 - Abnormal Male indicating possible myocardial injury.   Clinicians would have to utilize clinical acumen, EKG, Troponin, and serial changes to determine if it is an Acute Myocardial Infarction or myocardial injury due to an underlying chronic condition.         Magnesium [[id number]]  (Normal) Collected: 03/03/24 1311    Specimen: Blood Updated: 03/03/24 1332     Magnesium 1.9 mg/dL     Comprehensive Metabolic Panel [[id number]]  (Abnormal) Collected:  03/03/24 1311    Specimen: Blood Updated: 03/03/24 1332     Glucose 107 mg/dL      BUN 18 mg/dL      Creatinine 1.09 mg/dL      Sodium 139 mmol/L      Potassium 4.0 mmol/L      Chloride 98 mmol/L      CO2 26.4 mmol/L      Calcium 9.2 mg/dL      Total Protein 7.3 g/dL      Albumin 4.4 g/dL      ALT (SGPT) 16 U/L      AST (SGOT) 24 U/L      Alkaline Phosphatase 117 U/L      Total Bilirubin 0.8 mg/dL      Globulin 2.9 gm/dL      A/G Ratio 1.5 g/dL      BUN/Creatinine Ratio 16.5     Anion Gap 14.6 mmol/L      eGFR 48.7 mL/min/1.73     Narrative:      GFR Normal >60  Chronic Kidney Disease <60  Kidney Failure <15    The GFR formula is only valid for adults with stable renal function between ages 18 and 70.    CBC & Differential [[id number]]  (Normal) Collected: 03/03/24 1311    Specimen: Blood Updated: 03/03/24 1316    Narrative:      The following orders were created for panel order CBC & Differential.  Procedure                               Abnormality         Status                     ---------                               -----------         ------                     CBC Auto Differential[[id number]]        Normal              Final result                 Please view results for these tests on the individual orders.    CBC Auto Differential [[id number]]  (Normal) Collected: 03/03/24 1311    Specimen: Blood Updated: 03/03/24 1316     WBC 7.11 10*3/mm3      RBC 4.30 10*6/mm3      Hemoglobin 13.4 g/dL      Hematocrit 40.0 %      MCV 93.0 fL      MCH 31.2 pg      MCHC 33.5 g/dL      RDW 12.4 %      RDW-SD 42.4 fl      MPV 9.6 fL      Platelets 268 10*3/mm3      Neutrophil % 59.6 %      Lymphocyte % 27.0 %      Monocyte % 10.3 %      Eosinophil % 2.3 %      Basophil % 0.4 %      Immature Grans % 0.4 %      Neutrophils, Absolute 4.24 10*3/mm3      Lymphocytes, Absolute 1.92 10*3/mm3      Monocytes, Absolute 0.73 10*3/mm3      Eosinophils, Absolute 0.16 10*3/mm3      Basophils, Absolute 0.03 10*3/mm3      Immature Grans,  Absolute 0.03 10*3/mm3      nRBC 0.0 /100 WBC             Imaging Results (Last 24 Hours)       Procedure Component Value Units Date/Time    XR Chest 1 View [[id number]] Collected: 03/03/24 1355     Updated: 03/03/24 1358    Narrative:      PROCEDURE: XR CHEST 1 VW-     HISTORY: Dysrhythmia Protocol     COMPARISON: December 1, 2015. Irregular heartbeat worsening over the  last 3 days, no chest pain or shortness of breath..     FINDINGS: The heart is upper limits of normal in size which is increased  compared to the prior exam. Aortic mural calcifications noted.. The  lungs are clear. The mediastinum is unremarkable. There is no  pneumothorax.  There are no acute osseous abnormalities.       Impression:      Borderline cardiomegaly without acute infiltrate..                 This report was signed and finalized on 3/3/2024 1:56 PM by Lanny Mcmullen MD.               LAB RESULTS (LAST 7 DAYS)    CBC  Results from last 7 days   Lab Units 03/03/24  1311   WBC 10*3/mm3 7.11   RBC 10*6/mm3 4.30   HEMOGLOBIN g/dL 13.4   HEMATOCRIT % 40.0   MCV fL 93.0   PLATELETS 10*3/mm3 268       BMP  Results from last 7 days   Lab Units 03/03/24  1311   SODIUM mmol/L 139   POTASSIUM mmol/L 4.0   CHLORIDE mmol/L 98   CO2 mmol/L 26.4   BUN mg/dL 18   CREATININE mg/dL 1.09*   GLUCOSE mg/dL 107*   MAGNESIUM mg/dL 1.9       CMP   Results from last 7 days   Lab Units 03/03/24  1311   SODIUM mmol/L 139   POTASSIUM mmol/L 4.0   CHLORIDE mmol/L 98   CO2 mmol/L 26.4   BUN mg/dL 18   CREATININE mg/dL 1.09*   GLUCOSE mg/dL 107*   ALBUMIN g/dL 4.4   BILIRUBIN mg/dL 0.8   ALK PHOS U/L 117   AST (SGOT) U/L 24   ALT (SGPT) U/L 16       BNP        TROPONIN  Results from last 7 days   Lab Units 03/03/24  1311   HSTROP T ng/L 16*       CoAg  Results from last 7 days   Lab Units 03/03/24  1311   INR  1.03   APTT seconds 29.8*       Creatinine Clearance  Estimated Creatinine Clearance: 36.6 mL/min (A) (by C-G formula based on SCr of 1.09 mg/dL (H)).    ABG           Radiology  XR Chest 1 View    Result Date: 3/3/2024  Borderline cardiomegaly without acute infiltrate..      This report was signed and finalized on 3/3/2024 1:56 PM by Lanny Mcmullen MD.         EKG  I personally viewed and interpreted the patient's EKG/Telemetry data:  ECG 12 Lead Other; Dysrhythmia   Final Result            Echocardiogram:    Results for orders placed during the hospital encounter of 03/03/24    Adult Transthoracic Echo Complete W/ Cont if Necessary Per Protocol    Interpretation Summary    Left ventricular systolic function is normal. Calculated left ventricular 3D EF = 59% Left ventricular ejection fraction appears to be 56 - 60%.    Left ventricular diastolic dysfunction is noted.    The left atrial cavity is dilated.    The right atrial cavity is dilated.    Moderate mitral valve regurgitation is present. (SBP 150s)    Moderate to severe tricuspid valve regurgitation is present.    Estimated right ventricular systolic pressure from tricuspid regurgitation is moderately elevated (45-55 mmHg).        Stress Test:  Results for orders placed during the hospital encounter of 06/16/22    Stress test with myocardial perfusion one day    Interpretation Summary  1. Normal pharmacologic MPS with no evidence of inducible ischemia.  2. Hyperdynamic LV systolic function, LVEF 74%.        Cardiac Catheterization:  No results found for this or any previous visit.        Other:      ASSESSMENT & PLAN:    Principal Problem:    New onset atrial fibrillation    Atrial fibrillation, new onset  [id number] score is 5  We will start anticoagulation with Eliquis.  She should be able to tolerate full dose anticoagulation 5 mg p.o. twice daily.  Rate controlled with metoprolol XL  TSH is normal    HFpEF exacerbation  proBNP of 2324  HFpEF is secondary to uncontrolled hypertension, atrial fibrillation and mitral regurgitation.  Previous echocardiogram with preserved LV function and grade 2 diastolic  dysfunction  Repeat echocardiogram shows EF of 59%, moderate to severe tricuspid regurgitation and moderate mitral regurgitation with pulmonary hypertension.  Will give 1 dose of IV diuretics and switch her to oral tomorrow.  She is on torsemide at home  Monitor I's and O's and replace electrolytes as needed.    Mitral regurgitation  She has at least moderate mitral regurgitation with systolic blood pressure in the 150s.  MR is expected to improve with better blood pressure control.  Continue diuretics.  She should have serial echocardiograms and if MR worsens she will be a good candidate for transcatheter [id number] mitral valve repair.      Hypertension   Resume losartan  Will uptitrate as needed.    Hyperlipidemia  LDL 84, HDL 61, triglyceride 105 and total cholesterol 164  Start statin    Diabetes  A1c is 5.9  She is prediabetic  Currently managed with diet control        Lloyd Wihte MD  03/03/24  16:00 EST

## 2024-03-03 NOTE — ED PROVIDER NOTES
" EMERGENCY DEPARTMENT ENCOUNTER    Pt Name: Addie Perez  MRN: [id number]  Pt :   1934  Room Number:  19SF/19  Date of encounter:  3/3/2024  PCP: Ariela Hampton DO  ED Provider: Freddie Thurman MD    Historian: Patient and her daughter      HPI:  Chief Complaint: Palpitations        Context: Addie Perez is a 89 y.o. female who presents to the ED c/o palpitations that have been ongoing for the last several days.  Today the palpitations were worse while she was in [employer].  She felt as if her heart was beating out of her chest.  She did not become syncopal.  She had no chest discomfort with this.  She has no history of atrial fibrillation.  She follows up with Dr. Nunez of cardiology.      PAST MEDICAL HISTORY  Past Medical History:   Diagnosis Date    Arthritis     Back pain     Cataract     PT REPORTS EARLY STAGES    Cystitis     Diabetes mellitus      REPORTS \"I AM PRE DIABETIC\" AND REPORTS THAT SHE WATCHS HER DIET AND PCP WATCHES A1C CLOSELY    Disease of thyroid gland     Dysphagia     REPORTS MORE TROUBLE SWALLOWING WHEN EATING OR TAKING PILLS. REPORTS PAIN AND THAT SOMETIMES THINGS FEEL LIKE THEY \"STICK\"    Elevated cholesterol 2018    REPORTS SHE HAS TAKEN MEDICATION IN THE PAST BUT THAT SHE THINKS ALL IS WNL'S AT PRESENT TIME WITHOUT MEDICATION    Fracture of right ankle 2020    GERD (gastroesophageal reflux disease)     Hearing loss     REPORTS MILD AND NO USE OF HEARING AIDS    History of cardiac murmur     REPORTS \"I USED TO HAVE ONE BUT NOW THEY SAY THEY DON'T HEAR IT\"    History of colonic polyps     History of pneumonia     Hx of echocardiogram     Hypercholesterolemia     Hypertension     Irregular heart beat     Macular degeneration     Osteoporosis     Seasonal allergies     Seasonal allergies     Sinusitis     Spinal headache 2018    REPORTS AFTER DELIVERY OF HER SON    Urinary tract infection     Vertigo     Wears glasses          PAST SURGICAL HISTORY  Past Surgical " "History:   Procedure Laterality Date    BACK SURGERY      REPORTS A SPUR WAS REMOVED THAT WAS HITTING SCIATIC NERVE    BREAST BIOPSY      BREAST SURGERY Left     LUMPECTOMY, BENIGN     CATARACT EXTRACTION W/ INTRAOCULAR LENS IMPLANT Left 03/04/2022    Procedure: CATARACT PHACO EXTRACTION WITH INTRAOCULAR LENS IMPLANT left;  Surgeon: Pritesh Mcnally MD;  Location: [facility] OR;  Service: Ophthalmology;  Laterality: Left;    CATARACT EXTRACTION W/ INTRAOCULAR LENS IMPLANT Right 03/18/2022    Procedure: CATARACT PHACO EXTRACTION WITH INTRAOCULAR LENS IMPLANT RIGHT;  Surgeon: Pritesh Mcnally MD;  Location: [facility] OR;  Service: Ophthalmology;  Laterality: Right;    COLONOSCOPY  2012    REPORTS LAST DONE IN 2012 BUT HAS A HX OF SEVERAL OF THESE PROCEDURES    DILATATION AND CURETTAGE      REPORTS \"I HAD A COUPLE OF THOSE\"    ENDOSCOPY N/A 01/31/2017    Procedure: ESOPHAGOGASTRODUODENOSCOPY;  Surgeon: Robbie Quezada MD;  Location: [facility] ENDOSCOPY;  Service:     ENDOSCOPY N/A 09/21/2018    Procedure: ESOPHAGOGASTRODUODENOSCOPY WITH BIOPSY, DILITATION;  Surgeon: Robbie Quezada MD;  Location: [facility] ENDOSCOPY;  Service: Gastroenterology    ENDOSCOPY N/A 11/15/2023    Procedure: ESOPHAGOGASTRODUODENOSCOPY WITH BIOPSY AND DILATION;  Surgeon: Mil Ma MD;  Location: [facility] ENDOSCOPY;  Service: Gastroenterology;  Laterality: N/A;    FOOT SURGERY Left     REPORTS SURGICAL INTERVENTION TO CORRECT GREAT TOE ALIGNMENT    GALLBLADDER SURGERY  2009    HEMORRHOIDECTOMY  1973    TONSILLECTOMY  1946    UPPER GASTROINTESTINAL ENDOSCOPY  2014    UPPER GASTROINTESTINAL ENDOSCOPY  01/31/2017         FAMILY HISTORY  Family History   Problem Relation Age of Onset    Diabetes Mother     Cancer Brother         lung    Cancer Brother         brain    Colon cancer Paternal Grandmother         possibly colon cancer    Breast cancer Neg Hx          SOCIAL HISTORY  Social History     Socioeconomic History    Marital status: "    Tobacco Use    Smoking status: Never     Passive exposure: Never    Smokeless tobacco: Never   Vaping Use    Vaping status: Never Used   Substance and Sexual Activity    Alcohol use: No    Drug use: No    Sexual activity: Defer         ALLERGIES  Codeine, Escitalopram, Gabapentin, Ibuprofen, Levofloxacin, Naproxen, Nitrofurantoin, and Sulfa antibiotics        REVIEW OF SYSTEMS  Review of Systems       All systems reviewed and negative except for those discussed in HPI.       PHYSICAL EXAM    I have reviewed the triage vital signs and nursing notes.    ED Triage Vitals [03/03/24 1243]   Temp Heart Rate Resp BP SpO2   -- 75 16 (!) 161/101 100 %      Temp src Heart Rate Source Patient Position BP Location FiO2 (%)   -- -- -- -- --       Physical Exam  GENERAL:   Appears in no acute distress.  She appears a little anxious but otherwise in no distress.  She presents with her daughter from [employer].  HENT: Nares patent.  EYES: No scleral icterus.  CV: Irregular rhythm, intermittently tachycardic rate.  No murmurs gallops rubs  RESPIRATORY: Normal effort.  No audible wheezes, rales or rhonchi.  Clear to auscultation  ABDOMEN: Soft, nontender  MUSCULOSKELETAL: No deformities.   NEURO: Alert, moves all extremities, follows commands.  SKIN: Warm, dry, no rash visualized.  No peripheral edema      LAB RESULTS  Recent Results (from the past 24 hour(s))   Single High Sensitivity Troponin T    Collection Time: 03/03/24  1:11 PM    Specimen: Blood   Result Value Ref Range    HS Troponin T 16 (H) <14 ng/L   Comprehensive Metabolic Panel    Collection Time: 03/03/24  1:11 PM    Specimen: Blood   Result Value Ref Range    Glucose 107 (H) 65 - 99 mg/dL    BUN 18 8 - 23 mg/dL    Creatinine 1.09 (H) 0.57 - 1.00 mg/dL    Sodium 139 136 - 145 mmol/L    Potassium 4.0 3.5 - 5.2 mmol/L    Chloride 98 98 - 107 mmol/L    CO2 26.4 22.0 - 29.0 mmol/L    Calcium 9.2 8.6 - 10.5 mg/dL    Total Protein 7.3 6.0 - 8.5 g/dL    Albumin 4.4 3.5 -  5.2 g/dL    ALT (SGPT) 16 1 - 33 U/L    AST (SGOT) 24 1 - 32 U/L    Alkaline Phosphatase 117 39 - 117 U/L    Total Bilirubin 0.8 0.0 - 1.2 mg/dL    Globulin 2.9 gm/dL    A/G Ratio 1.5 g/dL    BUN/Creatinine Ratio 16.5 7.0 - 25.0    Anion Gap 14.6 5.0 - 15.0 mmol/L    eGFR 48.7 (L) >60.0 mL/min/1.73   Green Top (Gel)    Collection Time: 03/03/24  1:11 PM   Result Value Ref Range    Extra Tube Hold for add-ons.    Lavender Top    Collection Time: 03/03/24  1:11 PM   Result Value Ref Range    Extra Tube hold for add-on    Light Blue Top    Collection Time: 03/03/24  1:11 PM   Result Value Ref Range    Extra Tube Hold for add-ons.    CBC Auto Differential    Collection Time: 03/03/24  1:11 PM    Specimen: Blood   Result Value Ref Range    WBC 7.11 3.40 - 10.80 10*3/mm3    RBC 4.30 3.77 - 5.28 10*6/mm3    Hemoglobin 13.4 12.0 - 15.9 g/dL    Hematocrit 40.0 34.0 - 46.6 %    MCV 93.0 79.0 - 97.0 fL    MCH 31.2 26.6 - 33.0 pg    MCHC 33.5 31.5 - 35.7 g/dL    RDW 12.4 12.3 - 15.4 %    RDW-SD 42.4 37.0 - 54.0 fl    MPV 9.6 6.0 - 12.0 fL    Platelets 268 140 - 450 10*3/mm3    Neutrophil % 59.6 42.7 - 76.0 %    Lymphocyte % 27.0 19.6 - 45.3 %    Monocyte % 10.3 5.0 - 12.0 %    Eosinophil % 2.3 0.3 - 6.2 %    Basophil % 0.4 0.0 - 1.5 %    Immature Grans % 0.4 0.0 - 0.5 %    Neutrophils, Absolute 4.24 1.70 - 7.00 10*3/mm3    Lymphocytes, Absolute 1.92 0.70 - 3.10 10*3/mm3    Monocytes, Absolute 0.73 0.10 - 0.90 10*3/mm3    Eosinophils, Absolute 0.16 0.00 - 0.40 10*3/mm3    Basophils, Absolute 0.03 0.00 - 0.20 10*3/mm3    Immature Grans, Absolute 0.03 0.00 - 0.05 10*3/mm3    nRBC 0.0 0.0 - 0.2 /100 WBC   BNP    Collection Time: 03/03/24  1:11 PM    Specimen: Blood   Result Value Ref Range    proBNP 2,324.0 (H) 0.0 - 1,800.0 pg/mL   TSH    Collection Time: 03/03/24  1:11 PM    Specimen: Blood   Result Value Ref Range    TSH 1.230 0.270 - 4.200 uIU/mL   Magnesium    Collection Time: 03/03/24  1:11 PM    Specimen: Blood   Result  Value Ref Range    Magnesium 1.9 1.6 - 2.4 mg/dL       If labs were ordered, I independently reviewed the results and considered them in treating the patient.        RADIOLOGY  XR Chest 1 View    Result Date: 3/3/2024  PROCEDURE: XR CHEST 1 VW-  HISTORY: Dysrhythmia Protocol  COMPARISON: December 1, 2015. Irregular heartbeat worsening over the last 3 days, no chest pain or shortness of breath..  FINDINGS: The heart is upper limits of normal in size which is increased compared to the prior exam. Aortic mural calcifications noted.. The lungs are clear. The mediastinum is unremarkable. There is no pneumothorax.  There are no acute osseous abnormalities.      Borderline cardiomegaly without acute infiltrate..      This report was signed and finalized on 3/3/2024 1:56 PM by Lanny Mcmullen MD.       I ordered and independently reviewed the above noted radiographic studies.      I viewed images of chest x-ray which showed no active disease per my independent interpretation.    See radiologist's dictation for official interpretation.        PROCEDURES    Critical Care    Performed by: Freddie Thurman MD  Authorized by: Freddie Thurman MD    Critical care provider statement:     Critical care time (minutes):  35    Critical care time was exclusive of:  Separately billable procedures and treating other patients    Critical care was necessary to treat or prevent imminent or life-threatening deterioration of the following conditions:  Cardiac failure    Critical care was time spent personally by me on the following activities:  Ordering and performing treatments and interventions, ordering and review of laboratory studies, ordering and review of radiographic studies, pulse oximetry, re-evaluation of patient's condition, review of old charts, obtaining history from patient or surrogate, examination of patient, evaluation of patient's response to treatment, discussions with consultants and development of treatment plan with  patient or surrogate      ECG 12 Lead Other; Dysrhythmia   Final Result      Atrial fibrillation with ventricular rate 103.    Mild nonspecific ST/T wave abnormality.  I compared this twelve-lead electrocardiogram to 1 performed on 6/7/2023 and note that sinus rhythm is no longer present today.    MEDICATIONS GIVEN IN ER    Medications   sodium chloride 0.9 % flush 10 mL (has no administration in time range)   heparin (porcine) injection 3,970 Units (has no administration in time range)   heparin 73004 units/250 ml (100 units/ml) in D5W (has no administration in time range)   Pharmacy to Dose Heparin (has no administration in time range)   metoprolol tartrate (LOPRESSOR) injection 5 mg (5 mg Intravenous Given 3/3/24 1350)         MEDICAL DECISION MAKING, PROGRESS, and CONSULTS    All labs, if obtained, have been independently reviewed by me.  All radiology studies, if obtained, have been reviewed by me and the radiologist dictating the report.  All EKG's, if obtained, have been independently viewed and interpreted by me/my attending physician.      Discussion below represents my analysis of pertinent findings related to patient's condition, differential diagnosis, treatment plan and final disposition.                         Differential diagnosis:    New onset atrial fibrillation with rapid ventricular response.  Consider sinus tachycardia with significant atrial ectopy, less likely.  Consider CHF, acute coronary syndrome, etc.      Additional sources:    - Discussed/ obtained information from independent historians: Patient's daughter was present and provided some history.    - External (non-ED) record review: I reviewed twelve-lead electrocardiogram performed 6/7/2023 and note normal sinus rhythm at that time.  I reviewed echocardiogram performed 6/15/2023 which shows left ventricular ejection fraction of 55 to 60% among other findings.    I reviewed endoscopy note dated 1/31/2017.  This study showed distal  esophageal stricture.    - Chronic or social conditions impacting care: Lifelong non-smoker nondrinker.  [employer].    - Shared decision making: Patient and daughter are in complete agreement with current plans for evaluation and treatment.      Orders placed during this visit:  Orders Placed This Encounter   Procedures    Critical Care    XR Chest 1 View    Watson Draw    Single High Sensitivity Troponin T    Comprehensive Metabolic Panel    CBC Auto Differential    BNP    TSH    Magnesium    Heparin Anti-Xa    Protime-INR    aPTT    Continuous Pulse Oximetry    Vital Signs    Notify Provider Platelet Count Less Than 93933    Stop Infusion & Notify Provider if Bleeding Occurs    Oxygen Therapy- Nasal Cannula; Titrate 1-6 LPM Per SpO2; 90 - 95%    ECG 12 Lead Other; Dysrhythmia    Adult Transthoracic Echo Complete W/ Cont if Necessary Per Protocol    Insert Peripheral IV    Initiate Observation Status    CBC & Differential    Green Top (Gel)    Lavender Top    Light Blue Top    CBC & Differential         Additional orders considered but not ordered:  Echocardiogram which can be performed on an outpatient basis when felt necessary    ED Course:    Consultants:      ED Course as of 03/03/24 1510   Sun Mar 03, 2024   1341 I spoke with Dr. White who will see the patient in the emergency department and determine final disposition. [MS]   5498 I paged Dr. White, cardiology on-call for Dr. Nunez the patient's cardiologist. [MS]   1421 I had nursing staff administer metoprolol for heart rate control.  I will hold off on anticoagulation as the patient will likely be started on a heparin bolus and drip if admitted and on oral anticoagulation if sent home. [MS]      ED Course User Index  [MS] Freddie Thurman MD              Shared Decision Making:  After my consideration of clinical presentation and any laboratory/radiology studies obtained, I discussed the findings with the patient/patient representative who is in  agreement with the treatment plan and the final disposition.   Risks and benefits of discharge and/or observation/admission were discussed.       AS OF 15:10 EST VITALS:    BP - 134/87  HR - 82  TEMP - 98.5 °F (36.9 °C)  O2 SATS - 99%                  DIAGNOSIS  Final diagnoses:   New onset atrial fibrillation   Atrial fibrillation with rapid ventricular response         DISPOSITION  Admission      Please note that portions of this document were completed with voice recognition software.        Freddie Thurman MD  03/04/24 5563

## 2024-03-03 NOTE — Clinical Note
Level of Care: Telemetry [5]   Diagnosis: New onset atrial fibrillation [933257]   Admitting Physician: COLIN AVILES [[id number]]

## 2024-03-03 NOTE — PHARMACY RECOMMENDATION
HEPARIN INFUSION  Addie Perez is a  89 y.o. female receiving heparin infusion.     Therapy for (VTE/Cardiac):   Cardiac  Patient Weight: 66.2 kg  Initial Bolus (Y/N):   Y  Any Bolus (Y/N):   Y        Signs or Symptoms of Bleeding: none reported    Cardiac or Other (Not VTE)   Initial Bolus: 60 units/kg (Max 4,000 units)  Initial rate: 12 units/kg/hr (Max 1,000 units/hr)   Anti Xa Rebolus Infusion Hold time Change infusion Dose (Units/kg/hr) Next Anti Xa or aPTT Level Due   < 0.1 50 Units/kg  (4000 Units Max) None Increase by  3 Units/kg/hr 6 hours   0.1- 0.19 25 Units/kg  (2000 Units Max) None Increase by  2 Units/kg/hr 6 hours   0.2 - 0.29 0 None Increase by  1 Units/kg/hr 6 hours   0.3 - 0.5 0 None No Change 6 hours (after 2 consecutive levels in range check qAM)   0.51 - 0.6 0 None Decrease by  1 Units/kg/hr 6 hours   0.61 - 0.8 0 30 Minutes Decrease by  2 Units/kg/hr 6 hours   0.81 - 1 0 60 Minutes Decrease by  3 Units/kg/hr 6 hours   >1 0 Hold  After Anti Xa less than 0.5 decrease previous rate by  4 Units/kg/hr  Every 2 hours until Anti Xa  less than 0.5 then when infusion restarts in 6 hours       Recommend anti-Xa every 6 hours.         Lab 03/03/24  1522 03/03/24  1311   HEMOGLOBIN  --  13.4   HEMATOCRIT  --  40.0   PLATELETS  --  268   PROTIME  --  14.0   APTT  --  29.8*   HEPARIN ANTI-XA 0.10*  --    CREATININE  --  1.09*   EGFR  --  48.7*          Date   Time   Anti-Xa Current Rate (Unit/kg/hr) Bolus   (Units) Rate Change   (Unit/kg/hr) New Rate (Unit/kg/hr) Next   Anti-Xa Comments  Pump Check Daily   3/3/24 1545 0.10 0 3970 +12 12 2145 d/w Serena Allen, RN                                                                                                                                                                                                                                  Pharmacy will continue to follow anti-Xa results and monitor for signs and symptoms of bleeding or thrombosis.      Thank  you for the opportunity to consult on this patient.    Chip James RPH, Pharm.D.  03/03/24  15:55 EST

## 2024-03-04 VITALS
HEART RATE: 88 BPM | TEMPERATURE: 98.5 F | OXYGEN SATURATION: 96 % | BODY MASS INDEX: 24.16 KG/M2 | DIASTOLIC BLOOD PRESSURE: 65 MMHG | SYSTOLIC BLOOD PRESSURE: 99 MMHG | HEIGHT: 65 IN | RESPIRATION RATE: 11 BRPM | WEIGHT: 145 LBS

## 2024-03-04 LAB
BASOPHILS # BLD AUTO: 0.04 10*3/MM3 (ref 0–0.2)
BASOPHILS NFR BLD AUTO: 0.6 % (ref 0–1.5)
DEPRECATED RDW RBC AUTO: 41.8 FL (ref 37–54)
EOSINOPHIL # BLD AUTO: 0.13 10*3/MM3 (ref 0–0.4)
EOSINOPHIL NFR BLD AUTO: 2 % (ref 0.3–6.2)
ERYTHROCYTE [DISTWIDTH] IN BLOOD BY AUTOMATED COUNT: 12.2 % (ref 12.3–15.4)
HCT VFR BLD AUTO: 40 % (ref 34–46.6)
HGB BLD-MCNC: 13.2 G/DL (ref 12–15.9)
IMM GRANULOCYTES # BLD AUTO: 0.02 10*3/MM3 (ref 0–0.05)
IMM GRANULOCYTES NFR BLD AUTO: 0.3 % (ref 0–0.5)
LYMPHOCYTES # BLD AUTO: 1.43 10*3/MM3 (ref 0.7–3.1)
LYMPHOCYTES NFR BLD AUTO: 22.3 % (ref 19.6–45.3)
MCH RBC QN AUTO: 30.8 PG (ref 26.6–33)
MCHC RBC AUTO-ENTMCNC: 33 G/DL (ref 31.5–35.7)
MCV RBC AUTO: 93.5 FL (ref 79–97)
MONOCYTES # BLD AUTO: 0.85 10*3/MM3 (ref 0.1–0.9)
MONOCYTES NFR BLD AUTO: 13.3 % (ref 5–12)
NEUTROPHILS NFR BLD AUTO: 3.94 10*3/MM3 (ref 1.7–7)
NEUTROPHILS NFR BLD AUTO: 61.5 % (ref 42.7–76)
NRBC BLD AUTO-RTO: 0 /100 WBC (ref 0–0.2)
PLATELET # BLD AUTO: 263 10*3/MM3 (ref 140–450)
PMV BLD AUTO: 10 FL (ref 6–12)
RBC # BLD AUTO: 4.28 10*6/MM3 (ref 3.77–5.28)
WBC NRBC COR # BLD AUTO: 6.41 10*3/MM3 (ref 3.4–10.8)

## 2024-03-04 PROCEDURE — G0378 HOSPITAL OBSERVATION PER HR: HCPCS

## 2024-03-04 PROCEDURE — 99238 HOSP IP/OBS DSCHRG MGMT 30/<: CPT | Performed by: FAMILY MEDICINE

## 2024-03-04 PROCEDURE — 85025 COMPLETE CBC W/AUTO DIFF WBC: CPT | Performed by: EMERGENCY MEDICINE

## 2024-03-04 RX ORDER — METOPROLOL SUCCINATE 100 MG/1
100 TABLET, EXTENDED RELEASE ORAL
Qty: 30 TABLET | Refills: 0 | Status: SHIPPED | OUTPATIENT
Start: 2024-03-05 | End: 2024-04-04

## 2024-03-04 RX ADMIN — LOSARTAN POTASSIUM 100 MG: 50 TABLET, FILM COATED ORAL at 09:03

## 2024-03-04 RX ADMIN — METOPROLOL SUCCINATE 25 MG: 25 TABLET, EXTENDED RELEASE ORAL at 09:02

## 2024-03-04 RX ADMIN — APIXABAN 5 MG: 2.5 TABLET, FILM COATED ORAL at 05:37

## 2024-03-04 RX ADMIN — TORSEMIDE 10 MG: 10 TABLET ORAL at 09:03

## 2024-03-04 NOTE — CASE MANAGEMENT/SOCIAL WORK
Discharge Planning Assessment   [facility]     Patient Name: Addie Perez  MRN: [id number]  Today's Date: 3/4/2024    Admit Date: 3/3/2024    Plan: The patient is awake and able to answer questions.  Her daughter, Colleen, is at bedside.  She consents for her daughter to be involved in her DC planning.  She is a current patient of Dr. Cervantes and gets her medications from [facility].  She elects to be enrolled in Meds to Bed.  She does not currently use DME but has a walker, wheelchair, and cane available should she need them.  She has a medical guardian alert button.  The patient denies the need for DME and additional services.  At the time of DC the patient plans to return to her home where she lives alone.  Her daughter is close by in case patient needs assistance.  Questions and concerns were addressed.  TOM delivered at earlier time.  Will provide additional resources and information upon patient request.   Discharge Needs Assessment       Row Name 03/04/24 1124       Living Environment    People in Home alone    Current Living Arrangements home    Duration at Residence 50 + years    Potentially Unsafe Housing Conditions none    In the past 12 months has the electric, gas, oil, or water company threatened to shut off services in your home? No    Primary Care Provided by self    Provides Primary Care For no one    Family Caregiver if Needed none    Quality of Family Relationships helpful;involved;supportive    Able to Return to Prior Arrangements yes       Resource/Environmental Concerns    Resource/Environmental Concerns none    Transportation Concerns none       Transportation Needs    In the past 12 months, has lack of transportation kept you from medical appointments or from getting medications? no    In the past 12 months, has lack of transportation kept you from meetings, work, or from getting things needed for daily living? No       Food Insecurity    Within the past 12 months, you worried that your food  would run out before you got the money to buy more. Never true    Within the past 12 months, the food you bought just didn't last and you didn't have money to get more. Never true       Transition Planning    Patient/Family Anticipates Transition to home    Patient/Family Anticipated Services at Transition none    Transportation Anticipated family or friend will provide       Discharge Needs Assessment    Readmission Within the Last 30 Days no previous admission in last 30 days    Equipment Currently Used at Home other (see comments)  Medical guardian alert button    Concerns to be Addressed denies needs/concerns at this time    Anticipated Changes Related to Illness none    Equipment Needed After Discharge none    Provided Post Acute Provider List? N/A    N/A Provider List Comment Patient plans to return home    Provided Post Acute Provider Quality & Resource List? N/A    N/A Quality & Resource List Comment Patient plans to return home                   Discharge Plan       Row Name 03/04/24 1125       Plan    Plan The patient is awake and able to answer questions.  Her daughter, Colleen, is at bedside.  She consents for her daughter to be involved in her DC planning.  She is a current patient of Dr. Cervantes and gets her medications from [facility].  She elects to be enrolled in Meds to Bed.  She does not currently use DME but has a walker, wheelchair, and cane available should she need them.  She has a medical guardian alert button.  The patient denies the need for DME and additional services.  At the time of DC the patient plans to return to her home where she lives alone.  Her daughter is close by in case patient needs assistance.  Questions and concerns were addressed.  TOM delivered at earlier time.  Will provide additional resources and information upon patient request.    Patient/Family in Agreement with Plan yes    Provided Post Acute Provider List? N/A    Provided Post Acute Provider Quality & Resource List?  N/A    Plan Comments Denies needs at this time    Final Discharge Disposition Code 01 - home or self-care    Final Note Plans to DC home where she lives alone                  Continued Care and Services - Admitted Since 3/3/2024    No active coordination exists for this encounter.          Demographic Summary       Row Name 03/04/24 1123       General Information    Admission Type observation    Arrived From emergency department    Required Notices Provided Observation Status Notice    Referral Source admission list    Reason for Consult discharge planning    Preferred Language English       Contact Information    Permission Granted to Share Info With                    Functional Status       Row Name 03/04/24 1123       Functional Status    Usual Activity Tolerance excellent    Current Activity Tolerance good       Physical Activity    On average, how many days per week do you engage in moderate to strenuous exercise (like a brisk walk)? 0 days    On average, how many minutes do you engage in exercise at this level? 0 min    Number of minutes of exercise per week 0       Assessment of Health Literacy    How often do you have someone help you read hospital materials? Occasionally    How often do you have problems learning about your medical condition because of difficulty understanding written information? Occasionally    How often do you have a problem understanding what is told to you about your medical condition? Occasionally    How confident are you filling out medical forms by yourself? Quite a bit    Health Literacy Good       Functional Status, IADL    Medications independent    Meal Preparation independent    Housekeeping independent    Laundry independent    Shopping independent       Mental Status    General Appearance WDL WDL       Mental Status Summary    Recent Changes in Mental Status/Cognitive Functioning no changes                   Psychosocial       Row Name 03/04/24 1122        Values/Beliefs    Spiritual, Cultural Beliefs, Zoroastrian Practices, Values that Affect Care no       Behavior WDL    Behavior WDL WDL       Emotion Mood WDL    Emotion/Mood/Affect WDL WDL       Mental Health    Little Interest or Pleasure in Doing Things 0-->not at all    Feeling Down, Depressed or Hopeless 0-->not at all       Speech WDL    Speech WDL WDL  Patient wears hearing aides       Perceptual State WDL    Perceptual State WDL WDL       Thought Process WDL    Thought Process WDL WDL       Intellectual Performance WDL    Intellectual Performance WDL WDL                   Abuse/Neglect       Row Name 03/04/24 1124       Personal Safety    Feels Unsafe at Home or Work/School no    Feels Threatened by Someone no    Does Anyone Try to Keep You From Having Contact with Others or Doing Things Outside Your Home? no    Physical Signs of Abuse Present no                   Legal       Row Name 03/04/24 1124       Financial Resource Strain    How hard is it for you to pay for the very basics like food, housing, medical care, and heating? Not hard                   Substance Abuse       Row Name 03/04/24 1124       Substance Use    Substance Use Status never used                   Patient Forms       Row Name 03/04/24 1129       Patient Forms    Patient Observation Letter Delivered    Delivered to Patient    Method of delivery In person                      Rosey Clarke RN

## 2024-03-04 NOTE — CASE MANAGEMENT/SOCIAL WORK
Case Management Discharge Note      Final Note: Plans to DC home where she lives alone    Provided Post Acute Provider List?: N/A  N/A Provider List Comment: Patient plans to return home  Provided Post Acute Provider Quality & Resource List?: N/A  N/A Quality & Resource List Comment: Patient plans to return home    Selected Continued Care - Admitted Since 3/3/2024       Destination    No services have been selected for the patient.                Durable Medical Equipment    No services have been selected for the patient.                Dialysis/Infusion    No services have been selected for the patient.                Home Medical Care    No services have been selected for the patient.                Therapy    No services have been selected for the patient.                Community Resources    No services have been selected for the patient.                Community & DME    No services have been selected for the patient.                         Final Discharge Disposition Code: 01 - home or self-care   [id number]

## 2024-03-04 NOTE — ED NOTES
Dr. Jeffers at bedside. States patient is being discharged. House Supervisor called to reassign bed.

## 2024-03-04 NOTE — DISCHARGE SUMMARY
[facility]      Name:  Addie Perez   Age:  89 y.o.  Sex:  female  :  1934  MRN:  [id number]   Visit Number:  [id number]    Admission Date:  3/3/2024  Date of Discharge:  3/4/2024  Primary Care Physician:  Ariela Hampton, DO    Important issues to note:    Follow-up with Dr. Nunez in 2 weeks  Please monitor heart rate and blood pressure  She has been started on Eliquis and increased her metoprolol    Discharge Diagnoses:     Atrial fibrillation  Tricuspid valve regurgitation  Mitral regurgitation  Heart failure preserved ejection fraction  Hypertension    Problem List:     Active Hospital Problems    Diagnosis  POA    **New onset atrial fibrillation [I48.91]  Yes      Resolved Hospital Problems   No resolved problems to display.     Presenting Problem:    Chief Complaint   Patient presents with    Dizziness     Pt reports feeling irregular heart beat worsening over the last 3 days. Today c/o palpitations with nausea and light headedness. Denies CP and SOA Pt sees Dr. Nunez known hx afib       Consults:     Consulting Physician(s)         Provider   Role Specialty     Je Nunez MD      Consulting Physician Cardiology          Procedures Performed:        History of presenting illness/Hospital Course:    89-year-old with a history of hypertension, mitral valve regurgitation, tricuspid valve regurgitation, who presented to the emergency room with complaints of intermittent palpitations and some mild shortness of breath.  Initial workup was demonstrating evidence of likely new onset atrial fibrillation with RVR.  Her labs were overall unremarkable.    She was admitted to the cardiology service initially.  She was continued on metoprolol.  She did receive IV diuretic therapy.  Today, the patient was overall hemodynamically stable.  She had been walking around the room in the emergency room without any issues or palpitations.  Went over her home  medications with her and her daughter at length.  She apparently has been taking metoprolol 50 mg XL, at 25 mg twice a day.  I discussed up titration to 100 mg daily for the next 2 weeks with close monitoring of blood pressure and heart rate.  Discussed that if blood pressure is running too low she may need to decrease her losartan in half as well.  I did talk with Dr. Nunez over the phone who would see her in follow-up in 2 weeks.  She will be started on Eliquis at time of discharge.  We discussed reasons to return to the hospital.    Of note, I do think with her tricuspid valve and mitral valve regurgitation she would likely be difficult to keep in rhythm, and thus rate control strategy is initiated.  Patient also mention in the past when she had taken higher doses of beta-blockers she had actually had bradycardia, discussed she needs to monitor for this.  All questions answered.    Vital Signs:    Heart Rate:  [] 88  Resp:  [11] 11  BP: ()/(57-89) 99/65    Physical Exam:    General Appearance:  Alert and cooperative.  NAD   Head:  Atraumatic and normocephalic.   Eyes: Conjunctivae and sclerae normal, no icterus. No pallor.   Ears:  Ears with no abnormalities noted.   Throat: No oral lesions, no thrush, oral mucosa moist.   Neck: Supple, trachea midline, no thyromegaly.   Back:   No kyphoscoliosis present. No tenderness to palpation.   Lungs:   Breath sounds heard bilaterally equally.  No crackles or wheezing. No Pleural rub or bronchial breathing.   Heart:  Irregular normal S1 and S2, no murmur, no gallop, no rub. No JVD.   Abdomen:   Normal bowel sounds, no masses, no organomegaly. Soft, nontender, nondistended, no rebound tenderness.   Extremities: Supple, no edema, no cyanosis, no clubbing.   Pulses: Pulses palpable bilaterally.   Skin: No bleeding or rash.   Neurologic: Alert and oriented x 3. No facial asymmetry. Moves all four limbs. No tremors.     Pertinent Lab Results:     Results from last 7  days   Lab Units 03/03/24  1311   SODIUM mmol/L 139   POTASSIUM mmol/L 4.0   CHLORIDE mmol/L 98   CO2 mmol/L 26.4   BUN mg/dL 18   CREATININE mg/dL 1.09*   CALCIUM mg/dL 9.2   BILIRUBIN mg/dL 0.8   ALK PHOS U/L 117   ALT (SGPT) U/L 16   AST (SGOT) U/L 24   GLUCOSE mg/dL 107*     Results from last 7 days   Lab Units 03/04/24  0638 03/03/24  1311   WBC 10*3/mm3 6.41 7.11   HEMOGLOBIN g/dL 13.2 13.4   HEMATOCRIT % 40.0 40.0   PLATELETS 10*3/mm3 263 268     Results from last 7 days   Lab Units 03/03/24  1311   INR  1.03     Results from last 7 days   Lab Units 03/03/24  1311   HSTROP T ng/L 16*     Results from last 7 days   Lab Units 03/03/24  1311   PROBNP pg/mL 2,324.0*                       Pertinent Radiology Results:    Imaging Results (All)       Procedure Component Value Units Date/Time    XR Chest 1 View [[id number]] Collected: 03/03/24 1355     Updated: 03/03/24 1358    Narrative:      PROCEDURE: XR CHEST 1 VW-     HISTORY: Dysrhythmia Protocol     COMPARISON: December 1, 2015. Irregular heartbeat worsening over the  last 3 days, no chest pain or shortness of breath..     FINDINGS: The heart is upper limits of normal in size which is increased  compared to the prior exam. Aortic mural calcifications noted.. The  lungs are clear. The mediastinum is unremarkable. There is no  pneumothorax.  There are no acute osseous abnormalities.       Impression:      Borderline cardiomegaly without acute infiltrate..                 This report was signed and finalized on 3/3/2024 1:56 PM by Lanny Mcmullen MD.               Echo:    Results for orders placed during the hospital encounter of 03/03/24    Adult Transthoracic Echo Complete W/ Cont if Necessary Per Protocol    Interpretation Summary    Left ventricular systolic function is normal. Calculated left ventricular 3D EF = 59% Left ventricular ejection fraction appears to be 56 - 60%.    Left ventricular diastolic dysfunction is noted.    The left atrial cavity is  dilated.    The right atrial cavity is dilated.    Moderate mitral valve regurgitation is present. (SBP 150s)    Moderate to severe tricuspid valve regurgitation is present.    Estimated right ventricular systolic pressure from tricuspid regurgitation is moderately elevated (45-55 mmHg).    Condition on Discharge:      Stable.    Code status during the hospital stay:    There are no questions and answers to display.     Discharge Disposition:    Home or Self Care    Discharge Medications:       Discharge Medications        New Medications        Instructions Start Date   Eliquis 5 MG tablet tablet  Generic drug: apixaban   5 mg, Oral, Every 12 Hours Scheduled             Changes to Medications        Instructions Start Date   metoprolol succinate  MG 24 hr tablet  Commonly known as: TOPROL-XL  What changed:   medication strength  how much to take  when to take this   100 mg, Oral, Every 24 Hours Scheduled   Start Date: March 5, 2024            Continue These Medications        Instructions Start Date   acetaminophen 650 MG 8 hr tablet  Commonly known as: TYLENOL   650 mg, Oral, 2 Times Daily      Carboxymethylcellulose Sodium 0.25 % solution   1 drop, Ophthalmic, 2 Times Daily, REPORTS USES TO BOTH EYES       cetirizine 10 MG tablet  Commonly known as: zyrTEC   cetirizine 10 mg tablet   Take 1 tablet every day by oral route for 30 days.      CRANBERRY EXTRACT PO   1 tablet, Oral, Daily      Diclofenac Sodium 1 % gel gel  Commonly known as: VOLTAREN   diclofenac 1 % topical gel      estradiol 0.5 MG tablet  Commonly known as: ESTRACE   0.5 mg, Oral, 2 Times Weekly, REPORTS TAKES THIS ON Monday AND Friday AT NIGHT TIME      fluticasone 50 MCG/ACT nasal spray  Commonly known as: FLONASE   1 spray, Nasal, 2 Times Daily      losartan 50 MG tablet  Commonly known as: COZAAR   losartan 50 mg tablet   Take 1 tablet twice a day by oral route for 90 days.      multivitamin with minerals tablet tablet   1 tablet, Oral,  Daily      omeprazole 20 MG capsule  Commonly known as: priLOSEC   20 mg      torsemide 5 MG tablet  Commonly known as: DEMADEX   Daily.             Discharge Diet:   Cardiac    Activity at Discharge:   As tolerated    Follow-up Appointments:     Follow-up Information       Ariela Hampton DO .    Specialty: Family Medicine  Contact information:  [address]  [place] [address]  [phone]                           Future Appointments   Date Time Provider Department Center   6/17/2024 11:00 AM Je Nunez MD [facility]   7/15/2024 10:15 AM [facility] 1 BH [facility]   8/14/2024 12:40 PM Jose Eduardo Gallegos MD [facility]   8/19/2024 11:30 AM Jack Murdock APRN [facility]     Test Results Pending at Discharge:           Annalisa Jeffers DO  03/04/24  17:59 EST    Time: I spent 15 minutes on this discharge activity which included: face-to-face encounter with the patient, reviewing the data in the system, coordination of the care with the nursing staff as well as consultants, documentation, and entering orders.     Dictated utilizing Dragon dictation.

## 2024-03-18 ENCOUNTER — DISEASE STATE MANAGEMENT VISIT (OUTPATIENT)
Dept: PHARMACY | Facility: HOSPITAL | Age: 89
End: 2024-03-18
Payer: MEDICARE

## 2024-03-18 VITALS
WEIGHT: 148 LBS | RESPIRATION RATE: 16 BRPM | DIASTOLIC BLOOD PRESSURE: 67 MMHG | OXYGEN SATURATION: 97 % | HEART RATE: 65 BPM | TEMPERATURE: 97.5 F | SYSTOLIC BLOOD PRESSURE: 134 MMHG | BODY MASS INDEX: 24.66 KG/M2 | HEIGHT: 65 IN

## 2024-03-18 DIAGNOSIS — I50.30 DIASTOLIC CONGESTIVE HEART FAILURE, UNSPECIFIED HF CHRONICITY: ICD-10-CM

## 2024-03-18 DIAGNOSIS — I10 PRIMARY HYPERTENSION: ICD-10-CM

## 2024-03-18 DIAGNOSIS — I48.91 NEW ONSET ATRIAL FIBRILLATION: ICD-10-CM

## 2024-03-18 DIAGNOSIS — I50.30 HEART FAILURE WITH PRESERVED EJECTION FRACTION, UNSPECIFIED HF CHRONICITY: Primary | ICD-10-CM

## 2024-03-18 PROCEDURE — G0463 HOSPITAL OUTPT CLINIC VISIT: HCPCS

## 2024-03-18 RX ORDER — SPIRONOLACTONE 25 MG/1
12.5 TABLET ORAL DAILY
Qty: 30 TABLET | Refills: 0 | Status: SHIPPED | OUTPATIENT
Start: 2024-03-18

## 2024-03-19 ENCOUNTER — TELEPHONE (OUTPATIENT)
Dept: PHARMACY | Facility: HOSPITAL | Age: 89
End: 2024-03-19
Payer: MEDICARE

## 2024-03-19 NOTE — PROGRESS NOTES
[facility]  Heart Failure Pharmacist Note     Patient Name: Addie Perez  :1934  Age: 89 y.o.  Sex: female  Referring Provider: Annalisa Jeffers,*   Primary Cardiologist: Dr. Je Nunez  Encounter Provider:  JOHAN Peña    HPI: Patient presents for initial evaluation in heart failure clinic for HFpEF (59%; 56-60%). Patient admitted to the ED on 24 with new onset AFib with RVR where she was started on Eliquis and Metoprolol was increased from 50mg QD to 100mg QD. Patient notes hx of bradycardia with higher dose of beta blocker. PMH significant for HTN, hyperlipidemia, CKD. Patient reports /58- 119/66 and HR: 67-75. Night time /62- 124/63 with HR 70-81. When patient was in ED her BP was 161/101 HR 75, today in the clinic her BP was 134/67 with HR 65. Weight has increased from 145lb on 24to 148lb today. She denies chest pain, dizziness, palpitations, lightheadedness, abdominal or lower extremity swelling. She only uses one pillow at night to sleep lying down flat. She does report times of vertigo. She is taking Losartan 50mg daily instead of 100mg daily as documented in ED note. Patient is concerned for her ability to afford Eliquis Rx. Currently requires one time payment of ~$500 and then ~$50/month thereafter. She has about 2 weeks remaining with current Rx. Searching for PAP or Low Income subsidy assistance.       Heart Failure GDMT:      Drug Class   Drug   Dose Last Dose Adjustment   Additional Titration   Notes   ACEi/ARB/ARNI Losartan 50mg QD      Beta Blocker Metoprolol 100mg QD   Pt cuts tablet in half and dose 50mg in AM and 50mg in PM   MRA Spironolactone 12.5mg Initiated 24     SGLT2i N/A   N/A Hx of recurrent UTI noted     Other CV Meds:  Torsemide 5mg QD  Eliquis 5mg BID    Medication Use:  Adherence:   Past hx of medication use/intolerance:  Affordability:      Diet    Breakfast:  Lunch:  Dinner:  Snacks/Desserts:  Drinks:      Social Determinants:    Social Determinants of Health     Tobacco Use: Low Risk  (3/3/2024)    Patient History     Smoking Tobacco Use: Never     Smokeless Tobacco Use: Never     Passive Exposure: Never   Alcohol Use: Not At Risk (3/4/2024)    AUDIT-C     Frequency of Alcohol Consumption: Never     Average Number of Drinks: Patient does not drink     Frequency of Binge Drinking: Never   Financial Resource Strain: Low Risk  (3/4/2024)    Overall Financial Resource Strain (CARDIA)     Difficulty of Paying Living Expenses: Not hard at all   Food Insecurity: No Food Insecurity (3/4/2024)    Hunger Vital Sign     Worried About Running Out of Food in the Last Year: Never true     Ran Out of Food in the Last Year: Never true   Transportation Needs: No Transportation Needs (3/4/2024)    PRAPARE - Transportation     Lack of Transportation (Medical): No     Lack of Transportation (Non-Medical): No   Physical Activity: Inactive (3/4/2024)    Exercise Vital Sign     Days of Exercise per Week: 0 days     Minutes of Exercise per Session: 0 min   Stress: No Stress Concern Present (3/4/2024)    [employer] - Occupational Stress Questionnaire     Feeling of Stress : Not at all   Social Connections: Not At Risk (3/4/2024)    Family and Community Support     Help with Day-to-Day Activities: I get all the help I need     Lonely or Isolated: Never   Interpersonal Safety: Not At Risk (3/4/2024)    Abuse Screen     Unsafe at Home or Work/School: no     Feels Threatened by Someone?: no     Does Anyone Keep You from Contacting Others or Doint Things Outside the Home?: no     Physical Sign of Abuse Present: no   Depression: Not at risk (3/4/2024)    PHQ-2     PHQ-2 Score: 0   Housing Stability: Not At Risk (3/4/2024)    Housing Stability     Current Living Arrangements: home     Potentially Unsafe Housing Conditions: none   Utilities: Not At Risk  "(3/4/2024)    [facility] Utilities     Threatened with loss of utilities: No   Health Literacy: Not At Risk (3/4/2024)    Education     Help with school or training?: No     Preferred Language: English   Employment: Not At Risk (3/4/2024)    Employment     Do you want help finding or keeping work or a job?: I do not need or want help   Disabilities: At Risk (3/4/2024)    Disabilities     Concentrating, Remembering, or Making Decisions Difficulty: no     Doing Errands Independently Difficulty: yes       Immunization Status:  Pneumococcal:   Influenza:  COVID-19:    OBJECTIVE:  /67 (BP Location: Right arm, Patient Position: Sitting, Cuff Size: Adult)   Pulse 65   Temp 97.5 °F (36.4 °C) (Infrared)   Resp 16   Ht 165.1 cm (65\")   Wt 67.1 kg (148 lb)   LMP  (LMP Unknown)   SpO2 97% Comment: RA  BMI 24.63 kg/m²     Body mass index is 24.63 kg/m².  Wt Readings from Last 1 Encounters:   03/18/24 67.1 kg (148 lb)       Home BP Readings:  10-yr ASCVD risk: The ASCVD Risk score (Raul DK, et al., 2019) failed to calculate for the following reasons:    The 2019 ASCVD risk score is only valid for ages 40 to 79    Lab Review:  Renal Function: Estimated Creatinine Clearance: 36.3 mL/min (A) (by C-G formula based on SCr of 1.09 mg/dL (H)).    Lab Results   Component Value Date    PROBNP 2,324.0 (H) 03/03/2024       Results for orders placed during the hospital encounter of 03/03/24    Adult Transthoracic Echo Complete W/ Cont if Necessary Per Protocol    Interpretation Summary    Left ventricular systolic function is normal. Calculated left ventricular 3D EF = 59% Left ventricular ejection fraction appears to be 56 - 60%.    Left ventricular diastolic dysfunction is noted.    The left atrial cavity is dilated.    The right atrial cavity is dilated.    Moderate mitral valve regurgitation is present. (SBP 150s)    Moderate to severe tricuspid valve regurgitation is present.    Estimated right ventricular systolic pressure " "from tricuspid regurgitation is moderately elevated (45-55 mmHg).        6 MINUTE WALK                        Patient Education:  Addie Perez was provided written and verbal education during their clinic visit today. Topics reviewed include:  The basics of heart failure (defining heart failure, ejection fraction, stages of disease)  Signs and symptoms of heart failure, self track zones, and when to contact clinic or seek emergency care  Overview of heart failure treatment plan (pharmacologic and non-pharmacologic) and goals of treatment  Tips for success with managing heart failure medications  List of medications that may be used to treat heart failure and how they work in the body    Patient was given a heart failure tracker calendar to document weight, blood pressure, and symptoms/overall feeling each day. Patient was encouraged to complete this daily in order to help guide therapy adjustments. Oreusebio IRMA Ana expressed understanding.    Addie Perez was provided written and verbal education during their clinic visit today. Topics reviewed include:  Dietary modifications that can improve heart health  Quick tips for limiting sodium intake (<2000 mg/day), including foods to look for at the grocery store and making smart choices when eating out  \"[employer]\"  How to read a nutrition label  Managing fluid intake (<1500 mL/day)  Reinforced education provided at previous visit   Addie Perez was provided written and verbal education during their clinic visit today. Topics reviewed include:  Exercise and heart health  Getting started with exercise and maintaining physical activity  Recommendations for 150 minutes of moderate-intensity exercise per week  Chair based exercises  Reinforced previously provided education     ASSESSMENT/PLAN:  HFrEF/HFpEF (EF = 59%)  Begin Spironolactone 12.5mg daily  Continue Metoprolol Succinate 100mg QD  Continue Losartan 50mg QD  Scale was provided to check weight " daily  Handicap Parking pass was provided  Advised patient to call clinic with 2-3 lb weight gain in 24 hrs or >5 lb weight gain in 1 week         Daphney Ibrahim, PharmD  [facility]  03/19/24  10:16 EDT

## 2024-03-19 NOTE — TELEPHONE ENCOUNTER
Ms. Perez was referred to the Heart failure clinic following her recent ED visit to [facility] on 3/3/24.     At her ED visit, Ms. Perez had new onset Afib with RVR. Eliquis was started and a 30 day coupon card was used. Her insurance requires that the patient pay ~$500 for one month then ~$50 monthly thereafter for the Eliquis Rx. I spoke with Ms. Perez and she is concerned with being able to afford this.    I looked into a patient assistance plan and low income subsidy and Ms. Perez does not qualify for either. Xarelto is the same.     At this time, I believe her only option is to pursue warfarin for anticoagulation. I called her PCP, Ariela Hampton, who will assume management of Ms. Perez Anticoagulation therapy. Spoke with Mami, who will schedule an appointment with Dr. Hampton and begin warfarin management.     I called Ms. Perez to let her know. She voices understanding

## 2024-03-20 PROBLEM — I50.30 (HFPEF) HEART FAILURE WITH PRESERVED EJECTION FRACTION: Status: ACTIVE | Noted: 2024-03-20

## 2024-03-20 NOTE — PROGRESS NOTES
[facility] Heart Failure Clinic Note    Addie Perez  [id number]  2024    Primary Care Provider: Ariela Hampton DO   Referring Provider: Annalisa Jeffers,*    Chief Complaint: Initial evaluation    History of Present Illness:   Mrs. Addie Perez is a 89 y.o. female who presents to the HF Clinic for initial evaluation.  The patient has a past medical history significant for hyperlipidemia, hypertension, and type 2 diabetes mellitus, and questionable rheumatic fever as a child.  Her most recent history includes new onset of atrial fibrillation after she presented to the ED with palpitations, nausea, lightheadedness.  Her troponin was mildly elevated at 16, proBNP was elevated at greater than 2300, but no pleural effusions were found via CXR.  Her beta blocker was uptitrated therapy and IV diuresis.  She does report a history of bradycardia.  A subsequent echocardiogram showed served ejection fraction, but moderate mitral valve regurgitation and moderate to severe tricuspid valve regurgitation.  She is accompanied by her daughter (Colleen) and presents for initial evaluation.  The patient reports feel better since hospital discharge.  She specifically denies home SBPs 100s-120s and HR's in 70s-80s.  She has not been weighing herself at home (she doesn't have a scale), but denies chest pain, dyspnea, palpitations, orthopnea (sleeps on 1 flat pillow), and lower extremity edema.  She does report history of dizziness at times, but attributes this to a longstanding history of vertigo.  She reports worsening of her vision since hospital discharge, but reports a history of macular degeneration--she reports getting injections for this).  She reports concern regarding cost of Eliquis.  She offers no other complaints or concerns at this time.    Past Cardiac Testin. Last Coronary Angio: None  2. Prior Stress Testin2022  1. Normal pharmacologic MPS with no evidence of  inducible ischemia.   2. Hyperdynamic LV systolic function, LVEF 74%.   3. Last Echo: 3/3/2024    Left ventricular systolic function is normal. Calculated left ventricular 3D EF = 59% Left ventricular ejection fraction appears to be 56 - 60%.    Left ventricular diastolic dysfunction is noted.    The left atrial cavity is dilated.    The right atrial cavity is dilated.    Moderate mitral valve regurgitation is present. (SBP 150s)    Moderate to severe tricuspid valve regurgitation is present.    Estimated right ventricular systolic pressure from tricuspid regurgitation is moderately elevated (45-55 mmHg).  4. Prior Holter Monitor: None    Review of Systems:   Review of Systems  As Noted in HPI.   I have reviewed and confirmed the accuracy of the ROS as documented by the MA/KATIEN/RN JOHAN Peña    I have reviewed and/or updated the patient's past medical, past surgical, family, social history, problem list and allergies as appropriate.     Medications:     Current Outpatient Medications:     acetaminophen (TYLENOL) 650 MG 8 hr tablet, Take 1 tablet by mouth 2 (Two) Times a Day As Needed., Disp: , Rfl:     apixaban (ELIQUIS) 5 MG tablet tablet, Take 1 tablet by mouth Every 12 (Twelve) Hours for 30 days. Indications: Atrial Fibrillation, Disp: 60 tablet, Rfl: 0    Carboxymethylcellulose Sodium 0.25 % solution, Apply 1 drop to eye(s) as directed by provider 2 (Two) Times a Day. REPORTS USES TO BOTH EYES, Disp: , Rfl:     cetirizine (zyrTEC) 10 MG tablet, cetirizine 10 mg tablet  Take 1 tablet every day by oral route for 30 days., Disp: , Rfl:     CRANBERRY EXTRACT PO, Take 1 tablet by mouth Daily., Disp: , Rfl:     Diclofenac Sodium (VOLTAREN) 1 % gel gel, As Needed., Disp: , Rfl:     estradiol (ESTRACE) 0.5 MG tablet, Take 1 tablet by mouth 2 (Two) Times a Week. REPORTS TAKES THIS ON Monday AND Friday AT NIGHT TIME, Disp: 24 tablet, Rfl: 10    losartan (COZAAR) 50 MG tablet, Take 1 tablet by mouth Daily.,  "Disp: , Rfl:     metoprolol succinate XL (TOPROL-XL) 100 MG 24 hr tablet, Take 1 tablet by mouth Daily for 30 days. (Patient taking differently: Take 0.5 tablets by mouth 2 (Two) Times a Day.), Disp: 30 tablet, Rfl: 0    multivitamin with minerals tablet tablet, Take 1 tablet by mouth Daily., Disp: , Rfl:     omeprazole (priLOSEC) 20 MG capsule, 1 capsule., Disp: , Rfl:     torsemide (DEMADEX) 5 MG tablet, Daily., Disp: , Rfl:     fluticasone (FLONASE) 50 MCG/ACT nasal spray, 1 spray into the nostril(s) as directed by provider 2 (Two) Times a Day. (Patient not taking: Reported on 3/18/2024), Disp: , Rfl:     spironolactone (ALDACTONE) 25 MG tablet, Take 0.5 tablets by mouth Daily. Patient has macular degeneration.  Would you please cut these pills in half for her?, Disp: 30 tablet, Rfl: 0    Physical Exam:  Vital Signs:   Vitals:    03/18/24 1303   BP: 134/67   BP Location: Right arm   Patient Position: Sitting   Cuff Size: Adult   Pulse: 65   Resp: 16   Temp: 97.5 °F (36.4 °C)   TempSrc: Infrared   SpO2: 97%  Comment: RA   Weight: 67.1 kg (148 lb)   Height: 165.1 cm (65\")     Body mass index is 24.63 kg/m².    Physical Exam  Vitals and nursing note reviewed.   Constitutional:       General: She is not in acute distress.  HENT:      Head: Normocephalic and atraumatic.   Neck:      Trachea: Trachea normal.   Cardiovascular:      Rate and Rhythm: Normal rate and regular rhythm.      Pulses: Normal pulses.      Heart sounds: Murmur heard.      No friction rub. No gallop.   Pulmonary:      Effort: Pulmonary effort is normal.      Breath sounds: Normal breath sounds.   Musculoskeletal:      Cervical back: Neck supple.      Right lower leg: No edema.      Left lower leg: No edema.   Skin:     General: Skin is warm and dry.   Neurological:      Mental Status: She is alert and oriented to person, place, and time.   Psychiatric:         Mood and Affect: Mood normal.         Behavior: Behavior normal. Behavior is cooperative. "         Thought Content: Thought content does not include suicidal ideation.         Results Review:   I reviewed the patient's new clinical results.    Procedures    Assessment / Plan:   Diagnoses and all orders for this visit:    1. Heart failure with preserved ejection fraction, unspecified HF chronicity (Primary)  3/24, LVEF 56 - 60%  Stage C  NYHA Functional Class II  START spironolactone 12.5 mg daily (request sent to pharmacy to cut pills in half for her d/t macular degeneration)  Follow-up in 1 week to reassess at which time she will have a BMP drawn    2. New onset atrial fibrillation  Asymptomatic  Continue metoprolol for rate control  Pharmacist to inquire about patient assistance for DOAC  Continue Eliquis for CVA prophylaxis    3. Primary hypertension  START low-dose spironolactone as previously described      Preventative Cardiology:   Tobacco Cessation: N/A   Advance Care Planning: ACP discussion was declined by the patient. Patient has an advance directive in EMR which is still valid.      Follow Up:   Return in about 1 week (around 3/25/2024) for DSM.      Thank you for allowing me to participate in the care of your patient. Please to not hesitate to contact me with additional questions or concerns.     Betsy Andrews, APRN

## 2024-03-25 ENCOUNTER — DISEASE STATE MANAGEMENT VISIT (OUTPATIENT)
Dept: PHARMACY | Facility: HOSPITAL | Age: 89
End: 2024-03-25
Payer: MEDICARE

## 2024-03-25 ENCOUNTER — LAB (OUTPATIENT)
Dept: LAB | Facility: HOSPITAL | Age: 89
End: 2024-03-25
Payer: MEDICARE

## 2024-03-25 VITALS
TEMPERATURE: 97.8 F | OXYGEN SATURATION: 97 % | BODY MASS INDEX: 24.3 KG/M2 | HEART RATE: 68 BPM | WEIGHT: 146 LBS | SYSTOLIC BLOOD PRESSURE: 109 MMHG | DIASTOLIC BLOOD PRESSURE: 59 MMHG

## 2024-03-25 DIAGNOSIS — I10 PRIMARY HYPERTENSION: ICD-10-CM

## 2024-03-25 DIAGNOSIS — I50.30 HEART FAILURE WITH PRESERVED EJECTION FRACTION, UNSPECIFIED HF CHRONICITY: ICD-10-CM

## 2024-03-25 DIAGNOSIS — I50.30 HEART FAILURE WITH PRESERVED EJECTION FRACTION, UNSPECIFIED HF CHRONICITY: Primary | ICD-10-CM

## 2024-03-25 DIAGNOSIS — I48.91 NEW ONSET ATRIAL FIBRILLATION: ICD-10-CM

## 2024-03-25 LAB
ANION GAP SERPL CALCULATED.3IONS-SCNC: 9 MMOL/L (ref 5–15)
BUN SERPL-MCNC: 19 MG/DL (ref 8–23)
BUN/CREAT SERPL: 14.8 (ref 7–25)
CALCIUM SPEC-SCNC: 9.3 MG/DL (ref 8.2–9.6)
CHLORIDE SERPL-SCNC: 102 MMOL/L (ref 98–107)
CO2 SERPL-SCNC: 27 MMOL/L (ref 22–29)
CREAT SERPL-MCNC: 1.28 MG/DL (ref 0.57–1)
EGFRCR SERPLBLD CKD-EPI 2021: 39.9 ML/MIN/1.73
GLUCOSE SERPL-MCNC: 82 MG/DL (ref 65–99)
POTASSIUM SERPL-SCNC: 4.9 MMOL/L (ref 3.5–5.2)
SODIUM SERPL-SCNC: 138 MMOL/L (ref 136–145)

## 2024-03-25 PROCEDURE — G0463 HOSPITAL OUTPT CLINIC VISIT: HCPCS

## 2024-03-25 PROCEDURE — 36415 COLL VENOUS BLD VENIPUNCTURE: CPT

## 2024-03-25 PROCEDURE — 80048 BASIC METABOLIC PNL TOTAL CA: CPT

## 2024-03-25 RX ORDER — OMEPRAZOLE 10 MG/1
1 CAPSULE, DELAYED RELEASE ORAL EVERY MORNING
COMMUNITY

## 2024-03-25 NOTE — PROGRESS NOTES
"             [facility] Heart Failure Clinic Follow Up Note    Addie Perez  [id number]  03/25/2024    Primary Care Provider: Ariela Hampton DO   Referring Provider: Annalisa Jeffers,*    Chief Complaint: Follow-up CHF    History of Present Illness:   Mrs. Addie Perez is a 89 y.o. female who presents to the HF Clinic for follow up.  The patient has a past medical history significant for hyperlipidemia, hypertension, and type 2 diabetes mellitus, and questionable rheumatic fever as a child.  Her most recent history includes new onset of atrial fibrillation after she presented to the ED with palpitations, nausea, lightheadedness (3/24).  Her troponin was mildly elevated at 16, proBNP was elevated at greater than 2300, but no pleural effusions were found via CXR.  Her metoprolol was uptitrated to 100 mg and she was treated with IV diuresis.  She previously reported a history of bradycardia.  A subsequent echocardiogram showed preserved ejection fraction, but moderate mitral valve regurgitation and moderate to severe tricuspid valve regurgitation.  She is accompanied by her daughter (Colleen) and presents to the HF Clinic for initial evaluation.  The patient reports taking spironolactone without any issues.  She reports stable weight at home and notes SBP readings in 100s-120s.  She does note some lower extremity edema that is worse in the evening, but improves with elevation and is usually resolved overnight.  She does express some concerns about palpitations that tend to occur more frequently in the evening before she is going to sleep.  She denies the sensation of her heart racing, but notes that sometimes it is \"all over the place\".  She is reticent to start any additional medication for rate control; pill burden is a concern to her.  She denies chest pain as well as shortness of breath with exertion.  She denies dizziness, syncope, orthopnea, and PND.  She continues to take Eliquis " without significant bruising or bleeding for which she has had to seek medical care.  She offers no other complaints or concerns at this time.    Past Cardiac Testin. Last Coronary Angio: None  2. Prior Stress Testin2022  1. Normal pharmacologic MPS with no evidence of inducible ischemia.   2. Hyperdynamic LV systolic function, LVEF 74%.   3. Last Echo: 3/3/2024    Left ventricular systolic function is normal. Calculated left ventricular 3D EF = 59% Left ventricular ejection fraction appears to be 56 - 60%.    Left ventricular diastolic dysfunction is noted.    The left atrial cavity is dilated.    The right atrial cavity is dilated.    Moderate mitral valve regurgitation is present. (SBP 150s)    Moderate to severe tricuspid valve regurgitation is present.    Estimated right ventricular systolic pressure from tricuspid regurgitation is moderately elevated (45-55 mmHg).  4. Prior Holter Monitor: None    Review of Systems:   Review of Systems  As Noted in HPI.   I have reviewed and confirmed the accuracy of the ROS as documented by the MA/GIUSEPPE/RN JOHAN Peña    I have reviewed and/or updated the patient's past medical, past surgical, family, social history, problem list and allergies as appropriate.     Medications:     Current Outpatient Medications:     acetaminophen (TYLENOL) 650 MG 8 hr tablet, Take 1 tablet by mouth 2 (Two) Times a Day As Needed., Disp: , Rfl:     apixaban (ELIQUIS) 5 MG tablet tablet, Take 1 tablet by mouth Every 12 (Twelve) Hours for 30 days. Indications: Atrial Fibrillation, Disp: 60 tablet, Rfl: 0    Carboxymethylcellulose Sodium 0.25 % solution, Apply 1 drop to eye(s) as directed by provider 2 (Two) Times a Day. REPORTS USES TO BOTH EYES, Disp: , Rfl:     cetirizine (zyrTEC) 10 MG tablet, cetirizine 10 mg tablet  Take 1 tablet every day by oral route for 30 days., Disp: , Rfl:     CRANBERRY EXTRACT PO, Take 1 tablet by mouth Daily., Disp: , Rfl:     Diclofenac  Sodium (VOLTAREN) 1 % gel gel, As Needed., Disp: , Rfl:     estradiol (ESTRACE) 0.5 MG tablet, Take 1 tablet by mouth 2 (Two) Times a Week. REPORTS TAKES THIS ON Monday AND Friday AT NIGHT TIME, Disp: 24 tablet, Rfl: 10    losartan (COZAAR) 50 MG tablet, Take 1 tablet by mouth Daily., Disp: , Rfl:     metoprolol succinate XL (TOPROL-XL) 100 MG 24 hr tablet, Take 1 tablet by mouth Daily for 30 days. (Patient taking differently: Take 0.5 tablets by mouth 2 (Two) Times a Day.), Disp: 30 tablet, Rfl: 0    multivitamin with minerals tablet tablet, Take 1 tablet by mouth Daily. One-A-Day, Disp: , Rfl:     spironolactone (ALDACTONE) 25 MG tablet, Take 0.5 tablets by mouth Daily. Patient has macular degeneration.  Would you please cut these pills in half for her?, Disp: 30 tablet, Rfl: 0    torsemide (DEMADEX) 5 MG tablet, Daily., Disp: , Rfl:     omeprazole (prilOSEC) 10 MG capsule, Take 1 capsule by mouth Every Morning., Disp: , Rfl:     Physical Exam:  Vital Signs:   Vitals:    03/25/24 1118   BP: 109/59   Pulse: 68   Temp: 97.8 °F (36.6 °C)   SpO2: 97%   Weight: 66.2 kg (146 lb)  Comment: Pt weighed 141 lbs this morning when she got up     Body mass index is 24.3 kg/m².    Physical Exam  Vitals and nursing note reviewed.   Constitutional:       General: She is not in acute distress.  HENT:      Head: Normocephalic and atraumatic.   Neck:      Trachea: Trachea normal.   Cardiovascular:      Rate and Rhythm: Normal rate. Rhythm irregular.      Pulses: Normal pulses.      Heart sounds: Normal heart sounds. No murmur heard.     No friction rub. No gallop.   Pulmonary:      Effort: Pulmonary effort is normal.      Breath sounds: Normal breath sounds.   Musculoskeletal:      Cervical back: Neck supple.      Right lower leg: No edema.      Left lower leg: No edema.   Skin:     General: Skin is warm and dry.   Neurological:      Mental Status: She is alert and oriented to person, place, and time.   Psychiatric:         Mood and  Affect: Mood normal.         Behavior: Behavior normal. Behavior is cooperative.         Thought Content: Thought content does not include suicidal ideation.         Results Review:   I reviewed the patient's new clinical results.    Procedures    Assessment / Plan:       1. Heart failure with preserved ejection fraction, unspecified HF chronicity (Primary)  3/24, LVEF 56 - 60%  Stage C  NYHA Functional Class II  BMP to reassess kidney function and electrolyte status  Continue spironolactone at current dose for now     2. New onset atrial fibrillation  Irregular heartbeat on auscultation with reported symptomology  Given her reported edema is minimal and improves with conservative intervention, the patient may benefit from diltiazem for rate control; defer to Dr. Nunez with whom the patient has a follow-up appointment tomorrow  Continue metoprolol for rate control  Continue Eliquis for CVA prophylaxis     3. Primary hypertension  Well controlled        Preventative Cardiology:   Tobacco Cessation: N/A   Advance Care Planning: ACP discussion was declined by the patient. Patient does not have an advance directive, declines further assistance.     Follow Up:   TBD      Thank you for allowing me to participate in the care of your patient. Please to not hesitate to contact me with additional questions or concerns.     JOHAN Wallace

## 2024-03-26 ENCOUNTER — TELEPHONE (OUTPATIENT)
Dept: PHARMACY | Facility: HOSPITAL | Age: 89
End: 2024-03-26
Payer: MEDICARE

## 2024-03-26 ENCOUNTER — OFFICE VISIT (OUTPATIENT)
Dept: CARDIOLOGY | Facility: CLINIC | Age: 89
End: 2024-03-26
Payer: MEDICARE

## 2024-03-26 VITALS
SYSTOLIC BLOOD PRESSURE: 122 MMHG | HEIGHT: 66 IN | HEART RATE: 76 BPM | BODY MASS INDEX: 22.98 KG/M2 | DIASTOLIC BLOOD PRESSURE: 62 MMHG | OXYGEN SATURATION: 98 % | RESPIRATION RATE: 18 BRPM | WEIGHT: 143 LBS

## 2024-03-26 DIAGNOSIS — I10 PRIMARY HYPERTENSION: ICD-10-CM

## 2024-03-26 DIAGNOSIS — I48.19 PERSISTENT ATRIAL FIBRILLATION: Primary | ICD-10-CM

## 2024-03-26 DIAGNOSIS — I50.32 CHRONIC DIASTOLIC CHF (CONGESTIVE HEART FAILURE): ICD-10-CM

## 2024-03-26 DIAGNOSIS — I50.30 DIASTOLIC CONGESTIVE HEART FAILURE, UNSPECIFIED HF CHRONICITY: ICD-10-CM

## 2024-03-26 RX ORDER — SPIRONOLACTONE 25 MG/1
12.5 TABLET ORAL DAILY
Qty: 45 TABLET | Refills: 1 | Status: SHIPPED | OUTPATIENT
Start: 2024-03-26

## 2024-03-26 NOTE — PROGRESS NOTES
"             [facility] Follow Up Note    Addie Perez  [id number]  2024    Primary Care Provider: Ariela Hampton DO    Chief Complaint: Routine follow-up    History of Present Illness:   Mrs. Addie Perez is a 89y.o. female who presents to the Cardiology Clinic for routine follow-up.  The patient has a past medical history significant for hyperlipidemia, hypertension, and type 2 diabetes mellitus.  She has a past cardiac history significant for chronic diastolic heart failure and persistent atrial fibrillation.  Today, the patient reports frequent episodes of palpitations.  She reports her palpitations are described as a \"fast and irregular\" heart rate.  The palpitations appear to occur exclusively with exertion.  She reports her heart rate is within normal limits at rest.  She denies any associated dizziness or lightheadedness.  No chest pain or exertional chest discomfort.  She is currently tolerating Eliquis without significant bleeding or bruising.  Her lower extremity edema has been well-controlled with torsemide.  No other specific complaints today.     Past Cardiac Testin. Last Coronary Angio: None  2. Prior Stress Testing: MPS   1. Normal pharmacologic MPS with no evidence of inducible ischemia.   2. Hyperdynamic LV systolic function, LVEF 74%.   3. Last Echo: None  4. Prior Holter Monitor: None    Review of Systems:   Review of Systems   Constitutional:  Negative for activity change, appetite change, chills, diaphoresis, fatigue, fever, unexpected weight gain and unexpected weight loss.   Eyes:  Negative for blurred vision and double vision.   Respiratory:  Negative for cough, chest tightness, shortness of breath and wheezing.    Cardiovascular:  Positive for palpitations. Negative for chest pain and leg swelling.   Gastrointestinal:  Negative for abdominal pain, anal bleeding, blood in stool and GERD.   Endocrine: Negative for cold intolerance and " heat intolerance.   Genitourinary:  Negative for hematuria.   Neurological:  Negative for dizziness, syncope, weakness and light-headedness.   Hematological:  Does not bruise/bleed easily.   Psychiatric/Behavioral:  Negative for depressed mood and stress. The patient is not nervous/anxious.        I have reviewed and/or updated the patient's past medical, past surgical, family, social history, problem list and allergies as appropriate.     Medications:     Current Outpatient Medications:     acetaminophen (TYLENOL) 650 MG 8 hr tablet, Take 1 tablet by mouth 2 (Two) Times a Day As Needed., Disp: , Rfl:     apixaban (ELIQUIS) 5 MG tablet tablet, Take 1 tablet by mouth Every 12 (Twelve) Hours for 30 days. Indications: Atrial Fibrillation, Disp: 60 tablet, Rfl: 0    Carboxymethylcellulose Sodium 0.25 % solution, Apply 1 drop to eye(s) as directed by provider 2 (Two) Times a Day. REPORTS USES TO BOTH EYES, Disp: , Rfl:     cetirizine (zyrTEC) 10 MG tablet, cetirizine 10 mg tablet  Take 1 tablet every day by oral route for 30 days., Disp: , Rfl:     CRANBERRY EXTRACT PO, Take 1 tablet by mouth Daily., Disp: , Rfl:     Diclofenac Sodium (VOLTAREN) 1 % gel gel, As Needed., Disp: , Rfl:     estradiol (ESTRACE) 0.5 MG tablet, Take 1 tablet by mouth 2 (Two) Times a Week. REPORTS TAKES THIS ON Monday AND Friday AT NIGHT TIME, Disp: 24 tablet, Rfl: 10    losartan (COZAAR) 50 MG tablet, Take 1 tablet by mouth Daily., Disp: , Rfl:     metoprolol succinate XL (TOPROL-XL) 100 MG 24 hr tablet, Take 1 tablet by mouth Daily for 30 days. (Patient taking differently: Take 0.5 tablets by mouth 2 (Two) Times a Day.), Disp: 30 tablet, Rfl: 0    multivitamin with minerals tablet tablet, Take 1 tablet by mouth Daily. One-A-Day, Disp: , Rfl:     omeprazole (prilOSEC) 10 MG capsule, Take 1 capsule by mouth Every Morning., Disp: , Rfl:     torsemide (DEMADEX) 5 MG tablet, Daily., Disp: , Rfl:     spironolactone (ALDACTONE) 25 MG tablet, Take 0.5  "tablets by mouth Daily. *Please split tablets for patient*, Disp: 45 tablet, Rfl: 1    Physical Exam:  Vital Signs:   Vitals:    03/26/24 1117   BP: 122/62   BP Location: Right arm   Patient Position: Sitting   Cuff Size: Adult   Pulse: 76   Resp: 18   SpO2: 98%   Weight: 64.9 kg (143 lb)   Height: 167.6 cm (66\")       Physical Exam  Constitutional:       General: She is not in acute distress.     Appearance: Normal appearance. She is well-developed. She is not diaphoretic.   HENT:      Head: Normocephalic and atraumatic.   Eyes:      General: No scleral icterus.     Pupils: Pupils are equal, round, and reactive to light.   Neck:      Trachea: No tracheal deviation.   Cardiovascular:      Rate and Rhythm: Normal rate. Rhythm irregular.      Heart sounds: Normal heart sounds. No murmur heard.     No friction rub. No gallop.      Comments: Normal JVD.  Pulmonary:      Effort: Pulmonary effort is normal. No respiratory distress.      Breath sounds: Normal breath sounds. No stridor. No wheezing or rales.   Chest:      Chest wall: No tenderness.   Abdominal:      General: Bowel sounds are normal. There is no distension.      Palpations: Abdomen is soft.      Tenderness: There is no abdominal tenderness. There is no guarding or rebound.   Musculoskeletal:         General: No swelling. Normal range of motion.      Cervical back: Neck supple. No tenderness.   Lymphadenopathy:      Cervical: No cervical adenopathy.   Skin:     General: Skin is warm and dry.      Findings: No erythema.   Neurological:      General: No focal deficit present.      Mental Status: She is alert and oriented to person, place, and time.   Psychiatric:         Mood and Affect: Mood normal.         Behavior: Behavior normal.         Results Review:   I reviewed the patient's new clinical results.  I personally viewed and interpreted the patient's EKG/Telemetry data      ECG 12 Lead    Date/Time: 3/26/2024 12:40 PM  Performed by: Je Nunez, " MD    Authorized by: Je Nunez MD  Comparison: compared with previous ECG from 3/3/2024  Similar to previous ECG  Rhythm: atrial fibrillation  Rate: normal  QRS axis: left    Clinical impression: abnormal EKG          Assessment / Plan:     1.  Chronic diastolic heart failure  --Volume status appears adequately controlled today  --Continue torsemide     2.  Persistent atrial fibrillation  --Suspect a history of paroxysmal atrial fibrillation based on symptoms, A-fib now appears to be persistent  -- Rate controlled today  -- Given frequent palpitations associated with exertion, will obtain 48-hour Holter monitoring to further assess rate control  -- If RVR with exertion, will attempt to optimize rate control  -- If symptoms unable to be controlled with rate control alone, will then consider a rhythm control strategy  -- Continue metoprolol  -- Continue Eliquis for CVA prophylaxis    Follow Up:   Return in about 4 weeks (around 4/23/2024).      Thank you for allowing me to participate in the care of your patient. Please to not hesitate to contact me with additional questions or concerns.     MITZY Nunez MD  Interventional Cardiology   03/26/2024  11:23 EDT

## 2024-03-26 NOTE — PROGRESS NOTES
[facility]  Heart Failure Pharmacist Note     Patient Name: Addie Perez  :1934  Age: 89 y.o.  Sex: female  Referring Provider: Annalisa Jeffers,*   Primary Cardiologist: Dr. Je Nunez  Encounter Provider:  JOHAN Peña    HPI: Patient presents for follow-up evaluation in heart failure clinic for HFpEF (59%; 56-60% in 2024). PMH significant for HTN, hyperlipidemia, CKD, Afib, T2DM.    Patient reports she is feeling well today. She denies SOB, chest pain, dizziness, lightheadedness, abdominal or lower extremity swelling. She does report frequent episodes of palpitations (known hx of Afib), especially at night which disrupts her sleep. She remains rate controlled with metoprolol (HR in 60s), however is symptomatic with palpitations. She will follow up with Dr. Nunez regarding this. She started spironolactone as directed at last visit and denies any issues at this time. Labs ordered.    Heart Failure GDMT:      Drug Class   Drug   Dose Last Dose Adjustment   Additional Titration   Notes   ACEi/ARB/ARNI Losartan 50 mg QD      Beta Blocker Metoprolol 100 mg QD   Pt cuts tablet in half and dose 50mg in AM and 50mg in PM   MRA Spironolactone 12.5 mg Initiated 24     SGLT2i N/A   N/A Hx of recurrent UTI noted     Other CV Meds:  Torsemide 5mg QD  Eliquis 5mg BID    Medication Use:  Adherence: good  Past hx of medication use/intolerance: N/A  Affordability: difficulty affording Eliquis; possible switch to warfarin    Social Determinants:    Social Determinants of Health     Tobacco Use: Low Risk  (3/26/2024)    Patient History     Smoking Tobacco Use: Never     Smokeless Tobacco Use: Never     Passive Exposure: Never   Alcohol Use: Not At Risk (3/4/2024)    AUDIT-C     Frequency of Alcohol Consumption: Never     Average Number of Drinks: Patient does not drink     Frequency of Binge Drinking: Never   Financial Resource Strain: Low Risk  (3/4/2024)    Overall Financial  Resource Strain (CARDIA)     Difficulty of Paying Living Expenses: Not hard at all   Food Insecurity: No Food Insecurity (3/4/2024)    Hunger Vital Sign     Worried About Running Out of Food in the Last Year: Never true     Ran Out of Food in the Last Year: Never true   Transportation Needs: No Transportation Needs (3/4/2024)    PRAPARE - Transportation     Lack of Transportation (Medical): No     Lack of Transportation (Non-Medical): No   Physical Activity: Inactive (3/4/2024)    Exercise Vital Sign     Days of Exercise per Week: 0 days     Minutes of Exercise per Session: 0 min   Stress: No Stress Concern Present (3/4/2024)    [employer] - Occupational Stress Questionnaire     Feeling of Stress : Not at all   Social Connections: Not At Risk (3/4/2024)    Family and Community Support     Help with Day-to-Day Activities: I get all the help I need     Lonely or Isolated: Never   Interpersonal Safety: Not At Risk (3/4/2024)    Abuse Screen     Unsafe at Home or Work/School: no     Feels Threatened by Someone?: no     Does Anyone Keep You from Contacting Others or Doint Things Outside the Home?: no     Physical Sign of Abuse Present: no   Depression: Not at risk (3/4/2024)    PHQ-2     PHQ-2 Score: 0   Housing Stability: Not At Risk (3/4/2024)    Housing Stability     Current Living Arrangements: home     Potentially Unsafe Housing Conditions: none   Utilities: Not At Risk (3/4/2024)    [facility] Utilities     Threatened with loss of utilities: No   Health Literacy: Not At Risk (3/4/2024)    Education     Help with school or training?: No     Preferred Language: English   Employment: Not At Risk (3/4/2024)    Employment     Do you want help finding or keeping work or a job?: I do not need or want help   Disabilities: At Risk (3/4/2024)    Disabilities     Concentrating, Remembering, or Making Decisions Difficulty: no     Doing Errands Independently Difficulty: yes       Immunization  Status:  Pneumococcal:   Influenza:  COVID-19:    OBJECTIVE:  /59   Pulse 68   Temp 97.8 °F (36.6 °C)   Wt 66.2 kg (146 lb) Comment: Pt weighed 141 lbs this morning when she got up  LMP  (LMP Unknown)   SpO2 97%   BMI 24.30 kg/m²     Body mass index is 24.3 kg/m².  Wt Readings from Last 1 Encounters:   03/26/24 64.9 kg (143 lb)       Home BP Readings:  10-yr ASCVD risk: The ASCVD Risk score (Raul BLAKE, et al., 2019) failed to calculate for the following reasons:    The 2019 ASCVD risk score is only valid for ages 40 to 79    Lab Review:  Renal Function: Estimated Creatinine Clearance: 30.5 mL/min (A) (by C-G formula based on SCr of 1.28 mg/dL (H)).    Lab Results   Component Value Date    PROBNP 2,324.0 (H) 03/03/2024       Results for orders placed during the hospital encounter of 03/03/24    Adult Transthoracic Echo Complete W/ Cont if Necessary Per Protocol    Interpretation Summary    Left ventricular systolic function is normal. Calculated left ventricular 3D EF = 59% Left ventricular ejection fraction appears to be 56 - 60%.    Left ventricular diastolic dysfunction is noted.    The left atrial cavity is dilated.    The right atrial cavity is dilated.    Moderate mitral valve regurgitation is present. (SBP 150s)    Moderate to severe tricuspid valve regurgitation is present.    Estimated right ventricular systolic pressure from tricuspid regurgitation is moderately elevated (45-55 mmHg).      ASSESSMENT/PLAN:  HFpEF (EF 56-60%)  Continue spironolactone 12.5 mg daily -- labs pending  Continue Metoprolol  mg QD  Continue Losartan 50 mg QD  Defer SGLT2 inhibitor -- hx of recurrent UTIs  Continue torsemide daily. Advised patient to call clinic with 2-3 lb weight gain in 24 hrs or >5 lb weight gain in 1 week         Luther Hammer RPH  [facility]  03/26/24  10:16 EDT

## 2024-03-26 NOTE — TELEPHONE ENCOUNTER
Reviewed lab results from yesterday, slight increase in Scr above baseline and increase in K after starting spironolactone.    Josh/Rosibel: Please let patient know that labs from yesterday look stable. We will continue her current medications as prescribed and re-check BMP in ~1 month. She has a follow up appointment with Dr. Nunez on 4/24/24 if she could get labs re-drawn on that date and then we can follow up with her in HF clinic on ~4/29 if that works with her schedule. She can call us if she develops any worsening symptoms in the meantime and we can get her in sooner. Continue monitoring BP and weight daily, let us know if she needs any refills other than spironolactone (already sent this one to [facility]).      Saundra -- [place]

## 2024-04-24 ENCOUNTER — OFFICE VISIT (OUTPATIENT)
Dept: CARDIOLOGY | Facility: CLINIC | Age: 89
End: 2024-04-24
Payer: MEDICARE

## 2024-04-24 VITALS
BODY MASS INDEX: 22.66 KG/M2 | RESPIRATION RATE: 18 BRPM | HEIGHT: 66 IN | HEART RATE: 78 BPM | WEIGHT: 141 LBS | DIASTOLIC BLOOD PRESSURE: 60 MMHG | OXYGEN SATURATION: 98 % | SYSTOLIC BLOOD PRESSURE: 112 MMHG

## 2024-04-24 DIAGNOSIS — I48.19 PERSISTENT ATRIAL FIBRILLATION: Primary | ICD-10-CM

## 2024-04-24 DIAGNOSIS — I50.32 CHRONIC DIASTOLIC CHF (CONGESTIVE HEART FAILURE): ICD-10-CM

## 2024-04-24 PROCEDURE — 99214 OFFICE O/P EST MOD 30 MIN: CPT | Performed by: INTERNAL MEDICINE

## 2024-04-24 RX ORDER — AMIODARONE HYDROCHLORIDE 200 MG/1
200 TABLET ORAL DAILY
Qty: 30 TABLET | Refills: 1 | Status: SHIPPED | OUTPATIENT
Start: 2024-04-24

## 2024-04-24 NOTE — PROGRESS NOTES
"             [facility] Cardiology Office Follow Up Note    Addie Perez  [id number]  20[birth year]    Primary Care Provider: Ariela Hampton DO    Chief Complaint: Routine follow-up    History of Present Illness:   Mrs. Addie Perez is a 89y.o. female who presents to the Cardiology Clinic for routine follow-up.  The patient has a past medical history significant for hyperlipidemia, hypertension, and type 2 diabetes mellitus.  She has a past cardiac history significant for chronic diastolic heart failure and persistent atrial fibrillation.  Since her last appointment, the patient underwent outpatient cardiac monitoring showing 100% AF burden, which was rate controlled.  Her symptoms corresponded to rate controlled atrial fibrillation.  Today, she reports persistent palpitations as well as exertional dyspnea and fatigue with activities such as walking to her mailbox.  She denies any significant symptoms while at rest.  No other specific complaints today.     Past Cardiac Testin. Last Coronary Angio: None  2. Prior Stress Testing: MPS   1. Normal pharmacologic MPS with no evidence of inducible ischemia.   2. Hyperdynamic LV systolic function, LVEF 74%.   3. Last Echo: 3/24   1.  LVEF 55-60%   2.  Diastolic dysfunction   3.  Moderate MR   4.  Moderate to severe TR  4. Prior Holter Monitor: 48-hour Holter   1.  The predominant rhythm atrial fibrillation with a 100% AF burden.  Average heart rate 80 bpm, max 104 bpm.  2.  Rare pauses with the longest being 2.2 seconds in duration.  3.  One symptomatic event of \"shortness of breath and fast heart rate\" corresponded to rate controlled atrial fibrillation.    Review of Systems:   Review of Systems   Constitutional:  Positive for fatigue. Negative for activity change, appetite change, chills, diaphoresis, fever, unexpected weight gain and unexpected weight loss.   Eyes:  Negative for blurred vision and double vision.   Respiratory:  Positive " for shortness of breath. Negative for cough, chest tightness and wheezing.    Cardiovascular:  Positive for palpitations. Negative for chest pain and leg swelling.   Gastrointestinal:  Negative for abdominal pain, anal bleeding, blood in stool and GERD.   Endocrine: Negative for cold intolerance and heat intolerance.   Genitourinary:  Negative for hematuria.   Neurological:  Negative for dizziness, syncope, weakness and light-headedness.   Hematological:  Does not bruise/bleed easily.   Psychiatric/Behavioral:  Negative for depressed mood and stress. The patient is not nervous/anxious.        I have reviewed and/or updated the patient's past medical, past surgical, family, social history, problem list and allergies as appropriate.     Medications:     Current Outpatient Medications:     acetaminophen (TYLENOL) 650 MG 8 hr tablet, Take 1 tablet by mouth 2 (Two) Times a Day As Needed., Disp: , Rfl:     apixaban (ELIQUIS) 5 MG tablet tablet, Take 1 tablet by mouth Every 12 (Twelve) Hours for 30 days. Indications: Atrial Fibrillation, Disp: 60 tablet, Rfl: 0    Carboxymethylcellulose Sodium 0.25 % solution, Apply 1 drop to eye(s) as directed by provider 2 (Two) Times a Day. REPORTS USES TO BOTH EYES, Disp: , Rfl:     cetirizine (zyrTEC) 10 MG tablet, cetirizine 10 mg tablet  Take 1 tablet every day by oral route for 30 days., Disp: , Rfl:     CRANBERRY EXTRACT PO, Take 1 tablet by mouth Daily., Disp: , Rfl:     Diclofenac Sodium (VOLTAREN) 1 % gel gel, As Needed., Disp: , Rfl:     estradiol (ESTRACE) 0.5 MG tablet, Take 1 tablet by mouth 2 (Two) Times a Week. REPORTS TAKES THIS ON Monday AND Friday AT NIGHT TIME, Disp: 24 tablet, Rfl: 10    losartan (COZAAR) 50 MG tablet, Take 1 tablet by mouth Daily., Disp: , Rfl:     multivitamin with minerals tablet tablet, Take 1 tablet by mouth Daily. One-A-Day, Disp: , Rfl:     omeprazole (prilOSEC) 10 MG capsule, Take 1 capsule by mouth Every Morning., Disp: , Rfl:      "spironolactone (ALDACTONE) 25 MG tablet, Take 0.5 tablets by mouth Daily. *Please split tablets for patient*, Disp: 45 tablet, Rfl: 1    torsemide (DEMADEX) 5 MG tablet, Daily., Disp: , Rfl:     amiodarone (PACERONE) 200 MG tablet, Take 1 tablet by mouth Daily., Disp: 30 tablet, Rfl: 1    metoprolol succinate XL (TOPROL-XL) 100 MG 24 hr tablet, Take 1 tablet by mouth Daily for 30 days. (Patient taking differently: Take 0.5 tablets by mouth 2 (Two) Times a Day.), Disp: 30 tablet, Rfl: 0    Physical Exam:  Vital Signs:   Vitals:    04/24/24 1323   BP: 112/60   BP Location: Right arm   Patient Position: Sitting   Pulse: 78   Resp: 18   SpO2: 98%   Weight: 64 kg (141 lb)   Height: 167.6 cm (66\")       Physical Exam  Constitutional:       General: She is not in acute distress.     Appearance: Normal appearance. She is well-developed. She is not diaphoretic.   HENT:      Head: Normocephalic and atraumatic.   Eyes:      General: No scleral icterus.     Pupils: Pupils are equal, round, and reactive to light.   Neck:      Trachea: No tracheal deviation.   Cardiovascular:      Rate and Rhythm: Normal rate. Rhythm irregular.      Heart sounds: Normal heart sounds. No murmur heard.     No friction rub. No gallop.      Comments: Normal JVD.  Pulmonary:      Effort: Pulmonary effort is normal. No respiratory distress.      Breath sounds: Normal breath sounds. No stridor. No wheezing or rales.   Chest:      Chest wall: No tenderness.   Abdominal:      General: Bowel sounds are normal. There is no distension.      Palpations: Abdomen is soft.      Tenderness: There is no abdominal tenderness. There is no guarding or rebound.   Musculoskeletal:         General: No swelling. Normal range of motion.      Cervical back: Neck supple. No tenderness.   Lymphadenopathy:      Cervical: No cervical adenopathy.   Skin:     General: Skin is warm and dry.      Findings: No erythema.   Neurological:      General: No focal deficit present.      " Mental Status: She is alert and oriented to person, place, and time.   Psychiatric:         Mood and Affect: Mood normal.         Behavior: Behavior normal.         Results Review:   I reviewed the patient's new clinical results.        Assessment / Plan:     1.  Persistent atrial fibrillation  -- Recent outpatient cardiac monitoring showed 100% AF burden, rate controlled with symptoms corresponding to rate controlled A-fib  -- Remains symptomatic, palpitations primarily associated with exertion  -- Discussed options to try to restore sinus rhythm including cardioversion, loading with oral amiodarone, etc.  The patient would like to try oral amiodarone loading, however is not sure if she would eventually want to proceed with synchronized cardioversion if she does not convert with amiodarone  -- Will start oral amiodarone 200 mg twice daily x 1 week, then decrease to once daily  -- Continue Eliquis for CVA prophylaxis  -- Continue metoprolol for rate control  -- Follow-up in 2 weeks with ECG to reevaluate rhythm    2.  Chronic diastolic heart failure  -- Euvolemic on exam today  --Continue torsemide    Follow Up:   Return in about 2 weeks (around 5/8/2024).      Thank you for allowing me to participate in the care of your patient. Please to not hesitate to contact me with additional questions or concerns.     MITZY Nunez MD  Interventional Cardiology   04/24/2024  13:25 EDT

## 2024-04-29 ENCOUNTER — DISEASE STATE MANAGEMENT VISIT (OUTPATIENT)
Dept: PHARMACY | Facility: HOSPITAL | Age: 89
End: 2024-04-29
Payer: MEDICARE

## 2024-04-29 ENCOUNTER — LAB (OUTPATIENT)
Dept: LAB | Facility: HOSPITAL | Age: 89
End: 2024-04-29
Payer: MEDICARE

## 2024-04-29 DIAGNOSIS — I50.31 ACUTE HEART FAILURE WITH PRESERVED EJECTION FRACTION: Primary | ICD-10-CM

## 2024-04-29 DIAGNOSIS — I50.30 DIASTOLIC CONGESTIVE HEART FAILURE, UNSPECIFIED HF CHRONICITY: ICD-10-CM

## 2024-04-29 LAB
ANION GAP SERPL CALCULATED.3IONS-SCNC: 11 MMOL/L (ref 5–15)
BUN SERPL-MCNC: 21 MG/DL (ref 8–23)
BUN/CREAT SERPL: 15.9 (ref 7–25)
CALCIUM SPEC-SCNC: 8.8 MG/DL (ref 8.2–9.6)
CHLORIDE SERPL-SCNC: 96 MMOL/L (ref 98–107)
CO2 SERPL-SCNC: 23 MMOL/L (ref 22–29)
CREAT SERPL-MCNC: 1.32 MG/DL (ref 0.57–1)
EGFRCR SERPLBLD CKD-EPI 2021: 38.4 ML/MIN/1.73
GLUCOSE SERPL-MCNC: 143 MG/DL (ref 65–99)
POTASSIUM SERPL-SCNC: 4.9 MMOL/L (ref 3.5–5.2)
SODIUM SERPL-SCNC: 130 MMOL/L (ref 136–145)

## 2024-04-29 PROCEDURE — 36415 COLL VENOUS BLD VENIPUNCTURE: CPT

## 2024-04-29 PROCEDURE — 96374 THER/PROPH/DIAG INJ IV PUSH: CPT

## 2024-04-29 PROCEDURE — 80048 BASIC METABOLIC PNL TOTAL CA: CPT

## 2024-04-29 PROCEDURE — 25010000002 FUROSEMIDE PER 20 MG: Performed by: NURSE PRACTITIONER

## 2024-04-29 RX ORDER — FUROSEMIDE 10 MG/ML
80 INJECTION INTRAMUSCULAR; INTRAVENOUS ONCE
Status: COMPLETED | OUTPATIENT
Start: 2024-04-29 | End: 2024-04-29

## 2024-04-29 RX ADMIN — FUROSEMIDE 80 MG: 10 INJECTION, SOLUTION INTRAMUSCULAR; INTRAVENOUS at 15:30

## 2024-04-29 NOTE — PROGRESS NOTES
[facility] Heart Failure Clinic Follow Up Note    Addie Perez  [id number]  2024    Primary Care Provider: Ariela Hampton DO   Referring Provider: Betsy Andrews A*    Chief Complaint: Unintentional weight gain    History of Present Illness:   Mrs. Addie Perez is a 89 y.o. female who presents to the HF Clinic for unintentional weight gain.  The patient has a past medical history significant for hyperlipidemia, hypertension, and type 2 diabetes mellitus.  She has a past cardiac history significant for chronic diastolic heart failure and persistent atrial fibrillation.  Last month, the patient underwent outpatient cardiac monitoring showing 100% AF burden, which was rate controlled.  At her cardiology clinic visit last week, the patient was started on a loading dose of amiodarone with daily dosing to follow.  She called the heart failure clinic this morning concerned that recent weight gain was due to this medication.  She presents today for evaluation.  The patient reports worsening dyspnea over the past several days.  Her home log also notes symptoms of dizziness insomnia at home.  Today she reports abdominal fullness with a pair of pants being too tight that she recently wore comfortably.  She denies consumption of any high sodium meals.  She denies excessive fluid intake.  She reports some palpitations, but denies chest pain.  She offers no other complaints or concerns at this time.    Past Cardiac Testin. Last Coronary Angio: None  2. Prior Stress Testin2022  1. Normal pharmacologic MPS with no evidence of inducible ischemia.   2. Hyperdynamic LV systolic function, LVEF 74%.   3. Last Echo: 3/3/2024    Left ventricular systolic function is normal. Calculated left ventricular 3D EF = 59% Left ventricular ejection fraction appears to be 56 - 60%.    Left ventricular diastolic dysfunction is noted.    The left atrial cavity is dilated.    The right  atrial cavity is dilated.    Moderate mitral valve regurgitation is present. (SBP 150s)    Moderate to severe tricuspid valve regurgitation is present.    Estimated right ventricular systolic pressure from tricuspid regurgitation is moderately elevated (45-55 mmHg).  4. Prior Holter Monitor: None    Review of Systems:   Review of Systems  As Noted in HPI.   I have reviewed and confirmed the accuracy of the ROS as documented by the MA/LPN/RN JOHAN Peña    I have reviewed and/or updated the patient's past medical, past surgical, family, social history, problem list and allergies as appropriate.     Medications:     Current Outpatient Medications:     acetaminophen (TYLENOL) 650 MG 8 hr tablet, Take 1 tablet by mouth 2 (Two) Times a Day As Needed., Disp: , Rfl:     amiodarone (PACERONE) 200 MG tablet, Take 1 tablet by mouth Daily., Disp: 30 tablet, Rfl: 1    apixaban (ELIQUIS) 5 MG tablet tablet, Take 1 tablet by mouth 2 (Two) Times a Day., Disp: , Rfl:     Carboxymethylcellulose Sodium 0.25 % solution, Apply 1 drop to eye(s) as directed by provider 2 (Two) Times a Day. REPORTS USES TO BOTH EYES, Disp: , Rfl:     cetirizine (zyrTEC) 10 MG tablet, cetirizine 10 mg tablet  Take 1 tablet every day by oral route for 30 days., Disp: , Rfl:     CRANBERRY EXTRACT PO, Take 1 tablet by mouth Daily., Disp: , Rfl:     Diclofenac Sodium (VOLTAREN) 1 % gel gel, As Needed., Disp: , Rfl:     estradiol (ESTRACE) 0.5 MG tablet, Take 1 tablet by mouth 2 (Two) Times a Week. REPORTS TAKES THIS ON Monday AND Friday AT NIGHT TIME, Disp: 24 tablet, Rfl: 10    losartan (COZAAR) 50 MG tablet, Take 1 tablet by mouth Daily., Disp: , Rfl:     metoprolol succinate XL (TOPROL-XL) 100 MG 24 hr tablet, Take 1 tablet by mouth Daily for 30 days. (Patient taking differently: Take 0.5 tablets by mouth Daily.), Disp: 30 tablet, Rfl: 0    multivitamin with minerals tablet tablet, Take 1 tablet by mouth Daily. One-A-Day, Disp: , Rfl:      "omeprazole (prilOSEC) 10 MG capsule, Take 1 capsule by mouth Every Morning., Disp: , Rfl:     spironolactone (ALDACTONE) 25 MG tablet, Take 0.5 tablets by mouth Daily. *Please split tablets for patient*, Disp: 45 tablet, Rfl: 1    torsemide (DEMADEX) 5 MG tablet, Daily., Disp: , Rfl:     Physical Exam:    Vitals:     04/24/24 1323   BP: 112/60   BP Location: Right arm   Patient Position: Sitting   Pulse: 78   Resp: 18   SpO2: 98%   Weight: 64 kg (141 lb)   Height: 167.6 cm (66\")     Vital Signs:   Vitals:    04/29/24 1516   BP: 125/69   BP Location: Right arm   Patient Position: Sitting   Cuff Size: Adult   Pulse: 58   Resp: 16   Temp: 97.7 °F (36.5 °C)   TempSrc: Infrared   SpO2: 98%  Comment: RA   Weight: 66.7 kg (147 lb)   Height: 167.6 cm (66\")     Body mass index is 23.73 kg/m².    Physical Exam  Vitals and nursing note reviewed.   Constitutional:       General: She is not in acute distress.  HENT:      Head: Normocephalic and atraumatic.   Neck:      Vascular: No JVD.      Trachea: Trachea normal.   Cardiovascular:      Rate and Rhythm: Normal rate. Rhythm irregular.      Pulses: Normal pulses.      Heart sounds: Normal heart sounds. No murmur heard.     No friction rub. No gallop.   Pulmonary:      Effort: Pulmonary effort is normal.      Breath sounds: Examination of the right-middle field reveals rales. Examination of the left-middle field reveals rales. Examination of the right-lower field reveals rales. Examination of the left-lower field reveals rales.   Musculoskeletal:      Cervical back: Neck supple.      Right lower leg: No edema.      Left lower leg: No edema.   Skin:     General: Skin is warm and dry.   Neurological:      Mental Status: She is alert and oriented to person, place, and time.   Psychiatric:         Mood and Affect: Mood normal.         Behavior: Behavior normal. Behavior is cooperative.         Thought Content: Thought content does not include suicidal ideation.         Results " Review:   I reviewed the patient's new clinical results.    Procedures    Assessment / Plan:     1. Acute heart failure with preserved ejection fraction  3/24, LVEF 56 - 60%  Stage C  NYHA Functional Class II  +6 lbs in 5 days  Lasix 80 mg IVP x's 1 dose NOW  Follow-up in 3 days for recheck of volume status    2. Hypertension  Acceptable blood pressure today    3. Atrial fibrillation with RVR  Advised patient that atrial fibrillation with RVR can actually perpetuate heart failure exacerbations  Encourage patient to continue amiodarone as prescribed  Continue Eliquis for CVA prophylaxis  Continue metoprolol for rate control      Preventative Cardiology:   Tobacco Cessation: N/A   Advance Care Planning: ACP discussion was declined by the patient. Patient has an advance directive in EMR which is still valid.          Thank you for allowing me to participate in the care of your patient. Please to not hesitate to contact me with additional questions or concerns.     Betsy Andrews, JOHAN

## 2024-04-30 VITALS
OXYGEN SATURATION: 98 % | HEIGHT: 66 IN | WEIGHT: 147 LBS | SYSTOLIC BLOOD PRESSURE: 125 MMHG | DIASTOLIC BLOOD PRESSURE: 69 MMHG | TEMPERATURE: 97.7 F | BODY MASS INDEX: 23.63 KG/M2 | RESPIRATION RATE: 16 BRPM | HEART RATE: 58 BPM

## 2024-04-30 NOTE — PROGRESS NOTES
[facility]  Heart Failure Pharmacist Note     Patient Name: Addie Perez  :1934  Age: 89 y.o.  Sex: female  Referring Provider: eBtsy Andrews A*   Primary Cardiologist: Dr. Je Nunez  Encounter Provider:  JOHAN Peña    HPI: Patient presents for follow-up evaluation in heart failure clinic for HFpEF (59%; 56-60% in 2024). PMH significant for HTN, hyperlipidemia, CKD, Afib, T2DM.    Patient called the clinic today and asked to be scheduled due to feeling worse. She reports trouble breathing, she feels jittery and has seen an increase in insomnia. She reports an increase in abdominal fullness and her pants fitting too tight that have recently felt comfortable. Patient has watched weight at home and has seen an increase from 140lbs to 146 lbs in 5 days. Today she is 147lbs in clinic. Her BP has been within (141-120)/ 60s. Scanned to media tab. We discussed amiodarone today as she had questions regarding the medication. She will decrease dose on 2024 as instructed by Dr. Nunez.     Heart Failure GDMT:    Drug Class   Drug   Dose Last Dose Adjustment   Additional Titration   Notes   ACEi/ARB/ARNI Losartan 50 mg QD      Beta Blocker Metoprolol 100 mg QD   Pt cuts tablet in half and dose 50mg in AM and 50mg in PM   MRA Spironolactone 12.5 mg Initiated 24     SGLT2i N/A   N/A Hx of recurrent UTI noted     Other CV Meds:  Torsemide 5mg QD  Eliquis 5mg BID    Medication Use:  Adherence: good  Past hx of medication use/intolerance: N/A  Affordability: difficulty affording Eliquis; possible switch to warfarin    Social Determinants:    Social Determinants of Health     Tobacco Use: Low Risk  (2024)    Patient History     Smoking Tobacco Use: Never     Smokeless Tobacco Use: Never     Passive Exposure: Never   Alcohol Use: Not At Risk (3/4/2024)    AUDIT-C     Frequency of Alcohol Consumption: Never     Average Number of Drinks: Patient does not drink      Frequency of Binge Drinking: Never   Financial Resource Strain: Low Risk  (3/4/2024)    Overall Financial Resource Strain (CARDIA)     Difficulty of Paying Living Expenses: Not hard at all   Food Insecurity: No Food Insecurity (3/4/2024)    Hunger Vital Sign     Worried About Running Out of Food in the Last Year: Never true     Ran Out of Food in the Last Year: Never true   Transportation Needs: No Transportation Needs (3/4/2024)    PRAPARE - Transportation     Lack of Transportation (Medical): No     Lack of Transportation (Non-Medical): No   Physical Activity: Inactive (3/4/2024)    Exercise Vital Sign     Days of Exercise per Week: 0 days     Minutes of Exercise per Session: 0 min   Stress: No Stress Concern Present (3/4/2024)    [employer] - Occupational Stress Questionnaire     Feeling of Stress : Not at all   Social Connections: Not At Risk (3/4/2024)    Family and Community Support     Help with Day-to-Day Activities: I get all the help I need     Lonely or Isolated: Never   Interpersonal Safety: Not At Risk (3/4/2024)    Abuse Screen     Unsafe at Home or Work/School: no     Feels Threatened by Someone?: no     Does Anyone Keep You from Contacting Others or Doint Things Outside the Home?: no     Physical Sign of Abuse Present: no   Depression: Not at risk (3/4/2024)    PHQ-2     PHQ-2 Score: 0   Housing Stability: Not At Risk (3/4/2024)    Housing Stability     Current Living Arrangements: home     Potentially Unsafe Housing Conditions: none   Utilities: Not At Risk (3/4/2024)    [facility] Utilities     Threatened with loss of utilities: No   Health Literacy: Not At Risk (3/4/2024)    Education     Help with school or training?: No     Preferred Language: English   Employment: Not At Risk (3/4/2024)    Employment     Do you want help finding or keeping work or a job?: I do not need or want help   Disabilities: At Risk (3/4/2024)    Disabilities     Concentrating, Remembering, or  "Making Decisions Difficulty: no     Doing Errands Independently Difficulty: yes       Immunization Status:  Pneumococcal:   Influenza:  COVID-19:    OBJECTIVE:  /69   Pulse 58   Temp 97.7 °F (36.5 °C) (Infrared)   Resp 16   Ht 167.6 cm (66\")   Wt 66.7 kg (147 lb)   LMP  (LMP Unknown)   SpO2 98% Comment: RA  BMI 23.73 kg/m²     Body mass index is 23.73 kg/m².  Wt Readings from Last 1 Encounters:   04/29/24 66.7 kg (147 lb)       Home BP Readings:  10-yr ASCVD risk: The ASCVD Risk score (Raul BLAKE, et al., 2019) failed to calculate for the following reasons:    The 2019 ASCVD risk score is only valid for ages 40 to 79    Lab Review:  Renal Function: Estimated Creatinine Clearance: 29.8 mL/min (A) (by C-G formula based on SCr of 1.32 mg/dL (H)).    Lab Results   Component Value Date    PROBNP 2,324.0 (H) 03/03/2024       Results for orders placed during the hospital encounter of 03/03/24    Adult Transthoracic Echo Complete W/ Cont if Necessary Per Protocol    Interpretation Summary    Left ventricular systolic function is normal. Calculated left ventricular 3D EF = 59% Left ventricular ejection fraction appears to be 56 - 60%.    Left ventricular diastolic dysfunction is noted.    The left atrial cavity is dilated.    The right atrial cavity is dilated.    Moderate mitral valve regurgitation is present. (SBP 150s)    Moderate to severe tricuspid valve regurgitation is present.    Estimated right ventricular systolic pressure from tricuspid regurgitation is moderately elevated (45-55 mmHg).      ASSESSMENT/PLAN:  HFpEF (EF 56-60%)  Continue spironolactone 12.5 mg daily -- addendum: labs received on 4/30. Sodium is 130 and SCr has steadily increased since started spironolactone on 3/18/2024. Pt K ok at 4.9.  Pt in fluid overload today. Consider repeat BMP on Thursday with repeat appointment to watch Scr steady increase vero following diuresis.   Continue Metoprolol  mg QD  Continue Losartan 50 mg " QD  Pt IV diuresed today in clinic with Furosemide 80mg IV.   Defer SGLT2 inhibitor -- hx of recurrent UTIs  Continue torsemide daily. Advised patient to call clinic with 2-3 lb weight gain in 24 hrs or >5 lb weight gain in 1 week           Daphney Ibrahim, PharmD  [facility]  04/30/24  10:16 EDT

## 2024-05-02 ENCOUNTER — DISEASE STATE MANAGEMENT VISIT (OUTPATIENT)
Dept: PHARMACY | Facility: HOSPITAL | Age: 89
End: 2024-05-02
Payer: MEDICARE

## 2024-05-02 VITALS
SYSTOLIC BLOOD PRESSURE: 125 MMHG | WEIGHT: 145 LBS | TEMPERATURE: 98.2 F | OXYGEN SATURATION: 98 % | HEIGHT: 66 IN | DIASTOLIC BLOOD PRESSURE: 86 MMHG | BODY MASS INDEX: 23.3 KG/M2 | HEART RATE: 74 BPM | RESPIRATION RATE: 16 BRPM

## 2024-05-02 DIAGNOSIS — I50.30 HEART FAILURE WITH PRESERVED EJECTION FRACTION, UNSPECIFIED HF CHRONICITY: Primary | ICD-10-CM

## 2024-05-02 PROCEDURE — G0463 HOSPITAL OUTPT CLINIC VISIT: HCPCS

## 2024-05-02 NOTE — PROGRESS NOTES
"             [facility] Heart Failure Clinic Follow Up Note    Addie Perez  [id number]  05/02/2024    Primary Care Provider: Ariela Hampton DO   Referring Provider: Betsy Andrews A*    Chief Complaint: Follow-up CHF    History of Present Illness:   Mrs. Addie Perez is a 89 y.o. female who presents to the HF Clinic for follow up after IV diuresis.  The patient reports feeling much better today.  She reports that she didn't even realize that she was \"smothering\" at home until she started feeling better.  She states she initially lost 8 lbs overnight and had dry mouth, but states that this has resolved.  She offers no other complaints or concerns at this time.     Review of Systems:   Review of Systems  As Noted in HPI.   I have reviewed and confirmed the accuracy of the ROS as documented by the MA/LPN/RN JOHAN Peña    I have reviewed and/or updated the patient's past medical, past surgical, family, social history, problem list and allergies as appropriate.     Medications:     Current Outpatient Medications:     acetaminophen (TYLENOL) 650 MG 8 hr tablet, Take 1 tablet by mouth 2 (Two) Times a Day As Needed., Disp: , Rfl:     amiodarone (PACERONE) 200 MG tablet, Take 1 tablet by mouth Daily., Disp: 30 tablet, Rfl: 1    apixaban (ELIQUIS) 5 MG tablet tablet, Take 1 tablet by mouth 2 (Two) Times a Day., Disp: , Rfl:     Carboxymethylcellulose Sodium 0.25 % solution, Apply 1 drop to eye(s) as directed by provider 2 (Two) Times a Day. REPORTS USES TO BOTH EYES, Disp: , Rfl:     cetirizine (zyrTEC) 10 MG tablet, cetirizine 10 mg tablet  Take 1 tablet every day by oral route for 30 days., Disp: , Rfl:     CRANBERRY EXTRACT PO, Take 1 tablet by mouth Daily. Pt taking every other day, Disp: , Rfl:     Diclofenac Sodium (VOLTAREN) 1 % gel gel, As Needed., Disp: , Rfl:     estradiol (ESTRACE) 0.5 MG tablet, Take 1 tablet by mouth 2 (Two) Times a Week. REPORTS TAKES THIS ON Monday " "AND Friday AT NIGHT TIME, Disp: 24 tablet, Rfl: 10    losartan (COZAAR) 50 MG tablet, Take 1 tablet by mouth Daily. Pt thinks she is taking two times daily, Disp: , Rfl:     metoprolol succinate XL (TOPROL-XL) 100 MG 24 hr tablet, Take 1 tablet by mouth Daily for 30 days. (Patient taking differently: Take 0.5 tablets by mouth Daily. Pt taking 50mg two times daily), Disp: 30 tablet, Rfl: 0    multivitamin with minerals tablet tablet, Take 1 tablet by mouth Daily. One-A-Day every other day, Disp: , Rfl:     omeprazole (prilOSEC) 10 MG capsule, Take 1 capsule by mouth Every Morning., Disp: , Rfl:     spironolactone (ALDACTONE) 25 MG tablet, Take 0.5 tablets by mouth Daily. *Please split tablets for patient*, Disp: 45 tablet, Rfl: 1    torsemide (DEMADEX) 5 MG tablet, Daily., Disp: , Rfl:     Physical Exam:    Vitals:     04/24/24 1323   BP: 112/60   BP Location: Right arm   Patient Position: Sitting   Pulse: 78   Resp: 18   SpO2: 98%   Weight: 64 kg (141 lb)   Height: 167.6 cm (66\")      Vital Signs:   Vitals       Vitals:     04/29/24 1516   BP: 125/69   BP Location: Right arm   Patient Position: Sitting   Cuff Size: Adult   Pulse: 58   Resp: 16   Temp: 97.7 °F (36.5 °C)   TempSrc: Infrared   SpO2: 98%  Comment: RA   Weight: 66.7 kg (147 lb)   Height: 167.6 cm (66\")         Body mass index is 23.73 kg/m².      Vital Signs:   Vitals:    05/02/24 1240   BP: 125/86   BP Location: Right arm   Patient Position: Sitting   Cuff Size: Adult   Pulse: 74   Resp: 16   Temp: 98.2 °F (36.8 °C)   TempSrc: Infrared   SpO2: 98%  Comment: RA   Weight: 65.8 kg (145 lb)   Height: 167.6 cm (66\")     Body mass index is 23.4 kg/m².    Physical Exam  Vitals and nursing note reviewed.   Constitutional:       General: She is not in acute distress.  HENT:      Head: Normocephalic and atraumatic.   Neck:      Trachea: Trachea normal.   Cardiovascular:      Rate and Rhythm: Normal rate. Rhythm irregular.      Pulses: Normal pulses.      Heart " sounds: Normal heart sounds. No murmur heard.     No friction rub. No gallop.   Pulmonary:      Effort: Pulmonary effort is normal.      Breath sounds: Examination of the left-lower field reveals rales. Rales present.   Musculoskeletal:      Cervical back: Neck supple.      Right lower leg: No edema.      Left lower leg: No edema.   Skin:     General: Skin is warm and dry.   Neurological:      Mental Status: She is alert and oriented to person, place, and time.   Psychiatric:         Mood and Affect: Mood normal.         Behavior: Behavior normal. Behavior is cooperative.         Thought Content: Thought content does not include suicidal ideation.         Results Review:   I reviewed the patient's new clinical results.    Procedures    Assessment / Plan:   Diagnoses and all orders for this visit:    1. Heart failure with preserved ejection fraction, unspecified HF chronicity (Primary)  Stage C  NYHA functional class II  Near euvolemic on exam  No change to GDMT  Continue daily weight at home  Follow-up with Dr. Nunez next week as scheduled  Follow-up a week from Monday with BMP to reassess kidney fx and electrolyte status    Preventative Cardiology:   Tobacco Cessation: N/A   Advance Care Planning: ACP discussion was declined by the patient. Patient has an advance directive in EMR which is still valid.      Follow Up:   Follow-up in 2 weeks with DSM      Thank you for allowing me to participate in the care of your patient. Please to not hesitate to contact me with additional questions or concerns.     JOHAN Wallace

## 2024-05-06 NOTE — PROGRESS NOTES
Ambulatory Care Clinic  Heart Failure Pharmacist Note     Patient Name: Addie Perez  :1934  Age: 89 y.o.  Sex: female  Referring Provider: Betsy Andrews A*   Primary Cardiologist: Dr. Je Nunez  Encounter Provider:  JOHAN Peña    HPI: Patient presents for follow-up evaluation in heart failure clinic for HFpEF (59%; 56-60% in 2024). PMH significant for HTN, hyperlipidemia, CKD, Afib, T2DM.    Patient reports feeling better since IV diuresis in the clinic on 2024. Patient forgot to bring her weight and BP log today. He denies chest pain, dizziness, palpitations, lightheadedness, abdominal or lower extremity swelling. She decreased Amiodarone dose on 2024 as instructed by Dr. Nunez with follow up appointment on 2024.     Heart Failure GDMT:    Drug Class   Drug   Dose Last Dose Adjustment   Additional Titration   Notes   ACEi/ARB/ARNI Losartan 50 mg QD      Beta Blocker Metoprolol 100 mg QD   Pt cuts tablet in half and dose 50mg in AM and 50mg in PM   MRA Spironolactone 12.5 mg Initiated 24     SGLT2i N/A   N/A Hx of recurrent UTI noted     Other CV Meds:  Torsemide 5mg QD  Eliquis 5mg BID    Medication Use:  Adherence: good  Past hx of medication use/intolerance: N/A  Affordability: difficulty affording Eliquis; possible switch to warfarin    Social Determinants:    Social Determinants of Health     Tobacco Use: Low Risk  (2024)    Patient History     Smoking Tobacco Use: Never     Smokeless Tobacco Use: Never     Passive Exposure: Never   Alcohol Use: Not At Risk (3/4/2024)    AUDIT-C     Frequency of Alcohol Consumption: Never     Average Number of Drinks: Patient does not drink     Frequency of Binge Drinking: Never   Financial Resource Strain: Low Risk  (3/4/2024)    Overall Financial Resource Strain (CARDIA)     Difficulty of Paying Living Expenses: Not hard at all   Food Insecurity: No Food Insecurity (3/4/2024)    Hunger Vital Sign      Worried About Running Out of Food in the Last Year: Never true     Ran Out of Food in the Last Year: Never true   Transportation Needs: No Transportation Needs (3/4/2024)    PRAPARE - Transportation     Lack of Transportation (Medical): No     Lack of Transportation (Non-Medical): No   Physical Activity: Inactive (3/4/2024)    Exercise Vital Sign     Days of Exercise per Week: 0 days     Minutes of Exercise per Session: 0 min   Stress: No Stress Concern Present (3/4/2024)    [employer] - Occupational Stress Questionnaire     Feeling of Stress : Not at all   Social Connections: Not At Risk (3/4/2024)    Family and Community Support     Help with Day-to-Day Activities: I get all the help I need     Lonely or Isolated: Never   Interpersonal Safety: Not At Risk (3/4/2024)    Abuse Screen     Unsafe at Home or Work/School: no     Feels Threatened by Someone?: no     Does Anyone Keep You from Contacting Others or Doint Things Outside the Home?: no     Physical Sign of Abuse Present: no   Depression: Not at risk (3/4/2024)    PHQ-2     PHQ-2 Score: 0   Housing Stability: Not At Risk (3/4/2024)    Housing Stability     Current Living Arrangements: home     Potentially Unsafe Housing Conditions: none   Utilities: Not At Risk (3/4/2024)    [facility] Utilities     Threatened with loss of utilities: No   Health Literacy: Not At Risk (3/4/2024)    Education     Help with school or training?: No     Preferred Language: English   Employment: Not At Risk (3/4/2024)    Employment     Do you want help finding or keeping work or a job?: I do not need or want help   Disabilities: At Risk (3/4/2024)    Disabilities     Concentrating, Remembering, or Making Decisions Difficulty: no     Doing Errands Independently Difficulty: yes       Immunization Status:  Pneumococcal:   Influenza:  COVID-19:    OBJECTIVE:  /86 (BP Location: Right arm, Patient Position: Sitting, Cuff Size: Adult)   Pulse 74   Temp 98.2 °F  "(36.8 °C) (Infrared)   Resp 16   Ht 167.6 cm (66\")   Wt 65.8 kg (145 lb)   LMP  (LMP Unknown)   SpO2 98% Comment: RA  BMI 23.40 kg/m²     Body mass index is 23.4 kg/m².  Wt Readings from Last 1 Encounters:   05/02/24 65.8 kg (145 lb)       Home BP Readings:  10-yr ASCVD risk: The ASCVD Risk score (Raul DK, et al., 2019) failed to calculate for the following reasons:    The 2019 ASCVD risk score is only valid for ages 40 to 79    Lab Review:  Renal Function: Estimated Creatinine Clearance: 29.4 mL/min (A) (by C-G formula based on SCr of 1.32 mg/dL (H)).    Lab Results   Component Value Date    PROBNP 2,324.0 (H) 03/03/2024       Results for orders placed during the hospital encounter of 03/03/24    Adult Transthoracic Echo Complete W/ Cont if Necessary Per Protocol    Interpretation Summary    Left ventricular systolic function is normal. Calculated left ventricular 3D EF = 59% Left ventricular ejection fraction appears to be 56 - 60%.    Left ventricular diastolic dysfunction is noted.    The left atrial cavity is dilated.    The right atrial cavity is dilated.    Moderate mitral valve regurgitation is present. (SBP 150s)    Moderate to severe tricuspid valve regurgitation is present.    Estimated right ventricular systolic pressure from tricuspid regurgitation is moderately elevated (45-55 mmHg).      ASSESSMENT/PLAN:  HFpEF (EF 56-60%)  Continue spironolactone 12.5 mg daily   Continue Metoprolol  mg QD  Continue Losartan 50 mg QD  Defer SGLT2 inhibitor -- hx of recurrent UTIs  Repeat BMP on 5/13.  Patient sees Dr. Nunez on 5/8.  Continue torsemide daily.   Advised patient to call clinic with 2-3 lb weight gain in 24 hrs or >5 lb weight gain in 1 week         Daphney Ibrahim, PharmD  [facility] Failure [facility]  05/06/24  10:16 EDT  "

## 2024-05-08 ENCOUNTER — OFFICE VISIT (OUTPATIENT)
Dept: CARDIOLOGY | Facility: CLINIC | Age: 89
End: 2024-05-08
Payer: MEDICARE

## 2024-05-08 VITALS
WEIGHT: 145 LBS | HEART RATE: 88 BPM | RESPIRATION RATE: 18 BRPM | BODY MASS INDEX: 23.3 KG/M2 | OXYGEN SATURATION: 97 % | DIASTOLIC BLOOD PRESSURE: 82 MMHG | HEIGHT: 66 IN | SYSTOLIC BLOOD PRESSURE: 138 MMHG

## 2024-05-08 DIAGNOSIS — I48.19 PERSISTENT ATRIAL FIBRILLATION: Primary | ICD-10-CM

## 2024-05-08 DIAGNOSIS — I50.32 CHRONIC DIASTOLIC CHF (CONGESTIVE HEART FAILURE): ICD-10-CM

## 2024-05-08 PROCEDURE — 99213 OFFICE O/P EST LOW 20 MIN: CPT | Performed by: INTERNAL MEDICINE

## 2024-05-08 PROCEDURE — 93000 ELECTROCARDIOGRAM COMPLETE: CPT | Performed by: INTERNAL MEDICINE

## 2024-05-08 NOTE — TELEPHONE ENCOUNTER
Pt seen in office today requested samples of Eliquis 5mg   2 boxes given  Lot: [id number]  Exp: 9/30/2025

## 2024-05-10 DIAGNOSIS — I50.30 DIASTOLIC CONGESTIVE HEART FAILURE, UNSPECIFIED HF CHRONICITY: ICD-10-CM

## 2024-05-10 RX ORDER — SPIRONOLACTONE 25 MG/1
12.5 TABLET ORAL DAILY
Qty: 45 TABLET | Refills: 0 | Status: SHIPPED | OUTPATIENT
Start: 2024-05-10

## 2024-05-13 ENCOUNTER — TELEPHONE (OUTPATIENT)
Dept: PHARMACY | Facility: HOSPITAL | Age: 89
End: 2024-05-13
Payer: MEDICARE

## 2024-05-13 ENCOUNTER — LAB (OUTPATIENT)
Dept: LAB | Facility: HOSPITAL | Age: 89
End: 2024-05-13
Payer: MEDICARE

## 2024-05-13 DIAGNOSIS — I50.30 HEART FAILURE WITH PRESERVED EJECTION FRACTION, UNSPECIFIED HF CHRONICITY: ICD-10-CM

## 2024-05-13 LAB
ANION GAP SERPL CALCULATED.3IONS-SCNC: 13.4 MMOL/L (ref 5–15)
BUN SERPL-MCNC: 24 MG/DL (ref 8–23)
BUN/CREAT SERPL: 18.8 (ref 7–25)
CALCIUM SPEC-SCNC: 9.3 MG/DL (ref 8.2–9.6)
CHLORIDE SERPL-SCNC: 100 MMOL/L (ref 98–107)
CO2 SERPL-SCNC: 24.6 MMOL/L (ref 22–29)
CREAT SERPL-MCNC: 1.28 MG/DL (ref 0.57–1)
EGFRCR SERPLBLD CKD-EPI 2021: 39.9 ML/MIN/1.73
GLUCOSE SERPL-MCNC: 133 MG/DL (ref 65–99)
POTASSIUM SERPL-SCNC: 4.6 MMOL/L (ref 3.5–5.2)
SODIUM SERPL-SCNC: 138 MMOL/L (ref 136–145)

## 2024-05-13 PROCEDURE — 80048 BASIC METABOLIC PNL TOTAL CA: CPT

## 2024-05-13 PROCEDURE — 36415 COLL VENOUS BLD VENIPUNCTURE: CPT

## 2024-05-13 NOTE — TELEPHONE ENCOUNTER
Spoke with patient about recent labs. Discussed that her kidney function looks stable and K is WNL. Will continue current GDMT. Scheduled a follow up appointment for 6/10 at 11:30 am.

## 2024-05-21 DIAGNOSIS — I48.19 PERSISTENT ATRIAL FIBRILLATION: ICD-10-CM

## 2024-06-06 ENCOUNTER — TELEPHONE (OUTPATIENT)
Dept: CARDIOLOGY | Facility: CLINIC | Age: 89
End: 2024-06-06
Payer: MEDICARE

## 2024-06-06 DIAGNOSIS — I48.19 PERSISTENT ATRIAL FIBRILLATION: ICD-10-CM

## 2024-06-06 NOTE — TELEPHONE ENCOUNTER
Caller Name: Addie Perez      Relationship: Self      Best Contact Number: [phone]      Patient is requesting samples of ELIQUIS 5 MG      How many days of medication do you have left? 5 DAYS         .

## 2024-06-07 NOTE — TELEPHONE ENCOUNTER
Samples provided for Pt   Eliquis 5 mg  LOT #: [id number]  EXP: 8/30/25  2 BOXES GIVEN  Please sign for samples

## 2024-06-10 ENCOUNTER — DISEASE STATE MANAGEMENT VISIT (OUTPATIENT)
Dept: PHARMACY | Facility: HOSPITAL | Age: 89
End: 2024-06-10
Payer: MEDICARE

## 2024-06-10 VITALS
OXYGEN SATURATION: 97 % | WEIGHT: 144.8 LBS | TEMPERATURE: 97.5 F | SYSTOLIC BLOOD PRESSURE: 101 MMHG | DIASTOLIC BLOOD PRESSURE: 53 MMHG | BODY MASS INDEX: 23.37 KG/M2 | HEART RATE: 68 BPM

## 2024-06-10 DIAGNOSIS — I50.30 HEART FAILURE WITH PRESERVED EJECTION FRACTION, UNSPECIFIED HF CHRONICITY: Primary | ICD-10-CM

## 2024-06-10 PROCEDURE — G0463 HOSPITAL OUTPT CLINIC VISIT: HCPCS

## 2024-06-10 NOTE — PROGRESS NOTES
-             [facility] Failure Clinic Follow Up Note    Addie Perez  [id number]  06/10/2024    Primary Care Provider: Ariela Hampton DO   Referring Provider: Annalisa Jeffers,*    Chief Complaint: Follow-up CHF    History of Present Illness:   Mrs. Addie Perez is a 89 y.o. female who presents to the HF Clinic for follow up.   The patient has a past medical history significant for hyperlipidemia, hypertension, and type 2 diabetes mellitus.  She has a past cardiac history significant for chronic diastolic heart failure and persistent atrial fibrillation.  The patient was recently loaded with amiodarone in anticipation of cardioversion.  This was discontinued, however, after an outpatient cardiac monitor study showed 100% burden for atrial fibrillation.  She has since declined cardioversion as well.  She was recently started on low-dose spironolactone.  She is accompanied by her daughter and presents today for follow-up.  The patient reports feeling well from a cardiac standpoint.  She does report 1 episode in the last month or so where she has been awakened from sleep with reports of palpitations.  The patient has been cutting her 100 mg tablet of metoprolol in half and taking it twice a day.  She notes significant fatigue and weakness if she takes the whole tablet at a time.  She reports good blood pressures at home with SBP's in the 100s/70s-80s.  She specifically denies chest pain, but may reports shortness of breath with increased humidity outside.  She denies any dyspnea with ADLs.  She reports occasional dizziness consistent with her baseline of intermittent vertigo.  She offers no other complaints or concerns at this time.    Review of Systems:   Review of Systems  As Noted in HPI.   I have reviewed and confirmed the accuracy of the ROS as documented by the MA/KATIEN/RN JOHAN Peña    I have reviewed and/or updated the patient's past medical, past surgical,  family, social history, problem list and allergies as appropriate.     Medications:     Current Outpatient Medications:     acetaminophen (TYLENOL) 650 MG 8 hr tablet, Take 1 tablet by mouth 2 (Two) Times a Day As Needed., Disp: , Rfl:     apixaban (ELIQUIS) 5 MG tablet tablet, Take 1 tablet by mouth 2 (Two) Times a Day., Disp: 28 tablet, Rfl: 0    Carboxymethylcellulose Sodium 0.25 % solution, Apply 1 drop to eye(s) as directed by provider 2 (Two) Times a Day. REPORTS USES TO BOTH EYES, Disp: , Rfl:     cetirizine (zyrTEC) 10 MG tablet, cetirizine 10 mg tablet  Take 1 tablet every day by oral route for 30 days., Disp: , Rfl:     CRANBERRY EXTRACT PO, Take 1 tablet by mouth Daily. Pt taking every other day, Disp: , Rfl:     Diclofenac Sodium (VOLTAREN) 1 % gel gel, As Needed., Disp: , Rfl:     estradiol (ESTRACE) 0.5 MG tablet, Take 1 tablet by mouth 2 (Two) Times a Week. REPORTS TAKES THIS ON Monday AND Friday AT NIGHT TIME, Disp: 24 tablet, Rfl: 10    losartan (COZAAR) 50 MG tablet, Take 1 tablet by mouth 2 (Two) Times a Day., Disp: , Rfl:     multivitamin with minerals tablet tablet, Take 1 tablet by mouth Daily. One-A-Day every other day, Disp: , Rfl:     omeprazole (prilOSEC) 10 MG capsule, Take 1 capsule by mouth Every Morning., Disp: , Rfl:     spironolactone (ALDACTONE) 25 MG tablet, Take 0.5 tablets by mouth Daily. *Please split tablets for patient*, Disp: 45 tablet, Rfl: 0    torsemide (DEMADEX) 5 MG tablet, Take 1 tablet by mouth Daily., Disp: , Rfl:     metoprolol succinate XL (TOPROL-XL) 100 MG 24 hr tablet, Take 1 tablet by mouth Daily for 30 days. (Patient taking differently: Take 0.5 tablets by mouth Daily. Pt taking 50mg two times daily), Disp: 30 tablet, Rfl: 0    Physical Exam:     5/8/24 4/24/24 3/26/24 3/3/24   /82 112/60 122/62 99/65   Heart Rate 88 78 76 88   Resp 18 18 18 11   Temp -- -- -- 98.5 °F (36.9 °C)   SpO2 97 % 98 % 98 % 96 %   Weight 65.8 kg (145 lb) 64 kg (141 lb) 64.9 kg  "(143 lb) 65.8 kg (145 lb)   Height 167.6 cm (66\") 167.6 cm (66\") 167.6 cm (66\") 165.1 cm (65\")   BMI (Calculated) 23.4 22.8 23.1 24.1     Vital Signs:   Vitals:    06/10/24 1115   BP: 101/53   Pulse: 68   Temp: 97.5 °F (36.4 °C)   SpO2: 97%   Weight: 65.7 kg (144 lb 12.8 oz)     Body mass index is 23.37 kg/m².    Physical Exam  Vitals and nursing note reviewed.   Constitutional:       General: She is not in acute distress.  HENT:      Head: Normocephalic and atraumatic.   Neck:      Trachea: Trachea normal.   Cardiovascular:      Rate and Rhythm: Normal rate. Rhythm irregular.      Pulses: Normal pulses.      Heart sounds: Normal heart sounds. No murmur heard.     No friction rub. No gallop.   Pulmonary:      Effort: Pulmonary effort is normal.      Breath sounds: Normal breath sounds.   Musculoskeletal:      Cervical back: Neck supple.      Right lower leg: No edema.      Left lower leg: No edema.   Skin:     General: Skin is warm and dry.   Neurological:      Mental Status: She is alert and oriented to person, place, and time.   Psychiatric:         Mood and Affect: Mood normal.         Behavior: Behavior normal. Behavior is cooperative.         Thought Content: Thought content does not include suicidal ideation.         Results Review:   I reviewed the patient's new clinical results.    Procedures    Assessment / Plan:     1. Heart failure with preserved ejection fraction, unspecified HF chronicity (Primary)  Stage C  NYHA functional class II  Euvolemic on exam  Patient is on max tolerated GDMT    2. Persistent atrial fibrillation  Largely asymptomatic  Continue beta blocker for rate control  Continue Eliquis for CVA prophylaxis    3.  Primary hypertension  Acceptable blood pressure control    Preventative Cardiology:   Tobacco Cessation: N/A   Advance Care Planning: ACP discussion was declined by the patient. Patient has an advance directive in EMR which is still valid.      Follow Up:   Return in about 3 months " (around 9/10/2024) for DSM.      Thank you for allowing me to participate in the care of your patient. Please to not hesitate to contact me with additional questions or concerns.     JOHAN Wallace

## 2024-06-10 NOTE — PROGRESS NOTES
Ambulatory Care Clinic  Heart Failure Pharmacist Note     Patient Name: Addie Perez  :1934  Age: 89 y.o.  Sex: female  Referring Provider: Annalisa Jeffers,*   Primary Cardiologist: Dr. Je Nunez  Encounter Provider:  JOAHN Peña    HPI: Patient presents for follow-up evaluation in heart failure clinic for HFpEF (59%; 56-60% in 2024). PMH significant for HTN, hyperlipidemia, CKD, Afib, T2DM.    Patient reports she feels good from a heart failure standpoint. She reports an event at night where she woke up and felt paraesthesias and her heart was racing.  Her home HR is not stable and fluctuates between 65-95. Patient has a 100% AF burden and cardioversion has been discussed with Dr. Kwong. She takes half a tablet of metoprolol 100mg BID. She has tried previously to take a whole tab of metoprolol 100mg, but feels weak and tired. She also complains of vertigo. Her home BP is 100's/70-80's, with some SBP in the 90's. She reports that she feels dizzy when the SBP is that low, but is unsure if it is due to vertigo or hypotension. She does report shortness of breath but claims it is from the humidity. Upon APRN examination, there wasn't any noticeable edema and lungs sound clear upon auscultation.     Will follow-up in 3 months.    Heart Failure GDMT:    Drug Class   Drug   Dose Last Dose Adjustment   Additional Titration   Notes   ACEi/ARB/ARNI Losartan 50 mg QD      Beta Blocker Metoprolol 100 mg QD   Pt cuts tablet in half and dose 50mg in AM and 50mg in PM   MRA Spironolactone 12.5 mg Initiated 24     SGLT2i N/A   N/A Hx of recurrent UTI noted     Other CV Meds:  Torsemide 5mg QD  Eliquis 5mg BID    Medication Use:  Adherence: good  Past hx of medication use/intolerance: N/A  Affordability: difficulty affording Eliquis; possible switch to warfarin    Social Determinants:    Social Determinants of Health     Tobacco Use: Low Risk  (6/10/2024)    Patient History     Smoking  Tobacco Use: Never     Smokeless Tobacco Use: Never     Passive Exposure: Never   Alcohol Use: Not At Risk (3/4/2024)    AUDIT-C     Frequency of Alcohol Consumption: Never     Average Number of Drinks: Patient does not drink     Frequency of Binge Drinking: Never   Financial Resource Strain: Low Risk  (3/4/2024)    Overall Financial Resource Strain (CARDIA)     Difficulty of Paying Living Expenses: Not hard at all   Food Insecurity: No Food Insecurity (3/4/2024)    Hunger Vital Sign     Worried About Running Out of Food in the Last Year: Never true     Ran Out of Food in the Last Year: Never true   Transportation Needs: No Transportation Needs (3/4/2024)    PRAPARE - Transportation     Lack of Transportation (Medical): No     Lack of Transportation (Non-Medical): No   Physical Activity: Inactive (3/4/2024)    Exercise Vital Sign     Days of Exercise per Week: 0 days     Minutes of Exercise per Session: 0 min   Stress: No Stress Concern Present (3/4/2024)    [employer] - Occupational Stress Questionnaire     Feeling of Stress : Not at all   Social Connections: Not At Risk (3/4/2024)    Family and Community Support     Help with Day-to-Day Activities: I get all the help I need     Lonely or Isolated: Never   Interpersonal Safety: Not At Risk (3/4/2024)    Abuse Screen     Unsafe at Home or Work/School: no     Feels Threatened by Someone?: no     Does Anyone Keep You from Contacting Others or Doint Things Outside the Home?: no     Physical Sign of Abuse Present: no   Depression: Not at risk (3/4/2024)    PHQ-2     PHQ-2 Score: 0   Housing Stability: Not At Risk (3/4/2024)    Housing Stability     Current Living Arrangements: home     Potentially Unsafe Housing Conditions: none   Utilities: Not At Risk (3/4/2024)    [facility] Utilities     Threatened with loss of utilities: No   Health Literacy: Not At Risk (3/4/2024)    Education     Help with school or training?: No     Preferred Language:  English   Employment: Not At Risk (3/4/2024)    Employment     Do you want help finding or keeping work or a job?: I do not need or want help   Disabilities: At Risk (3/4/2024)    Disabilities     Concentrating, Remembering, or Making Decisions Difficulty: no     Doing Errands Independently Difficulty: yes       Immunization Status:  Pneumococcal:   Influenza:  COVID-19:    OBJECTIVE:  /53   Pulse 68   Temp 97.5 °F (36.4 °C)   Wt 65.7 kg (144 lb 12.8 oz)   LMP  (LMP Unknown)   SpO2 97%   BMI 23.37 kg/m²     Body mass index is 23.37 kg/m².  Wt Readings from Last 1 Encounters:   06/10/24 65.7 kg (144 lb 12.8 oz)       Home BP Readings:  10-yr ASCVD risk: The ASCVD Risk score (Raul BLAKE, et al., 2019) failed to calculate for the following reasons:    The 2019 ASCVD risk score is only valid for ages 40 to 79    Lab Review:  Renal Function: Estimated Creatinine Clearance: 30.3 mL/min (A) (by C-G formula based on SCr of 1.28 mg/dL (H)).    Lab Results   Component Value Date    PROBNP 2,324.0 (H) 03/03/2024       Results for orders placed during the hospital encounter of 03/03/24    Adult Transthoracic Echo Complete W/ Cont if Necessary Per Protocol    Interpretation Summary    Left ventricular systolic function is normal. Calculated left ventricular 3D EF = 59% Left ventricular ejection fraction appears to be 56 - 60%.    Left ventricular diastolic dysfunction is noted.    The left atrial cavity is dilated.    The right atrial cavity is dilated.    Moderate mitral valve regurgitation is present. (SBP 150s)    Moderate to severe tricuspid valve regurgitation is present.    Estimated right ventricular systolic pressure from tricuspid regurgitation is moderately elevated (45-55 mmHg).      ASSESSMENT/PLAN:  HFpEF (EF 56-60%)  Continue spironolactone 12.5 mg daily   Continue Metoprolol  mg QD  Continue Losartan 50 mg QD  Defer SGLT2 inhibitor -- hx of recurrent UTIs  Continue torsemide daily.   Advised  patient to call clinic with 2-3 lb weight gain in 24 hrs or >5 lb weight gain in 1 week         Taurus Narayanan, Pharmacy Intern  [facility]  06/10/24  10:16 EDT

## 2024-06-19 ENCOUNTER — TELEPHONE (OUTPATIENT)
Dept: CARDIOLOGY | Facility: CLINIC | Age: 89
End: 2024-06-19
Payer: MEDICARE

## 2024-06-19 DIAGNOSIS — I48.19 PERSISTENT ATRIAL FIBRILLATION: ICD-10-CM

## 2024-06-19 RX ORDER — AMIODARONE HYDROCHLORIDE 200 MG/1
200 TABLET ORAL DAILY
Qty: 30 TABLET | Refills: 1 | OUTPATIENT
Start: 2024-06-19

## 2024-06-19 NOTE — TELEPHONE ENCOUNTER
Caller: MARIA ANTONIA BLANK    Relationship:    Callback number: [phone]   Is it ok to leave a message: [x] Yes [] No    Requested medication for samples: ELIQUIS    How much medication does the patient currently have left: 5 DAYS    Who will be picking up the samples: [facility]    Do you need information about patient financial assistance for this medication: [] Yes [x] No    Additional details provided: PLEASE CALL PATIENT  WHEN  IS READY

## 2024-06-24 RX ORDER — AMIODARONE HYDROCHLORIDE 200 MG/1
200 TABLET ORAL DAILY
Qty: 30 TABLET | Refills: 1 | OUTPATIENT
Start: 2024-06-24

## 2024-06-28 ENCOUNTER — TELEPHONE (OUTPATIENT)
Dept: PHARMACY | Facility: HOSPITAL | Age: 89
End: 2024-06-28
Payer: MEDICARE

## 2024-06-28 NOTE — TELEPHONE ENCOUNTER
Received a call this morning from patient with complaints of intermittent diarrhea. She stated that it is not all the time but is occasionally unexpected. She wanted to know if this could be due to the diuretic she is currently taking or if it would need to be changed.    Per JOHAN Lira it was recommended for patient to not make any changes to her medications. She recommended patient to try a liquid diet for 10 hours then slowly introduce soft foods referring to the BRAT diet. If diarrhea continued, she would need to follow up with PCP.    This information was relayed to patient and she was given examples of a liquid and BRAT diet. Patient verbalized understanding and did not have any questions.    ROSY Gleason

## 2024-07-03 ENCOUNTER — TELEPHONE (OUTPATIENT)
Dept: PHARMACY | Facility: HOSPITAL | Age: 89
End: 2024-07-03
Payer: MEDICARE

## 2024-07-03 DIAGNOSIS — I48.19 PERSISTENT ATRIAL FIBRILLATION: ICD-10-CM

## 2024-07-05 DIAGNOSIS — I48.19 PERSISTENT ATRIAL FIBRILLATION: ICD-10-CM

## 2024-07-15 ENCOUNTER — HOSPITAL ENCOUNTER (OUTPATIENT)
Dept: MAMMOGRAPHY | Facility: HOSPITAL | Age: 89
Discharge: HOME | End: 2024-07-15
Admitting: SURGERY
Payer: MEDICARE

## 2024-07-15 DIAGNOSIS — Z12.31 SCREENING MAMMOGRAM FOR BREAST CANCER: ICD-10-CM

## 2024-07-15 PROCEDURE — 77063 BREAST TOMOSYNTHESIS BI: CPT

## 2024-07-15 PROCEDURE — 77067 SCR MAMMO BI INCL CAD: CPT

## 2024-07-19 ENCOUNTER — TRANSCRIBE ORDERS (OUTPATIENT)
Dept: ADMINISTRATIVE | Facility: HOSPITAL | Age: 89
End: 2024-07-19
Payer: MEDICARE

## 2024-07-19 DIAGNOSIS — R92.8 ABNORMAL MAMMOGRAM: Primary | ICD-10-CM

## 2024-08-01 DIAGNOSIS — I50.30 DIASTOLIC CONGESTIVE HEART FAILURE, UNSPECIFIED HF CHRONICITY: ICD-10-CM

## 2024-08-01 RX ORDER — SPIRONOLACTONE 25 MG/1
12.5 TABLET ORAL DAILY
Qty: 45 TABLET | Refills: 3 | Status: SHIPPED | OUTPATIENT
Start: 2024-08-01

## 2024-08-01 NOTE — TELEPHONE ENCOUNTER
Per Luther, [facility] - Refills sent. OK for her to get injection in her knee. She should continue monitoring her BP daily and let us know if she has any BP <90/60. Fatigue may be related to heat, age, hydration status. She should ensure that she is staying hydrated. If she does not have any additional symptoms (SOB, swelling in lower extremities/abdomen, dry cough, weight gain), she can continue current medications. Can call clinic if fatigue does not improve.     Pt was notified. Pt then started reading off her BP log. Pt had several diastolic reading that were less than 60. I spoke to Luther again and she recommended pt hold the spironolactone over the weekend and come in on Monday. Pt is agreeable to this. Appt has been scheduled

## 2024-08-01 NOTE — TELEPHONE ENCOUNTER
"Mrs. Perez called requesting a refill on her spironolactone be sent to [facility].    Pt also asked if it would be ok for her to get an injection in her knee ( I have left a vm with [facility] to find out what is in the injection, Mrs. Perez said it was a \"gel\")    She also wanted to let us know that she has been feeling really tired lately. Her BP and everything is doing fine, just fatigued.    Please advise.  "

## 2024-08-05 ENCOUNTER — DISEASE STATE MANAGEMENT VISIT (OUTPATIENT)
Dept: PHARMACY | Facility: HOSPITAL | Age: 89
End: 2024-08-05
Payer: MEDICARE

## 2024-08-05 ENCOUNTER — TELEPHONE (OUTPATIENT)
Dept: CARDIOLOGY | Facility: CLINIC | Age: 89
End: 2024-08-05
Payer: MEDICARE

## 2024-08-05 VITALS
BODY MASS INDEX: 23.4 KG/M2 | OXYGEN SATURATION: 98 % | SYSTOLIC BLOOD PRESSURE: 98 MMHG | WEIGHT: 145 LBS | DIASTOLIC BLOOD PRESSURE: 59 MMHG | HEART RATE: 73 BPM | TEMPERATURE: 97.5 F

## 2024-08-05 DIAGNOSIS — I48.19 PERSISTENT ATRIAL FIBRILLATION: ICD-10-CM

## 2024-08-05 DIAGNOSIS — I95.9 HYPOTENSION, UNSPECIFIED HYPOTENSION TYPE: Primary | ICD-10-CM

## 2024-08-05 PROCEDURE — 99213 OFFICE O/P EST LOW 20 MIN: CPT | Performed by: NURSE PRACTITIONER

## 2024-08-05 PROCEDURE — G0463 HOSPITAL OUTPT CLINIC VISIT: HCPCS

## 2024-08-05 RX ORDER — FLUTICASONE PROPIONATE 50 MCG
2 SPRAY, SUSPENSION (ML) NASAL DAILY PRN
COMMUNITY
Start: 2024-07-16

## 2024-08-05 NOTE — TELEPHONE ENCOUNTER
Plan to call patient next  Monday to check her blood pressure and consider restarting spironolactone.

## 2024-08-05 NOTE — PROGRESS NOTES
Ambulatory Care Clinic  Heart Failure Pharmacist Note     Patient Name: Addie Perez  :1934  Age: 89 y.o.  Sex: female  Referring Provider: Annalisa Jeffers,*   Primary Cardiologist: Dr. Je Nunez  Encounter Provider:  JOHAN Peña    HPI: Patient presents for follow-up evaluation in heart failure clinic for HFpEF (59%; 56-60% in 2024). PMH significant for HTN, hyperlipidemia, CKD, Afib, T2DM.    Patient called the clinic last week to report feeling tired and diastolic pressures under 60. She was instructed to stop Spironolactone. She reports feeling much better since holding. Her home HR is not stable and fluctuates between 65-95. Patient has a 100% AF burden and cardioversion has been discussed with Dr. Kwong. She continues Torsemide daily. Discussed potential of stopping this and then allowing more adjustments in HF medications, however, patient feels most comfortable staying on Torsemide daily at this time. She confirms that she has been taking Losartan 50mg twice daily for a total of 100mg.    Heart Failure GDMT:    Drug Class   Drug   Dose Last Dose Adjustment   Additional Titration   Notes   ACEi/ARB/ARNI Losartan 50 mg QD 24  Reduce to 50mg once daily from BID   Beta Blocker Metoprolol 100 mg QD   Pt cuts tablet in half and dose 50mg in AM and 50mg in PM   MRA Spironolactone 12.5 mg Initiated 24     SGLT2i N/A   N/A Hx of recurrent UTI noted     Other CV Meds:  Torsemide 5mg QD  Eliquis 5mg BID    Medication Use:  Adherence: good  Past hx of medication use/intolerance: N/A  Affordability: difficulty affording Eliquis; possible switch to warfarin    Social Determinants:    Social Determinants of Health     Tobacco Use: Low Risk  (2024)    Patient History     Smoking Tobacco Use: Never     Smokeless Tobacco Use: Never     Passive Exposure: Never   Alcohol Use: Not At Risk (3/4/2024)    AUDIT-C     Frequency of Alcohol Consumption: Never     Average Number  of Drinks: Patient does not drink     Frequency of Binge Drinking: Never   Financial Resource Strain: Low Risk  (3/4/2024)    Overall Financial Resource Strain (CARDIA)     Difficulty of Paying Living Expenses: Not hard at all   Food Insecurity: No Food Insecurity (3/4/2024)    Hunger Vital Sign     Worried About Running Out of Food in the Last Year: Never true     Ran Out of Food in the Last Year: Never true   Transportation Needs: No Transportation Needs (3/4/2024)    PRAPARE - Transportation     Lack of Transportation (Medical): No     Lack of Transportation (Non-Medical): No   Physical Activity: Inactive (3/4/2024)    Exercise Vital Sign     Days of Exercise per Week: 0 days     Minutes of Exercise per Session: 0 min   Stress: No Stress Concern Present (3/4/2024)    [employer] [place] [employer] - Occupational Stress Questionnaire     Feeling of Stress : Not at all   Social Connections: Not At Risk (3/4/2024)    Family and Community Support     Help with Day-to-Day Activities: I get all the help I need     Lonely or Isolated: Never   Interpersonal Safety: Not At Risk (3/4/2024)    Abuse Screen     Unsafe at Home or Work/School: no     Feels Threatened by Someone?: no     Does Anyone Keep You from Contacting Others or Doint Things Outside the Home?: no     Physical Sign of Abuse Present: no   Depression: Not at risk (3/4/2024)    PHQ-2     PHQ-2 Score: 0   Housing Stability: Not At Risk (3/4/2024)    Housing Stability     Current Living Arrangements: home     Potentially Unsafe Housing Conditions: none   Utilities: Not At Risk (3/4/2024)    [facility] Utilities     Threatened with loss of utilities: No   Health Literacy: Not At Risk (3/4/2024)    Education     Help with school or training?: No     Preferred Language: English   Employment: Not At Risk (3/4/2024)    Employment     Do you want help finding or keeping work or a job?: I do not need or want help   Disabilities: At Risk (3/4/2024)    Disabilities      Concentrating, Remembering, or Making Decisions Difficulty: no     Doing Errands Independently Difficulty: yes       Immunization Status:  Pneumococcal: Yes- PCV13 12/8/2016; PPV23 10/23/2006  Influenza: yearly  COVID-19: Last 9/16/2022    OBJECTIVE:  BP 98/59   Pulse 73   Temp 97.5 °F (36.4 °C)   Wt 65.8 kg (145 lb)   LMP  (LMP Unknown)   SpO2 98%   BMI 23.40 kg/m²     Body mass index is 23.4 kg/m².  Wt Readings from Last 1 Encounters:   08/05/24 65.8 kg (145 lb)       Home BP Readings:  10-yr ASCVD risk: The ASCVD Risk score (Raul BLAKE, et al., 2019) failed to calculate for the following reasons:    The 2019 ASCVD risk score is only valid for ages 40 to 79    Lab Review:  Renal Function: CrCl cannot be calculated (Patient's most recent lab result is older than the maximum 30 days allowed.).    Lab Results   Component Value Date    PROBNP 2,324.0 (H) 03/03/2024       Results for orders placed during the hospital encounter of 03/03/24    Adult Transthoracic Echo Complete W/ Cont if Necessary Per Protocol    Interpretation Summary    Left ventricular systolic function is normal. Calculated left ventricular 3D EF = 59% Left ventricular ejection fraction appears to be 56 - 60%.    Left ventricular diastolic dysfunction is noted.    The left atrial cavity is dilated.    The right atrial cavity is dilated.    Moderate mitral valve regurgitation is present. (SBP 150s)    Moderate to severe tricuspid valve regurgitation is present.    Estimated right ventricular systolic pressure from tricuspid regurgitation is moderately elevated (45-55 mmHg).      ASSESSMENT/PLAN:  HFpEF (EF 56-60%)  Hold spironolactone 12.5 mg daily   Continue Metoprolol  mg QD (patient cuts in half and takes 50mg in the morning and 50mg at night)  Decrease dose to Losartan 50 mg QD.   Defer SGLT2 inhibitor -- hx of recurrent UTIs  Continue torsemide daily.   Advised patient to call clinic with 2-3 lb weight gain in 24 hrs or >5 lb weight  gain in 1 week  Will call patient in one week and follow up with BP. Based on results- may resume ibis 12.5mg if patient is agreeable.       Daphney Ibrahim, PharmD  [facility] Failure [facility]  08/05/24  10:16 EDT

## 2024-08-05 NOTE — PATIENT INSTRUCTIONS
CONTINUE TO HOLD SPIRONOLACTONE.    CHANGE LOSARTAN TO JUST ONCE DAILY IN THE MORNING.  DISCONTINUE THE EVENING DOSE.    WE WILL CALL YOU IN 1 WEEK TO DISCUSS YOUR BLOOD PRESSURE AT HOME AND SEE IF WE CAN ADD THE SPIRONOLACTONE BACK.

## 2024-08-05 NOTE — PROGRESS NOTES
[facility] Follow Up Note    Addie Perez  [id number]  08/05/2024    Primary Care Provider: Ariela Hampton DO   Referring Provider: Annalisa Jeffers,*    Chief Complaint: Low blood pressure, fatigue    History of Present Illness:   Mrs. Addie Perez is a 89 y.o. female who presents to the HF Clinic for follow up of hypotension and fatigue.  The patient called the clinic last week and reported multiple DBP's in 40's and 50's.  She was advised to hold spironolactone over the weekend.  She is accompanied by her daughter and presents for follow-up.  The patient reports feeling much better after holding spironolactone.  She states she was so tired with low diastolic BP's that she could barely stay awake.  She specifically denies chest pain, dyspnea.  She reports that every morning she has gained 2 lbs overnight and states her torsemide keeps her weight stable.  She offers no other complaints or concerns at this time.    Review of Systems:   Review of Systems  As Noted in HPI.   I have reviewed and confirmed the accuracy of the ROS as documented by the MA/LPN/RN JOHAN Peña    I have reviewed and/or updated the patient's past medical, past surgical, family, social history, problem list and allergies as appropriate.     Medications:     Current Outpatient Medications:     acetaminophen (TYLENOL) 650 MG 8 hr tablet, Take 1 tablet by mouth 2 (Two) Times a Day As Needed., Disp: , Rfl:     apixaban (ELIQUIS) 5 MG tablet tablet, Take 1 tablet by mouth Every 12 (Twelve) Hours., Disp: 60 tablet, Rfl: 6    Carboxymethylcellulose Sodium 0.25 % solution, Apply 1 drop to eye(s) as directed by provider 2 (Two) Times a Day. REPORTS USES TO BOTH EYES, Disp: , Rfl:     cetirizine (zyrTEC) 10 MG tablet, cetirizine 10 mg tablet  Take 1 tablet every day by oral route for 30 days., Disp: , Rfl:     CRANBERRY EXTRACT PO, Take 1 tablet by mouth Daily. Pt taking every  other day, Disp: , Rfl:     estradiol (ESTRACE) 0.5 MG tablet, Take 1 tablet by mouth 2 (Two) Times a Week. REPORTS TAKES THIS ON Monday AND Friday AT NIGHT TIME, Disp: 24 tablet, Rfl: 10    fluticasone (FLONASE) 50 MCG/ACT nasal spray, 2 sprays into the nostril(s) as directed by provider Daily As Needed., Disp: , Rfl:     losartan (COZAAR) 50 MG tablet, Take 1 tablet by mouth 2 (Two) Times a Day., Disp: , Rfl:     metoprolol succinate XL (TOPROL-XL) 100 MG 24 hr tablet, Take 1 tablet by mouth Daily for 30 days. (Patient taking differently: Take 0.5 tablets by mouth Daily. Pt taking 50mg two times daily), Disp: 30 tablet, Rfl: 0    multivitamin with minerals tablet tablet, Take 1 tablet by mouth Daily. One-A-Day every other day, Disp: , Rfl:     omeprazole (prilOSEC) 10 MG capsule, Take 1 capsule by mouth Every Morning., Disp: , Rfl:     torsemide (DEMADEX) 5 MG tablet, Take 1 tablet by mouth Daily., Disp: , Rfl:     spironolactone (ALDACTONE) 25 MG tablet, Take 0.5 tablets by mouth Daily. *Please split tablets for patient* (Patient not taking: Reported on 8/5/2024), Disp: 45 tablet, Rfl: 3    Physical Exam:  Vital Signs:   Vitals:    08/05/24 1122   BP: 98/59   Pulse: 73   Temp: 97.5 °F (36.4 °C)   SpO2: 98%   Weight: 65.8 kg (145 lb)     Body mass index is 23.4 kg/m².    Physical Exam  Vitals and nursing note reviewed.   Constitutional:       General: She is not in acute distress.  HENT:      Head: Normocephalic and atraumatic.   Neck:      Trachea: Trachea normal.   Cardiovascular:      Rate and Rhythm: Normal rate and regular rhythm.      Pulses: Normal pulses.      Heart sounds: Normal heart sounds. No murmur heard.     No friction rub. No gallop.   Pulmonary:      Effort: Pulmonary effort is normal.      Breath sounds: Normal breath sounds.   Musculoskeletal:      Cervical back: Neck supple.      Right lower leg: No edema.      Left lower leg: No edema.   Skin:     General: Skin is warm and dry.    Neurological:      Mental Status: She is alert and oriented to person, place, and time.   Psychiatric:         Mood and Affect: Mood normal.         Behavior: Behavior normal. Behavior is cooperative.         Thought Content: Thought content does not include suicidal ideation.         Results Review:   I reviewed the patient's new clinical results.    Procedures    Assessment / Plan:   Diagnoses and all orders for this visit:    1. Hypotension (Primary)  Marginal blood pressure today, but asymptomatic  Given her age, change losartan to 50 mg once daily      Patient Instructions   CONTINUE TO HOLD SPIRONOLACTONE.    CHANGE LOSARTAN TO JUST ONCE DAILY IN THE MORNING.  DISCONTINUE THE EVENING DOSE.    WE WILL CALL YOU IN 1 WEEK TO DISCUSS YOUR BLOOD PRESSURE AT HOME AND SEE IF WE CAN ADD THE SPIRONOLACTONE BACK.         Preventative Cardiology:   Tobacco Cessation: N/A   Advance Care Planning: ACP discussion was declined by the patient. Patient does not have an advance directive, declines further assistance.     Follow Up:   WILL CALL PATIENT IN 1 WEEK TO REASSESS BLOOD PRESSURE    Thank you for allowing me to participate in the care of your patient. Please to not hesitate to contact me with additional questions or concerns.     JOHAN Wallace

## 2024-08-12 ENCOUNTER — TELEPHONE (OUTPATIENT)
Dept: CARDIOLOGY | Facility: CLINIC | Age: 89
End: 2024-08-12
Payer: MEDICARE

## 2024-08-12 NOTE — TELEPHONE ENCOUNTER
"108/67, 72  103/69, 94  110/58,76  99/48, 56 (asymptomatic, \"I sat around all day\")  101/53, 68  126/65, 65    Patient reports feeling well and does not want to add spironolactone back at this time.  She reports stable weight and would like to keep her September follow-up appointment.  She has agreed to call back with any additional blood pressure/fluid/breathing concerns before then, if needed.      "

## 2024-08-19 ENCOUNTER — OFFICE VISIT (OUTPATIENT)
Dept: GASTROENTEROLOGY | Facility: CLINIC | Age: 89
End: 2024-08-19
Payer: MEDICARE

## 2024-08-19 VITALS
DIASTOLIC BLOOD PRESSURE: 80 MMHG | OXYGEN SATURATION: 97 % | SYSTOLIC BLOOD PRESSURE: 120 MMHG | WEIGHT: 146 LBS | HEART RATE: 99 BPM | BODY MASS INDEX: 23.57 KG/M2

## 2024-08-19 DIAGNOSIS — K58.0 IRRITABLE BOWEL SYNDROME WITH DIARRHEA: Chronic | ICD-10-CM

## 2024-08-19 DIAGNOSIS — K21.9 GASTROESOPHAGEAL REFLUX DISEASE WITHOUT ESOPHAGITIS: Chronic | ICD-10-CM

## 2024-08-19 DIAGNOSIS — R13.19 ESOPHAGEAL DYSPHAGIA: Primary | Chronic | ICD-10-CM

## 2024-08-19 DIAGNOSIS — K22.2 SCHATZKI'S RING: Chronic | ICD-10-CM

## 2024-08-19 PROCEDURE — 1159F MED LIST DOCD IN RCRD: CPT | Performed by: NURSE PRACTITIONER

## 2024-08-19 PROCEDURE — 1160F RVW MEDS BY RX/DR IN RCRD: CPT | Performed by: NURSE PRACTITIONER

## 2024-08-19 PROCEDURE — 99214 OFFICE O/P EST MOD 30 MIN: CPT | Performed by: NURSE PRACTITIONER

## 2024-08-19 NOTE — PROGRESS NOTES
"     Follow Up Note     Date: 2024   Patient Name: Addie Perez  MRN: [id number]  : 1934     Primary Care Provider: Ariela Hampton DO     Chief Complaint   Patient presents with    Difficulty Swallowing     History of present illness:   2024  Addie Perez is a 89 y.o. female who is here today for follow up for difficulty swallowing. Difficulty swallowing is unchanged, comes and goes. She still feels like food takes a long time to go down. Reflux reasonably controlled with low dose PPI daily.     She had CHF from a-[facility] in 2024 and was started on blood thinners and a \"water pill\", as well as other cardiac medications. Since then, she has occasional episodes of cramping and diarrhea. Nothing on a regular basis. Occurs randomly, not sure if it is associated with eating. Denies any GI bleeding.     Interval History:  2024  Addie Perez is a 89 y.o. female who is here today for follow up for difficulty swallowing. It has gotten better since her EGD, but she still feels like her food \"takes too long\" to go down. No constipation or diarrhea. Denies GI bleeding.      10/5/2023  She has been having difficulty swallowing for the past 2 years that occurs 2-3 times per week. She has difficulty swallowing solids and liquids. She feels it gets \"backed up\" in her esophagus and takes a long time to go down. No history of weight loss. She has reflux for many years that is controlled with Omeprazole daily.      Denies changes in bowel habits, constipation or diarrhea. Denies GI bleeding. Her last colonoscopy was in  by Dr. Gallegos. Her last EGD was in 2018 by Dr. Quezada.    Subjective      Past Medical History:   Diagnosis Date    Arthritis     Back pain     Cataract     PT REPORTS EARLY STAGES    Cystitis     Diabetes mellitus      REPORTS \"I AM PRE DIABETIC\" AND REPORTS THAT SHE WATCHS HER DIET AND PCP WATCHES A1C CLOSELY    Disease of thyroid gland     Dysphagia     REPORTS MORE TROUBLE " "SWALLOWING WHEN EATING OR TAKING PILLS. REPORTS PAIN AND THAT SOMETIMES THINGS FEEL LIKE THEY \"STICK\"    Elevated cholesterol 09/20/2018    REPORTS SHE HAS TAKEN MEDICATION IN THE PAST BUT THAT SHE THINKS ALL IS WNL'S AT PRESENT TIME WITHOUT MEDICATION    Fracture of right ankle 2020    GERD (gastroesophageal reflux disease)     Hearing loss     REPORTS MILD AND NO USE OF HEARING AIDS    History of cardiac murmur     REPORTS \"I USED TO HAVE ONE BUT NOW THEY SAY THEY DON'T HEAR IT\"    History of colonic polyps     History of pneumonia     Hx of echocardiogram     Hypercholesterolemia     Hypertension     Irregular heart beat     Macular degeneration     Osteoporosis     Seasonal allergies     Seasonal allergies     Sinusitis     Spinal headache 09/20/2018    REPORTS AFTER DELIVERY OF HER SON    Urinary tract infection     Vertigo     Wears glasses      Past Surgical History:   Procedure Laterality Date    BACK SURGERY      REPORTS A SPUR WAS REMOVED THAT WAS HITTING SCIATIC NERVE    BREAST BIOPSY      BREAST SURGERY Left     LUMPECTOMY, BENIGN     CATARACT EXTRACTION W/ INTRAOCULAR LENS IMPLANT Left 03/04/2022    Procedure: CATARACT PHACO EXTRACTION WITH INTRAOCULAR LENS IMPLANT left;  Surgeon: Pritesh Mcnally MD;  Location: [facility] OR;  Service: Ophthalmology;  Laterality: Left;    CATARACT EXTRACTION W/ INTRAOCULAR LENS IMPLANT Right 03/18/2022    Procedure: CATARACT PHACO EXTRACTION WITH INTRAOCULAR LENS IMPLANT RIGHT;  Surgeon: Pritesh Mcnally MD;  Location: [facility] OR;  Service: Ophthalmology;  Laterality: Right;    COLONOSCOPY  2012    REPORTS LAST DONE IN 2012 BUT HAS A HX OF SEVERAL OF THESE PROCEDURES    DILATATION AND CURETTAGE      REPORTS \"I HAD A COUPLE OF THOSE\"    ENDOSCOPY N/A 01/31/2017    Procedure: ESOPHAGOGASTRODUODENOSCOPY;  Surgeon: Robbie Quezada MD;  Location: [facility] ENDOSCOPY;  Service:     ENDOSCOPY N/A 09/21/2018    Procedure: ESOPHAGOGASTRODUODENOSCOPY WITH BIOPSY, DILITATION;  " Surgeon: Robbie Quezada MD;  Location: [facility] ENDOSCOPY;  Service: Gastroenterology    ENDOSCOPY N/A 11/15/2023    Procedure: ESOPHAGOGASTRODUODENOSCOPY WITH BIOPSY AND DILATION;  Surgeon: Mil Ma MD;  Location: [facility] ENDOSCOPY;  Service: Gastroenterology;  Laterality: N/A;    FOOT SURGERY Left     REPORTS SURGICAL INTERVENTION TO CORRECT GREAT TOE ALIGNMENT    GALLBLADDER SURGERY  2009    HEMORRHOIDECTOMY  1973    TONSILLECTOMY  1946    UPPER GASTROINTESTINAL ENDOSCOPY  2014    UPPER GASTROINTESTINAL ENDOSCOPY  01/31/2017     Family History   Problem Relation Age of Onset    Diabetes Mother     Cancer Brother         lung    Cancer Brother         brain    Colon cancer Paternal Grandmother         possibly colon cancer    Breast cancer Neg Hx      Social History     Socioeconomic History    Marital status:    Tobacco Use    Smoking status: Never     Passive exposure: Never    Smokeless tobacco: Never   Vaping Use    Vaping status: Never Used   Substance and Sexual Activity    Alcohol use: No    Drug use: No    Sexual activity: Defer       Current Outpatient Medications:     acetaminophen (TYLENOL) 650 MG 8 hr tablet, Take 1 tablet by mouth 2 (Two) Times a Day As Needed., Disp: , Rfl:     apixaban (ELIQUIS) 5 MG tablet tablet, Take 1 tablet by mouth Every 12 (Twelve) Hours., Disp: 60 tablet, Rfl: 6    Carboxymethylcellulose Sodium 0.25 % solution, Apply 1 drop to eye(s) as directed by provider 2 (Two) Times a Day. REPORTS USES TO BOTH EYES, Disp: , Rfl:     cetirizine (zyrTEC) 10 MG tablet, cetirizine 10 mg tablet  Take 1 tablet every day by oral route for 30 days., Disp: , Rfl:     CRANBERRY EXTRACT PO, Take 1 tablet by mouth Daily. Pt taking every other day, Disp: , Rfl:     estradiol (ESTRACE) 0.5 MG tablet, Take 1 tablet by mouth 2 (Two) Times a Week. REPORTS TAKES THIS ON Monday AND Friday AT NIGHT TIME, Disp: 24 tablet, Rfl: 10    fluticasone (FLONASE) 50 MCG/ACT nasal spray, 2 sprays  "into the nostril(s) as directed by provider Daily As Needed., Disp: , Rfl:     losartan (COZAAR) 50 MG tablet, Take 1 tablet by mouth Daily., Disp: , Rfl:     metoprolol succinate XL (TOPROL-XL) 100 MG 24 hr tablet, Take 1 tablet by mouth Daily for 30 days. (Patient taking differently: Take 0.5 tablets by mouth Daily. Pt taking 50mg two times daily), Disp: 30 tablet, Rfl: 0    multivitamin with minerals tablet tablet, Take 1 tablet by mouth Daily. One-A-Day every other day, Disp: , Rfl:     omeprazole (prilOSEC) 10 MG capsule, Take 1 capsule by mouth Every Morning., Disp: , Rfl:     torsemide (DEMADEX) 5 MG tablet, Take 1 tablet by mouth Daily., Disp: , Rfl:     Allergies   Allergen Reactions    Codeine Nausea Only    Escitalopram Nausea Only     REPORTS \"LEXAPRO\"    Gabapentin Other (See Comments)     REPORTS \"MAKES MY EYES HURT BECAUSE OF MY MACULAR DEGENERATION\"    Ibuprofen Palpitations     REPORTS \"IT CAUSES MY HEART TO BEAT FAST\"    Levofloxacin Nausea Only    Naproxen Nausea Only    Nitrofurantoin Nausea Only    Sulfa Antibiotics Nausea Only     The following portions of the patient's history were reviewed and updated as appropriate: allergies, current medications, past family history, past medical history, past social history, past surgical history and problem list.  Objective     Physical Exam  Vitals and nursing note reviewed.   Constitutional:       General: She is not in acute distress.     Appearance: Normal appearance. She is well-developed.   HENT:      Head: Normocephalic and atraumatic.      Mouth/Throat:      Mouth: Mucous membranes are not pale, not dry and not cyanotic.   Eyes:      General: Lids are normal.   Neck:      Trachea: Trachea normal.   Cardiovascular:      Rate and Rhythm: Normal rate.   Pulmonary:      Effort: Pulmonary effort is normal. No respiratory distress.      Breath sounds: Normal breath sounds.   Abdominal:      Tenderness: There is no abdominal tenderness.   Skin:     " General: Skin is warm and dry.   Neurological:      Mental Status: She is alert and oriented to person, place, and time.   Psychiatric:         Mood and Affect: Mood normal.         Speech: Speech normal.         Behavior: Behavior normal. Behavior is cooperative.       Vitals:    08/19/24 1108   BP: 120/80   Pulse: 99   SpO2: 97%   Weight: 66.2 kg (146 lb)     Body mass index is 23.57 kg/m².     Results Review:   I reviewed the patient's new clinical results.    No visits with results within 90 Day(s) from this visit.   Latest known visit with results is:   Lab on 05/13/2024   Component Date Value Ref Range Status    Glucose 05/13/2024 133 (H)  65 - 99 mg/dL Final    BUN 05/13/2024 24 (H)  8 - 23 mg/dL Final    Creatinine 05/13/2024 1.28 (H)  0.57 - 1.00 mg/dL Final    Sodium 05/13/2024 138  136 - 145 mmol/L Final    Potassium 05/13/2024 4.6  3.5 - 5.2 mmol/L Final    Chloride 05/13/2024 100  98 - 107 mmol/L Final    CO2 05/13/2024 24.6  22.0 - 29.0 mmol/L Final    Calcium 05/13/2024 9.3  8.2 - 9.6 mg/dL Final    BUN/Creatinine Ratio 05/13/2024 18.8  7.0 - 25.0 Final    Anion Gap 05/13/2024 13.4  5.0 - 15.0 mmol/L Final    eGFR 05/13/2024 39.9 (L)  >60.0 mL/min/1.73 Final      Comprehensive Metabolic Panel (03/03/2024 13:11)  CBC & Differential (03/03/2024 13:11)  TSH (03/03/2024 13:11)  Protime-INR (03/03/2024 13:11)    EGD dated January 31, 2017 per Dr. Quezada   - Distal esophageal stricture which was serially dilated to 18 mm.   - A small sliding hiatal hernia less than 3 cm  - Erosive distal esophagitis-LA class A  - 2 small early mid esophageal diverticula and erosive gastritis.   - Significant tortuosity and tertiary contractions of esophageal body were noted.   - Biopsies from the mid and distal esophageal revealed reactive mucosal changes consistent with reflux. No specialized mucosa. No increased eosinophils were noted. Gastric biopsies from the antrum body and angularis were negative for Helicobacter  pylori. Biopsies from the second portion of duodenum were negative for celiac disease.      EGD dated 9/21/2018 per Dr. Damien Montilla's-type ring. Status post serial dilation to 18 mm.  Small sliding hiatal hernia less than 3 cm.  Erythematous distal esophagitis.  Erythematous-erosive gastritis.  No Valdes's esophagus.  -Duodenum biopsy unremarkable.  Antrum body and fundus biopsy with mild chronic gastritis, no H. pylori.     EGD dated 11/15/2023 per Dr. Ma  - Normal oropharynx.  - Z-line regular, 37 cm from the incisors.  - 2 cm hiatal hernia.  - Obstructing and mild Schatzki ring. Dilated. Biopsied.  - Erythematous mucosa in the posterior wall of the stomach and antrum. Biopsied.  - Normal duodenal bulb, first portion of the duodenum, second portion of the duodenum and third portion of the duodenum.  A.     ANTRUM AND BODY, BIOPSY:  Gastric antral type mucosa with mild chronic inactive gastritis  Negative for H. pylori organisms on routine stain  Negative for intestinal metaplasia, dysplasia, or malignancy  B.     ESOPHAGUS, BIOPSY, RANDOM:  Squamous epithelium with reactive changes  Negative for intraepithelial eosinophils, intestinal metaplasia, dysplasia, or malignancy      Assessment / Plan      1. Esophageal dysphagia  2. Schatzki's ring  3. Gastroesophageal reflux disease without esophagitis  Reflux reasonably controlled with low-dose PPI daily.  Continue same.  Continues to have sensation of food going down very slowly, unchanged after EGD.  EGD dated 11/15/2023 with obstructing mild Schatzki's ring dilated.  Biopsies unremarkable.  Chest x-ray dated 3/3/2024 with no abnormality of esophagus noted.  Suspect some age-related dysmotility.  Antireflux measures  Esophagram    - FL ESOPHAGRAM SINGLE CONTRAST; Future    4. Irritable bowel syndrome with diarrhea  Symptoms suggestive of IBS.  Symptoms come and go, nothing on a regular basis.  Denies any GI bleeding.  Basic labs unremarkable.  TSH normal.   No recent abdominal imaging.   Low FODMAP diet-avoid all dairy.  May take Pepto dismal or Imodium as needed  Will obtain stool studies and further workup if symptoms continue or worsen.    Patient Instructions   Antireflux measures: Avoid fried, fatty foods, alcohol, chocolate, coffee, tea,  soft drinks, all carbonated beverages (including sparkling water), peppermint and spearmint, spicy foods, tomatoes and tomato based foods, onions, peppers, and garlic.   Other antireflux measures include weight reduction if overweight, avoiding tight clothing around the abdomen, elevating the head of the bed 6 inches with blocks under the head board, and don't drink or eat before going to bed and avoid lying down immediately after meals.  Omeprazole 20 mg 1 by mouth in the am 30 minutes before breakfast.  Eat relatively soft diet, eat in upright position and chew food well.  Drink water after 2-3 bites and take medications with liberal amounts of water.   After eating and taking medications, remain in upright position for 5-10 minutes.  Esophagram  Follow up: 6 months    Jack Murdock, APRN  8/19/2024    Please note that portions of this note were completed with a voice recognition program.

## 2024-08-19 NOTE — PATIENT INSTRUCTIONS
Antireflux measures: Avoid fried, fatty foods, alcohol, chocolate, coffee, tea,  soft drinks, all carbonated beverages (including sparkling water), peppermint and spearmint, spicy foods, tomatoes and tomato based foods, onions, peppers, and garlic.   Other antireflux measures include weight reduction if overweight, avoiding tight clothing around the abdomen, elevating the head of the bed 6 inches with blocks under the head board, and don't drink or eat before going to bed and avoid lying down immediately after meals.  Omeprazole 20 mg 1 by mouth in the am 30 minutes before breakfast.  Eat relatively soft diet, eat in upright position and chew food well.  Drink water after 2-3 bites and take medications with liberal amounts of water.   After eating and taking medications, remain in upright position for 5-10 minutes.  Esophagram  Follow up: 6 months

## 2024-08-21 NOTE — PROGRESS NOTES
Patient: Addie Perez    YOB: 1934    Date: 08/26/2024    Primary Care Provider: Ariela Hampton DO    Chief Complaint   Patient presents with    Follow-up     1 yr mammogram        SUBJECTIVE:    History of present illness:  Patient is s/p left breast biopsy (benign) which was performed in the remote past.  Patient is here for follow-up mammogram with additional views of her right breast which was read BI-RADS category 2 with benign findings.  Patient had ultrasound of her right breast performed 08/22/2024 as well, it showed a benign simple cyst in the 1:00 position, it was read BI-RADS category 2 with benign findings.    She reports no other problems at this time.    The following portions of the patient's history were reviewed and updated as appropriate: allergies, current medications, past family history, past medical history, past social history, past surgical history and problem list.      Review of Systems   Constitutional:  Negative for chills, fever and unexpected weight change.   HENT:  Negative for hearing loss, trouble swallowing and voice change.    Eyes:  Negative for visual disturbance.   Respiratory:  Negative for apnea, cough, chest tightness, shortness of breath and wheezing.    Cardiovascular:  Negative for chest pain, palpitations and leg swelling.   Gastrointestinal:  Negative for abdominal distention, abdominal pain, anal bleeding, blood in stool, constipation, diarrhea, nausea, rectal pain and vomiting.   Endocrine: Negative for cold intolerance and heat intolerance.   Genitourinary:  Negative for difficulty urinating, dysuria and flank pain.   Musculoskeletal:  Negative for back pain and gait problem.   Skin:  Negative for color change, rash and wound.   Neurological:  Negative for dizziness, syncope, speech difficulty, weakness, light-headedness, numbness and headaches.   Hematological:  Negative for adenopathy. Does not bruise/bleed easily.   Psychiatric/Behavioral:   "Negative for confusion. The patient is not nervous/anxious.        Allergies:  Allergies   Allergen Reactions    Codeine Nausea Only    Escitalopram Nausea Only     REPORTS \"LEXAPRO\"    Gabapentin Other (See Comments)     REPORTS \"MAKES MY EYES HURT BECAUSE OF MY MACULAR DEGENERATION\"    Ibuprofen Palpitations     REPORTS \"IT CAUSES MY HEART TO BEAT FAST\"    Levofloxacin Nausea Only    Naproxen Nausea Only    Nitrofurantoin Nausea Only    Sulfa Antibiotics Nausea Only       Medications:    Current Outpatient Medications:     acetaminophen (TYLENOL) 650 MG 8 hr tablet, Take 1 tablet by mouth 2 (Two) Times a Day As Needed., Disp: , Rfl:     apixaban (ELIQUIS) 5 MG tablet tablet, Take 1 tablet by mouth Every 12 (Twelve) Hours., Disp: 60 tablet, Rfl: 6    Carboxymethylcellulose Sodium 0.25 % solution, Apply 1 drop to eye(s) as directed by provider 2 (Two) Times a Day. REPORTS USES TO BOTH EYES, Disp: , Rfl:     cetirizine (zyrTEC) 10 MG tablet, cetirizine 10 mg tablet  Take 1 tablet every day by oral route for 30 days., Disp: , Rfl:     CRANBERRY EXTRACT PO, Take 1 tablet by mouth Daily. Pt taking every other day, Disp: , Rfl:     estradiol (ESTRACE) 0.5 MG tablet, Take 1 tablet by mouth 2 (Two) Times a Week. REPORTS TAKES THIS ON Monday AND Friday AT NIGHT TIME, Disp: 24 tablet, Rfl: 10    fluticasone (FLONASE) 50 MCG/ACT nasal spray, 2 sprays into the nostril(s) as directed by provider Daily As Needed., Disp: , Rfl:     losartan (COZAAR) 50 MG tablet, Take 1 tablet by mouth Daily., Disp: , Rfl:     metoprolol succinate XL (TOPROL-XL) 100 MG 24 hr tablet, Take 1 tablet by mouth Daily for 30 days. (Patient taking differently: Take 0.5 tablets by mouth Daily. Pt taking 50mg two times daily), Disp: 30 tablet, Rfl: 0    multivitamin with minerals tablet tablet, Take 1 tablet by mouth Daily. One-A-Day every other day, Disp: , Rfl:     omeprazole (prilOSEC) 10 MG capsule, Take 1 capsule by mouth Every Morning., Disp: , Rfl: " "    torsemide (DEMADEX) 5 MG tablet, Take 1 tablet by mouth Daily., Disp: , Rfl:     amiodarone (PACERONE) 200 MG tablet, , Disp: , Rfl:     spironolactone (ALDACTONE) 25 MG tablet, , Disp: , Rfl:     History:  Past Medical History:   Diagnosis Date    Arthritis     Back pain     Cataract     PT REPORTS EARLY STAGES    Cystitis     Diabetes mellitus      REPORTS \"I AM PRE DIABETIC\" AND REPORTS THAT SHE WATCHS HER DIET AND PCP WATCHES A1C CLOSELY    Disease of thyroid gland     Dysphagia     REPORTS MORE TROUBLE SWALLOWING WHEN EATING OR TAKING PILLS. REPORTS PAIN AND THAT SOMETIMES THINGS FEEL LIKE THEY \"STICK\"    Elevated cholesterol 09/20/2018    REPORTS SHE HAS TAKEN MEDICATION IN THE PAST BUT THAT SHE THINKS ALL IS WNL'S AT PRESENT TIME WITHOUT MEDICATION    Fracture of right ankle 2020    GERD (gastroesophageal reflux disease)     Hearing loss     REPORTS MILD AND NO USE OF HEARING AIDS    History of cardiac murmur     REPORTS \"I USED TO HAVE ONE BUT NOW THEY SAY THEY DON'T HEAR IT\"    History of colonic polyps     History of pneumonia     Hx of echocardiogram     Hypercholesterolemia     Hypertension     Irregular heart beat     Macular degeneration     Osteoporosis     Seasonal allergies     Seasonal allergies     Sinusitis     Spinal headache 09/20/2018    REPORTS AFTER DELIVERY OF HER SON    Urinary tract infection     Vertigo     Wears glasses        Past Surgical History:   Procedure Laterality Date    BACK SURGERY      REPORTS A SPUR WAS REMOVED THAT WAS HITTING SCIATIC NERVE    BREAST BIOPSY      BREAST SURGERY Left     LUMPECTOMY, BENIGN     CATARACT EXTRACTION W/ INTRAOCULAR LENS IMPLANT Left 03/04/2022    Procedure: CATARACT PHACO EXTRACTION WITH INTRAOCULAR LENS IMPLANT left;  Surgeon: Pritesh Mcnally MD;  Location: [facility];  Service: Ophthalmology;  Laterality: Left;    CATARACT EXTRACTION W/ INTRAOCULAR LENS IMPLANT Right 03/18/2022    Procedure: CATARACT PHACO EXTRACTION WITH INTRAOCULAR " "LENS IMPLANT RIGHT;  Surgeon: Pritesh Mcnally MD;  Location: [facility] OR;  Service: Ophthalmology;  Laterality: Right;    COLONOSCOPY  2012    REPORTS LAST DONE IN 2012 BUT HAS A HX OF SEVERAL OF THESE PROCEDURES    DILATATION AND CURETTAGE      REPORTS \"I HAD A COUPLE OF THOSE\"    ENDOSCOPY N/A 01/31/2017    Procedure: ESOPHAGOGASTRODUODENOSCOPY;  Surgeon: Robbie Quezada MD;  Location: [facility] ENDOSCOPY;  Service:     ENDOSCOPY N/A 09/21/2018    Procedure: ESOPHAGOGASTRODUODENOSCOPY WITH BIOPSY, DILITATION;  Surgeon: Robbie Quezada MD;  Location: [facility] ENDOSCOPY;  Service: Gastroenterology    ENDOSCOPY N/A 11/15/2023    Procedure: ESOPHAGOGASTRODUODENOSCOPY WITH BIOPSY AND DILATION;  Surgeon: Mil Ma MD;  Location: [facility] ENDOSCOPY;  Service: Gastroenterology;  Laterality: N/A;    FOOT SURGERY Left     REPORTS SURGICAL INTERVENTION TO CORRECT GREAT TOE ALIGNMENT    GALLBLADDER SURGERY  2009    HEMORRHOIDECTOMY  1973    TONSILLECTOMY  1946    UPPER GASTROINTESTINAL ENDOSCOPY  2014    UPPER GASTROINTESTINAL ENDOSCOPY  01/31/2017       Family History   Problem Relation Age of Onset    Diabetes Mother     Cancer Brother         lung    Cancer Brother         brain    Colon cancer Paternal Grandmother         possibly colon cancer    Breast cancer Neg Hx        Social History     Tobacco Use    Smoking status: Never     Passive exposure: Never    Smokeless tobacco: Never   Vaping Use    Vaping status: Never Used   Substance Use Topics    Alcohol use: No    Drug use: No        OBJECTIVE:    Vital Signs:   Vitals:    08/26/24 0925   BP: 120/70   Pulse: 69   Temp: 99.1 °F (37.3 °C)   SpO2: 98%   Weight: 67.9 kg (149 lb 9.6 oz)   Height: 167.6 cm (65.98\")       Physical Exam:   General Appearance:    Alert, cooperative, in no acute distress   Head:    Normocephalic, without obvious abnormality, atraumatic   Eyes:            Lids and lashes normal, conjunctivae and sclerae normal, no   icterus, no pallor, " corneas clear, PERRLA   Ears:    Ears appear intact with no abnormalities noted   Throat:   No oral lesions, no thrush, oral mucosa moist   Neck:   No adenopathy, supple, trachea midline, no thyromegaly, no   carotid bruit, no JVD   Lungs:     Clear to auscultation,respirations regular, even and                  unlabored    Heart:    Regular rhythm and normal rate, normal S1 and S2, no            murmur, no gallop, no rub, no click   Chest Wall:    No abnormalities observed   Abdomen:     Normal bowel sounds, no masses, no organomegaly, soft        non-tender, non-distended, no guarding, no rebound                tenderness   Extremities:   Moves all extremities well, no edema, no cyanosis, no             redness   Pulses:   Pulses palpable and equal bilaterally   Skin:   No bleeding, bruising or rash, bilateral breasts are normal   Lymph nodes:   No palpable adenopathy   Neurologic:   Cranial nerves 2 - 12 grossly intact, sensation intact, DTR       present and equal bilaterally     Results Review:   I reviewed the patient's new clinical results.  I reviewed the patient's new imaging results and agree with the interpretation.  I reviewed the patient's other test results and agree with the interpretation    Review of Systems was reviewed and confirmed as accurate as documented by the MA.    ASSESSMENT/PLAN:    1. Bilateral breast cysts        I did have a detailed and extensive discussion with the patient in the office today.  I do not think she needs any more mammograms but she wants to see me in 1 year for follow-up physical examination.  I will see her at that time.    I discussed the patients findings and my recommendations with patient        Electronically signed by Jose Eduardo Gallegos MD  08/27/24

## 2024-08-22 ENCOUNTER — HOSPITAL ENCOUNTER (OUTPATIENT)
Dept: ULTRASOUND IMAGING | Facility: HOSPITAL | Age: 89
Discharge: HOME | End: 2024-08-22
Payer: MEDICARE

## 2024-08-22 ENCOUNTER — HOSPITAL ENCOUNTER (OUTPATIENT)
Dept: MAMMOGRAPHY | Facility: HOSPITAL | Age: 89
Discharge: HOME | End: 2024-08-22
Payer: MEDICARE

## 2024-08-22 DIAGNOSIS — R92.8 ABNORMAL MAMMOGRAM: ICD-10-CM

## 2024-08-22 PROCEDURE — 77065 DX MAMMO INCL CAD UNI: CPT

## 2024-08-22 PROCEDURE — G0279 TOMOSYNTHESIS, MAMMO: HCPCS

## 2024-08-22 PROCEDURE — 76642 ULTRASOUND BREAST LIMITED: CPT

## 2024-08-23 NOTE — TELEPHONE ENCOUNTER
Patient requested a prescription for Eliquis 5mg to be sent in for a refill. She uses [facility] pharmacy in [place].    ROSY Gleason   Normal for race

## 2024-08-26 ENCOUNTER — OFFICE VISIT (OUTPATIENT)
Dept: SURGERY | Facility: CLINIC | Age: 89
End: 2024-08-26
Payer: MEDICARE

## 2024-08-26 VITALS
BODY MASS INDEX: 24.04 KG/M2 | DIASTOLIC BLOOD PRESSURE: 70 MMHG | OXYGEN SATURATION: 98 % | HEIGHT: 66 IN | HEART RATE: 69 BPM | TEMPERATURE: 99.1 F | WEIGHT: 149.6 LBS | SYSTOLIC BLOOD PRESSURE: 120 MMHG

## 2024-08-26 DIAGNOSIS — N60.01 BILATERAL BREAST CYSTS: Primary | ICD-10-CM

## 2024-08-26 DIAGNOSIS — N60.02 BILATERAL BREAST CYSTS: Primary | ICD-10-CM

## 2024-08-26 PROCEDURE — 1160F RVW MEDS BY RX/DR IN RCRD: CPT | Performed by: SURGERY

## 2024-08-26 PROCEDURE — 99213 OFFICE O/P EST LOW 20 MIN: CPT | Performed by: SURGERY

## 2024-08-26 PROCEDURE — 1159F MED LIST DOCD IN RCRD: CPT | Performed by: SURGERY

## 2024-08-26 RX ORDER — AMIODARONE HYDROCHLORIDE 200 MG/1
TABLET ORAL
COMMUNITY

## 2024-08-26 RX ORDER — SPIRONOLACTONE 25 MG/1
TABLET ORAL
COMMUNITY

## 2024-09-04 DIAGNOSIS — I48.19 PERSISTENT ATRIAL FIBRILLATION: ICD-10-CM

## 2024-09-09 ENCOUNTER — DISEASE STATE MANAGEMENT VISIT (OUTPATIENT)
Dept: PHARMACY | Facility: HOSPITAL | Age: 89
End: 2024-09-09
Payer: MEDICARE

## 2024-09-09 VITALS
BODY MASS INDEX: 23.9 KG/M2 | SYSTOLIC BLOOD PRESSURE: 125 MMHG | DIASTOLIC BLOOD PRESSURE: 75 MMHG | HEART RATE: 61 BPM | WEIGHT: 148 LBS | OXYGEN SATURATION: 98 %

## 2024-09-09 DIAGNOSIS — I50.30 HEART FAILURE WITH PRESERVED EJECTION FRACTION, UNSPECIFIED HF CHRONICITY: Primary | ICD-10-CM

## 2024-09-09 PROCEDURE — G0463 HOSPITAL OUTPT CLINIC VISIT: HCPCS

## 2024-09-09 PROCEDURE — 99214 OFFICE O/P EST MOD 30 MIN: CPT | Performed by: NURSE PRACTITIONER

## 2024-09-09 RX ORDER — METOPROLOL SUCCINATE 50 MG/1
100 TABLET, EXTENDED RELEASE ORAL 2 TIMES DAILY
Status: ON HOLD | COMMUNITY
Start: 2024-08-26

## 2024-09-09 NOTE — PROGRESS NOTES
[facility] Heart Failure Clinic Follow Up Note    Addie Perez  [id number]  09/09/2024    Primary Care Provider: Ariela Hampton DO   Referring Provider: Annalisa Jeffers,*    Chief Complaint: Follow-up CHF    History of Present Illness:   Mrs. Addie Perez is a 89 y.o. female who presents to the HF Clinic for follow up.  At her last HF clinic visit, the patient reported hypotension, fatigue, and DBPs in the 40s.  Spironolactone was held and losartan was changed to once daily dosing (instead of 50 mg twice a day).  When I called the patient the following week, she reported the following BP readings:  108/67, 72  103/69, 94  110/58, 76  99/48, 56 (asymptomatic)  101/53, 68  126/65, 65.  The patient reported feeling well and wanted to keep follow-up appointment.  She presents today for follow-up.  The patient reports having a dry, hacking, cough, runny nose, and a post-nasal drip.  She reports waking up with the sensation of her heart pounding hard/racing when she is sleeping.  She has never had a sleep study and notes she could not sleep with anything on or around her face.  She denies shortness of breath, chest pain, and lower extremity edema.  She reports not urinating as much now as she used to with torsemide.  She does note that within 2 hours of taking this, she will urinate.  She offers no other complaints or concerns at this time.    Review of Systems:   Review of Systems  As Noted in HPI.   I have reviewed and confirmed the accuracy of the ROS as documented by the MA/LPN/RN JOHAN Peña    I have reviewed and/or updated the patient's past medical, past surgical, family, social history, problem list and allergies as appropriate.     Medications:     Current Outpatient Medications:     acetaminophen (TYLENOL) 650 MG 8 hr tablet, Take 1 tablet by mouth 2 (Two) Times a Day As Needed., Disp: , Rfl:     apixaban (ELIQUIS) 5 MG tablet tablet, Take 1 tablet by  mouth Every 12 (Twelve) Hours., Disp: 60 tablet, Rfl: 6    cetirizine (zyrTEC) 10 MG tablet, cetirizine 10 mg tablet  Take 1 tablet every day by oral route for 30 days., Disp: , Rfl:     CRANBERRY EXTRACT PO, Take 1 tablet by mouth Daily. Pt taking every other day, Disp: , Rfl:     estradiol (ESTRACE) 0.5 MG tablet, Take 1 tablet by mouth 2 (Two) Times a Week. REPORTS TAKES THIS ON Monday AND Friday AT NIGHT TIME, Disp: 24 tablet, Rfl: 10    fluticasone (FLONASE) 50 MCG/ACT nasal spray, 2 sprays into the nostril(s) as directed by provider Daily As Needed., Disp: , Rfl:     losartan (COZAAR) 50 MG tablet, Take 1 tablet by mouth Daily., Disp: , Rfl:     metoprolol succinate XL (TOPROL-XL) 50 MG 24 hr tablet, Take 2 tablets by mouth 2 (Two) Times a Day., Disp: , Rfl:     multivitamin with minerals tablet tablet, Take 1 tablet by mouth Daily. One-A-Day every other day, Disp: , Rfl:     torsemide (DEMADEX) 5 MG tablet, Take 1 tablet by mouth Daily., Disp: , Rfl:     Carboxymethylcellulose Sodium 0.25 % solution, Apply 1 drop to eye(s) as directed by provider 2 (Two) Times a Day. REPORTS USES TO BOTH EYES (Patient not taking: Reported on 9/9/2024), Disp: , Rfl:     omeprazole (prilOSEC) 10 MG capsule, Take 1 capsule by mouth Every Morning., Disp: , Rfl:     Physical Exam:  Vital Signs:   Vitals:    09/09/24 1116   BP: 125/75   Pulse: 61   SpO2: 98%   Weight: 67.1 kg (148 lb)     Body mass index is 23.9 kg/m².    Physical Exam  Vitals and nursing note reviewed.   Constitutional:       General: She is not in acute distress.  HENT:      Head: Normocephalic and atraumatic.   Neck:      Trachea: Trachea normal.   Cardiovascular:      Rate and Rhythm: Normal rate and regular rhythm.      Pulses: Normal pulses.      Heart sounds: Normal heart sounds. No murmur heard.     No friction rub. No gallop.   Pulmonary:      Effort: Pulmonary effort is normal.      Breath sounds: Normal breath sounds.   Musculoskeletal:      Cervical  back: Neck supple.      Right lower leg: No edema.      Left lower leg: No edema.   Skin:     General: Skin is warm and dry.   Neurological:      Mental Status: She is alert and oriented to person, place, and time.   Psychiatric:         Mood and Affect: Mood normal.         Behavior: Behavior normal. Behavior is cooperative.         Thought Content: Thought content does not include suicidal ideation.         Results Review:   I reviewed the patient's new clinical results.    Procedures    Assessment / Plan:   Diagnoses and all orders for this visit:    1. Heart failure with preserved ejection fraction, unspecified HF chronicity (Primary)  3/24, LVEF 56-60%, diastolic dysfunction noted  Stage C  NYHA functional class II  Euvolemic on exam  Patient is on max tolerated GDMT  BMP     2. Persistent atrial fibrillation  Given she is waking up with palpitations, she may very well have untreated sleep apnea, however, she declines referral to sleep medicine at this time.  She understands this option remains available to her.  Continue beta blocker for rate control  Continue Eliquis for CVA prophylaxis     3.  Primary hypertension  Recent hypotension improved  Essentially normotensive today    Preventative Cardiology:   Tobacco Cessation: N/A   Advance Care Planning: ACP discussion was declined by the patient. Patient has an advance directive in EMR which is still valid.      Follow Up:   Return in about 3 months (around 12/9/2024) for DSM.      Thank you for allowing me to participate in the care of your patient. Please to not hesitate to contact me with additional questions or concerns.     JOHAN Wallace

## 2024-09-09 NOTE — PROGRESS NOTES
Ambulatory Care Clinic  Heart Failure Pharmacist Note     Patient Name: Addie Perez  :1934  Age: 89 y.o.  Sex: female  Referring Provider: Annalisa Jeffers,*   Primary Cardiologist: Dr. Je Nunez  Encounter Provider:  JOHAN Peña    HPI: Patient presents for follow-up evaluation in heart failure clinic for HFpEF (59%; 56-60% in 2024). PMH significant for HTN, hyperlipidemia, CKD, Afib, T2DM.    Patient reports feeling well today. Reports no SOB and symptoms concerning for fluid overload, and lung sounds are normal per APRN. She weighs herself twice daily and does not show signs of excess volume that would require diuresis. She takes torsemide once per day. She does report that sometimes she will wake up in the middle of the night because her heart is racing. APRN discussed possibility of RAYSHAWN and the implications it could have on heart health. Patient declined referral for to sleep medicine or evaluation for CPAP.     Heart Failure GDMT:    Drug Class   Drug   Dose Last Dose Adjustment   Additional Titration   Notes   ACEi/ARB/ARNI Losartan 50 mg QD 24  Reduce to 50mg once daily from BID   Beta Blocker Metoprolol 100 mg QD   Pt cuts tablet in half and dose 50mg in AM and 50mg in PM    Patient want to discontinue   SGLT2i N/A   N/A Hx of recurrent UTI noted     Other CV Meds:  Torsemide 5mg QD  Eliquis 5mg BID    Medication Use:  Adherence: good  Past hx of medication use/intolerance: N/A  Affordability: difficulty affording Eliquis; possible switch to warfarin    Social Determinants:    Social Determinants of Health     Tobacco Use: Low Risk  (2024)    Patient History     Smoking Tobacco Use: Never     Smokeless Tobacco Use: Never     Passive Exposure: Never   Alcohol Use: Not At Risk (3/4/2024)    AUDIT-C     Frequency of Alcohol Consumption: Never     Average Number of Drinks: Patient does not drink     Frequency of Binge Drinking: Never   Financial Resource Strain:  Low Risk  (3/4/2024)    Overall Financial Resource Strain (CARDIA)     Difficulty of Paying Living Expenses: Not hard at all   Food Insecurity: No Food Insecurity (3/4/2024)    Hunger Vital Sign     Worried About Running Out of Food in the Last Year: Never true     Ran Out of Food in the Last Year: Never true   Transportation Needs: No Transportation Needs (3/4/2024)    PRAPARE - Transportation     Lack of Transportation (Medical): No     Lack of Transportation (Non-Medical): No   Physical Activity: Inactive (3/4/2024)    Exercise Vital Sign     Days of Exercise per Week: 0 days     Minutes of Exercise per Session: 0 min   Stress: No Stress Concern Present (3/4/2024)    [place] [employer] - Occupational Stress Questionnaire     Feeling of Stress : Not at all   Social Connections: Not At Risk (3/4/2024)    Family and Community Support     Help with Day-to-Day Activities: I get all the help I need     Lonely or Isolated: Never   Interpersonal Safety: Not At Risk (3/4/2024)    Abuse Screen     Unsafe at Home or Work/School: no     Feels Threatened by Someone?: no     Does Anyone Keep You from Contacting Others or Doint Things Outside the Home?: no     Physical Sign of Abuse Present: no   Depression: Not at risk (3/4/2024)    PHQ-2     PHQ-2 Score: 0   Housing Stability: Not At Risk (3/4/2024)    Housing Stability     Current Living Arrangements: home     Potentially Unsafe Housing Conditions: none   Utilities: Not At Risk (3/4/2024)    AHC Utilities     Threatened with loss of utilities: No   Health Literacy: Not At Risk (3/4/2024)    Education     Help with school or training?: No     Preferred Language: English   Employment: Not At Risk (3/4/2024)    Employment     Do you want help finding or keeping work or a job?: I do not need or want help   Disabilities: At Risk (3/4/2024)    Disabilities     Concentrating, Remembering, or Making Decisions Difficulty: no     Doing Errands Independently  Difficulty: yes       Immunization Status:  Pneumococcal: Yes- PCV13 12/8/2016; PPV23 10/23/2006  Influenza: yearly  COVID-19: Last 9/16/2022    OBJECTIVE:  /75   Pulse 61   Wt 67.1 kg (148 lb)   LMP  (LMP Unknown)   SpO2 98%   BMI 23.90 kg/m²     Body mass index is 23.9 kg/m².  Wt Readings from Last 1 Encounters:   09/09/24 67.1 kg (148 lb)       Home BP Readings:  10-yr ASCVD risk: The ASCVD Risk score (Raul BLAKE, et al., 2019) failed to calculate for the following reasons:    The 2019 ASCVD risk score is only valid for ages 40 to 79    Lab Review:  Renal Function: CrCl cannot be calculated (Patient's most recent lab result is older than the maximum 30 days allowed.).    Lab Results   Component Value Date    PROBNP 2,324.0 (H) 03/03/2024       Results for orders placed during the hospital encounter of 03/03/24    Adult Transthoracic Echo Complete W/ Cont if Necessary Per Protocol    Interpretation Summary    Left ventricular systolic function is normal. Calculated left ventricular 3D EF = 59% Left ventricular ejection fraction appears to be 56 - 60%.    Left ventricular diastolic dysfunction is noted.    The left atrial cavity is dilated.    The right atrial cavity is dilated.    Moderate mitral valve regurgitation is present. (SBP 150s)    Moderate to severe tricuspid valve regurgitation is present.    Estimated right ventricular systolic pressure from tricuspid regurgitation is moderately elevated (45-55 mmHg).      ASSESSMENT/PLAN:  HFpEF (EF 56-60%)  Continue hold spironolactone 12.5 mg daily. Pt prefers not to restart.   Continue Metoprolol  mg QD (patient cuts in half and takes 50mg in the morning and 50mg at night)  Continue Losartan 50 mg QD.   Defer SGLT2 inhibitor -- hx of recurrent UTIs  Continue torsemide daily.   Advised patient to call clinic with 2-3 lb weight gain in 24 hrs or >5 lb weight gain in 1 week  Will call patient in one week and follow up with BP. Based on results- may  resume ibis 12.5mg if patient is agreeable.       Cristin Bird, Pharmacy Intern  [facility]  09/09/24  13:32 EDT

## 2024-09-14 ENCOUNTER — HOSPITAL ENCOUNTER (INPATIENT)
Facility: HOSPITAL | Age: 89
LOS: 3 days | Discharge: NURSING, REHAB OR LONG-TERM CARE | DRG: 536 | End: 2024-09-18
Attending: EMERGENCY MEDICINE | Admitting: STUDENT IN AN ORGANIZED HEALTH CARE EDUCATION/TRAINING PROGRAM
Payer: MEDICARE

## 2024-09-14 ENCOUNTER — APPOINTMENT (OUTPATIENT)
Dept: CT IMAGING | Facility: HOSPITAL | Age: 89
DRG: 536 | End: 2024-09-14
Payer: MEDICARE

## 2024-09-14 ENCOUNTER — APPOINTMENT (OUTPATIENT)
Dept: GENERAL RADIOLOGY | Facility: HOSPITAL | Age: 89
DRG: 536 | End: 2024-09-14
Payer: MEDICARE

## 2024-09-14 DIAGNOSIS — S32.592A CLOSED FRACTURE OF MULTIPLE RAMI OF LEFT PUBIS, INITIAL ENCOUNTER: ICD-10-CM

## 2024-09-14 DIAGNOSIS — S32.10XA CLOSED FRACTURE OF SACRUM, UNSPECIFIED PORTION OF SACRUM, INITIAL ENCOUNTER: ICD-10-CM
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
## 2024-09-14 DIAGNOSIS — S32.9XXA CLOSED NONDISPLACED FRACTURE OF PELVIS, UNSPECIFIED PART OF PELVIS, INITIAL ENCOUNTER: ICD-10-CM

## 2024-09-14 DIAGNOSIS — S32.591A: ICD-10-CM

## 2024-09-14 LAB
ALBUMIN SERPL-MCNC: 4.1 G/DL (ref 3.5–5.2)
ALBUMIN/GLOB SERPL: 1.4 G/DL
ALP SERPL-CCNC: 132 U/L (ref 39–117)
ALT SERPL W P-5'-P-CCNC: 15 U/L (ref 1–33)
ANION GAP SERPL CALCULATED.3IONS-SCNC: 12.3 MMOL/L (ref 5–15)
AST SERPL-CCNC: 29 U/L (ref 1–32)
BASOPHILS # BLD AUTO: 0.04 10*3/MM3 (ref 0–0.2)
BASOPHILS NFR BLD AUTO: 0.6 % (ref 0–1.5)
BILIRUB SERPL-MCNC: 1.3 MG/DL (ref 0–1.2)
BUN SERPL-MCNC: 13 MG/DL (ref 8–23)
BUN/CREAT SERPL: 11.5 (ref 7–25)
CALCIUM SPEC-SCNC: 8.9 MG/DL (ref 8.2–9.6)
CHLORIDE SERPL-SCNC: 103 MMOL/L (ref 98–107)
CO2 SERPL-SCNC: 24.7 MMOL/L (ref 22–29)
CREAT SERPL-MCNC: 1.13 MG/DL (ref 0.57–1)
DEPRECATED RDW RBC AUTO: 46.4 FL (ref 37–54)
EGFRCR SERPLBLD CKD-EPI 2021: 46.3 ML/MIN/1.73
EOSINOPHIL # BLD AUTO: 0.13 10*3/MM3 (ref 0–0.4)
EOSINOPHIL NFR BLD AUTO: 2 % (ref 0.3–6.2)
ERYTHROCYTE [DISTWIDTH] IN BLOOD BY AUTOMATED COUNT: 13.5 % (ref 12.3–15.4)
GLOBULIN UR ELPH-MCNC: 3 GM/DL
GLUCOSE SERPL-MCNC: 118 MG/DL (ref 65–99)
HCT VFR BLD AUTO: 40.2 % (ref 34–46.6)
HGB BLD-MCNC: 12.9 G/DL (ref 12–15.9)
IMM GRANULOCYTES # BLD AUTO: 0.05 10*3/MM3 (ref 0–0.05)
IMM GRANULOCYTES NFR BLD AUTO: 0.8 % (ref 0–0.5)
INR PPP: 1.65 (ref 0.9–1.1)
LYMPHOCYTES # BLD AUTO: 1.17 10*3/MM3 (ref 0.7–3.1)
LYMPHOCYTES NFR BLD AUTO: 17.8 % (ref 19.6–45.3)
MCH RBC QN AUTO: 30.4 PG (ref 26.6–33)
MCHC RBC AUTO-ENTMCNC: 32.1 G/DL (ref 31.5–35.7)
MCV RBC AUTO: 94.6 FL (ref 79–97)
MONOCYTES # BLD AUTO: 0.86 10*3/MM3 (ref 0.1–0.9)
MONOCYTES NFR BLD AUTO: 13.1 % (ref 5–12)
NEUTROPHILS NFR BLD AUTO: 4.31 10*3/MM3 (ref 1.7–7)
NEUTROPHILS NFR BLD AUTO: 65.7 % (ref 42.7–76)
NRBC BLD AUTO-RTO: 0 /100 WBC (ref 0–0.2)
PLATELET # BLD AUTO: 213 10*3/MM3 (ref 140–450)
PMV BLD AUTO: 10.1 FL (ref 6–12)
POTASSIUM SERPL-SCNC: 4.2 MMOL/L (ref 3.5–5.2)
PROT SERPL-MCNC: 7.1 G/DL (ref 6–8.5)
PROTHROMBIN TIME: 20 SECONDS (ref 12.3–15.1)
RBC # BLD AUTO: 4.25 10*6/MM3 (ref 3.77–5.28)
SODIUM SERPL-SCNC: 140 MMOL/L (ref 136–145)
WBC NRBC COR # BLD AUTO: 6.56 10*3/MM3 (ref 3.4–10.8)

## 2024-09-14 PROCEDURE — 25010000002 ONDANSETRON PER 1 MG: Performed by: EMERGENCY MEDICINE

## 2024-09-14 PROCEDURE — 36415 COLL VENOUS BLD VENIPUNCTURE: CPT

## 2024-09-14 PROCEDURE — 85610 PROTHROMBIN TIME: CPT

## 2024-09-14 PROCEDURE — 25010000002 TETANUS-DIPHTH-ACELL PERTUSSIS 5-2.5-18.5 LF-MCG/0.5 SUSPENSION PREFILLED SYRINGE

## 2024-09-14 PROCEDURE — 99223 1ST HOSP IP/OBS HIGH 75: CPT | Performed by: STUDENT IN AN ORGANIZED HEALTH CARE EDUCATION/TRAINING PROGRAM

## 2024-09-14 PROCEDURE — 90471 IMMUNIZATION ADMIN: CPT

## 2024-09-14 PROCEDURE — 80053 COMPREHEN METABOLIC PANEL: CPT

## 2024-09-14 PROCEDURE — 93005 ELECTROCARDIOGRAM TRACING: CPT

## 2024-09-14 PROCEDURE — G0378 HOSPITAL OBSERVATION PER HR: HCPCS

## 2024-09-14 PROCEDURE — 73502 X-RAY EXAM HIP UNI 2-3 VIEWS: CPT

## 2024-09-14 PROCEDURE — 73130 X-RAY EXAM OF HAND: CPT

## 2024-09-14 PROCEDURE — 99285 EMERGENCY DEPT VISIT HI MDM: CPT

## 2024-09-14 PROCEDURE — 73080 X-RAY EXAM OF ELBOW: CPT

## 2024-09-14 PROCEDURE — 25010000002 MORPHINE PER 10 MG: Performed by: EMERGENCY MEDICINE

## 2024-09-14 PROCEDURE — 90715 TDAP VACCINE 7 YRS/> IM: CPT

## 2024-09-14 PROCEDURE — 70450 CT HEAD/BRAIN W/O DYE: CPT

## 2024-09-14 PROCEDURE — 72192 CT PELVIS W/O DYE: CPT

## 2024-09-14 PROCEDURE — 85025 COMPLETE CBC W/AUTO DIFF WBC: CPT

## 2024-09-14 PROCEDURE — 72125 CT NECK SPINE W/O DYE: CPT

## 2024-09-14 RX ORDER — NITROGLYCERIN 0.4 MG/1
0.4 TABLET SUBLINGUAL
Status: DISCONTINUED | OUTPATIENT
Start: 2024-09-14 | End: 2024-09-17

## 2024-09-14 RX ORDER — ACETAMINOPHEN 650 MG/1
650 SUPPOSITORY RECTAL EVERY 4 HOURS PRN
Status: DISCONTINUED | OUTPATIENT
Start: 2024-09-14 | End: 2024-09-18 | Stop reason: HOSPADM

## 2024-09-14 RX ORDER — SODIUM CHLORIDE 0.9 % (FLUSH) 0.9 %
10 SYRINGE (ML) INJECTION EVERY 12 HOURS SCHEDULED
Status: DISCONTINUED | OUTPATIENT
Start: 2024-09-14 | End: 2024-09-18 | Stop reason: HOSPADM

## 2024-09-14 RX ORDER — METOPROLOL SUCCINATE 100 MG/1
100 TABLET, EXTENDED RELEASE ORAL 2 TIMES DAILY
Status: DISCONTINUED | OUTPATIENT
Start: 2024-09-14 | End: 2024-09-18 | Stop reason: HOSPADM

## 2024-09-14 RX ORDER — PANTOPRAZOLE SODIUM 40 MG/1
40 TABLET, DELAYED RELEASE ORAL
Status: DISCONTINUED | OUTPATIENT
Start: 2024-09-15 | End: 2024-09-18 | Stop reason: HOSPADM

## 2024-09-14 RX ORDER — AMOXICILLIN 250 MG
2 CAPSULE ORAL 2 TIMES DAILY PRN
Status: DISCONTINUED | OUTPATIENT
Start: 2024-09-14 | End: 2024-09-18 | Stop reason: HOSPADM

## 2024-09-14 RX ORDER — SODIUM CHLORIDE 0.9 % (FLUSH) 0.9 %
10 SYRINGE (ML) INJECTION AS NEEDED
Status: DISCONTINUED | OUTPATIENT
Start: 2024-09-14 | End: 2024-09-18 | Stop reason: HOSPADM

## 2024-09-14 RX ORDER — ONDANSETRON 2 MG/ML
4 INJECTION INTRAMUSCULAR; INTRAVENOUS EVERY 6 HOURS PRN
Status: DISCONTINUED | OUTPATIENT
Start: 2024-09-14 | End: 2024-09-18 | Stop reason: HOSPADM

## 2024-09-14 RX ORDER — POLYETHYLENE GLYCOL 3350 17 G/17G
17 POWDER, FOR SOLUTION ORAL DAILY PRN
Status: DISCONTINUED | OUTPATIENT
Start: 2024-09-14 | End: 2024-09-18 | Stop reason: HOSPADM

## 2024-09-14 RX ORDER — TORSEMIDE 10 MG/1
5 TABLET ORAL DAILY
Status: DISCONTINUED | OUTPATIENT
Start: 2024-09-14 | End: 2024-09-18 | Stop reason: HOSPADM

## 2024-09-14 RX ORDER — BISACODYL 5 MG/1
5 TABLET, DELAYED RELEASE ORAL DAILY PRN
Status: DISCONTINUED | OUTPATIENT
Start: 2024-09-14 | End: 2024-09-18 | Stop reason: HOSPADM

## 2024-09-14 RX ORDER — ONDANSETRON 2 MG/ML
4 INJECTION INTRAMUSCULAR; INTRAVENOUS ONCE
Status: COMPLETED | OUTPATIENT
Start: 2024-09-14 | End: 2024-09-14

## 2024-09-14 RX ORDER — ACETAMINOPHEN 160 MG/5ML
650 SOLUTION ORAL EVERY 4 HOURS PRN
Status: DISCONTINUED | OUTPATIENT
Start: 2024-09-14 | End: 2024-09-18 | Stop reason: HOSPADM

## 2024-09-14 RX ORDER — ACETAMINOPHEN 325 MG/1
650 TABLET ORAL EVERY 4 HOURS PRN
Status: DISCONTINUED | OUTPATIENT
Start: 2024-09-14 | End: 2024-09-18 | Stop reason: HOSPADM

## 2024-09-14 RX ORDER — LOSARTAN POTASSIUM 50 MG/1
50 TABLET ORAL DAILY
Status: DISCONTINUED | OUTPATIENT
Start: 2024-09-14 | End: 2024-09-18 | Stop reason: HOSPADM

## 2024-09-14 RX ORDER — BISACODYL 10 MG
10 SUPPOSITORY, RECTAL RECTAL DAILY PRN
Status: DISCONTINUED | OUTPATIENT
Start: 2024-09-14 | End: 2024-09-18 | Stop reason: HOSPADM

## 2024-09-14 RX ORDER — HYDROCODONE BITARTRATE AND ACETAMINOPHEN 5; 325 MG/1; MG/1
1 TABLET ORAL EVERY 6 HOURS PRN
Status: DISCONTINUED | OUTPATIENT
Start: 2024-09-14 | End: 2024-09-18 | Stop reason: HOSPADM

## 2024-09-14 RX ORDER — SODIUM CHLORIDE 9 MG/ML
40 INJECTION, SOLUTION INTRAVENOUS AS NEEDED
Status: DISCONTINUED | OUTPATIENT
Start: 2024-09-14 | End: 2024-09-18 | Stop reason: HOSPADM

## 2024-09-14 RX ADMIN — HYDROCODONE BITARTRATE AND ACETAMINOPHEN 1 TABLET: 5; 325 TABLET ORAL at 17:03

## 2024-09-14 RX ADMIN — MORPHINE SULFATE 4 MG: 4 INJECTION, SOLUTION INTRAMUSCULAR; INTRAVENOUS at 11:43

## 2024-09-14 RX ADMIN — TORSEMIDE 5 MG: 10 TABLET ORAL at 16:11

## 2024-09-14 RX ADMIN — HYDROCODONE BITARTRATE AND ACETAMINOPHEN 1 TABLET: 5; 325 TABLET ORAL at 22:47

## 2024-09-14 RX ADMIN — Medication 10 ML: at 19:36

## 2024-09-14 RX ADMIN — TETANUS TOXOID, REDUCED DIPHTHERIA TOXOID AND ACELLULAR PERTUSSIS VACCINE, ADSORBED 0.5 ML: 5; 2.5; 8; 8; 2.5 SUSPENSION INTRAMUSCULAR at 10:31

## 2024-09-14 RX ADMIN — LOSARTAN POTASSIUM 50 MG: 50 TABLET, FILM COATED ORAL at 15:21

## 2024-09-14 RX ADMIN — ONDANSETRON 4 MG: 2 INJECTION INTRAMUSCULAR; INTRAVENOUS at 11:41

## 2024-09-14 RX ADMIN — METOPROLOL SUCCINATE 100 MG: 100 TABLET, EXTENDED RELEASE ORAL at 15:21

## 2024-09-14 RX ADMIN — APIXABAN 5 MG: 2.5 TABLET, FILM COATED ORAL at 19:35

## 2024-09-15 LAB
ANION GAP SERPL CALCULATED.3IONS-SCNC: 12.3 MMOL/L (ref 5–15)
BASOPHILS # BLD AUTO: 0.04 10*3/MM3 (ref 0–0.2)
BASOPHILS NFR BLD AUTO: 0.6 % (ref 0–1.5)
BUN SERPL-MCNC: 13 MG/DL (ref 8–23)
BUN/CREAT SERPL: 12.5 (ref 7–25)
CALCIUM SPEC-SCNC: 8.8 MG/DL (ref 8.2–9.6)
CHLORIDE SERPL-SCNC: 101 MMOL/L (ref 98–107)
CO2 SERPL-SCNC: 19.7 MMOL/L (ref 22–29)
CREAT SERPL-MCNC: 1.04 MG/DL (ref 0.57–1)
DEPRECATED RDW RBC AUTO: 51 FL (ref 37–54)
EGFRCR SERPLBLD CKD-EPI 2021: 51.2 ML/MIN/1.73
EOSINOPHIL # BLD AUTO: 0.11 10*3/MM3 (ref 0–0.4)
EOSINOPHIL NFR BLD AUTO: 1.6 % (ref 0.3–6.2)
ERYTHROCYTE [DISTWIDTH] IN BLOOD BY AUTOMATED COUNT: 13.6 % (ref 12.3–15.4)
GLUCOSE SERPL-MCNC: 94 MG/DL (ref 65–99)
HCT VFR BLD AUTO: 41.6 % (ref 34–46.6)
HGB BLD-MCNC: 12.6 G/DL (ref 12–15.9)
IMM GRANULOCYTES # BLD AUTO: 0.03 10*3/MM3 (ref 0–0.05)
IMM GRANULOCYTES NFR BLD AUTO: 0.4 % (ref 0–0.5)
LYMPHOCYTES # BLD AUTO: 1.17 10*3/MM3 (ref 0.7–3.1)
LYMPHOCYTES NFR BLD AUTO: 17.5 % (ref 19.6–45.3)
MCH RBC QN AUTO: 30.7 PG (ref 26.6–33)
MCHC RBC AUTO-ENTMCNC: 30.3 G/DL (ref 31.5–35.7)
MCV RBC AUTO: 101.5 FL (ref 79–97)
MONOCYTES # BLD AUTO: 1 10*3/MM3 (ref 0.1–0.9)
MONOCYTES NFR BLD AUTO: 15 % (ref 5–12)
NEUTROPHILS NFR BLD AUTO: 4.33 10*3/MM3 (ref 1.7–7)
NEUTROPHILS NFR BLD AUTO: 64.9 % (ref 42.7–76)
NRBC BLD AUTO-RTO: 0 /100 WBC (ref 0–0.2)
PLATELET # BLD AUTO: 156 10*3/MM3 (ref 140–450)
PMV BLD AUTO: 10.2 FL (ref 6–12)
POTASSIUM SERPL-SCNC: 4 MMOL/L (ref 3.5–5.2)
RBC # BLD AUTO: 4.1 10*6/MM3 (ref 3.77–5.28)
SODIUM SERPL-SCNC: 133 MMOL/L (ref 136–145)
WBC NRBC COR # BLD AUTO: 6.68 10*3/MM3 (ref 3.4–10.8)

## 2024-09-15 PROCEDURE — 80048 BASIC METABOLIC PNL TOTAL CA: CPT | Performed by: STUDENT IN AN ORGANIZED HEALTH CARE EDUCATION/TRAINING PROGRAM

## 2024-09-15 PROCEDURE — 97161 PT EVAL LOW COMPLEX 20 MIN: CPT

## 2024-09-15 PROCEDURE — 99232 SBSQ HOSP IP/OBS MODERATE 35: CPT | Performed by: STUDENT IN AN ORGANIZED HEALTH CARE EDUCATION/TRAINING PROGRAM

## 2024-09-15 PROCEDURE — 85025 COMPLETE CBC W/AUTO DIFF WBC: CPT | Performed by: STUDENT IN AN ORGANIZED HEALTH CARE EDUCATION/TRAINING PROGRAM

## 2024-09-15 RX ADMIN — Medication 10 ML: at 21:14

## 2024-09-15 RX ADMIN — HYDROCODONE BITARTRATE AND ACETAMINOPHEN 1 TABLET: 5; 325 TABLET ORAL at 12:29

## 2024-09-15 RX ADMIN — APIXABAN 5 MG: 2.5 TABLET, FILM COATED ORAL at 21:08

## 2024-09-15 RX ADMIN — SENNOSIDES AND DOCUSATE SODIUM 2 TABLET: 50; 8.6 TABLET ORAL at 21:08

## 2024-09-15 RX ADMIN — PANTOPRAZOLE SODIUM 40 MG: 40 TABLET, DELAYED RELEASE ORAL at 05:29

## 2024-09-15 RX ADMIN — HYDROCODONE BITARTRATE AND ACETAMINOPHEN 1 TABLET: 5; 325 TABLET ORAL at 21:08

## 2024-09-15 RX ADMIN — METOPROLOL SUCCINATE 100 MG: 100 TABLET, EXTENDED RELEASE ORAL at 21:08

## 2024-09-15 RX ADMIN — APIXABAN 5 MG: 2.5 TABLET, FILM COATED ORAL at 08:51

## 2024-09-15 RX ADMIN — Medication 10 ML: at 08:51

## 2024-09-15 RX ADMIN — HYDROCODONE BITARTRATE AND ACETAMINOPHEN 1 TABLET: 5; 325 TABLET ORAL at 05:29

## 2024-09-16 LAB
ANION GAP SERPL CALCULATED.3IONS-SCNC: 11.2 MMOL/L (ref 5–15)
BASOPHILS # BLD AUTO: 0.03 10*3/MM3 (ref 0–0.2)
BASOPHILS NFR BLD AUTO: 0.3 % (ref 0–1.5)
BUN SERPL-MCNC: 12 MG/DL (ref 8–23)
BUN/CREAT SERPL: 11.8 (ref 7–25)
CALCIUM SPEC-SCNC: 8.7 MG/DL (ref 8.2–9.6)
CHLORIDE SERPL-SCNC: 96 MMOL/L (ref 98–107)
CO2 SERPL-SCNC: 22.8 MMOL/L (ref 22–29)
CREAT SERPL-MCNC: 1.02 MG/DL (ref 0.57–1)
DEPRECATED RDW RBC AUTO: 44.9 FL (ref 37–54)
EGFRCR SERPLBLD CKD-EPI 2021: 52.4 ML/MIN/1.73
EOSINOPHIL # BLD AUTO: 0.2 10*3/MM3 (ref 0–0.4)
EOSINOPHIL NFR BLD AUTO: 2.2 % (ref 0.3–6.2)
ERYTHROCYTE [DISTWIDTH] IN BLOOD BY AUTOMATED COUNT: 13.2 % (ref 12.3–15.4)
GLUCOSE SERPL-MCNC: 138 MG/DL (ref 65–99)
HCT VFR BLD AUTO: 32.9 % (ref 34–46.6)
HGB BLD-MCNC: 10.9 G/DL (ref 12–15.9)
IMM GRANULOCYTES # BLD AUTO: 0.03 10*3/MM3 (ref 0–0.05)
IMM GRANULOCYTES NFR BLD AUTO: 0.3 % (ref 0–0.5)
LYMPHOCYTES # BLD AUTO: 1.15 10*3/MM3 (ref 0.7–3.1)
LYMPHOCYTES NFR BLD AUTO: 12.6 % (ref 19.6–45.3)
MCH RBC QN AUTO: 30.7 PG (ref 26.6–33)
MCHC RBC AUTO-ENTMCNC: 33.1 G/DL (ref 31.5–35.7)
MCV RBC AUTO: 92.7 FL (ref 79–97)
MONOCYTES # BLD AUTO: 1.42 10*3/MM3 (ref 0.1–0.9)
MONOCYTES NFR BLD AUTO: 15.5 % (ref 5–12)
NEUTROPHILS NFR BLD AUTO: 6.31 10*3/MM3 (ref 1.7–7)
NEUTROPHILS NFR BLD AUTO: 69.1 % (ref 42.7–76)
NRBC BLD AUTO-RTO: 0 /100 WBC (ref 0–0.2)
PLATELET # BLD AUTO: 184 10*3/MM3 (ref 140–450)
PMV BLD AUTO: 10.8 FL (ref 6–12)
POTASSIUM SERPL-SCNC: 4.2 MMOL/L (ref 3.5–5.2)
RBC # BLD AUTO: 3.55 10*6/MM3 (ref 3.77–5.28)
SODIUM SERPL-SCNC: 130 MMOL/L (ref 136–145)
WBC NRBC COR # BLD AUTO: 9.14 10*3/MM3 (ref 3.4–10.8)

## 2024-09-16 PROCEDURE — 99232 SBSQ HOSP IP/OBS MODERATE 35: CPT | Performed by: STUDENT IN AN ORGANIZED HEALTH CARE EDUCATION/TRAINING PROGRAM

## 2024-09-16 PROCEDURE — 97530 THERAPEUTIC ACTIVITIES: CPT

## 2024-09-16 PROCEDURE — 97116 GAIT TRAINING THERAPY: CPT

## 2024-09-16 PROCEDURE — 97110 THERAPEUTIC EXERCISES: CPT

## 2024-09-16 PROCEDURE — 97166 OT EVAL MOD COMPLEX 45 MIN: CPT

## 2024-09-16 PROCEDURE — 25010000002 ONDANSETRON PER 1 MG: Performed by: STUDENT IN AN ORGANIZED HEALTH CARE EDUCATION/TRAINING PROGRAM

## 2024-09-16 PROCEDURE — 80048 BASIC METABOLIC PNL TOTAL CA: CPT | Performed by: STUDENT IN AN ORGANIZED HEALTH CARE EDUCATION/TRAINING PROGRAM

## 2024-09-16 PROCEDURE — 85025 COMPLETE CBC W/AUTO DIFF WBC: CPT | Performed by: STUDENT IN AN ORGANIZED HEALTH CARE EDUCATION/TRAINING PROGRAM

## 2024-09-16 RX ADMIN — TORSEMIDE 5 MG: 10 TABLET ORAL at 09:39

## 2024-09-16 RX ADMIN — BISACODYL 5 MG: 5 TABLET, COATED ORAL at 21:22

## 2024-09-16 RX ADMIN — APIXABAN 5 MG: 2.5 TABLET, FILM COATED ORAL at 21:22

## 2024-09-16 RX ADMIN — METOPROLOL SUCCINATE 100 MG: 100 TABLET, EXTENDED RELEASE ORAL at 21:22

## 2024-09-16 RX ADMIN — LOSARTAN POTASSIUM 50 MG: 50 TABLET, FILM COATED ORAL at 09:39

## 2024-09-16 RX ADMIN — METOPROLOL SUCCINATE 100 MG: 100 TABLET, EXTENDED RELEASE ORAL at 09:39

## 2024-09-16 RX ADMIN — ONDANSETRON 4 MG: 2 INJECTION INTRAMUSCULAR; INTRAVENOUS at 22:09

## 2024-09-16 RX ADMIN — Medication 10 ML: at 21:23

## 2024-09-16 RX ADMIN — HYDROCODONE BITARTRATE AND ACETAMINOPHEN 1 TABLET: 5; 325 TABLET ORAL at 21:22

## 2024-09-16 RX ADMIN — HYDROCODONE BITARTRATE AND ACETAMINOPHEN 1 TABLET: 5; 325 TABLET ORAL at 09:39

## 2024-09-16 RX ADMIN — Medication 10 ML: at 09:40

## 2024-09-16 RX ADMIN — APIXABAN 5 MG: 2.5 TABLET, FILM COATED ORAL at 09:39

## 2024-09-16 RX ADMIN — PANTOPRAZOLE SODIUM 40 MG: 40 TABLET, DELAYED RELEASE ORAL at 06:39

## 2024-09-17 LAB
ANION GAP SERPL CALCULATED.3IONS-SCNC: 10.7 MMOL/L (ref 5–15)
BASOPHILS # BLD AUTO: 0.02 10*3/MM3 (ref 0–0.2)
BASOPHILS NFR BLD AUTO: 0.2 % (ref 0–1.5)
BUN SERPL-MCNC: 14 MG/DL (ref 8–23)
BUN/CREAT SERPL: 14.6 (ref 7–25)
CALCIUM SPEC-SCNC: 9 MG/DL (ref 8.2–9.6)
CHLORIDE SERPL-SCNC: 97 MMOL/L (ref 98–107)
CO2 SERPL-SCNC: 22.3 MMOL/L (ref 22–29)
CREAT SERPL-MCNC: 0.96 MG/DL (ref 0.57–1)
DEPRECATED RDW RBC AUTO: 45.8 FL (ref 37–54)
EGFRCR SERPLBLD CKD-EPI 2021: 56.3 ML/MIN/1.73
EOSINOPHIL # BLD AUTO: 0.06 10*3/MM3 (ref 0–0.4)
EOSINOPHIL NFR BLD AUTO: 0.6 % (ref 0.3–6.2)
ERYTHROCYTE [DISTWIDTH] IN BLOOD BY AUTOMATED COUNT: 13.5 % (ref 12.3–15.4)
GLUCOSE BLDC GLUCOMTR-MCNC: 136 MG/DL (ref 70–130)
GLUCOSE SERPL-MCNC: 119 MG/DL (ref 65–99)
HCT VFR BLD AUTO: 36.2 % (ref 34–46.6)
HGB BLD-MCNC: 11.7 G/DL (ref 12–15.9)
IMM GRANULOCYTES # BLD AUTO: 0.06 10*3/MM3 (ref 0–0.05)
IMM GRANULOCYTES NFR BLD AUTO: 0.6 % (ref 0–0.5)
LYMPHOCYTES # BLD AUTO: 1.28 10*3/MM3 (ref 0.7–3.1)
LYMPHOCYTES NFR BLD AUTO: 12.9 % (ref 19.6–45.3)
MCH RBC QN AUTO: 30.2 PG (ref 26.6–33)
MCHC RBC AUTO-ENTMCNC: 32.3 G/DL (ref 31.5–35.7)
MCV RBC AUTO: 93.3 FL (ref 79–97)
MONOCYTES # BLD AUTO: 1.38 10*3/MM3 (ref 0.1–0.9)
MONOCYTES NFR BLD AUTO: 13.9 % (ref 5–12)
NEUTROPHILS NFR BLD AUTO: 7.15 10*3/MM3 (ref 1.7–7)
NEUTROPHILS NFR BLD AUTO: 71.8 % (ref 42.7–76)
NRBC BLD AUTO-RTO: 0 /100 WBC (ref 0–0.2)
PLATELET # BLD AUTO: 187 10*3/MM3 (ref 140–450)
PMV BLD AUTO: 10.5 FL (ref 6–12)
POTASSIUM SERPL-SCNC: 4.4 MMOL/L (ref 3.5–5.2)
RBC # BLD AUTO: 3.88 10*6/MM3 (ref 3.77–5.28)
SODIUM SERPL-SCNC: 130 MMOL/L (ref 136–145)
WBC NRBC COR # BLD AUTO: 9.95 10*3/MM3 (ref 3.4–10.8)

## 2024-09-17 PROCEDURE — 99223 1ST HOSP IP/OBS HIGH 75: CPT | Performed by: PHYSICIAN ASSISTANT

## 2024-09-17 PROCEDURE — 85025 COMPLETE CBC W/AUTO DIFF WBC: CPT | Performed by: STUDENT IN AN ORGANIZED HEALTH CARE EDUCATION/TRAINING PROGRAM

## 2024-09-17 PROCEDURE — 97116 GAIT TRAINING THERAPY: CPT

## 2024-09-17 PROCEDURE — 99497 ADVNCD CARE PLAN 30 MIN: CPT | Performed by: PHYSICIAN ASSISTANT

## 2024-09-17 PROCEDURE — 82948 REAGENT STRIP/BLOOD GLUCOSE: CPT

## 2024-09-17 PROCEDURE — 97530 THERAPEUTIC ACTIVITIES: CPT

## 2024-09-17 PROCEDURE — 99232 SBSQ HOSP IP/OBS MODERATE 35: CPT | Performed by: STUDENT IN AN ORGANIZED HEALTH CARE EDUCATION/TRAINING PROGRAM

## 2024-09-17 PROCEDURE — 80048 BASIC METABOLIC PNL TOTAL CA: CPT | Performed by: STUDENT IN AN ORGANIZED HEALTH CARE EDUCATION/TRAINING PROGRAM

## 2024-09-17 RX ORDER — SENNOSIDES A AND B 8.6 MG/1
2 TABLET, FILM COATED ORAL NIGHTLY
Status: DISCONTINUED | OUTPATIENT
Start: 2024-09-17 | End: 2024-09-18 | Stop reason: HOSPADM

## 2024-09-17 RX ADMIN — TORSEMIDE 5 MG: 10 TABLET ORAL at 08:41

## 2024-09-17 RX ADMIN — METOPROLOL SUCCINATE 100 MG: 100 TABLET, EXTENDED RELEASE ORAL at 20:41

## 2024-09-17 RX ADMIN — Medication 10 ML: at 20:41

## 2024-09-17 RX ADMIN — METOPROLOL SUCCINATE 100 MG: 100 TABLET, EXTENDED RELEASE ORAL at 08:41

## 2024-09-17 RX ADMIN — SENNOSIDES 2 TABLET: 8.6 TABLET, FILM COATED ORAL at 20:41

## 2024-09-17 RX ADMIN — SENNOSIDES AND DOCUSATE SODIUM 2 TABLET: 50; 8.6 TABLET ORAL at 08:41

## 2024-09-17 RX ADMIN — APIXABAN 5 MG: 2.5 TABLET, FILM COATED ORAL at 20:41

## 2024-09-17 RX ADMIN — HYDROCODONE BITARTRATE AND ACETAMINOPHEN 1 TABLET: 5; 325 TABLET ORAL at 10:18

## 2024-09-17 RX ADMIN — Medication 10 ML: at 08:41

## 2024-09-17 RX ADMIN — HYDROCODONE BITARTRATE AND ACETAMINOPHEN 1 TABLET: 5; 325 TABLET ORAL at 17:33

## 2024-09-17 RX ADMIN — LOSARTAN POTASSIUM 50 MG: 50 TABLET, FILM COATED ORAL at 08:41

## 2024-09-17 RX ADMIN — APIXABAN 5 MG: 2.5 TABLET, FILM COATED ORAL at 08:41

## 2024-09-17 RX ADMIN — BISACODYL 10 MG: 10 SUPPOSITORY RECTAL at 15:00

## 2024-09-17 RX ADMIN — SENNOSIDES AND DOCUSATE SODIUM 2 TABLET: 50; 8.6 TABLET ORAL at 20:41

## 2024-09-17 RX ADMIN — PANTOPRAZOLE SODIUM 40 MG: 40 TABLET, DELAYED RELEASE ORAL at 05:30

## 2024-09-18 VITALS
DIASTOLIC BLOOD PRESSURE: 80 MMHG | TEMPERATURE: 97.3 F | HEIGHT: 66 IN | WEIGHT: 153.44 LBS | SYSTOLIC BLOOD PRESSURE: 127 MMHG | BODY MASS INDEX: 24.66 KG/M2 | RESPIRATION RATE: 18 BRPM | HEART RATE: 71 BPM | OXYGEN SATURATION: 95 %

## 2024-09-18 DIAGNOSIS — S32.9XXA CLOSED NONDISPLACED FRACTURE OF PELVIS, UNSPECIFIED PART OF PELVIS, INITIAL ENCOUNTER: ICD-10-CM

## 2024-09-18 LAB
ANION GAP SERPL CALCULATED.3IONS-SCNC: 11 MMOL/L (ref 5–15)
BASOPHILS # BLD AUTO: 0.02 10*3/MM3 (ref 0–0.2)
BASOPHILS NFR BLD AUTO: 0.2 % (ref 0–1.5)
BUN SERPL-MCNC: 15 MG/DL (ref 8–23)
BUN/CREAT SERPL: 15.8 (ref 7–25)
CALCIUM SPEC-SCNC: 8.6 MG/DL (ref 8.2–9.6)
CHLORIDE SERPL-SCNC: 96 MMOL/L (ref 98–107)
CO2 SERPL-SCNC: 23 MMOL/L (ref 22–29)
CREAT SERPL-MCNC: 0.95 MG/DL (ref 0.57–1)
DEPRECATED RDW RBC AUTO: 44.6 FL (ref 37–54)
EGFRCR SERPLBLD CKD-EPI 2021: 57 ML/MIN/1.73
EOSINOPHIL # BLD AUTO: 0.07 10*3/MM3 (ref 0–0.4)
EOSINOPHIL NFR BLD AUTO: 0.7 % (ref 0.3–6.2)
ERYTHROCYTE [DISTWIDTH] IN BLOOD BY AUTOMATED COUNT: 13.2 % (ref 12.3–15.4)
GLUCOSE SERPL-MCNC: 122 MG/DL (ref 65–99)
HCT VFR BLD AUTO: 34.3 % (ref 34–46.6)
HGB BLD-MCNC: 11.5 G/DL (ref 12–15.9)
IMM GRANULOCYTES # BLD AUTO: 0.05 10*3/MM3 (ref 0–0.05)
IMM GRANULOCYTES NFR BLD AUTO: 0.5 % (ref 0–0.5)
LYMPHOCYTES # BLD AUTO: 1.16 10*3/MM3 (ref 0.7–3.1)
LYMPHOCYTES NFR BLD AUTO: 11.6 % (ref 19.6–45.3)
MCH RBC QN AUTO: 30.8 PG (ref 26.6–33)
MCHC RBC AUTO-ENTMCNC: 33.5 G/DL (ref 31.5–35.7)
MCV RBC AUTO: 92 FL (ref 79–97)
MONOCYTES # BLD AUTO: 1.41 10*3/MM3 (ref 0.1–0.9)
MONOCYTES NFR BLD AUTO: 14.1 % (ref 5–12)
NEUTROPHILS NFR BLD AUTO: 7.28 10*3/MM3 (ref 1.7–7)
NEUTROPHILS NFR BLD AUTO: 72.9 % (ref 42.7–76)
NRBC BLD AUTO-RTO: 0 /100 WBC (ref 0–0.2)
PLATELET # BLD AUTO: 224 10*3/MM3 (ref 140–450)
PMV BLD AUTO: 10.5 FL (ref 6–12)
POTASSIUM SERPL-SCNC: 4 MMOL/L (ref 3.5–5.2)
RBC # BLD AUTO: 3.73 10*6/MM3 (ref 3.77–5.28)
SODIUM SERPL-SCNC: 130 MMOL/L (ref 136–145)
WBC NRBC COR # BLD AUTO: 9.99 10*3/MM3 (ref 3.4–10.8)

## 2024-09-18 PROCEDURE — 85025 COMPLETE CBC W/AUTO DIFF WBC: CPT | Performed by: STUDENT IN AN ORGANIZED HEALTH CARE EDUCATION/TRAINING PROGRAM

## 2024-09-18 PROCEDURE — 80048 BASIC METABOLIC PNL TOTAL CA: CPT | Performed by: STUDENT IN AN ORGANIZED HEALTH CARE EDUCATION/TRAINING PROGRAM

## 2024-09-18 PROCEDURE — 99238 HOSP IP/OBS DSCHRG MGMT 30/<: CPT | Performed by: STUDENT IN AN ORGANIZED HEALTH CARE EDUCATION/TRAINING PROGRAM

## 2024-09-18 RX ORDER — HYDROCODONE BITARTRATE AND ACETAMINOPHEN 5; 325 MG/1; MG/1
1 TABLET ORAL EVERY 6 HOURS PRN
Qty: 120 TABLET | Refills: 0 | Status: SHIPPED | OUTPATIENT
Start: 2024-09-18

## 2024-09-18 RX ORDER — HYDROCODONE BITARTRATE AND ACETAMINOPHEN 5; 325 MG/1; MG/1
1 TABLET ORAL EVERY 6 HOURS PRN
Qty: 12 TABLET | Refills: 0 | Status: SHIPPED | OUTPATIENT
Start: 2024-09-18 | End: 2024-09-18 | Stop reason: SDUPTHER

## 2024-09-18 RX ADMIN — Medication 10 ML: at 08:38

## 2024-09-18 RX ADMIN — APIXABAN 5 MG: 2.5 TABLET, FILM COATED ORAL at 08:38

## 2024-09-18 RX ADMIN — METOPROLOL SUCCINATE 100 MG: 100 TABLET, EXTENDED RELEASE ORAL at 08:38

## 2024-09-18 RX ADMIN — PANTOPRAZOLE SODIUM 40 MG: 40 TABLET, DELAYED RELEASE ORAL at 05:08

## 2024-09-18 RX ADMIN — HYDROCODONE BITARTRATE AND ACETAMINOPHEN 1 TABLET: 5; 325 TABLET ORAL at 12:36

## 2024-09-18 RX ADMIN — TORSEMIDE 5 MG: 10 TABLET ORAL at 08:38

## 2024-09-18 RX ADMIN — HYDROCODONE BITARTRATE AND ACETAMINOPHEN 1 TABLET: 5; 325 TABLET ORAL at 05:08

## 2024-09-18 RX ADMIN — LOSARTAN POTASSIUM 50 MG: 50 TABLET, FILM COATED ORAL at 08:38

## 2024-09-23 ENCOUNTER — NURSING HOME (OUTPATIENT)
Dept: FAMILY MEDICINE CLINIC | Facility: CLINIC | Age: 89
End: 2024-09-23
Payer: MEDICARE

## 2024-09-23 VITALS
OXYGEN SATURATION: 99 % | RESPIRATION RATE: 16 BRPM | BODY MASS INDEX: 24.37 KG/M2 | DIASTOLIC BLOOD PRESSURE: 65 MMHG | HEART RATE: 115 BPM | WEIGHT: 151 LBS | TEMPERATURE: 99.1 F | SYSTOLIC BLOOD PRESSURE: 115 MMHG

## 2024-09-23 DIAGNOSIS — Z78.9 IMPAIRED MOBILITY AND ADLS: ICD-10-CM
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
## 2024-09-23 DIAGNOSIS — Z79.01 ANTICOAGULATED: ICD-10-CM

## 2024-09-23 DIAGNOSIS — S32.9XXA CLOSED NONDISPLACED FRACTURE OF PELVIS, UNSPECIFIED PART OF PELVIS, INITIAL ENCOUNTER: ICD-10-CM

## 2024-09-23 DIAGNOSIS — S32.9XXA CLOSED NONDISPLACED FRACTURE OF PELVIS, UNSPECIFIED PART OF PELVIS, INITIAL ENCOUNTER: Primary | ICD-10-CM

## 2024-09-23 DIAGNOSIS — Z87.448 HISTORY OF HEMATURIA: ICD-10-CM

## 2024-09-23 DIAGNOSIS — R30.0 DYSURIA: ICD-10-CM

## 2024-09-23 DIAGNOSIS — Z74.09 IMPAIRED MOBILITY AND ADLS: ICD-10-CM

## 2024-09-23 PROCEDURE — 99309 SBSQ NF CARE MODERATE MDM 30: CPT | Performed by: NURSE PRACTITIONER

## 2024-09-25 RX ORDER — HYDROCODONE BITARTRATE AND ACETAMINOPHEN 5; 325 MG/1; MG/1
1 TABLET ORAL EVERY 6 HOURS
Qty: 120 TABLET | Refills: 0 | Status: SHIPPED | OUTPATIENT
Start: 2024-09-25

## 2024-10-02 ENCOUNTER — NURSING HOME (OUTPATIENT)
Dept: FAMILY MEDICINE CLINIC | Facility: CLINIC | Age: 89
End: 2024-10-02
Payer: MEDICARE

## 2024-10-02 VITALS
RESPIRATION RATE: 18 BRPM | SYSTOLIC BLOOD PRESSURE: 118 MMHG | BODY MASS INDEX: 23.57 KG/M2 | TEMPERATURE: 97.9 F | WEIGHT: 146 LBS | OXYGEN SATURATION: 97 % | HEART RATE: 85 BPM | DIASTOLIC BLOOD PRESSURE: 69 MMHG

## 2024-10-02 DIAGNOSIS — Z74.09 IMPAIRED MOBILITY AND ADLS: ICD-10-CM

## 2024-10-02 DIAGNOSIS — E78.5 DYSLIPIDEMIA: Chronic | ICD-10-CM

## 2024-10-02 DIAGNOSIS — Z78.9 IMPAIRED MOBILITY AND ADLS: ICD-10-CM
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
## 2024-10-02 DIAGNOSIS — I50.32 CHRONIC HEART FAILURE WITH PRESERVED EJECTION FRACTION: ICD-10-CM

## 2024-10-02 DIAGNOSIS — S32.82XD MULTIPLE CLOSED FRACTURES OF PELVIS WITHOUT DISRUPTION OF PELVIC RING WITH ROUTINE HEALING, SUBSEQUENT ENCOUNTER: ICD-10-CM

## 2024-10-02 DIAGNOSIS — I10 ESSENTIAL HYPERTENSION: Chronic | ICD-10-CM

## 2024-10-02 DIAGNOSIS — I48.0 PAROXYSMAL ATRIAL FIBRILLATION: Chronic | ICD-10-CM

## 2024-10-02 DIAGNOSIS — K21.9 GASTROESOPHAGEAL REFLUX DISEASE WITHOUT ESOPHAGITIS: Chronic | ICD-10-CM

## 2024-10-02 DIAGNOSIS — R54 AGE-RELATED PHYSICAL DEBILITY: ICD-10-CM

## 2024-10-02 PROBLEM — R12 HEARTBURN: Status: RESOLVED | Noted: 2018-08-30 | Resolved: 2024-10-02

## 2024-10-02 PROCEDURE — 99306 1ST NF CARE HIGH MDM 50: CPT | Performed by: FAMILY MEDICINE

## 2024-10-03 NOTE — PROGRESS NOTES
Nursing Home History/Physical        James DO Christy [x]   JOHAN Richards []  [address] [place] [address]  Phone: [phone]  Fax: [phone] Bill Sargent MD []  Francois Jones DO []  [address] [place] [address]  Phone: [phone]  Fax: [phone]     PATIENT NAME: Addie Perez                                                                          YOB: 1934           DATE OF SERVICE: 10/02/2024  FACILITY:  []  [facility]  []  [place]   []  [facility]   [x] [facility]    [] Other ______________________________________________________________________     CHIEF COMPLAINT:  Fall with significant injury/impaired mobility and ADLs/physical deconditioning/multiple closed pelvic fractures/paroxysmal atrial fibrillation/hypertension/GERD/chronic diastolic CHF      HISTORY OF PRESENT ILLNESS:      [x]  Initial visit for coordination of debilitative care issues and chronic medical management needs.    I have reviewed labs/imaging/records from this hospitalization, including ER staff and admitting/attending physicians H/P's and progress notes to establish a comprehensive understanding of this patient's clinical hospital course, as well as to establish a transition of care appropriately.    Admission Date:  9/14/2024  Date of Discharge:  9/18/2024  Primary Care Physician:  Ariela Hampton DO     Important issues to note:     STR for debility after multiple pelvic fractures     Discharge Diagnoses:      Mechanical fall  Multiple pelvic fractures  Hypoxia  Hypotension     Chronic: atrial fibrillation on Eliquis, prediabetes, heart failure reduced ejection fraction, GERD, Schatzki ring, IBS, hyperlipidemia, hypertension, gout, osteoarthritis.      Problem List:            Active Hospital Problems     Diagnosis   POA    **Pelvic fracture [S32.9XXA]   Yes       Resolved Hospital Problems   No resolved problems to display.      Presenting Problem:         Chief  Complaint   Patient presents with    Fall                 Consults:      Consulting Physician(s)           Provider   Role Specialty      Randy Serrano MD       Consulting Physician Orthopedic Surgery             Procedures Performed:     none     History of presenting illness/Hospital Course:     Addie Perez is a 89-year-old woman with past medical history of atrial fibrillation on Eliquis, prediabetes, heart failure reduced ejection fraction, GERD, Schatzki ring, IBS, hyperlipidemia, hypertension, gout, osteoarthritis.  Presented to [facility] ED on 9/14/2024 with concern for mechanical fall morning day of presentation.  Said that she was outside watering plants on her porch when she was turning around and tripped.  Denied any preceding symptoms.  That she hit the left side of her head and does have some pain in her neck, but did not lose consciousness.  No change in vision.  Has not ambulated since the fall.  Denies back pain.     ED summary: Afebrile, tachycardic, other vital signs stable room air.  EKG atrial fibrillation, rate 101, nonspecific ST-T wave changes, significant artifact present.  Creatinine 1.13, blood glucose under 19.  INR 1.63.  CBC unremarkable.  XR elbow/hand no fracture. XR hip; multiple fractures including left superior and inferior pubic rami nondisplaced fracture of the right pubic tubercle, left sacral alar fracture.  CT head/cervical spine no acute pathology.  CT confirmed fractures from XR.  ED spoke with orthopedics-Dr. Serrano, recommend nonop.  She was provided morphine, Zofran, boostrix.     Inpatient general floor admission 9/14/2024 after mechanical fall with multiple pelvic fractures, inability to ambulate.  Clinically improved during course.  Appropriate for STR.  Did have some transient hypotension and hypoxia, BP medicine held and was able to be continued.  No cardiopulmonary pathology suspected.     Stable for discharge to STR 9/18/2024.    PAST MEDICAL & SURGICAL HISTORY:   Past  "Medical History:   Diagnosis Date    Arthritis     Back pain     Cataract     PT REPORTS EARLY STAGES    Cystitis     Diabetes mellitus      REPORTS \"I AM PRE DIABETIC\" AND REPORTS THAT SHE WATCHS HER DIET AND PCP WATCHES A1C CLOSELY    Disease of thyroid gland     Dysphagia     REPORTS MORE TROUBLE SWALLOWING WHEN EATING OR TAKING PILLS. REPORTS PAIN AND THAT SOMETIMES THINGS FEEL LIKE THEY \"STICK\"    Elevated cholesterol 09/20/2018    REPORTS SHE HAS TAKEN MEDICATION IN THE PAST BUT THAT SHE THINKS ALL IS WNL'S AT PRESENT TIME WITHOUT MEDICATION    Fracture of right ankle 2020    GERD (gastroesophageal reflux disease)     Hearing loss     REPORTS MILD AND NO USE OF HEARING AIDS    History of cardiac murmur     REPORTS \"I USED TO HAVE ONE BUT NOW THEY SAY THEY DON'T HEAR IT\"    History of colonic polyps     History of pneumonia     Hx of echocardiogram     Hypercholesterolemia     Hypertension     Irregular heart beat     Macular degeneration     Osteoporosis     Seasonal allergies     Seasonal allergies     Sinusitis     Spinal headache 09/20/2018    REPORTS AFTER DELIVERY OF HER SON    Urinary tract infection     Vertigo     Wears glasses       Past Surgical History:   Procedure Laterality Date    BACK SURGERY      REPORTS A SPUR WAS REMOVED THAT WAS HITTING SCIATIC NERVE    BREAST BIOPSY      BREAST SURGERY Left     LUMPECTOMY, BENIGN     CATARACT EXTRACTION W/ INTRAOCULAR LENS IMPLANT Left 03/04/2022    Procedure: CATARACT PHACO EXTRACTION WITH INTRAOCULAR LENS IMPLANT left;  Surgeon: Pritesh Mcnally MD;  Location: [facility];  Service: Ophthalmology;  Laterality: Left;    CATARACT EXTRACTION W/ INTRAOCULAR LENS IMPLANT Right 03/18/2022    Procedure: CATARACT PHACO EXTRACTION WITH INTRAOCULAR LENS IMPLANT RIGHT;  Surgeon: Pritesh Mcnally MD;  Location: [facility];  Service: Ophthalmology;  Laterality: Right;    COLONOSCOPY  2012    REPORTS LAST DONE IN 2012 BUT HAS A HX OF SEVERAL OF THESE PROCEDURES    " "DILATATION AND CURETTAGE      REPORTS \"I HAD A COUPLE OF THOSE\"    ENDOSCOPY N/A 01/31/2017    Procedure: ESOPHAGOGASTRODUODENOSCOPY;  Surgeon: Robbie Quezada MD;  Location: [facility] ENDOSCOPY;  Service:     ENDOSCOPY N/A 09/21/2018    Procedure: ESOPHAGOGASTRODUODENOSCOPY WITH BIOPSY, DILITATION;  Surgeon: Robbie Quezada MD;  Location: [facility] ENDOSCOPY;  Service: Gastroenterology    ENDOSCOPY N/A 11/15/2023    Procedure: ESOPHAGOGASTRODUODENOSCOPY WITH BIOPSY AND DILATION;  Surgeon: Mil Ma MD;  Location: [facility] ENDOSCOPY;  Service: Gastroenterology;  Laterality: N/A;    FOOT SURGERY Left     REPORTS SURGICAL INTERVENTION TO CORRECT GREAT TOE ALIGNMENT    GALLBLADDER SURGERY  2009    HEMORRHOIDECTOMY  1973    TONSILLECTOMY  1946    UPPER GASTROINTESTINAL ENDOSCOPY  2014    UPPER GASTROINTESTINAL ENDOSCOPY  01/31/2017         MEDICATIONS:  I have reviewed and reconciled the patients medication list in the patients chart at the [facility] nursing facility today.      ALLERGIES:    Allergies   Allergen Reactions    Codeine Nausea Only    Escitalopram Nausea Only     REPORTS \"LEXAPRO\"    Gabapentin Other (See Comments)     REPORTS \"MAKES MY EYES HURT BECAUSE OF MY MACULAR DEGENERATION\"    Ibuprofen Palpitations     REPORTS \"IT CAUSES MY HEART TO BEAT FAST\"    Levofloxacin Nausea Only    Naproxen Nausea Only    Nitrofurantoin Nausea Only    Sulfa Antibiotics Nausea Only         SOCIAL HISTORY:    Social History     Socioeconomic History    Marital status:    Tobacco Use    Smoking status: Never     Passive exposure: Never    Smokeless tobacco: Never   Vaping Use    Vaping status: Never Used   Substance and Sexual Activity    Alcohol use: No    Drug use: No    Sexual activity: Defer       FAMILY HISTORY:    Family History   Problem Relation Age of Onset    Diabetes Mother     Cancer Brother         lung    Cancer Brother         brain    Colon cancer Paternal Grandmother         possibly colon cancer    " Breast cancer Neg Hx        REVIEW OF SYSTEMS:    Review of Systems  Appetite: Fair []   Good [x]   Poor []   Weight Loss []  [x]  Weight Stable   Unavoidable Weight Loss []  Tolerating Tube Feeding []    Supplements Provided []   Constitutional: Negative for fever, chills, diaphoresis or fatigue and weight change.   HENT: No dysphagia; no changes to vision/hearing/smell/taste; no epistaxis  Eyes: Negative for redness and visual disturbance.   Respiratory: Negative for shortness of breath, chest pain, cough or chest tightness.   Cardiovascular: Negative for chest pain and palpitations.   Gastrointestinal: Negative for abdominal distention, abdominal pain and blood in stool.   Endocrine: Negative for cold intolerance and heat intolerance.   Genitourinary: Negative for difficulty urinating, dysuria and frequency.Negative for hematuria   Musculoskeletal: Chronic myalgias and arthralgias  Integumentary: No open wounds, rash or concerning skin lesions  Neurological: Negative for syncope; improving generalized weakness.  Hematological: Negative for adenopathy. Does not bruise/bleed easily.   Immunological: Negative for reported allergies or immunological disorders  Psychological: No acute behavioral changes    PHYSICAL EXAMINATION:   VITAL SIGNS:   Vitals:    10/02/24 0846   BP: 118/69   Pulse: 85   Resp: 18   Temp: 97.9 °F (36.6 °C)   SpO2: 97%         Physical Exam  General Appearance:  [x]  Alert   [x]  Oriented x person  [x]  No acute distress     []  Confused  []  Disoriented   []  Comatose   Head:  Atraumatic and normocephalic, without obvious abnormality.   Eyes:         PERRLA, conjunctivae and sclerae normal, no Icterus. No pallor. Extra-occular movements are within normal limits.   Ears:  Ears appear intact with no abnormalities noted.   Throat: No oral lesions, no thrush, oral mucosa moist.   Neck: Supple, trachea midline, no thyromegaly, no carotid bruit.   Back:   No kyphoscoliosis. No tenderness to  palpation.   Lungs:   Chest shape is normal. Breath sounds heard bilaterally equally.  No wheezing.  Audible air exchange noted all lung fields.   Heart:  Normal S1 and S2, no murmur, no gallop, no rub. No JVD.   Abdomen:   Normal bowel sounds, no masses, no organomegaly. Soft, non-tender, non-distended, no guarding    Extremities: Moves all extremities, however remains hesitant due to pelvic fractures and discomfort.  Without edema, cyanosis or clubbing.  Frail build.  Poor core strength/stability.   Pulses: Pulses palpable and equal bilaterally.   Skin: No bleeding or rash.  Generalized dry skin noted.  Age-related atrophy of skin.   Neurologic: [x] Normal speech []  Normal mental status    [x] Cranial nerves II through XII intact   [x]  No anosmia [x]  DTR 2+ [x]  Proprioception intact  [x] age related motor/sensory deficits      Psych/Mood:                    [x]  No acute changes []  Depressed      Urinary:      [x]  Continent  []  Incontinent  []  Retention  []  F/C     []  UTI w/treatment in progress      ASSESSMENT     Diagnoses and all orders for this visit:    1. Fall with significant injury, initial encounter (Primary)    2. Age-related physical debility    3. Impaired mobility and ADLs    4. Gastroesophageal reflux disease without esophagitis    5. Chronic heart failure with preserved ejection fraction    6. Essential hypertension    7. Paroxysmal atrial fibrillation    8. Dyslipidemia    9. Multiple closed fractures of pelvis without disruption of pelvic ring with routine healing, subsequent encounter        PLAN  Planned utilization of skilled nursing facility services to aid in mobilization/transfers, as well as any ADLs of need.  Fall precautions in place given her impaired mobility and advanced age.  Continue PT/OT, as well as strengthening regimen to aid in returning to preinjury level of independence.    Pain appears clinically well-controlled.    Continue low-sodium/salt dietary intake,  demonstrates no findings of acute volume overload at this time.  Continue frequent weight evaluations.    Vital signs demonstrate hemodynamic stability.  Blood pressure and heart rate are at goal.    Continue dietary/lifestyle modifications to aid in symptom management of chronic GERD.    [facility] labs as per routine/need.    [x]  Discussed Patient in detail with nursing/staff, addressed all needs today.     [x]  Plan of Care Reviewed   [x]  PT/OT Reviewed   []  Order Changes  [x]  Discharge Plans Reviewed  []  Code Status Change     I spent 55 minutes caring for Ora on this date of service. This time includes time spent by me in the following activities:preparing for the visit, reviewing tests, obtaining and/or reviewing a separately obtained history, performing a medically appropriate examination and/or evaluation , counseling and educating the patient/family/caregiver, ordering medications, tests, or procedures, documenting information in the medical record, and care coordination    I confirm accuracy of unchanged data/findings which have been carried forward from previous visit, as well as I have updated appropriately those that have changed.      James Harrison   10/02/2024

## 2024-10-08 DIAGNOSIS — S32.9XXA CLOSED NONDISPLACED FRACTURE OF PELVIS, UNSPECIFIED PART OF PELVIS, INITIAL ENCOUNTER: ICD-10-CM

## 2024-10-08 RX ORDER — HYDROCODONE BITARTRATE AND ACETAMINOPHEN 5; 325 MG/1; MG/1
1 TABLET ORAL EVERY 6 HOURS
Qty: 30 TABLET | Refills: 0 | Status: SHIPPED | OUTPATIENT
Start: 2024-10-08

## 2024-10-08 NOTE — TELEPHONE ENCOUNTER
(DISCHARGE SCRIPT)    LAUREN REQUESTING MED REFILL FOR NORCO 5-325 MG.    DIRECTIONS: NORCO 5-325 MG 1 TAB PO Q 6 HRS SCHEDULED.

## 2024-10-10 ENCOUNTER — NURSING HOME (OUTPATIENT)
Dept: FAMILY MEDICINE CLINIC | Facility: CLINIC | Age: 89
End: 2024-10-10
Payer: MEDICARE

## 2024-10-10 VITALS
RESPIRATION RATE: 18 BRPM | HEART RATE: 83 BPM | TEMPERATURE: 98 F | DIASTOLIC BLOOD PRESSURE: 64 MMHG | SYSTOLIC BLOOD PRESSURE: 121 MMHG | BODY MASS INDEX: 23.57 KG/M2 | OXYGEN SATURATION: 96 % | WEIGHT: 146 LBS

## 2024-10-10 DIAGNOSIS — E78.5 DYSLIPIDEMIA: Chronic | ICD-10-CM

## 2024-10-10 DIAGNOSIS — K21.9 GASTROESOPHAGEAL REFLUX DISEASE WITHOUT ESOPHAGITIS: Chronic | ICD-10-CM

## 2024-10-10 DIAGNOSIS — I10 ESSENTIAL HYPERTENSION: Chronic | ICD-10-CM

## 2024-10-10 DIAGNOSIS — Z74.09 IMPAIRED MOBILITY AND ADLS: ICD-10-CM

## 2024-10-10 DIAGNOSIS — E11.9 CONTROLLED TYPE 2 DIABETES MELLITUS WITHOUT COMPLICATION, WITHOUT LONG-TERM CURRENT USE OF INSULIN: ICD-10-CM

## 2024-10-10 DIAGNOSIS — Z78.9 IMPAIRED MOBILITY AND ADLS: ICD-10-CM

## 2024-10-10 DIAGNOSIS — I48.0 PAROXYSMAL ATRIAL FIBRILLATION: Chronic | ICD-10-CM

## 2024-10-10 DIAGNOSIS — S32.9XXD CLOSED NONDISPLACED FRACTURE OF PELVIS WITH ROUTINE HEALING, UNSPECIFIED PART OF PELVIS, SUBSEQUENT ENCOUNTER: Primary | ICD-10-CM

## 2024-10-10 DIAGNOSIS — I50.30 HEART FAILURE WITH PRESERVED EJECTION FRACTION, UNSPECIFIED HF CHRONICITY: ICD-10-CM
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
## 2024-10-10 PROBLEM — I95.9 HYPOTENSION: Status: RESOLVED | Noted: 2024-08-05 | Resolved: 2024-10-10

## 2024-10-10 PROCEDURE — 99316 NF DSCHRG MGMT 30 MIN+: CPT | Performed by: NURSE PRACTITIONER

## 2024-10-10 NOTE — PROGRESS NOTES
"Nursing Home Follow Up Note      James Harrison DO []   JOHAN Richards [x]  [address] [place] [address]  Phone: [phone]  Fax: [phone] Bill Sargent MD []    Francois Jones DO []   [address] [place] [address]  Phone: [phone]  Fax: [phone]     PATIENT NAME: Addie Perez                                                                          YOB: 1934           DATE OF SERVICE: 10/10/2024  FACILITY:  [][place]   [] [place]   [] [facility]   [x] [facility]   [] Other ______________________________________________________________________      CHIEF COMPLAINT:  Discharge from facility       HISTORY OF PRESENT ILLNESS:   Addie Perez is a 89 year old female being discharged home from rehab facility on 10/11/2024.  She had a mechanical fall on 09/14/2024 while outside watering her plants. She sustained multiple pelvic fractures and was unable to ambulate. X-ray of elbow/hand no fracture. X-ray of hip- multiple fractures including left superior and inferior pubic rami nondisplaced fracture of the right pubic tubercle, left sacral alar fracture. CT confirmed fracture.   CT head/cervical spine no acute pathology.  ED spoke with orthopedics-Dr. Serrano, recommended non-operative. Was sent here on 09/18/2024 for rehabilitation and strengthening.  She has been working with physical therapy. She has reached goals to be able to discharge home tomorrow. She will continue therapy at home and family will assist with care. She will follow up with her PCP for management of chronic conditions. Has PMH DM, HTN, GERD, thyroid disease, Afib, HLD and CHF.     PAST MEDICAL & SURGICAL HISTORY:   Past Medical History:   Diagnosis Date    Arthritis     Back pain     Cataract     PT REPORTS EARLY STAGES    Cystitis     Diabetes mellitus      REPORTS \"I AM PRE DIABETIC\" AND REPORTS THAT SHE WATCHS HER DIET AND PCP WATCHES A1C CLOSELY    Disease of thyroid gland     Dysphagia  " "   REPORTS MORE TROUBLE SWALLOWING WHEN EATING OR TAKING PILLS. REPORTS PAIN AND THAT SOMETIMES THINGS FEEL LIKE THEY \"STICK\"    Elevated cholesterol 09/20/2018    REPORTS SHE HAS TAKEN MEDICATION IN THE PAST BUT THAT SHE THINKS ALL IS WNL'S AT PRESENT TIME WITHOUT MEDICATION    Fracture of right ankle 2020    GERD (gastroesophageal reflux disease)     Hearing loss     REPORTS MILD AND NO USE OF HEARING AIDS    History of cardiac murmur     REPORTS \"I USED TO HAVE ONE BUT NOW THEY SAY THEY DON'T HEAR IT\"    History of colonic polyps     History of pneumonia     Hx of echocardiogram     Hypercholesterolemia     Hypertension     Irregular heart beat     Macular degeneration     Osteoporosis     Seasonal allergies     Seasonal allergies     Sinusitis     Spinal headache 09/20/2018    REPORTS AFTER DELIVERY OF HER SON    Urinary tract infection     Vertigo     Wears glasses       Past Surgical History:   Procedure Laterality Date    BACK SURGERY      REPORTS A SPUR WAS REMOVED THAT WAS HITTING SCIATIC NERVE    BREAST BIOPSY      BREAST SURGERY Left     LUMPECTOMY, BENIGN     CATARACT EXTRACTION W/ INTRAOCULAR LENS IMPLANT Left 03/04/2022    Procedure: CATARACT PHACO EXTRACTION WITH INTRAOCULAR LENS IMPLANT left;  Surgeon: Pritesh Mcnally MD;  Location: [facility] OR;  Service: Ophthalmology;  Laterality: Left;    CATARACT EXTRACTION W/ INTRAOCULAR LENS IMPLANT Right 03/18/2022    Procedure: CATARACT PHACO EXTRACTION WITH INTRAOCULAR LENS IMPLANT RIGHT;  Surgeon: Pritesh Mcnally MD;  Location: [facility] OR;  Service: Ophthalmology;  Laterality: Right;    COLONOSCOPY  2012    REPORTS LAST DONE IN 2012 BUT HAS A HX OF SEVERAL OF THESE PROCEDURES    DILATATION AND CURETTAGE      REPORTS \"I HAD A COUPLE OF THOSE\"    ENDOSCOPY N/A 01/31/2017    Procedure: ESOPHAGOGASTRODUODENOSCOPY;  Surgeon: Robbie Quezada MD;  Location: [facility] ENDOSCOPY;  Service:     ENDOSCOPY N/A 09/21/2018    Procedure: ESOPHAGOGASTRODUODENOSCOPY WITH " "BIOPSY, DILITATION;  Surgeon: Robbie Quezada MD;  Location: [facility] ENDOSCOPY;  Service: Gastroenterology    ENDOSCOPY N/A 11/15/2023    Procedure: ESOPHAGOGASTRODUODENOSCOPY WITH BIOPSY AND DILATION;  Surgeon: Mil Ma MD;  Location: [facility] ENDOSCOPY;  Service: Gastroenterology;  Laterality: N/A;    FOOT SURGERY Left     REPORTS SURGICAL INTERVENTION TO CORRECT GREAT TOE ALIGNMENT    GALLBLADDER SURGERY  2009    HEMORRHOIDECTOMY  1973    TONSILLECTOMY  1946    UPPER GASTROINTESTINAL ENDOSCOPY  2014    UPPER GASTROINTESTINAL ENDOSCOPY  01/31/2017         MEDICATIONS:  I have reviewed and reconciled the patients medication list in the patients chart at the [facility] today.      ALLERGIES:    Allergies   Allergen Reactions    Codeine Nausea Only    Escitalopram Nausea Only     REPORTS \"LEXAPRO\"    Gabapentin Other (See Comments)     REPORTS \"MAKES MY EYES HURT BECAUSE OF MY MACULAR DEGENERATION\"    Ibuprofen Palpitations     REPORTS \"IT CAUSES MY HEART TO BEAT FAST\"    Levofloxacin Nausea Only    Naproxen Nausea Only    Nitrofurantoin Nausea Only    Sulfa Antibiotics Nausea Only         SOCIAL HISTORY:    Social History     Socioeconomic History    Marital status:    Tobacco Use    Smoking status: Never     Passive exposure: Never    Smokeless tobacco: Never   Vaping Use    Vaping status: Never Used   Substance and Sexual Activity    Alcohol use: No    Drug use: No    Sexual activity: Defer       FAMILY HISTORY:    Family History   Problem Relation Age of Onset    Diabetes Mother     Cancer Brother         lung    Cancer Brother         brain    Colon cancer Paternal Grandmother         possibly colon cancer    Breast cancer Neg Hx        REVIEW OF SYSTEMS:    Review of Systems   Constitutional:  Negative for activity change, appetite change, chills, fatigue and fever.   HENT:  Negative for congestion, ear pain, sneezing, sore throat and trouble swallowing.    Eyes:  Negative for " pain, discharge and redness.   Respiratory:  Negative for apnea, cough, choking, shortness of breath and wheezing.    Cardiovascular:  Negative for chest pain and leg swelling.   Gastrointestinal:  Negative for abdominal pain, constipation, nausea, vomiting and GERD.   Genitourinary:  Negative for decreased urine volume, difficulty urinating, dysuria, frequency and urgency.   Musculoskeletal:  Positive for arthralgias and gait problem.   Skin:  Negative for color change and rash.   Neurological:  Positive for weakness. Negative for dizziness, facial asymmetry, numbness, headache and confusion.   Psychiatric/Behavioral:  Negative for sleep disturbance and depressed mood. The patient is not nervous/anxious.          PHYSICAL EXAMINATION:   VITAL SIGNS:   Vitals:    10/10/24 1052   BP: 121/64   Pulse: 83   Resp: 18   Temp: 98 °F (36.7 °C)   SpO2: 96%   Weight: 66.2 kg (146 lb)        Physical Exam  Constitutional:       Appearance: Normal appearance. She is normal weight.   HENT:      Head: Normocephalic.      Right Ear: External ear normal.      Left Ear: External ear normal.      Nose: Nose normal.      Mouth/Throat:      Mouth: Mucous membranes are moist.      Pharynx: Oropharynx is clear.   Eyes:      General:         Right eye: No discharge.         Left eye: No discharge.   Cardiovascular:      Rate and Rhythm: Normal rate and regular rhythm.      Pulses: Normal pulses.      Heart sounds: Normal heart sounds.   Pulmonary:      Effort: Pulmonary effort is normal. No respiratory distress.      Breath sounds: Normal breath sounds.   Abdominal:      General: Abdomen is flat. Bowel sounds are normal. There is no distension.      Palpations: Abdomen is soft.      Tenderness: There is no abdominal tenderness. There is no guarding.   Musculoskeletal:      Right hip: Decreased range of motion.      Left hip: Decreased range of motion.   Skin:     General: Skin is warm and dry.      Coloration: Skin is pale.    Neurological:      Mental Status: She is alert.      Motor: Weakness present.   Psychiatric:         Mood and Affect: Mood and affect normal.         Behavior: Behavior normal.         Thought Content: Thought content normal.         Cognition and Memory: Cognition and memory normal.         Judgment: Judgment normal.         RECORDS REVIEW:     I have reviewed records in [facility]    ASSESSMENT     Diagnoses and all orders for this visit:    1. Closed nondisplaced fracture of pelvis with routine healing, unspecified part of pelvis, subsequent encounter (Primary)    2. Injury due to fall, subsequent encounter    3. Impaired mobility and ADLs    4. Heart failure with preserved ejection fraction, unspecified HF chronicity    5. Dyslipidemia    6. Essential hypertension    7. Paroxysmal atrial fibrillation    8. Controlled type 2 diabetes mellitus without complication, without long-term current use of insulin    9. Gastroesophageal reflux disease without esophagitis        PLAN    Fracture of pelvis/fall/impaired mobility:  -Discharging home tomorrow. She will continue therapy at home. She will follow up with Ortho as directed. Has equipment needed at home.     CHF/Afib/HTN  -Stable with no exacerbation since admission. RRR. BP at goal. Will follow with PCP and Cardiology.     Dyslipidemia/DM  -Stable on current medication. Will continue to follow up with PCP.    GERD  -Controlled on current medication,     Nursing encouraged to keep me informed of any acute changes or any new concerning symptoms.     Continue supportive care for all ADLs.        [x]  Discussed Patient in detail with nursing/staff, addressed all needs today.     [x]  Plan of Care Reviewed   []  PT/OT Reviewed   []  Order Changes  [x]  Discharge Plans Reviewed  []  Advance Directive on file with Nursing Home.   [x]  POA on file with Nursing Home.   [x]  Code Status listed: []  Full Code   []  DNR     I spent 40 minutes on discharge for Ora on this date of  service. This time includes time spent by me in the following activities:preparing for the visit, reviewing tests, obtaining and/or reviewing a separately obtained history, performing a medically appropriate examination and/or evaluation , counseling and educating the patient/family/caregiver, ordering medications, tests, or procedures, referring and communicating with other health care professionals , documenting information in the medical record, independently interpreting results and communicating that information with the patient/family/caregiver, and care coordination    I confirm accuracy of unchanged data/findings which have been carried forward from previous visit, as well as I have updated appropriately those that have changed.          Delma Manzanares, APRN.

## 2024-11-01 ENCOUNTER — HOSPITAL ENCOUNTER (OUTPATIENT)
Dept: GENERAL RADIOLOGY | Facility: HOSPITAL | Age: 89
Discharge: HOME | End: 2024-11-01
Payer: MEDICARE

## 2024-11-01 DIAGNOSIS — R13.19 ESOPHAGEAL DYSPHAGIA: Chronic | ICD-10-CM

## 2024-11-01 PROCEDURE — 74220 X-RAY XM ESOPHAGUS 1CNTRST: CPT

## 2024-11-06 ENCOUNTER — TELEPHONE (OUTPATIENT)
Dept: CARDIOLOGY | Facility: CLINIC | Age: 89
End: 2024-11-06

## 2024-11-06 NOTE — TELEPHONE ENCOUNTER
Caller: Addie Perez    Relationship to patient: Self    Best call back number: [phone]    Chief complaint: FOLLOW UP-HEART BEATS A LITTLE FAST WITH EXERCISE    Type of visit: FOLLOW UP    Requested date: ANY     If rescheduling, when is the original appointment: 11.6.24     Additional notes:PATIENT CANNOT MAKE HER APPOINTMENT TODAY BECAUSE SHE DOESN'T DRIVE AND HER DAUGHTER THAT WAS GOING TO BRING HER HAS STREP THROAT. PATIENT FELL APPROXIMATELY 7 WEEKS AGO AND BROKE HER PELVIS.  HUB NEXT AVAILABLE WITH DR. HOYOS IS NOT UNTIL FEBRUARY AND PATIENT DOES NOT WISH TO WAIT THAT LONG.

## 2024-11-14 ENCOUNTER — APPOINTMENT (OUTPATIENT)
Dept: CT IMAGING | Facility: HOSPITAL | Age: 89
DRG: 291 | End: 2024-11-14
Payer: MEDICARE

## 2024-11-14 ENCOUNTER — APPOINTMENT (OUTPATIENT)
Dept: GENERAL RADIOLOGY | Facility: HOSPITAL | Age: 89
DRG: 291 | End: 2024-11-14
Payer: MEDICARE

## 2024-11-14 ENCOUNTER — HOSPITAL ENCOUNTER (INPATIENT)
Facility: HOSPITAL | Age: 89
LOS: 2 days | Discharge: HOME | DRG: 291 | End: 2024-11-18
Attending: STUDENT IN AN ORGANIZED HEALTH CARE EDUCATION/TRAINING PROGRAM | Admitting: FAMILY MEDICINE
Payer: MEDICARE

## 2024-11-14 DIAGNOSIS — I48.91 ATRIAL FIBRILLATION WITH RVR: ICD-10-CM

## 2024-11-14 DIAGNOSIS — I50.33 ACUTE ON CHRONIC DIASTOLIC CONGESTIVE HEART FAILURE: ICD-10-CM

## 2024-11-14 DIAGNOSIS — J81.0 ACUTE PULMONARY EDEMA: ICD-10-CM

## 2024-11-14 DIAGNOSIS — I50.9 ACUTE ON CHRONIC CONGESTIVE HEART FAILURE, UNSPECIFIED HEART FAILURE TYPE: ICD-10-CM

## 2024-11-14 DIAGNOSIS — R60.0 BILATERAL LOWER EXTREMITY EDEMA: ICD-10-CM

## 2024-11-14 DIAGNOSIS — R06.01 ORTHOPNEA: Primary | ICD-10-CM

## 2024-11-14 DIAGNOSIS — J90 PLEURAL EFFUSION ON RIGHT: ICD-10-CM

## 2024-11-14 PROBLEM — I48.19 ATRIAL FIBRILLATION, PERSISTENT: Status: ACTIVE | Noted: 2024-11-14

## 2024-11-14 LAB
BASOPHILS # BLD AUTO: 0.04 10*3/MM3 (ref 0–0.2)
BASOPHILS NFR BLD AUTO: 0.7 % (ref 0–1.5)
DEPRECATED RDW RBC AUTO: 42.3 FL (ref 37–54)
EOSINOPHIL # BLD AUTO: 0.18 10*3/MM3 (ref 0–0.4)
EOSINOPHIL NFR BLD AUTO: 3 % (ref 0.3–6.2)
ERYTHROCYTE [DISTWIDTH] IN BLOOD BY AUTOMATED COUNT: 13.5 % (ref 12.3–15.4)
HCT VFR BLD AUTO: 36.1 % (ref 34–46.6)
HGB BLD-MCNC: 12.2 G/DL (ref 12–15.9)
IMM GRANULOCYTES # BLD AUTO: 0.02 10*3/MM3 (ref 0–0.05)
IMM GRANULOCYTES NFR BLD AUTO: 0.3 % (ref 0–0.5)
LYMPHOCYTES # BLD AUTO: 1.99 10*3/MM3 (ref 0.7–3.1)
LYMPHOCYTES NFR BLD AUTO: 32.7 % (ref 19.6–45.3)
MCH RBC QN AUTO: 29.3 PG (ref 26.6–33)
MCHC RBC AUTO-ENTMCNC: 33.8 G/DL (ref 31.5–35.7)
MCV RBC AUTO: 86.6 FL (ref 79–97)
MONOCYTES # BLD AUTO: 0.85 10*3/MM3 (ref 0.1–0.9)
MONOCYTES NFR BLD AUTO: 14 % (ref 5–12)
NEUTROPHILS NFR BLD AUTO: 3.01 10*3/MM3 (ref 1.7–7)
NEUTROPHILS NFR BLD AUTO: 49.3 % (ref 42.7–76)
NRBC BLD AUTO-RTO: 0 /100 WBC (ref 0–0.2)
PLATELET # BLD AUTO: 283 10*3/MM3 (ref 140–450)
PMV BLD AUTO: 9.5 FL (ref 6–12)
RBC # BLD AUTO: 4.17 10*6/MM3 (ref 3.77–5.28)
WBC NRBC COR # BLD AUTO: 6.09 10*3/MM3 (ref 3.4–10.8)

## 2024-11-14 PROCEDURE — 87636 SARSCOV2 & INF A&B AMP PRB: CPT | Performed by: STUDENT IN AN ORGANIZED HEALTH CARE EDUCATION/TRAINING PROGRAM

## 2024-11-14 PROCEDURE — 85025 COMPLETE CBC W/AUTO DIFF WBC: CPT | Performed by: STUDENT IN AN ORGANIZED HEALTH CARE EDUCATION/TRAINING PROGRAM

## 2024-11-14 PROCEDURE — 83880 ASSAY OF NATRIURETIC PEPTIDE: CPT | Performed by: STUDENT IN AN ORGANIZED HEALTH CARE EDUCATION/TRAINING PROGRAM

## 2024-11-14 PROCEDURE — 84484 ASSAY OF TROPONIN QUANT: CPT | Performed by: STUDENT IN AN ORGANIZED HEALTH CARE EDUCATION/TRAINING PROGRAM

## 2024-11-14 PROCEDURE — 93005 ELECTROCARDIOGRAM TRACING: CPT | Performed by: STUDENT IN AN ORGANIZED HEALTH CARE EDUCATION/TRAINING PROGRAM

## 2024-11-14 PROCEDURE — 80053 COMPREHEN METABOLIC PANEL: CPT | Performed by: STUDENT IN AN ORGANIZED HEALTH CARE EDUCATION/TRAINING PROGRAM

## 2024-11-14 PROCEDURE — 71045 X-RAY EXAM CHEST 1 VIEW: CPT

## 2024-11-14 PROCEDURE — 99285 EMERGENCY DEPT VISIT HI MDM: CPT | Performed by: STUDENT IN AN ORGANIZED HEALTH CARE EDUCATION/TRAINING PROGRAM

## 2024-11-14 PROCEDURE — 71275 CT ANGIOGRAPHY CHEST: CPT

## 2024-11-14 RX ORDER — SODIUM CHLORIDE 0.9 % (FLUSH) 0.9 %
10 SYRINGE (ML) INJECTION AS NEEDED
Status: DISCONTINUED | OUTPATIENT
Start: 2024-11-14 | End: 2024-11-18 | Stop reason: HOSPADM

## 2024-11-15 ENCOUNTER — APPOINTMENT (OUTPATIENT)
Dept: ULTRASOUND IMAGING | Facility: HOSPITAL | Age: 89
DRG: 291 | End: 2024-11-15
Payer: MEDICARE

## 2024-11-15 PROBLEM — I50.33 ACUTE ON CHRONIC DIASTOLIC (CONGESTIVE) HEART FAILURE: Status: ACTIVE | Noted: 2024-11-15

## 2024-11-15 LAB
ALBUMIN SERPL-MCNC: 3.6 G/DL (ref 3.5–5.2)
ALBUMIN/GLOB SERPL: 1.2 G/DL
ALP SERPL-CCNC: 265 U/L (ref 39–117)
ALT SERPL W P-5'-P-CCNC: 7 U/L (ref 1–33)
ANION GAP SERPL CALCULATED.3IONS-SCNC: 14.1 MMOL/L (ref 5–15)
ANION GAP SERPL CALCULATED.3IONS-SCNC: 16.4 MMOL/L (ref 5–15)
AST SERPL-CCNC: 23 U/L (ref 1–32)
BACTERIA UR QL AUTO: ABNORMAL /HPF
BILIRUB SERPL-MCNC: 1.4 MG/DL (ref 0–1.2)
BILIRUB UR QL STRIP: NEGATIVE
BUN SERPL-MCNC: 11 MG/DL (ref 8–23)
BUN SERPL-MCNC: 11 MG/DL (ref 8–23)
BUN/CREAT SERPL: 10.5 (ref 7–25)
BUN/CREAT SERPL: 11.1 (ref 7–25)
CALCIUM SPEC-SCNC: 9 MG/DL (ref 8.2–9.6)
CALCIUM SPEC-SCNC: 9 MG/DL (ref 8.2–9.6)
CHLORIDE SERPL-SCNC: 92 MMOL/L (ref 98–107)
CHLORIDE SERPL-SCNC: 96 MMOL/L (ref 98–107)
CLARITY UR: CLEAR
CO2 SERPL-SCNC: 21.6 MMOL/L (ref 22–29)
CO2 SERPL-SCNC: 25.9 MMOL/L (ref 22–29)
COLOR UR: YELLOW
CREAT SERPL-MCNC: 0.99 MG/DL (ref 0.57–1)
CREAT SERPL-MCNC: 1.05 MG/DL (ref 0.57–1)
EGFRCR SERPLBLD CKD-EPI 2021: 50.6 ML/MIN/1.73
EGFRCR SERPLBLD CKD-EPI 2021: 54.3 ML/MIN/1.73
FLUAV SUBTYP SPEC NAA+PROBE: NOT DETECTED
FLUBV RNA ISLT QL NAA+PROBE: NOT DETECTED
GEN 5 1HR TROPONIN T REFLEX: 13 NG/L
GLOBULIN UR ELPH-MCNC: 3.1 GM/DL
GLUCOSE SERPL-MCNC: 104 MG/DL (ref 65–99)
GLUCOSE SERPL-MCNC: 112 MG/DL (ref 65–99)
GLUCOSE UR STRIP-MCNC: NEGATIVE MG/DL
HGB UR QL STRIP.AUTO: ABNORMAL
HYALINE CASTS UR QL AUTO: ABNORMAL /LPF
KETONES UR QL STRIP: NEGATIVE
LEUKOCYTE ESTERASE UR QL STRIP.AUTO: NEGATIVE
MAGNESIUM SERPL-MCNC: 1.9 MG/DL (ref 1.6–2.4)
NITRITE UR QL STRIP: NEGATIVE
NT-PROBNP SERPL-MCNC: 2999 PG/ML (ref 0–1800)
PH UR STRIP.AUTO: 7 [PH] (ref 5–8)
POTASSIUM SERPL-SCNC: 3.4 MMOL/L (ref 3.5–5.2)
POTASSIUM SERPL-SCNC: 3.4 MMOL/L (ref 3.5–5.2)
PROT SERPL-MCNC: 6.7 G/DL (ref 6–8.5)
PROT UR QL STRIP: NEGATIVE
RBC # UR STRIP: ABNORMAL /HPF
REF LAB TEST METHOD: ABNORMAL
SARS-COV-2 RNA RESP QL NAA+PROBE: NOT DETECTED
SODIUM SERPL-SCNC: 130 MMOL/L (ref 136–145)
SODIUM SERPL-SCNC: 136 MMOL/L (ref 136–145)
SP GR UR STRIP: 1.01 (ref 1–1.03)
SQUAMOUS #/AREA URNS HPF: ABNORMAL /HPF
TROPONIN T NUMERIC DELTA: 0 NG/L
TROPONIN T SERPL HS-MCNC: 13 NG/L
UROBILINOGEN UR QL STRIP: ABNORMAL
WBC # UR STRIP: ABNORMAL /HPF

## 2024-11-15 PROCEDURE — 25010000002 FUROSEMIDE PER 20 MG: Performed by: INTERNAL MEDICINE

## 2024-11-15 PROCEDURE — G0378 HOSPITAL OBSERVATION PER HR: HCPCS

## 2024-11-15 PROCEDURE — 97161 PT EVAL LOW COMPLEX 20 MIN: CPT

## 2024-11-15 PROCEDURE — 63710000001 DIPHENHYDRAMINE PER 50 MG: Performed by: FAMILY MEDICINE

## 2024-11-15 PROCEDURE — 83735 ASSAY OF MAGNESIUM: CPT | Performed by: FAMILY MEDICINE

## 2024-11-15 PROCEDURE — 81001 URINALYSIS AUTO W/SCOPE: CPT | Performed by: STUDENT IN AN ORGANIZED HEALTH CARE EDUCATION/TRAINING PROGRAM

## 2024-11-15 PROCEDURE — 80048 BASIC METABOLIC PNL TOTAL CA: CPT | Performed by: FAMILY MEDICINE

## 2024-11-15 PROCEDURE — 97165 OT EVAL LOW COMPLEX 30 MIN: CPT

## 2024-11-15 PROCEDURE — 25010000002 FUROSEMIDE PER 20 MG: Performed by: STUDENT IN AN ORGANIZED HEALTH CARE EDUCATION/TRAINING PROGRAM

## 2024-11-15 PROCEDURE — 99214 OFFICE O/P EST MOD 30 MIN: CPT | Performed by: INTERNAL MEDICINE

## 2024-11-15 PROCEDURE — 93971 EXTREMITY STUDY: CPT

## 2024-11-15 PROCEDURE — 99223 1ST HOSP IP/OBS HIGH 75: CPT | Performed by: FAMILY MEDICINE

## 2024-11-15 PROCEDURE — 84484 ASSAY OF TROPONIN QUANT: CPT | Performed by: STUDENT IN AN ORGANIZED HEALTH CARE EDUCATION/TRAINING PROGRAM

## 2024-11-15 PROCEDURE — 25010000002 FUROSEMIDE PER 20 MG: Performed by: FAMILY MEDICINE

## 2024-11-15 PROCEDURE — 25510000001 IOPAMIDOL 61 % SOLUTION: Performed by: STUDENT IN AN ORGANIZED HEALTH CARE EDUCATION/TRAINING PROGRAM

## 2024-11-15 RX ORDER — BISACODYL 10 MG
10 SUPPOSITORY, RECTAL RECTAL DAILY PRN
Status: DISCONTINUED | OUTPATIENT
Start: 2024-11-15 | End: 2024-11-18 | Stop reason: HOSPADM

## 2024-11-15 RX ORDER — HYDROCODONE BITARTRATE AND ACETAMINOPHEN 5; 325 MG/1; MG/1
1 TABLET ORAL EVERY 6 HOURS
Status: DISCONTINUED | OUTPATIENT
Start: 2024-11-15 | End: 2024-11-15

## 2024-11-15 RX ORDER — FUROSEMIDE 10 MG/ML
40 INJECTION INTRAMUSCULAR; INTRAVENOUS
Status: DISCONTINUED | OUTPATIENT
Start: 2024-11-15 | End: 2024-11-18 | Stop reason: HOSPADM

## 2024-11-15 RX ORDER — PANTOPRAZOLE SODIUM 40 MG/1
40 TABLET, DELAYED RELEASE ORAL
Status: DISCONTINUED | OUTPATIENT
Start: 2024-11-15 | End: 2024-11-18 | Stop reason: HOSPADM

## 2024-11-15 RX ORDER — ALUMINA, MAGNESIA, AND SIMETHICONE 2400; 2400; 240 MG/30ML; MG/30ML; MG/30ML
15 SUSPENSION ORAL EVERY 6 HOURS PRN
Status: DISCONTINUED | OUTPATIENT
Start: 2024-11-15 | End: 2024-11-18 | Stop reason: HOSPADM

## 2024-11-15 RX ORDER — METOPROLOL TARTRATE 50 MG
50 TABLET ORAL EVERY 12 HOURS SCHEDULED
Status: DISCONTINUED | OUTPATIENT
Start: 2024-11-15 | End: 2024-11-15

## 2024-11-15 RX ORDER — ONDANSETRON 2 MG/ML
4 INJECTION INTRAMUSCULAR; INTRAVENOUS EVERY 6 HOURS PRN
Status: DISCONTINUED | OUTPATIENT
Start: 2024-11-15 | End: 2024-11-18 | Stop reason: HOSPADM

## 2024-11-15 RX ORDER — SODIUM CHLORIDE 0.9 % (FLUSH) 0.9 %
10 SYRINGE (ML) INJECTION AS NEEDED
Status: DISCONTINUED | OUTPATIENT
Start: 2024-11-15 | End: 2024-11-18 | Stop reason: HOSPADM

## 2024-11-15 RX ORDER — FLUTICASONE PROPIONATE 50 MCG
2 SPRAY, SUSPENSION (ML) NASAL DAILY
Status: DISCONTINUED | OUTPATIENT
Start: 2024-11-15 | End: 2024-11-18 | Stop reason: HOSPADM

## 2024-11-15 RX ORDER — IOPAMIDOL 612 MG/ML
100 INJECTION, SOLUTION INTRAVASCULAR
Status: COMPLETED | OUTPATIENT
Start: 2024-11-15 | End: 2024-11-15

## 2024-11-15 RX ORDER — CETIRIZINE HYDROCHLORIDE 10 MG/1
10 TABLET ORAL DAILY
Status: DISCONTINUED | OUTPATIENT
Start: 2024-11-15 | End: 2024-11-18 | Stop reason: HOSPADM

## 2024-11-15 RX ORDER — ACETAMINOPHEN 325 MG/1
650 TABLET ORAL EVERY 4 HOURS PRN
Status: DISCONTINUED | OUTPATIENT
Start: 2024-11-15 | End: 2024-11-18 | Stop reason: HOSPADM

## 2024-11-15 RX ORDER — AMOXICILLIN 250 MG
2 CAPSULE ORAL 2 TIMES DAILY PRN
Status: DISCONTINUED | OUTPATIENT
Start: 2024-11-15 | End: 2024-11-18 | Stop reason: HOSPADM

## 2024-11-15 RX ORDER — ACETAMINOPHEN 650 MG/1
650 SUPPOSITORY RECTAL EVERY 4 HOURS PRN
Status: DISCONTINUED | OUTPATIENT
Start: 2024-11-15 | End: 2024-11-18 | Stop reason: HOSPADM

## 2024-11-15 RX ORDER — ACETAMINOPHEN 160 MG/5ML
650 SOLUTION ORAL EVERY 4 HOURS PRN
Status: DISCONTINUED | OUTPATIENT
Start: 2024-11-15 | End: 2024-11-18 | Stop reason: HOSPADM

## 2024-11-15 RX ORDER — FUROSEMIDE 10 MG/ML
40 INJECTION INTRAMUSCULAR; INTRAVENOUS ONCE
Status: COMPLETED | OUTPATIENT
Start: 2024-11-15 | End: 2024-11-15

## 2024-11-15 RX ORDER — FUROSEMIDE 10 MG/ML
20 INJECTION INTRAMUSCULAR; INTRAVENOUS
Status: DISCONTINUED | OUTPATIENT
Start: 2024-11-15 | End: 2024-11-15

## 2024-11-15 RX ORDER — DIPHENHYDRAMINE HCL 25 MG
25 CAPSULE ORAL NIGHTLY PRN
Status: DISCONTINUED | OUTPATIENT
Start: 2024-11-15 | End: 2024-11-18 | Stop reason: HOSPADM

## 2024-11-15 RX ORDER — BISACODYL 5 MG/1
5 TABLET, DELAYED RELEASE ORAL DAILY PRN
Status: DISCONTINUED | OUTPATIENT
Start: 2024-11-15 | End: 2024-11-18 | Stop reason: HOSPADM

## 2024-11-15 RX ORDER — SODIUM CHLORIDE 9 MG/ML
40 INJECTION, SOLUTION INTRAVENOUS AS NEEDED
Status: DISCONTINUED | OUTPATIENT
Start: 2024-11-15 | End: 2024-11-18 | Stop reason: HOSPADM

## 2024-11-15 RX ORDER — ONDANSETRON 4 MG/1
4 TABLET, ORALLY DISINTEGRATING ORAL EVERY 6 HOURS PRN
Status: DISCONTINUED | OUTPATIENT
Start: 2024-11-15 | End: 2024-11-18 | Stop reason: HOSPADM

## 2024-11-15 RX ORDER — SPIRONOLACTONE 25 MG/1
25 TABLET ORAL DAILY
Status: DISCONTINUED | OUTPATIENT
Start: 2024-11-15 | End: 2024-11-18 | Stop reason: HOSPADM

## 2024-11-15 RX ORDER — POTASSIUM CHLORIDE 750 MG/1
40 CAPSULE, EXTENDED RELEASE ORAL DAILY
Status: DISCONTINUED | OUTPATIENT
Start: 2024-11-15 | End: 2024-11-18 | Stop reason: HOSPADM

## 2024-11-15 RX ORDER — NITROGLYCERIN 0.4 MG/1
0.4 TABLET SUBLINGUAL
Status: DISCONTINUED | OUTPATIENT
Start: 2024-11-15 | End: 2024-11-18 | Stop reason: HOSPADM

## 2024-11-15 RX ORDER — SODIUM CHLORIDE 0.9 % (FLUSH) 0.9 %
10 SYRINGE (ML) INJECTION EVERY 12 HOURS SCHEDULED
Status: DISCONTINUED | OUTPATIENT
Start: 2024-11-15 | End: 2024-11-18 | Stop reason: HOSPADM

## 2024-11-15 RX ORDER — POLYETHYLENE GLYCOL 3350 17 G/17G
17 POWDER, FOR SOLUTION ORAL DAILY PRN
Status: DISCONTINUED | OUTPATIENT
Start: 2024-11-15 | End: 2024-11-18 | Stop reason: HOSPADM

## 2024-11-15 RX ORDER — HYDROCODONE BITARTRATE AND ACETAMINOPHEN 5; 325 MG/1; MG/1
1 TABLET ORAL EVERY 6 HOURS PRN
Status: DISCONTINUED | OUTPATIENT
Start: 2024-11-15 | End: 2024-11-18 | Stop reason: HOSPADM

## 2024-11-15 RX ADMIN — IOPAMIDOL 100 ML: 612 INJECTION, SOLUTION INTRAVENOUS at 00:04

## 2024-11-15 RX ADMIN — FUROSEMIDE 20 MG: 10 INJECTION, SOLUTION INTRAMUSCULAR; INTRAVENOUS at 09:14

## 2024-11-15 RX ADMIN — PANTOPRAZOLE SODIUM 40 MG: 40 TABLET, DELAYED RELEASE ORAL at 06:09

## 2024-11-15 RX ADMIN — SPIRONOLACTONE 25 MG: 25 TABLET, FILM COATED ORAL at 09:21

## 2024-11-15 RX ADMIN — APIXABAN 5 MG: 5 TABLET, FILM COATED ORAL at 20:46

## 2024-11-15 RX ADMIN — METOPROLOL TARTRATE 50 MG: 50 TABLET, FILM COATED ORAL at 06:09

## 2024-11-15 RX ADMIN — Medication 10 ML: at 10:07

## 2024-11-15 RX ADMIN — APIXABAN 5 MG: 5 TABLET, FILM COATED ORAL at 09:20

## 2024-11-15 RX ADMIN — Medication 10 ML: at 20:46

## 2024-11-15 RX ADMIN — FUROSEMIDE 40 MG: 10 INJECTION, SOLUTION INTRAMUSCULAR; INTRAVENOUS at 17:10

## 2024-11-15 RX ADMIN — FUROSEMIDE 40 MG: 10 INJECTION, SOLUTION INTRAMUSCULAR; INTRAVENOUS at 01:37

## 2024-11-15 RX ADMIN — METOPROLOL TARTRATE 75 MG: 50 TABLET, FILM COATED ORAL at 20:45

## 2024-11-15 RX ADMIN — DIPHENHYDRAMINE HYDROCHLORIDE 25 MG: 25 CAPSULE ORAL at 21:56

## 2024-11-15 RX ADMIN — CETIRIZINE HYDROCHLORIDE 10 MG: 10 TABLET, FILM COATED ORAL at 09:20

## 2024-11-15 RX ADMIN — Medication 10 ML: at 02:12

## 2024-11-15 RX ADMIN — HYDROCODONE BITARTRATE AND ACETAMINOPHEN 1 TABLET: 5; 325 TABLET ORAL at 21:56

## 2024-11-15 RX ADMIN — POTASSIUM CHLORIDE 40 MEQ: 750 CAPSULE, EXTENDED RELEASE ORAL at 09:16

## 2024-11-15 RX ADMIN — FLUTICASONE PROPIONATE 2 SPRAY: 50 SPRAY, METERED NASAL at 09:21

## 2024-11-15 NOTE — ED NOTES
"Pt says she can not use the external catheter. Says she has to \"push\" to urinate and can not do that unless her feet are on the ground. Educated given re: importance of keeping HR low d/t CHF and Afib. Pt insists on using BSC stating \"the pain is unbearable\". Removed external catheter and assisted pt to BSC. Pt did have to lean forward before urination occurred. Pt was assisted back into bed and hooked up on monitor. Pt stated she was comfortable and had no further needs at this time. HR is 120. SpO2 is 95% on RA. No acute distress noted. Bed locked and low. Call light within reach.  "

## 2024-11-15 NOTE — PROGRESS NOTES
"    [facility]    PROGRESS NOTE    Name:  Addie Perez   Age:  89 y.o.  Sex:  female  :  1934  MRN:  [id number]   Visit Number:  [id number]  Admission Date:  2024  Date Of Service:  11/15/24  Primary Care Physician:  Ariela Hampton DO     LOS: 0 days :    Chief Complaint:      Shortness of air    Subjective:    Patient seen and examined.  States has not been sleeping well since her pelvic fractures due to inability to lay due to orthopnea.  States that her left lower extremity is usually larger than her right but appears to be worse currently.  Did have some pain in her calf.  Has been taking Eliquis as scheduled.    Hospital Course:    The patient is a 89-year-old female with a history of atrial fibrillation, valvular heart disease, diastolic CHF, with recent hospitalization and rehab stay due to pelvic fractures, who presented from home due to increasing shortness of breath and palpitations.  Patient states she has had the breathing difficulty ever since her pelvic fractures.  This had worsened over the last few days.  She was due to see her cardiologist next week.  She notes that they had previously discussed  a \"shock\" due to her A-fib.  She has been taking 5 mg of torsemide in addition to her metoprolol.  She has noted increased blood pressures over the last week or so.  She does watch her sodium intake.  She is unsure of her most recent weight at home but has not noticed significant gain.  Has noticed increased swelling in her legs.  Patient also mentions she has had intermittent vaginal bleeding since being on Eliquis earlier this year, but has not been severe.     In the ER the patient was overall stable.  Did have A-fib on telemetry with rates in the 100-1 10 range at rest.  Not hypoxic.  Creatinine of 1, sodium 130, total bilirubin 1.4.  proBNP of 3000.  White count of 6 hemoglobin 12 platelets 283.  Troponins normal.  CT scan of her chest with a moderate " right-sided pleural effusion, bilateral pulmonary edema, nodular liver could be due to cirrhosis per radiology.  There is also to suspected kidney cyst on the right.  Patient was given IV Lasix.  Hospitalist consulted for further medical management.  Dr. Nunez consulted for further recommendations.  Patient remained fluid overloaded with IV diuresis continued.  Patient had previously failed to convert with oral amiodarone loading and declined current attempt at electrical cardioversion.  Heart rate fluctuating from 80 to 120 bpm.  Metoprolol uptitrated.  Per cardiology if rate unable to be controlled consider EP evaluation for possible pacemaker and AV node ablation.    Review of Systems:     All systems were reviewed and negative except as mentioned in subjective, assessment and plan.    Vital Signs:    Temp:  [97.7 °F (36.5 °C)-98.2 °F (36.8 °C)] 97.7 °F (36.5 °C)  Heart Rate:  [] 99  Resp:  [12-20] 16  BP: (112-142)/() 140/92    Intake and output:    No intake/output data recorded.  No intake/output data recorded.    Physical Examination:    General Appearance:  Alert and cooperative.  Chronically ill elderly female.   Head:  Atraumatic and normocephalic.   Eyes: Conjunctivae and sclerae normal, no icterus. No pallor.   Throat: No oral lesions, no thrush, oral mucosa moist.   Neck: Supple, trachea midline, no thyromegaly.   Lungs:   Breath sounds heard bilaterally equally.  No wheezing or crackles. No Pleural rub or bronchial breathing.  On room air unlabored.   Heart:  Normal S1 and S2, regularly irregular, murmur, no gallop, no rub. No JVD.   Abdomen:   Normal bowel sounds, no masses, no organomegaly. Soft, nontender, nondistended, no rebound tenderness.   Extremities: Supple, left lower extremity edema greater than right, no cyanosis, no clubbing.   Skin: No bleeding or rash.   Neurologic: Alert and oriented x 3. No facial asymmetry. Moves all four limbs. No tremors.  Generalized weakness.  "    Laboratory results:    Results from last 7 days   Lab Units 11/15/24  0700 11/14/24  2347   SODIUM mmol/L 136 130*   POTASSIUM mmol/L 3.4* 3.4*   CHLORIDE mmol/L 96* 92*   CO2 mmol/L 25.9 21.6*   BUN mg/dL 11 11   CREATININE mg/dL 0.99 1.05*   CALCIUM mg/dL 9.0 9.0   BILIRUBIN mg/dL  --  1.4*   ALK PHOS U/L  --  265*   ALT (SGPT) U/L  --  7   AST (SGOT) U/L  --  23   GLUCOSE mg/dL 112* 104*     Results from last 7 days   Lab Units 11/14/24  2347   WBC 10*3/mm3 6.09   HEMOGLOBIN g/dL 12.2   HEMATOCRIT % 36.1   PLATELETS 10*3/mm3 283         Results from last 7 days   Lab Units 11/15/24  0136 11/14/24  2347   HSTROP T ng/L 13 13         No results for input(s): \"PHART\", \"WHU1NIC\", \"PO2ART\", \"ZVF5EHG\", \"BASEEXCESS\" in the last 8760 hours.   I have reviewed the patient's laboratory results.    Radiology results:    XR Chest 1 View    Result Date: 11/15/2024  PROCEDURE: XR CHEST 1 VW-    HISTORY: Shortness of breath  COMPARISON: March 2024.  FINDINGS: The heart is mildly enlarged. There is mild tortuosity of the aorta. There is decreased inspiratory effort. There is a new right basilar linear density which may be due to mild atelectasis versus infiltrate. Blunting of the costophrenic angles may be due to pleural effusions. There is no pneumothorax. There are no acute osseous abnormalities.      Impression: New right basilar linear density may be due to atelectasis versus infiltrate and possible small effusions. Consider mild fluid overload/CHF. Continued follow-up is recommended.        Images were reviewed, interpreted, and dictated by Dr. Lanny Mcmullen MD Transcribed by Amy Corea PA-C.  This report was signed and finalized on 11/15/2024 9:08 AM by Lanny Mcmullen MD.      CT Angiogram Chest Pulmonary Embolism    Result Date: 11/15/2024  FINAL REPORT TECHNIQUE: null CLINICAL HISTORY: Shortness of breath, A-fib COMPARISON: null FINDINGS: CT angiography chest with contrast. 3D Postprocessing. Comparison: None " Findings: Cardiomegaly. Unremarkable thoracic aorta and great vessels. No aneurysm or dissection. The visualized thyroid and mediastinum are unremarkable. Moderate right pleural effusion. Partial atelectasis right lower lobe. Mild ground-glass and reticular opacities in both lungs. Likely mostly atelectasis. Small amount of ascites in the upper abdomen. Cholecystectomy. Nodular liver surface. This can be seen with cirrhosis. Hypodense and hyperdense lesions at the right superior kidney. Both are likely cysts. Osteopenia. No acute fracture.     Impression: IMPRESSION: 1. No acute pulmonary embolus. 2. Moderate right pleural effusion. Mild reticular and ground-glass opacities in both lungs could be mild pulmonary edema. Consider heart failure. 3. Nodular liver could be due to cirrhosis. Small amount of ascites could be related. 4. 2 lesions in the right superior kidney are both favored to be cysts, however can be confirmed with nonemergent ultrasound. Authenticated and Electronically Signed by Miguel A Haro MD on 11/15/2024 01:06:31 AM   I have reviewed the patient's radiology reports.    Medication Review:     I have reviewed the patient's active and prn medications.     Problem List:      Acute on chronic diastolic (congestive) heart failure      Assessment:    Acute on chronic diastolic congestive heart failure, POA  Atrial fibrillation with intermittent RVR, paroxysmal, POA  Valvular heart disease, POA  Recent pelvic fracture with impaired mobility and ADLs  Cirrhosis?  Hematuria    Plan:    CHF:  -Dr. Nunez following.  -Diuresis continued and increased to 40 mg twice daily.  -Patient does have fairly significant mitral valve regurgitation and moderate to severe tricuspid valve regurgitation on prior echo.  May be contributing to volume issues as well.  -Strict I's and O's and daily weights.  -Left lower extremity edema usually greater than right.  Appears worse per patient with pain in calf.  Advised should  not have DVT if taking Eliquis.  Venous duplex pending.     A-fib:  -Heart rate currently 80-1 20 lying in bed.  -Metoprolol uptitrated.  Eliquis continued.  -Per Dr. Nunez if no improvement consider EP consultation with possible pacemaker and AV node ablation.     Cirrhosis:  No prior abdominal imaging to compare.  Does have mildly elevated bilirubin.  Could be related to hepatic congestion from heart failure, versus NAFLD.  May need further workup.    -Patient feels as if her mobility is currently at baseline and does not feel that she needs further short-term rehab.  Plans to return home when medically stable.    I have reviewed the copied text and it is accurate as of 11/15/2024     DVT Prophylaxis: Eliquis  Code Status: DNR  Diet: Cardiac fluid restriction  Discharge Plan: RE Day, APRN  11/15/24  15:39 EST    Dictated utilizing Dragon dictation.

## 2024-11-15 NOTE — H&P
"  [facility]      Name:  Addie Perez   Age:  89 y.o.  Sex:  female  :  1934  MRN:  [id number]   Visit Number:  [id number]  Admission Date:  2024  Date Of Service:  11/15/24  Primary Care Physician:  Ariela Hampton DO    Chief Complaint:     Shortness of breath    History Of Presenting Illness:      The patient is a 89-year-old female with a history of atrial fibrillation, valvular heart disease, diastolic CHF, with recent hospitalization and rehab stay due to pelvic fractures, who presented from home due to increasing shortness of breath and palpitations.  Patient states she has had the breathing difficulty ever since her pelvic fractures.  This had worsened over the last few days.  She was due to see her cardiologist next week.  She notes that talked about doing a \"shock\" due to her A-fib.  She has been taking 5 mg of torsemide in addition to her metoprolol.  She has noted increased blood pressures over the last week or so.  She does watch her sodium intake.  She is unsure of her most recent weight at home but has not noticed significant gain.  Has noticed increased swelling in her legs.  Patient also mentions she has had intermittent vaginal bleeding since being on Eliquis earlier this year, but has not been severe.    In the ER the patient was overall stable.  Did have A-fib on telemetry with rates in the 100-1 10 range at rest.  Not hypoxic.  Creatinine of 1, sodium 130, total bilirubin 1.4.  proBNP of 3000.  White count of 6 hemoglobin 12 platelets 283.  Troponins normal.  CT scan of her chest with a moderate right-sided pleural effusion, bilateral pulmonary edema, nodular liver could be due to cirrhosis per radiology.  There is also to suspected kidney cyst on the right.  Patient was given IV Lasix.  We were asked to admit for further workup.    Review Of Systems:    All systems were reviewed and negative except as mentioned in history of " presenting illness, assessment and plan.    Past Medical History: Patient  has a past medical history of Arthritis, Back pain, Cataract, Cystitis, Diabetes mellitus, Disease of thyroid gland, Dysphagia, Elevated cholesterol (09/20/2018), Fracture of right ankle (2020), GERD (gastroesophageal reflux disease), Hearing loss, History of cardiac murmur, History of colonic polyps, History of pneumonia, echocardiogram, Hypercholesterolemia, Hypertension, Irregular heart beat, Macular degeneration, Osteoporosis, Seasonal allergies, Seasonal allergies, Sinusitis, Spinal headache (09/20/2018), Urinary tract infection, Vertigo, and Wears glasses.    Past Surgical History: Patient  has a past surgical history that includes Gallbladder surgery (2009); Dilation and curettage of uterus; Hemorrhoid surgery (1973); Tonsillectomy (1946); Back surgery; Breast surgery (Left); Esophagogastroduodenoscopy (N/A, 01/31/2017); Upper gastrointestinal endoscopy (2014); Upper gastrointestinal endoscopy (01/31/2017); Colonoscopy (2012); Foot surgery (Left); Esophagogastroduodenoscopy (N/A, 09/21/2018); Cataract extraction w/ intraocular lens implant (Left, 03/04/2022); Cataract extraction w/ intraocular lens implant (Right, 03/18/2022); Breast biopsy; and Esophagogastroduodenoscopy (N/A, 11/15/2023).    Social History: Patient  reports that she has never smoked. She has never been exposed to tobacco smoke. She has never used smokeless tobacco. She reports that she does not drink alcohol and does not use drugs.    Family History:  Patient's family history has been reviewed and found to be noncontributory.     Allergies:      Codeine, Escitalopram, Gabapentin, Ibuprofen, Levofloxacin, Naproxen, Nitrofurantoin, and Sulfa antibiotics    Home Medications:    Prior to Admission Medications       Prescriptions Last Dose Informant Patient Reported? Taking?    acetaminophen (TYLENOL) 650 MG 8 hr tablet  Self Yes No    Take 1 tablet by mouth 2 (Two) Times  a Day As Needed.    apixaban (ELIQUIS) 5 MG tablet tablet   No No    Take 1 tablet by mouth Every 12 (Twelve) Hours.    cetirizine (zyrTEC) 10 MG tablet  Self Yes No    cetirizine 10 mg tablet   Take 1 tablet every day by oral route for 30 days.    CRANBERRY EXTRACT PO  Self Yes No    Take 1 tablet by mouth Daily. Pt taking every other day    estradiol (ESTRACE) 0.5 MG tablet   No No    Take 1 tablet by mouth 2 (Two) Times a Week. REPORTS TAKES THIS ON Monday AND Friday AT NIGHT TIME    fluticasone (FLONASE) 50 MCG/ACT nasal spray   Yes No    2 sprays into the nostril(s) as directed by provider Daily As Needed.    HYDROcodone-acetaminophen (NORCO) 5-325 MG per tablet   No No    Take 1 tablet by mouth Every 6 (Six) Hours.    losartan (COZAAR) 50 MG tablet  Self Yes No    Take 1 tablet by mouth Daily.    metoprolol succinate XL (TOPROL-XL) 50 MG 24 hr tablet   Yes No    Take 2 tablets by mouth 2 (Two) Times a Day.    multivitamin with minerals tablet tablet  Self Yes No    Take 1 tablet by mouth Daily. One-A-Day every other day    omeprazole (prilOSEC) 10 MG capsule   Yes No    Take 1 capsule by mouth Every Morning.    torsemide (DEMADEX) 5 MG tablet  Self Yes No    Take 1 tablet by mouth Daily.          ED Medications:    Medications   sodium chloride 0.9 % flush 10 mL (has no administration in time range)   sodium chloride 0.9 % flush 10 mL (10 mL Intravenous Given 11/15/24 0212)   sodium chloride 0.9 % flush 10 mL (has no administration in time range)   sodium chloride 0.9 % infusion 40 mL (has no administration in time range)   nitroglycerin (NITROSTAT) SL tablet 0.4 mg (has no administration in time range)   acetaminophen (TYLENOL) tablet 650 mg (has no administration in time range)     Or   acetaminophen (TYLENOL) 160 MG/5ML oral solution 650 mg (has no administration in time range)     Or   acetaminophen (TYLENOL) suppository 650 mg (has no administration in time range)   sennosides-docusate (PERICOLACE) 8.6-50  "MG per tablet 2 tablet (has no administration in time range)     And   polyethylene glycol (MIRALAX) packet 17 g (has no administration in time range)     And   bisacodyl (DULCOLAX) EC tablet 5 mg (has no administration in time range)     And   bisacodyl (DULCOLAX) suppository 10 mg (has no administration in time range)   diphenhydrAMINE (BENADRYL) capsule 25 mg (has no administration in time range)   ondansetron ODT (ZOFRAN-ODT) disintegrating tablet 4 mg (has no administration in time range)     Or   ondansetron (ZOFRAN) injection 4 mg (has no administration in time range)   aluminum-magnesium hydroxide-simethicone (MAALOX MAX) 400-400-40 MG/5ML suspension 15 mL (has no administration in time range)   metoprolol tartrate (LOPRESSOR) tablet 50 mg (has no administration in time range)   furosemide (LASIX) injection 20 mg (has no administration in time range)   potassium chloride (MICRO-K/KLOR-CON) CR capsule (has no administration in time range)   apixaban (ELIQUIS) tablet 5 mg (has no administration in time range)   iopamidol (ISOVUE-300) 61 % injection 100 mL (100 mL Intravenous Given 11/15/24 0004)   furosemide (LASIX) injection 40 mg (40 mg Intravenous Given 11/15/24 0137)     Vital Signs:  Temp:  [98.2 °F (36.8 °C)] 98.2 °F (36.8 °C)  Heart Rate:  [] 98  Resp:  [20] 20  BP: (138-142)/() 142/84        11/14/24 2254   Weight: 67.6 kg (149 lb)     Body mass index is 24.79 kg/m².    Physical Exam:     Most recent vital Signs: /84   Pulse 98   Temp 98.2 °F (36.8 °C) (Oral)   Resp 20   Ht 165.1 cm (65\")   Wt 67.6 kg (149 lb)   LMP  (LMP Unknown)   SpO2 98%   BMI 24.79 kg/m²     Physical Exam  Constitutional:       General: She is not in acute distress.     Appearance: She is normal weight. She is not toxic-appearing.   HENT:      Head: Normocephalic and atraumatic.      Ears:      Comments: Hearing loss  Eyes:      Extraocular Movements: Extraocular movements intact.      Pupils: Pupils are " "equal, round, and reactive to light.   Cardiovascular:      Rate and Rhythm: Tachycardia present. Rhythm irregular.      Pulses: Normal pulses.      Heart sounds: Murmur heard.   Pulmonary:      Effort: Respiratory distress present.      Breath sounds: Rales present.   Abdominal:      General: Bowel sounds are normal. There is no distension.      Tenderness: There is no abdominal tenderness. There is no guarding.   Musculoskeletal:      Right lower leg: Edema present.      Left lower leg: Edema present.   Skin:     General: Skin is warm.      Capillary Refill: Capillary refill takes less than 2 seconds.      Findings: No bruising or lesion.   Neurological:      General: No focal deficit present.      Mental Status: She is alert. Mental status is at baseline.      Motor: Weakness present.         Laboratory data:    I have reviewed the labs done in the emergency room.    Results from last 7 days   Lab Units 11/14/24  2347   SODIUM mmol/L 130*   POTASSIUM mmol/L 3.4*   CHLORIDE mmol/L 92*   CO2 mmol/L 21.6*   BUN mg/dL 11   CREATININE mg/dL 1.05*   CALCIUM mg/dL 9.0   BILIRUBIN mg/dL 1.4*   ALK PHOS U/L 265*   ALT (SGPT) U/L 7   AST (SGOT) U/L 23   GLUCOSE mg/dL 104*     Results from last 7 days   Lab Units 11/14/24  2347   WBC 10*3/mm3 6.09   HEMOGLOBIN g/dL 12.2   HEMATOCRIT % 36.1   PLATELETS 10*3/mm3 283         Results from last 7 days   Lab Units 11/15/24  0136 11/14/24  2347   HSTROP T ng/L 13 13     Results from last 7 days   Lab Units 11/14/24  2347   PROBNP pg/mL 2,999.0*                       Invalid input(s): \"USDES\", \"NITRITITE\", \"BACT\", \"EP\"    Pain Management Panel           No data to display                EKG:      Appears to be atrial fibrillation, no acute ST changes.    Radiology:    CT Angiogram Chest Pulmonary Embolism    Result Date: 11/15/2024  FINAL REPORT TECHNIQUE: null CLINICAL HISTORY: Shortness of breath, A-fib COMPARISON: null FINDINGS: CT angiography chest with contrast. 3D " Postprocessing. Comparison: None Findings: Cardiomegaly. Unremarkable thoracic aorta and great vessels. No aneurysm or dissection. The visualized thyroid and mediastinum are unremarkable. Moderate right pleural effusion. Partial atelectasis right lower lobe. Mild ground-glass and reticular opacities in both lungs. Likely mostly atelectasis. Small amount of ascites in the upper abdomen. Cholecystectomy. Nodular liver surface. This can be seen with cirrhosis. Hypodense and hyperdense lesions at the right superior kidney. Both are likely cysts. Osteopenia. No acute fracture.     IMPRESSION: 1. No acute pulmonary embolus. 2. Moderate right pleural effusion. Mild reticular and ground-glass opacities in both lungs could be mild pulmonary edema. Consider heart failure. 3. Nodular liver could be due to cirrhosis. Small amount of ascites could be related. 4. 2 lesions in the right superior kidney are both favored to be cysts, however can be confirmed with nonemergent ultrasound. Authenticated and Electronically Signed by Miguel A Haro MD on 11/15/2024 01:06:31 AM      Assessment:    Acute on chronic diastolic congestive heart failure, POA  Atrial fibrillation with intermittent RVR, paroxysmal, POA  Valvular heart disease, POA  Recent pelvic fracture with impaired mobility and ADLs  Cirrhosis?  Hematuria    Plan:    Admit for observation    CHF:  Will ask Dr. Nunez to see patient for consultation.  Will place on diuretic therapy with Lasix.  Will continue metoprolol 50 mg twice a day for now.  Patient may be a candidate for Entresto if blood pressure and renal function can tolerate.  Patient does have fairly significant mitral valve regurgitation and moderate to severe tricuspid valve regurgitation on prior echo.  May be contributing to volume issues as well.    A-fib:  Somewhat difficult to rate control, patient feels palpitations frequently at home.  She had been on a trial of amiodarone outpatient and had failed this  in regards to converting to sinus rhythm.  Cardiology had discussed possible electrical cardioversion attempt at 1 point.  Will continue her Eliquis and metoprolol for now.    Cirrhosis:  No prior abdominal imaging to compare.  Does have mildly elevated bilirubin.  Could be related to hepatic congestion from heart failure, versus NAFLD.  May need further workup.    Plan of care discussed with the patient and her daughter at bedside.    Risk Assessment: High  DVT Prophylaxis: Eliquis  Code Status: DNR  Diet: Cardiac fluid restriction    Advance Care Planning   ACP discussion was held with the patient during this visit. Patient has an advance directive in EMR which is still valid.            Annalisa Jeffers DO  11/15/24  02:15 EST    Dictated utilizing Dragon dictation.

## 2024-11-15 NOTE — CONSULTS
"     [facility] Cardiology Consult Note    Addie Perez  1934  [id number]  11/15/24    Requesting Physician: Annalisa Jeffers,*    Chief Complaint: Shortness of breath    History of Present Illness:   Mrs. Addie Perez is a 89 y.o. female who is being seen by Cardiology for evaluation of shortness of breath. The patient has a past medical history significant for hyperlipidemia, hypertension, and type 2 diabetes mellitus.  She has a past cardiac history significant for chronic diastolic heart failure and longstanding persistent atrial fibrillation.  The patient reports that for the past several days she has been experiencing increased shortness of breath as well as orthopnea.  She was checking her weights at the rehab facility, and noticed a weight gain.  In addition to her shortness of breath and weight gain, the patient does report frequent episodes of palpitations.  No dizziness or lightheadedness.  No presyncopal or syncopal events.  No history of chest pain.  Due to worsening shortness of breath, she presented to the emergency department for evaluation.     Past Cardiac Testin. Last Coronary Angio: None  2. Prior Stress Testing: MPS   1. Normal pharmacologic MPS with no evidence of inducible ischemia.   2. Hyperdynamic LV systolic function, LVEF 74%.   3. Last Echo: 3/24              1.  LVEF 55-60%              2.  Diastolic dysfunction              3.  Moderate MR              4.  Moderate to severe TR  4. Prior Holter Monitor: 48-hour Holter   1.  The predominant rhythm atrial fibrillation with a 100% AF burden.  Average heart rate 80 bpm, max 104 bpm.  2.  Rare pauses with the longest being 2.2 seconds in duration.  3.  One symptomatic event of \"shortness of breath and fast heart rate\" corresponded to rate controlled atrial fibrillation.         Review of Systems:   Review of Systems   Constitutional:  Positive for unexpected weight gain. Negative for activity " "change, appetite change, chills, diaphoresis, fatigue, fever and unexpected weight loss.   Eyes:  Negative for blurred vision and double vision.   Respiratory:  Positive for shortness of breath. Negative for cough, chest tightness and wheezing.    Cardiovascular:  Positive for palpitations. Negative for chest pain and leg swelling.   Gastrointestinal:  Negative for abdominal pain, anal bleeding, blood in stool and GERD.   Endocrine: Negative for cold intolerance and heat intolerance.   Genitourinary:  Negative for hematuria.   Neurological:  Negative for dizziness, syncope, weakness and light-headedness.   Hematological:  Does not bruise/bleed easily.   Psychiatric/Behavioral:  Negative for depressed mood and stress. The patient is not nervous/anxious.        Past Medical History:   Past Medical History:   Diagnosis Date    Arthritis     Back pain     Cataract     PT REPORTS EARLY STAGES    Cystitis     Diabetes mellitus      REPORTS \"I AM PRE DIABETIC\" AND REPORTS THAT SHE WATCHS HER DIET AND PCP WATCHES A1C CLOSELY    Disease of thyroid gland     Dysphagia     REPORTS MORE TROUBLE SWALLOWING WHEN EATING OR TAKING PILLS. REPORTS PAIN AND THAT SOMETIMES THINGS FEEL LIKE THEY \"STICK\"    Elevated cholesterol 09/20/2018    REPORTS SHE HAS TAKEN MEDICATION IN THE PAST BUT THAT SHE THINKS ALL IS WNL'S AT PRESENT TIME WITHOUT MEDICATION    Fracture of right ankle 2020    GERD (gastroesophageal reflux disease)     Hearing loss     REPORTS MILD AND NO USE OF HEARING AIDS    History of cardiac murmur     REPORTS \"I USED TO HAVE ONE BUT NOW THEY SAY THEY DON'T HEAR IT\"    History of colonic polyps     History of pneumonia     Hx of echocardiogram     Hypercholesterolemia     Hypertension     Irregular heart beat     Macular degeneration     Osteoporosis     Seasonal allergies     Seasonal allergies     Sinusitis     Spinal headache 09/20/2018    REPORTS AFTER DELIVERY OF HER SON    Urinary tract infection     Vertigo     " "Wears glasses        Past Surgical History:   Past Surgical History:   Procedure Laterality Date    BACK SURGERY      REPORTS A SPUR WAS REMOVED THAT WAS HITTING SCIATIC NERVE    BREAST BIOPSY      BREAST SURGERY Left     LUMPECTOMY, BENIGN     CATARACT EXTRACTION W/ INTRAOCULAR LENS IMPLANT Left 03/04/2022    Procedure: CATARACT PHACO EXTRACTION WITH INTRAOCULAR LENS IMPLANT left;  Surgeon: Pritesh Mcnally MD;  Location: [facility] OR;  Service: Ophthalmology;  Laterality: Left;    CATARACT EXTRACTION W/ INTRAOCULAR LENS IMPLANT Right 03/18/2022    Procedure: CATARACT PHACO EXTRACTION WITH INTRAOCULAR LENS IMPLANT RIGHT;  Surgeon: Pritesh Mcnally MD;  Location: [facility] OR;  Service: Ophthalmology;  Laterality: Right;    COLONOSCOPY  2012    REPORTS LAST DONE IN 2012 BUT HAS A HX OF SEVERAL OF THESE PROCEDURES    DILATATION AND CURETTAGE      REPORTS \"I HAD A COUPLE OF THOSE\"    ENDOSCOPY N/A 01/31/2017    Procedure: ESOPHAGOGASTRODUODENOSCOPY;  Surgeon: Robbie Quezada MD;  Location: [facility] ENDOSCOPY;  Service:     ENDOSCOPY N/A 09/21/2018    Procedure: ESOPHAGOGASTRODUODENOSCOPY WITH BIOPSY, DILITATION;  Surgeon: Robbie Quezada MD;  Location: [facility] ENDOSCOPY;  Service: Gastroenterology    ENDOSCOPY N/A 11/15/2023    Procedure: ESOPHAGOGASTRODUODENOSCOPY WITH BIOPSY AND DILATION;  Surgeon: Mil Ma MD;  Location: [facility] ENDOSCOPY;  Service: Gastroenterology;  Laterality: N/A;    FOOT SURGERY Left     REPORTS SURGICAL INTERVENTION TO CORRECT GREAT TOE ALIGNMENT    GALLBLADDER SURGERY  2009    HEMORRHOIDECTOMY  1973    TONSILLECTOMY  1946    UPPER GASTROINTESTINAL ENDOSCOPY  2014    UPPER GASTROINTESTINAL ENDOSCOPY  01/31/2017       Family History:   Family History   Problem Relation Age of Onset    Diabetes Mother     Cancer Brother         lung    Cancer Brother         brain    Colon cancer Paternal Grandmother         possibly colon cancer    Breast cancer Neg Hx        Social History: "   Social History     Socioeconomic History    Marital status:    Tobacco Use    Smoking status: Never     Passive exposure: Never    Smokeless tobacco: Never   Vaping Use    Vaping status: Never Used   Substance and Sexual Activity    Alcohol use: No    Drug use: No    Sexual activity: Defer       Medications:     Current Facility-Administered Medications:     acetaminophen (TYLENOL) tablet 650 mg, 650 mg, Oral, Q4H PRN **OR** acetaminophen (TYLENOL) 160 MG/5ML oral solution 650 mg, 650 mg, Oral, Q4H PRN **OR** acetaminophen (TYLENOL) suppository 650 mg, 650 mg, Rectal, Q4H PRN, Annalisa Jeffers DO    aluminum-magnesium hydroxide-simethicone (MAALOX MAX) 400-400-40 MG/5ML suspension 15 mL, 15 mL, Oral, Q6H PRN, Annalisa Jeffers DO    apixaban (ELIQUIS) tablet 5 mg, 5 mg, Oral, Q12H, Annalisa Jeffers, , 5 mg at 11/15/24 0920    sennosides-docusate (PERICOLACE) 8.6-50 MG per tablet 2 tablet, 2 tablet, Oral, BID PRN **AND** polyethylene glycol (MIRALAX) packet 17 g, 17 g, Oral, Daily PRN **AND** bisacodyl (DULCOLAX) EC tablet 5 mg, 5 mg, Oral, Daily PRN **AND** bisacodyl (DULCOLAX) suppository 10 mg, 10 mg, Rectal, Daily PRN, Annalisa Jeffers DO    cetirizine (zyrTEC) tablet 10 mg, 10 mg, Oral, Daily, Annalisa Jeffers, , 10 mg at 11/15/24 0920    diphenhydrAMINE (BENADRYL) capsule 25 mg, 25 mg, Oral, Nightly PRN, Annalisa Jeffers DO    fluticasone (FLONASE) 50 MCG/ACT nasal spray 2 spray, 2 spray, Nasal, Daily, Annalisa Jeffers DO, 2 spray at 11/15/24 0921    furosemide (LASIX) injection 20 mg, 20 mg, Intravenous, BID, Annalisa Jeffers DO, 20 mg at 11/15/24 0914    HYDROcodone-acetaminophen (NORCO) 5-325 MG per tablet 1 tablet, 1 tablet, Oral, Q6H PRN, Annalisa Jeffers DO    metoprolol tartrate (LOPRESSOR) tablet 50 mg, 50 mg, Oral, Q12H, Annalisa Jeffers DO, 50 mg at 11/15/24 0609    nitroglycerin (NITROSTAT) SL tablet 0.4 mg, 0.4 mg,  "Sublingual, Q5 Min PRN, Annalisa Jeffersus, DO    ondansetron ODT (ZOFRAN-ODT) disintegrating tablet 4 mg, 4 mg, Oral, Q6H PRN **OR** ondansetron (ZOFRAN) injection 4 mg, 4 mg, Intravenous, Q6H PRN, Annalisa Jeffersus, DO    pantoprazole (PROTONIX) EC tablet 40 mg, 40 mg, Oral, Q AM, Annalisa Jeffersus, DO, 40 mg at 11/15/24 0609    potassium chloride (MICRO-K/KLOR-CON) CR capsule, 40 mEq, Oral, Daily, Annalisa Jeffers Keron, DO, 40 mEq at 11/15/24 0916    [COMPLETED] Insert Peripheral IV, , , Once **AND** sodium chloride 0.9 % flush 10 mL, 10 mL, Intravenous, PRN, Adrien Moreira MD    sodium chloride 0.9 % flush 10 mL, 10 mL, Intravenous, Q12H, Annalisa Jeffers, DO, 10 mL at 11/15/24 1007    sodium chloride 0.9 % flush 10 mL, 10 mL, Intravenous, PRN, Annalisa Jeffersus, DO    sodium chloride 0.9 % infusion 40 mL, 40 mL, Intravenous, PRN, Annalisa Jeffersus, DO    spironolactone (ALDACTONE) tablet 25 mg, 25 mg, Oral, Daily, Annalisa Jeffersus, DO, 25 mg at 11/15/24 0921    Allergies:   Allergies   Allergen Reactions    Codeine Nausea Only    Escitalopram Nausea Only     REPORTS \"LEXAPRO\"    Gabapentin Other (See Comments)     REPORTS \"MAKES MY EYES HURT BECAUSE OF MY MACULAR DEGENERATION\"    Ibuprofen Palpitations     REPORTS \"IT CAUSES MY HEART TO BEAT FAST\"    Levofloxacin Nausea Only    Naproxen Nausea Only    Nitrofurantoin Nausea Only    Sulfa Antibiotics Nausea Only       Physical Exam:  Vital Signs:   Vitals:    11/15/24 0950 11/15/24 1000 11/15/24 1100 11/15/24 1308   BP: 142/84 133/88 112/64 140/92   BP Location:    Left arm   Patient Position:    Lying   Pulse:  90 85 99   Resp:    16   Temp:    97.7 °F (36.5 °C)   TempSrc:    Oral   SpO2:  96% 97% 99%   Weight:    64.5 kg (142 lb 3.2 oz)   Height:    139.7 cm (55\")       Physical Exam  Vitals and nursing note reviewed.   Constitutional:       General: She is not in acute distress.     Appearance: Normal appearance. She is " well-developed. She is not diaphoretic.   HENT:      Head: Normocephalic and atraumatic.   Eyes:      General: No scleral icterus.     Pupils: Pupils are equal, round, and reactive to light.   Neck:      Trachea: No tracheal deviation.   Cardiovascular:      Rate and Rhythm: Tachycardia present. Rhythm irregular.      Heart sounds: Normal heart sounds. No murmur heard.     No friction rub. No gallop.      Comments: Normal JVD.    Pulmonary:      Effort: Pulmonary effort is normal. No respiratory distress.      Breath sounds: Normal breath sounds. No stridor. No wheezing or rales.   Chest:      Chest wall: No tenderness.   Abdominal:      General: Bowel sounds are normal. There is no distension.      Palpations: Abdomen is soft.      Tenderness: There is no abdominal tenderness. There is no guarding or rebound.   Musculoskeletal:         General: Normal range of motion.      Comments: Trace bilateral lower extremity pitting edema   Lymphadenopathy:      Cervical: No cervical adenopathy.   Skin:     General: Skin is warm and dry.      Findings: No erythema.   Neurological:      General: No focal deficit present.      Mental Status: She is alert and oriented to person, place, and time.   Psychiatric:         Mood and Affect: Mood normal.         Behavior: Behavior normal.         Results Review:   Results from last 7 days   Lab Units 11/15/24  0700 11/14/24  2347   SODIUM mmol/L 136 130*   POTASSIUM mmol/L 3.4* 3.4*   CHLORIDE mmol/L 96* 92*   CO2 mmol/L 25.9 21.6*   BUN mg/dL 11 11   CREATININE mg/dL 0.99 1.05*   CALCIUM mg/dL 9.0 9.0   BILIRUBIN mg/dL  --  1.4*   ALK PHOS U/L  --  265*   ALT (SGPT) U/L  --  7   AST (SGOT) U/L  --  23   GLUCOSE mg/dL 112* 104*     Results from last 7 days   Lab Units 11/15/24  0136 11/14/24  2347   HSTROP T ng/L 13 13     Results from last 7 days   Lab Units 11/14/24  2347   WBC 10*3/mm3 6.09   HEMOGLOBIN g/dL 12.2   HEMATOCRIT % 36.1   PLATELETS 10*3/mm3 283         Results from last  7 days   Lab Units 11/15/24  0136   MAGNESIUM mg/dL 1.9           I personally viewed and interpreted the patient's EKG/Telemetry data     Assessment / Plan:     1.  Acute on chronic diastolic heart failure  -- Remains volume overloaded  -- Continue IV diuresis for today, uptitrate to 40 mg IV twice daily  -- Monitor daily weights, strict ins and outs    2.  Longstanding persistent atrial fibrillation  -- Previously failed to convert with oral amiodarone loading, declined attempt at electrical cardioversion  -- Mild RVR on telemetry  -- Will uptitrate metoprolol for additional rate control  -- If rate is unable to be controlled, would then consider EP evaluation for pacemaker and AV node ablation      Thank you for allowing me to participate in the care of your patient. Please do not hesitate to contact me with additional questions or concerns.     MITZY Nunez MD  Interventional Cardiology   11/15/24  14:33 EST

## 2024-11-15 NOTE — CASE MANAGEMENT/SOCIAL WORK
Discharge Planning Assessment   [facility]     Patient Name: Addie Perez  MRN: [id number]  Today's Date: 11/15/2024    Admit Date: 11/14/2024    Plan: The patient is awake and able to answer questions.  Her daughter is at bedside and she consents for her to be present for her DC plans.  She is a current patient of Dr. Cervantes and gets her medications from [facility].  She elects to enroll in [facility].  She uses a walker and hospital bed regularly and a wheelchair as needed.  She has home health services with [facility].  Her son and daughter assist her with household chores, cooking, and shopping when she is in need.  With-in the last month the patient was DC from [facility] after 3 weeks of short term rehab secondary to fracture.  The patient denies needs for additional DME or services at DC.  At the time of DC the patient plans to return home alone with home health ([facility]).  Questions and concerns were addressed at the time of this conversation; TOM delivered at earlier time.  Will provide additional resources and information upon patient request.   Discharge Needs Assessment       Row Name 11/15/24 1302       Living Environment    People in Home alone    Unique Family Situation Patient's son and daughter provide good support system for patient    Current Living Arrangements home    Duration at Residence 57 years    Potentially Unsafe Housing Conditions none    In the past 12 months has the electric, gas, oil, or water company threatened to shut off services in your home? No    Primary Care Provided by self    Provides Primary Care For no one, unable/limited ability to care for self    Family Caregiver if Needed none    Quality of Family Relationships helpful;involved;supportive    Able to Return to Prior Arrangements yes       Resource/Environmental Concerns    Resource/Environmental Concerns none    Transportation Concerns none       Transportation Needs    In the past 12 months, has lack of  transportation kept you from medical appointments or from getting medications? no    In the past 12 months, has lack of transportation kept you from meetings, work, or from getting things needed for daily living? No       Food Insecurity    Within the past 12 months, you worried that your food would run out before you got the money to buy more. Never true    Within the past 12 months, the food you bought just didn't last and you didn't have money to get more. Never true       Transition Planning    Patient/Family Anticipates Transition to home with help/services    Patient/Family Anticipated Services at Transition none    Transportation Anticipated family or friend will provide       Discharge Needs Assessment    Readmission Within the Last 30 Days no previous admission in last 30 days    Current Outpatient/Agency/Support Group homecare agency    Equipment Currently Used at Home wheelchair;walker, rolling;hospital bed    Concerns to be Addressed denies needs/concerns at this time    Do you want help finding or keeping work or a job? I do not need or want help    Do you want help with school or training? For example, starting or completing job training or getting a high school diploma, GED or equivalent No    Anticipated Changes Related to Illness inability to care for self    Equipment Needed After Discharge none    Discharge Facility/Level of Care Needs home with home health    Provided Post Acute Provider List? N/A    N/A Provider List Comment Patient to return home with same services; no new DME needs    Provided Post Acute Provider Quality & Resource List? N/A    N/A Quality & Resource List Comment Patient to return home with same services; no new DME needs    Offered/Gave Vendor List no    Patient's Choice of Community Agency(s) Current patient with [facility]    Current Discharge Risk lives alone                   Discharge Plan       Row Name 11/15/24 2517       Plan    Plan The patient is awake  and able to answer questions.  Her daughter is at bedside and she consents for her to be present for her DC plans.  She is a current patient of Dr. Cervantes and gets her medications from [facility].  She elects to enroll in [facility].  She uses a walker and hospital bed regularly and a wheelchair as needed.  She has home health services with [facility].  Her son and daughter assist her with household chores, cooking, and shopping when she is in need.  With-in the last month the patient was DC from [facility] after 3 weeks of short term rehab secondary to fracture.  The patient denies needs for additional DME or services at [place].  At the time of DC the patient plans to return home alone with home health ([facility]).  Questions and concerns were addressed at the time of this conversation; TOM delivered at earlier time.  Will provide additional resources and information upon patient request.    Patient/Family in Agreement with Plan yes    Provided Post Acute Provider List? N/A    N/A Provider List Comment Patient to return home with same services; no new DME needs    Provided Post Acute Provider Quality & Resource List? N/A    N/A Quality & Resource List Comment Patient to return home with same services; no new DME needs    Plan Comments Plans to continue with [facility] for  services    Final Discharge Disposition Code 06 - home with home health care    Final Note Plans to DC home with home health                  Continued Care and Services - Admitted Since 11/14/2024    No active coordination exists for this encounter.       Selected Continued Care - Episodes Includes continued care and service providers with selected services from the active episodes listed below      Heart Failure Episode start date: 3/18/2024   There are no active outsourced providers for this episode.                 Selected Continued Care - Prior Encounters Includes continued care and service providers with selected services from  prior encounters from 8/16/2024 to 11/15/2024      Discharged on 9/18/2024 Admission date: 9/14/2024 - Discharge disposition: Skilled Nursing Facility (DC - External)      Destination       Service Provider Services Address Phone Fax Patient Preferred    [facility] [address] [phone] --                             Demographic Summary       Row Name 11/15/24 1301       General Information    Admission Type observation    Arrived From emergency department    Required Notices Provided Observation Status Notice    Referral Source admission list    Reason for Consult discharge planning    Preferred Language English       Contact Information    Permission Granted to Share Info With ;family/designee                   Functional Status       Row Name 11/15/24 1301       Functional Status    Usual Activity Tolerance good       Physical Activity    On average, how many days per week do you engage in moderate to strenuous exercise (like a brisk walk)? 0 days    On average, how many minutes do you engage in exercise at this level? 0 min    Number of minutes of exercise per week 0       Assessment of Health Literacy    How often do you have someone help you read hospital materials? Never    How often do you have problems learning about your medical condition because of difficulty understanding written information? Never    How often do you have a problem understanding what is told to you about your medical condition? Never    How confident are you filling out medical forms by yourself? Extremely    Health Literacy Excellent       Functional Status, IADL    Medications independent    Meal Preparation assistive person    Housekeeping assistive person    Laundry assistive person    Shopping assistive person    If for any reason you need help with day-to-day activities such as bathing, preparing meals, shopping, managing  finances, etc., do you get the help you need? I get all the help I need       Mental Status    General Appearance WDL WDL       Mental Status Summary    Recent Changes in Mental Status/Cognitive Functioning no changes                   Psychosocial       Row Name 11/15/24 1301       Values/Beliefs    Spiritual, Cultural Beliefs, Zoroastrianism Practices, Values that Affect Care no       Behavior WDL    Behavior WDL WDL       Emotion Mood WDL    Emotion/Mood/Affect WDL WDL       Speech WDL    Speech WDL WDL       Perceptual State WDL    Perceptual State WDL WDL       Thought Process WDL    Thought Process WDL WDL       Intellectual Performance WDL    Intellectual Performance WDL WDL                   Abuse/Neglect       Row Name 11/15/24 1302       Personal Safety    Feels Unsafe at Home or Work/School no    Feels Threatened by Someone no    Does Anyone Try to Keep You From Having Contact with Others or Doing Things Outside Your Home? no    Physical Signs of Abuse Present no                   Legal       Row Name 11/15/24 1302       Financial Resource Strain    How hard is it for you to pay for the very basics like food, housing, medical care, and heating? Not hard                   Substance Abuse       Row Name 11/15/24 1302       Substance Use    Substance Use Status never used                   Patient Forms       Row Name 11/15/24 1311       Patient Forms    Patient Observation Letter Delivered    Delivered to Patient    Method of delivery In person                      Rosey Clarke RN

## 2024-11-15 NOTE — ED PROVIDER NOTES
"        [facility] EMERGENCY DEPARTMENT  Emergency Department Encounter  Emergency Medicine Physician Note       Pt Name: Addie Perez  MRN: [id number]  Pt :   1934  Room Number:    Date of encounter:  2024  PCP: Ariela Hampton DO  ED Provider: Adrien Moreira MD    Historian: Patient and daughter at bedside      HPI:  Chief Complaint: Shortness of breath        Context: Addie Perez is a 89 y.o. female who presents to the ED c/o shortness of breath.  Patient had a fall 2 months ago and has been rehabbing for a pelvis fracture.  States that over the past few days she has had increased difficulty breathing especially when laying flat.  Patient also reports that her legs are swollen, left more than right.  Chronically on Eliquis for A-fib.  Denies any additional falls.  Denies any fevers.  No reported sick contacts.      PAST MEDICAL HISTORY  Past Medical History:   Diagnosis Date    Arthritis     Back pain     Cataract     PT REPORTS EARLY STAGES    Cystitis     Diabetes mellitus      REPORTS \"I AM PRE DIABETIC\" AND REPORTS THAT SHE WATCHS HER DIET AND PCP WATCHES A1C CLOSELY    Disease of thyroid gland     Dysphagia     REPORTS MORE TROUBLE SWALLOWING WHEN EATING OR TAKING PILLS. REPORTS PAIN AND THAT SOMETIMES THINGS FEEL LIKE THEY \"STICK\"    Elevated cholesterol 2018    REPORTS SHE HAS TAKEN MEDICATION IN THE PAST BUT THAT SHE THINKS ALL IS WNL'S AT PRESENT TIME WITHOUT MEDICATION    Fracture of right ankle     GERD (gastroesophageal reflux disease)     Hearing loss     REPORTS MILD AND NO USE OF HEARING AIDS    History of cardiac murmur     REPORTS \"I USED TO HAVE ONE BUT NOW THEY SAY THEY DON'T HEAR IT\"    History of colonic polyps     History of pneumonia     Hx of echocardiogram     Hypercholesterolemia     Hypertension     Irregular heart beat     Macular degeneration     Osteoporosis     Seasonal allergies     Seasonal allergies     Sinusitis     Spinal " "headache 09/20/2018    REPORTS AFTER DELIVERY OF HER SON    Urinary tract infection     Vertigo     Wears glasses          PAST SURGICAL HISTORY  Past Surgical History:   Procedure Laterality Date    BACK SURGERY      REPORTS A SPUR WAS REMOVED THAT WAS HITTING SCIATIC NERVE    BREAST BIOPSY      BREAST SURGERY Left     LUMPECTOMY, BENIGN     CATARACT EXTRACTION W/ INTRAOCULAR LENS IMPLANT Left 03/04/2022    Procedure: CATARACT PHACO EXTRACTION WITH INTRAOCULAR LENS IMPLANT left;  Surgeon: Pritesh Mcnally MD;  Location: [facility] OR;  Service: Ophthalmology;  Laterality: Left;    CATARACT EXTRACTION W/ INTRAOCULAR LENS IMPLANT Right 03/18/2022    Procedure: CATARACT PHACO EXTRACTION WITH INTRAOCULAR LENS IMPLANT RIGHT;  Surgeon: Pritesh Mcnally MD;  Location: [facility] OR;  Service: Ophthalmology;  Laterality: Right;    COLONOSCOPY  2012    REPORTS LAST DONE IN 2012 BUT HAS A HX OF SEVERAL OF THESE PROCEDURES    DILATATION AND CURETTAGE      REPORTS \"I HAD A COUPLE OF THOSE\"    ENDOSCOPY N/A 01/31/2017    Procedure: ESOPHAGOGASTRODUODENOSCOPY;  Surgeon: Robbie Quezada MD;  Location: [facility] ENDOSCOPY;  Service:     ENDOSCOPY N/A 09/21/2018    Procedure: ESOPHAGOGASTRODUODENOSCOPY WITH BIOPSY, DILITATION;  Surgeon: Robbie Quezada MD;  Location: [facility] ENDOSCOPY;  Service: Gastroenterology    ENDOSCOPY N/A 11/15/2023    Procedure: ESOPHAGOGASTRODUODENOSCOPY WITH BIOPSY AND DILATION;  Surgeon: Mil Ma MD;  Location: [facility] ENDOSCOPY;  Service: Gastroenterology;  Laterality: N/A;    FOOT SURGERY Left     REPORTS SURGICAL INTERVENTION TO CORRECT GREAT TOE ALIGNMENT    GALLBLADDER SURGERY  2009    HEMORRHOIDECTOMY  1973    TONSILLECTOMY  1946    UPPER GASTROINTESTINAL ENDOSCOPY  2014    UPPER GASTROINTESTINAL ENDOSCOPY  01/31/2017         FAMILY HISTORY  Family History   Problem Relation Age of Onset    Diabetes Mother     Cancer Brother         lung    Cancer Brother         brain    Colon cancer " Paternal Grandmother         possibly colon cancer    Breast cancer Neg Hx          SOCIAL HISTORY  Social History     Socioeconomic History    Marital status:    Tobacco Use    Smoking status: Never     Passive exposure: Never    Smokeless tobacco: Never   Vaping Use    Vaping status: Never Used   Substance and Sexual Activity    Alcohol use: No    Drug use: No    Sexual activity: Defer         ALLERGIES  Codeine, Escitalopram, Gabapentin, Ibuprofen, Levofloxacin, Naproxen, Nitrofurantoin, and Sulfa antibiotics        REVIEW OF SYSTEMS  Review of Systems     All systems reviewed and negative except for those discussed in HPI.       PHYSICAL EXAM    I have reviewed the triage vital signs and nursing notes.    ED Triage Vitals [11/14/24 2254]   Temp Heart Rate Resp BP SpO2   98.2 °F (36.8 °C) 95 20 138/100 100 %      Temp src Heart Rate Source Patient Position BP Location FiO2 (%)   Oral Monitor Sitting Left arm --       Physical Exam    General:  Awake, alert, elderly, hard of hearing, otherwise no acute distress  HEENT: Atraumatic, normocephalic, EOMI, PERRLA, mucous membranes moist  NECK:  Supple, atraumatic  Cardiovascular: Mildly tachycardic irregular rhythm, no murmurs, rubs, or gallops.  2+ pitting edema of left lower extremity, 1+ pitting edema of right lower extremity.  Respiratory:  Regular rate, clear lungs to auscultation bilaterally.  No rhonchi, rales, wheezing  Abdominal:  Soft, nondistended, nontender.  No guarding or rebound.  No palpable masses  Extremity:  No visible bony abnormalities in all 4 extremities.  Full range of motion of all extremities.  Skin:  Warm and dry.  No rashes  Neuro:  AAOx3, GCS 15. Cranial nerves 2-12 grossly intact.  No focal strength or sensation deficits.  Psych:  Mood and affect appropriate.        LAB RESULTS  Recent Results (from the past 24 hours)   Comprehensive Metabolic Panel    Collection Time: 11/14/24 11:47 PM    Specimen: Blood   Result Value Ref Range     Glucose 104 (H) 65 - 99 mg/dL    BUN 11 8 - 23 mg/dL    Creatinine 1.05 (H) 0.57 - 1.00 mg/dL    Sodium 130 (L) 136 - 145 mmol/L    Potassium 3.4 (L) 3.5 - 5.2 mmol/L    Chloride 92 (L) 98 - 107 mmol/L    CO2 21.6 (L) 22.0 - 29.0 mmol/L    Calcium 9.0 8.2 - 9.6 mg/dL    Total Protein 6.7 6.0 - 8.5 g/dL    Albumin 3.6 3.5 - 5.2 g/dL    ALT (SGPT) 7 1 - 33 U/L    AST (SGOT) 23 1 - 32 U/L    Alkaline Phosphatase 265 (H) 39 - 117 U/L    Total Bilirubin 1.4 (H) 0.0 - 1.2 mg/dL    Globulin 3.1 gm/dL    A/G Ratio 1.2 g/dL    BUN/Creatinine Ratio 10.5 7.0 - 25.0    Anion Gap 16.4 (H) 5.0 - 15.0 mmol/L    eGFR 50.6 (L) >60.0 mL/min/1.73   BNP    Collection Time: 11/14/24 11:47 PM    Specimen: Blood   Result Value Ref Range    proBNP 2,999.0 (H) 0.0 - 1,800.0 pg/mL   High Sensitivity Troponin T    Collection Time: 11/14/24 11:47 PM    Specimen: Blood   Result Value Ref Range    HS Troponin T 13 <14 ng/L   CBC Auto Differential    Collection Time: 11/14/24 11:47 PM    Specimen: Blood   Result Value Ref Range    WBC 6.09 3.40 - 10.80 10*3/mm3    RBC 4.17 3.77 - 5.28 10*6/mm3    Hemoglobin 12.2 12.0 - 15.9 g/dL    Hematocrit 36.1 34.0 - 46.6 %    MCV 86.6 79.0 - 97.0 fL    MCH 29.3 26.6 - 33.0 pg    MCHC 33.8 31.5 - 35.7 g/dL    RDW 13.5 12.3 - 15.4 %    RDW-SD 42.3 37.0 - 54.0 fl    MPV 9.5 6.0 - 12.0 fL    Platelets 283 140 - 450 10*3/mm3    Neutrophil % 49.3 42.7 - 76.0 %    Lymphocyte % 32.7 19.6 - 45.3 %    Monocyte % 14.0 (H) 5.0 - 12.0 %    Eosinophil % 3.0 0.3 - 6.2 %    Basophil % 0.7 0.0 - 1.5 %    Immature Grans % 0.3 0.0 - 0.5 %    Neutrophils, Absolute 3.01 1.70 - 7.00 10*3/mm3    Lymphocytes, Absolute 1.99 0.70 - 3.10 10*3/mm3    Monocytes, Absolute 0.85 0.10 - 0.90 10*3/mm3    Eosinophils, Absolute 0.18 0.00 - 0.40 10*3/mm3    Basophils, Absolute 0.04 0.00 - 0.20 10*3/mm3    Immature Grans, Absolute 0.02 0.00 - 0.05 10*3/mm3    nRBC 0.0 0.0 - 0.2 /100 WBC   COVID-19 and FLU A/B PCR, 1 HR TAT - Swab, Nasopharynx     Collection Time: 11/14/24 11:48 PM    Specimen: Nasopharynx; Swab   Result Value Ref Range    COVID19 Not Detected Not Detected - Ref. Range    Influenza A PCR Not Detected Not Detected    Influenza B PCR Not Detected Not Detected       If labs were ordered, I independently evaluated the results and considered them in treating the patient.        RADIOLOGY  CT Angiogram Chest Pulmonary Embolism    Result Date: 11/15/2024  FINAL REPORT TECHNIQUE: null CLINICAL HISTORY: Shortness of breath, A-fib COMPARISON: null FINDINGS: CT angiography chest with contrast. 3D Postprocessing. Comparison: None Findings: Cardiomegaly. Unremarkable thoracic aorta and great vessels. No aneurysm or dissection. The visualized thyroid and mediastinum are unremarkable. Moderate right pleural effusion. Partial atelectasis right lower lobe. Mild ground-glass and reticular opacities in both lungs. Likely mostly atelectasis. Small amount of ascites in the upper abdomen. Cholecystectomy. Nodular liver surface. This can be seen with cirrhosis. Hypodense and hyperdense lesions at the right superior kidney. Both are likely cysts. Osteopenia. No acute fracture.     IMPRESSION: 1. No acute pulmonary embolus. 2. Moderate right pleural effusion. Mild reticular and ground-glass opacities in both lungs could be mild pulmonary edema. Consider heart failure. 3. Nodular liver could be due to cirrhosis. Small amount of ascites could be related. 4. 2 lesions in the right superior kidney are both favored to be cysts, however can be confirmed with nonemergent ultrasound. Authenticated and Electronically Signed by Miguel A Haro MD on 11/15/2024 01:06:31 AM     I ordered and independently evaluated the above noted radiographic studies.     See radiologist's dictation for official interpretation.        PROCEDURES    Procedures    ECG 12 Lead Dyspnea   Final Result          MEDICATIONS GIVEN IN ER    Medications   sodium chloride 0.9 % flush 10 mL (has no  administration in time range)   iopamidol (ISOVUE-300) 61 % injection 100 mL (100 mL Intravenous Given 11/15/24 0004)   furosemide (LASIX) injection 40 mg (40 mg Intravenous Given 11/15/24 0137)         MEDICAL DECISION MAKING, PROGRESS, and CONSULTS    All labs, if obtained, have been independently reviewed by me.  All radiology studies, if obtained, have been evaluated by me and the radiologist dictating the report.  All EKG's, if obtained, have been independently viewed and interpreted by me.      Discussion below represents my analysis of pertinent findings related to patient's condition, differential diagnosis, treatment plan and final disposition.    Addie Perez is a 89 y.o. female who presents to the ED c/o dyspnea.  Mildly tachycardic on arrival but otherwise hemodynamically stable with oxygen saturation 100% on room air.  Differential diagnosis includes but is not limited to CHF exacerbation, pneumonia, pneumothorax, pulmonary edema, pulmonary embolism, cardiac arrhythmia, myocardial infarction, viral illness.    EKG obtained which was personally interpreted showing A-fib with RVR with a rate of 114 but no evidence of acute ischemic changes/STEMI.    Chest x-ray personally interpreted showing nonspecific mild infiltrates in bilateral lower lung fields.    Labs show no significant leukocytosis or anemia.  Significant elevated proBNP of 2999.  Normal troponin.  COVID and influenza negative.  CT pulmonary embolism study obtained which was negative for PE but does show evidence of moderate right-sided pleural effusion along with groundglass opacities in both lungs consistent with pulmonary edema/CHF.  Patient started on IV Lasix.    Given concern for possible new onset CHF with pulmonary edema and pleural effusion, along with patient's A-fib.  Contacted hospitalist about admission for further treatment.  Hospitalist agreeable with this plan and admitted patient for further treatment.                              Orders placed during this visit:  Orders Placed This Encounter   Procedures    COVID-19 and FLU A/B PCR, 1 HR TAT - Swab, Nasopharynx    XR Chest 1 View    CT Angiogram Chest Pulmonary Embolism    Comprehensive Metabolic Panel    BNP    High Sensitivity Troponin T    CBC Auto Differential    High Sensitivity Troponin T 2Hr    ECG 12 Lead Dyspnea    Insert Peripheral IV    CBC & Differential         ED Course:    Consultants:                  Shared Decision Making:  After my consideration of clinical presentation and any laboratory/radiology studies obtained, I discussed the findings with the patient/patient representative who is in agreement with the treatment plan and the final disposition.   Risks and benefits of discharge and/or observation/admission were discussed.      AS OF 01:54 EST VITALS:    BP - 142/84  HR - 98  TEMP - 98.2 °F (36.8 °C) (Oral)  O2 SATS - 98%                  DIAGNOSIS  Final diagnoses:   Orthopnea   Acute on chronic congestive heart failure, unspecified heart failure type   Acute pulmonary edema   Bilateral lower extremity edema   Atrial fibrillation with RVR   Pleural effusion on right         DISPOSITION  Admit      Please note that portions of this document were completed with voice recognition software.        Adrien Moreira MD  11/15/24 0428

## 2024-11-15 NOTE — PLAN OF CARE
Problem: Pain Acute  Goal: Optimal Pain Control and Function  Outcome: Progressing     Problem: Heart Failure  Goal: Optimal Coping  Outcome: Progressing  Goal: Optimal Cardiac Output and Blood Flow  Outcome: Progressing  Goal: Stable Heart Rate and Rhythm  Outcome: Progressing  Goal: Fluid and Electrolyte Balance  Outcome: Progressing  Goal: Optimal Functional Ability  Outcome: Progressing  Goal: Improved Oral Intake  Outcome: Progressing  Goal: Effective Oxygenation and Ventilation  Outcome: Progressing  Goal: Effective Breathing Pattern During Sleep  Outcome: Progressing     Problem: Fall Injury Risk  Goal: Absence of Fall and Fall-Related Injury  Outcome: Progressing     Problem: Adult Inpatient Plan of Care  Goal: Plan of Care Review  Outcome: Progressing  Goal: Patient-Specific Goal (Individualized)  Outcome: Progressing  Goal: Absence of Hospital-Acquired Illness or Injury  Outcome: Progressing  Goal: Optimal Comfort and Wellbeing  Outcome: Progressing  Goal: Readiness for Transition of Care  Outcome: Progressing   Goal Outcome Evaluation:

## 2024-11-15 NOTE — PLAN OF CARE
Goal Outcome Evaluation:  Plan of Care Reviewed With: patient        Progress: no change  Outcome Evaluation: Patient participated well in PT evaluation and demonstrated decreased overall mobility.  She required CGA and demonstrated decreased overall mobility due to decreased activity tolerance and increased heart rate.  She is able to walk 12 feet with CGA and HHA.  Patient is expected to benefit from additional Pt services while hospitalized and upon discharge to home with home health care.    Anticipated Discharge Disposition (PT): home with home health

## 2024-11-15 NOTE — CONSULTS
"Dietitian Assessment    Patient Name: Addie Perez  YOB: 1934  MRN: [id number]  Admission date: 11/14/2024    Comment:        Clinical Nutrition Assessment      Reason for Assessment NSG/MST=1   H&P  Past Medical History:   Diagnosis Date    Arthritis     Back pain     Cataract     PT REPORTS EARLY STAGES    Cystitis     Diabetes mellitus      REPORTS \"I AM PRE DIABETIC\" AND REPORTS THAT SHE WATCHS HER DIET AND PCP WATCHES A1C CLOSELY    Disease of thyroid gland     Dysphagia     REPORTS MORE TROUBLE SWALLOWING WHEN EATING OR TAKING PILLS. REPORTS PAIN AND THAT SOMETIMES THINGS FEEL LIKE THEY \"STICK\"    Elevated cholesterol 09/20/2018    REPORTS SHE HAS TAKEN MEDICATION IN THE PAST BUT THAT SHE THINKS ALL IS WNL'S AT PRESENT TIME WITHOUT MEDICATION    Fracture of right ankle 2020    GERD (gastroesophageal reflux disease)     Hearing loss     REPORTS MILD AND NO USE OF HEARING AIDS    History of cardiac murmur     REPORTS \"I USED TO HAVE ONE BUT NOW THEY SAY THEY DON'T HEAR IT\"    History of colonic polyps     History of pneumonia     Hx of echocardiogram     Hypercholesterolemia     Hypertension     Irregular heart beat     Macular degeneration     Osteoporosis     Seasonal allergies     Seasonal allergies     Sinusitis     Spinal headache 09/20/2018    REPORTS AFTER DELIVERY OF HER SON    Urinary tract infection     Vertigo     Wears glasses        Past Surgical History:   Procedure Laterality Date    BACK SURGERY      REPORTS A SPUR WAS REMOVED THAT WAS HITTING SCIATIC NERVE    BREAST BIOPSY      BREAST SURGERY Left     LUMPECTOMY, BENIGN     CATARACT EXTRACTION W/ INTRAOCULAR LENS IMPLANT Left 03/04/2022    Procedure: CATARACT PHACO EXTRACTION WITH INTRAOCULAR LENS IMPLANT left;  Surgeon: Pritesh Mcnally MD;  Location: [facility];  Service: Ophthalmology;  Laterality: Left;    CATARACT EXTRACTION W/ INTRAOCULAR LENS IMPLANT Right 03/18/2022    Procedure: CATARACT PHACO EXTRACTION WITH " "INTRAOCULAR LENS IMPLANT RIGHT;  Surgeon: Pritesh Mcnally MD;  Location: [facility] OR;  Service: Ophthalmology;  Laterality: Right;    COLONOSCOPY  2012    REPORTS LAST DONE IN 2012 BUT HAS A HX OF SEVERAL OF THESE PROCEDURES    DILATATION AND CURETTAGE      REPORTS \"I HAD A COUPLE OF THOSE\"    ENDOSCOPY N/A 01/31/2017    Procedure: ESOPHAGOGASTRODUODENOSCOPY;  Surgeon: Robbie Quezada MD;  Location: [facility] ENDOSCOPY;  Service:     ENDOSCOPY N/A 09/21/2018    Procedure: ESOPHAGOGASTRODUODENOSCOPY WITH BIOPSY, DILITATION;  Surgeon: Robbie Quezada MD;  Location: [facility] ENDOSCOPY;  Service: Gastroenterology    ENDOSCOPY N/A 11/15/2023    Procedure: ESOPHAGOGASTRODUODENOSCOPY WITH BIOPSY AND DILATION;  Surgeon: Mil Ma MD;  Location: [facility] ENDOSCOPY;  Service: Gastroenterology;  Laterality: N/A;    FOOT SURGERY Left     REPORTS SURGICAL INTERVENTION TO CORRECT GREAT TOE ALIGNMENT    GALLBLADDER SURGERY  2009    HEMORRHOIDECTOMY  1973    TONSILLECTOMY  1946    UPPER GASTROINTESTINAL ENDOSCOPY  2014    UPPER GASTROINTESTINAL ENDOSCOPY  01/31/2017            Current Problems        Encounter Information        Trending Narrative     11/15: RD consulted per nursing screen. MST=1. EMR shows some wt loss but may be related 2/2 dieretic use.RD will f/up and add ONS if warranted once PO intake has been established.      Anthropometrics        Current Height, Weight Height: 139.7 cm (55\")  Weight: 64.5 kg (142 lb 3.2 oz) (11/15/24 1308)   Trending Weight Hx     This admission:              PTA:     Wt Readings from Last 30 Encounters:   11/15/24 1308 64.5 kg (142 lb 3.2 oz)   11/14/24 2254 67.6 kg (149 lb)   10/10/24 1052 66.2 kg (146 lb)   10/02/24 0846 66.2 kg (146 lb)   09/23/24 1250 68.5 kg (151 lb)   09/14/24 1613 69.6 kg (153 lb 7 oz)   09/14/24 1007 65.8 kg (145 lb)   09/09/24 1116 67.1 kg (148 lb)   08/26/24 0925 67.9 kg (149 lb 9.6 oz)   08/19/24 1108 66.2 kg (146 lb)   08/05/24 1122 65.8 kg (145 lb) "   06/10/24 1115 65.7 kg (144 lb 12.8 oz)   05/08/24 1421 65.8 kg (145 lb)   05/02/24 1240 65.8 kg (145 lb)   04/29/24 1516 66.7 kg (147 lb)   04/24/24 1323 64 kg (141 lb)   03/26/24 1117 64.9 kg (143 lb)   03/25/24 1118 66.2 kg (146 lb)   03/18/24 1303 67.1 kg (148 lb)   03/03/24 2142 65.8 kg (145 lb)   03/03/24 2141 65.8 kg (145 lb)   03/03/24 1517 66.2 kg (145 lb 15.1 oz)   03/03/24 1243 66.2 kg (146 lb)   02/08/24 1447 66.2 kg (146 lb)   11/13/23 1717 64.8 kg (142 lb 13.7 oz)   10/05/23 1303 64.9 kg (143 lb)   08/09/23 1312 65.1 kg (143 lb 9.6 oz)   06/12/23 1237 65.8 kg (145 lb)   06/06/23 1042 65.8 kg (145 lb)   03/02/23 0919 65.3 kg (144 lb)   08/09/22 1512 65.3 kg (144 lb)   03/17/22 1552 66.2 kg (146 lb)   08/09/21 1001 67 kg (147 lb 9.6 oz)   08/03/20 1025 65.1 kg (143 lb 8 oz)   11/20/19 1421 64.9 kg (143 lb)      BMI kg/m2 Body mass index is 33.05 kg/m².     Labs        Pertinent Labs     Results from last 7 days   Lab Units 11/15/24  0700 11/14/24  2347   SODIUM mmol/L 136 130*   POTASSIUM mmol/L 3.4* 3.4*   CHLORIDE mmol/L 96* 92*   CO2 mmol/L 25.9 21.6*   BUN mg/dL 11 11   CREATININE mg/dL 0.99 1.05*   CALCIUM mg/dL 9.0 9.0   BILIRUBIN mg/dL  --  1.4*   ALK PHOS U/L  --  265*   ALT (SGPT) U/L  --  7   AST (SGOT) U/L  --  23   GLUCOSE mg/dL 112* 104*       Results from last 7 days   Lab Units 11/15/24  0136 11/14/24  2347   MAGNESIUM mg/dL 1.9  --    HEMOGLOBIN g/dL  --  12.2   HEMATOCRIT %  --  36.1       Lab Results   Component Value Date    HGBA1C 5.90 (H) 03/03/2024            Medications       Scheduled Medications apixaban, 5 mg, Oral, Q12H  cetirizine, 10 mg, Oral, Daily  fluticasone, 2 spray, Nasal, Daily  furosemide, 20 mg, Intravenous, BID  metoprolol tartrate, 50 mg, Oral, Q12H  pantoprazole, 40 mg, Oral, Q AM  potassium chloride, 40 mEq, Oral, Daily  sodium chloride, 10 mL, Intravenous, Q12H  spironolactone, 25 mg, Oral, Daily        Infusions       PRN Medications   acetaminophen **OR**  acetaminophen **OR** acetaminophen    aluminum-magnesium hydroxide-simethicone    senna-docusate sodium **AND** polyethylene glycol **AND** bisacodyl **AND** bisacodyl    diphenhydrAMINE    HYDROcodone-acetaminophen    nitroglycerin    ondansetron ODT **OR** ondansetron    [COMPLETED] Insert Peripheral IV **AND** sodium chloride    sodium chloride    sodium chloride     Physical Findings        Trending Physical   Appearance, NFPE    --  Edema  None noted     Bowel Function LBM: PTA     Tubes Peripheral IV     Chewing/Swallowing WNL     Skin WNL     Estimated/Assessed Needs       Energy Requirements    EST Needs, Method, Wt used 2774-1350 kcal (25-30 kcal/kg CBW)       Protein Requirements    EST Needs, Method, Wt used 51-64 g pro (.8-1 g pro/kg CBW)       Fluid Requirements     Estimated Needs (mL/day) 1090-0261 mL       Current Nutrition Orders & Evaluation of Intake       Oral Nutrition     Food Allergies NKFA   Current PO Diet Diet: Cardiac, Fluid Restriction (240 mL/tray); Healthy Heart (2-3 Na+); 2000 mL/day; Fluid Consistency: Thin (IDDSI 0)   Supplement    PO Evaluation     Trending % PO Intake None recorded at this time     Enteral Nutrition    Enteral Route    Order, Modulars, Flushes    Residual/Tolerance    TF Observation         Parenteral Nutrition     TPN Route    Total # Days on TPN    TPN Order, Lipid Details    MVI & Trace Element Freq    TPN Observation       Nutrition Diagnosis         Nutrition Dx Problem 1 No nutrition dx      Nutrition Dx Problem 2        Intervention Goal         Intervention Goal(s) No significant wt changes  PO intake meet >50% of estimated needs  Adhere to ONS     Nutrition Intervention        RD Action No action at this time     Nutrition Prescription          Diet Prescription HH, 2000 mL fluid restriction    Supplement Prescription      Enteral Prescription        TPN Prescription      Monitor/Evaluation        Monitor Per protocol, I&O, PO intake, Pertinent labs,  Weight, Skin status, GI status, Symptoms, POC/GOC, Swallow function, Hemodynamic stability     RD to f/up    Electronically signed by:  Virginie Quijano RD  11/15/24 14:15 EST

## 2024-11-15 NOTE — THERAPY EVALUATION
"Patient Name: Addie Perez  : 1934    MRN: [id number]                              Today's Date: 11/15/2024       Admit Date: 2024    Visit Dx:     ICD-10-CM ICD-9-CM   1. Orthopnea  R06.01 786.02   2. Acute on chronic congestive heart failure, unspecified heart failure type  I50.9 428.0   3. Acute pulmonary edema  J81.0 518.4   4. Bilateral lower extremity edema  R60.0 782.3   5. Atrial fibrillation with RVR  I48.91 427.31   6. Pleural effusion on right  J90 511.9     Patient Active Problem List   Diagnosis    Abdominal pain    Chronic gastritis    Dyslipidemia    Essential hypertension    Chronic nausea    Controlled diabetes mellitus    Dysphagia    Epigastric pain    Allergic rhinitis    Chronic kidney disease    Macular degeneration    Difficult or painful urination    Fibrocystic disease of breast    Gout    Lumbosacral radiculopathy    Osteoarthritis    Osteopenia    Prediabetes    Stricture of esophagus    Toxic multinodular goiter with no crisis    Nuclear sclerotic cataract of left eye    Gastroesophageal reflux disease without esophagitis    Schatzki's ring    Paroxysmal atrial fibrillation    (HFpEF) heart failure with preserved ejection fraction    Irritable bowel syndrome with diarrhea    Pelvic fracture    Atrial fibrillation, persistent    Acute on chronic diastolic (congestive) heart failure     Past Medical History:   Diagnosis Date    Arthritis     Back pain     Cataract     PT REPORTS EARLY STAGES    Cystitis     Diabetes mellitus      REPORTS \"I AM PRE DIABETIC\" AND REPORTS THAT SHE WATCHS HER DIET AND PCP WATCHES A1C CLOSELY    Disease of thyroid gland     Dysphagia     REPORTS MORE TROUBLE SWALLOWING WHEN EATING OR TAKING PILLS. REPORTS PAIN AND THAT SOMETIMES THINGS FEEL LIKE THEY \"STICK\"    Elevated cholesterol 2018    REPORTS SHE HAS TAKEN MEDICATION IN THE PAST BUT THAT SHE THINKS ALL IS WNL'S AT PRESENT TIME WITHOUT MEDICATION    Fracture of right ankle     GERD " "(gastroesophageal reflux disease)     Hearing loss     REPORTS MILD AND NO USE OF HEARING AIDS    History of cardiac murmur     REPORTS \"I USED TO HAVE ONE BUT NOW THEY SAY THEY DON'T HEAR IT\"    History of colonic polyps     History of pneumonia     Hx of echocardiogram     Hypercholesterolemia     Hypertension     Irregular heart beat     Macular degeneration     Osteoporosis     Seasonal allergies     Seasonal allergies     Sinusitis     Spinal headache 09/20/2018    REPORTS AFTER DELIVERY OF HER SON    Urinary tract infection     Vertigo     Wears glasses      Past Surgical History:   Procedure Laterality Date    BACK SURGERY      REPORTS A SPUR WAS REMOVED THAT WAS HITTING SCIATIC NERVE    BREAST BIOPSY      BREAST SURGERY Left     LUMPECTOMY, BENIGN     CATARACT EXTRACTION W/ INTRAOCULAR LENS IMPLANT Left 03/04/2022    Procedure: CATARACT PHACO EXTRACTION WITH INTRAOCULAR LENS IMPLANT left;  Surgeon: Pritesh Mcnally MD;  Location: [facility] OR;  Service: Ophthalmology;  Laterality: Left;    CATARACT EXTRACTION W/ INTRAOCULAR LENS IMPLANT Right 03/18/2022    Procedure: CATARACT PHACO EXTRACTION WITH INTRAOCULAR LENS IMPLANT RIGHT;  Surgeon: Pritesh Mcnally MD;  Location: [facility] OR;  Service: Ophthalmology;  Laterality: Right;    COLONOSCOPY  2012    REPORTS LAST DONE IN 2012 BUT HAS A HX OF SEVERAL OF THESE PROCEDURES    DILATATION AND CURETTAGE      REPORTS \"I HAD A COUPLE OF THOSE\"    ENDOSCOPY N/A 01/31/2017    Procedure: ESOPHAGOGASTRODUODENOSCOPY;  Surgeon: Robbie Quezada MD;  Location: [facility] ENDOSCOPY;  Service:     ENDOSCOPY N/A 09/21/2018    Procedure: ESOPHAGOGASTRODUODENOSCOPY WITH BIOPSY, DILITATION;  Surgeon: Robbie Quezada MD;  Location: [facility] ENDOSCOPY;  Service: Gastroenterology    ENDOSCOPY N/A 11/15/2023    Procedure: ESOPHAGOGASTRODUODENOSCOPY WITH BIOPSY AND DILATION;  Surgeon: Mil Ma MD;  Location: [facility] ENDOSCOPY;  Service: Gastroenterology;  Laterality: N/A;    " FOOT SURGERY Left     REPORTS SURGICAL INTERVENTION TO CORRECT GREAT TOE ALIGNMENT    GALLBLADDER SURGERY  2009    HEMORRHOIDECTOMY  1973    TONSILLECTOMY  1946    UPPER GASTROINTESTINAL ENDOSCOPY  2014    UPPER GASTROINTESTINAL ENDOSCOPY  01/31/2017      General Information       Row Name 11/15/24 1338          OT Time and Intention    Subjective Information no complaints  -SP     Document Type evaluation  -SP     Mode of Treatment occupational therapy  -SP     Total Minutes, Occupational Therapy 38  -SP     Patient Effort good  -SP       Row Name 11/15/24 1338          General Information    Patient Profile Reviewed yes  -SP     Prior Level of Function independent:;all household mobility;ADL's  rolling walker for household mobility, wc for community mobility  -SP     Existing Precautions/Restrictions fall  -SP     Barriers to Rehab medically complex  -SP       Row Name 11/15/24 1338          Living Environment    People in Home alone  -SP       Row Name 11/15/24 1338          Stairs Within Home, Primary    Stairs, Within Home, Primary pt is able to reside on the primary lvl  -SP     Number of Stairs, Within Home, Primary one  -SP       Row Name 11/15/24 1338          Cognition    Orientation Status (Cognition) oriented x 4  -SP       Row Name 11/15/24 1338          Safety Issues/Impairments Affecting Functional Mobility    Safety Issues Affecting Function (Mobility) insight into deficits/self-awareness;safety precaution awareness;safety precautions follow-through/compliance  -SP     Impairments Affecting Function (Mobility) endurance/activity tolerance;strength;pain;balance  -SP               User Key  (r) = Recorded By, (t) = Taken By, (c) = Cosigned By      Initials Name Provider Type    SP Virginie Irizarry OT Occupational Therapist                     Mobility/ADL's       Row Name 11/15/24 1340          Bed Mobility    Bed Mobility supine-sit;sit-supine  -SP     Supine-Sit [place] (Bed Mobility) contact guard   -SP     Sit-Supine [place] (Bed Mobility) contact guard  -SP     Assistive Device (Bed Mobility) head of bed elevated  -SP       Row Name 11/15/24 1340          Transfers    Transfers sit-stand transfer;stand-sit transfer  -SP       Row Name 11/15/24 1340          Sit-Stand Transfer    Sit-Stand [place] (Transfers) contact guard  -SP     Assistive Device (Sit-Stand Transfers) other (see comments)  HHA  -SP       Row Name 11/15/24 1340          Stand-Sit Transfer    Stand-Sit [place] (Transfers) contact guard  -SP     Assistive Device (Stand-Sit Transfers) other (see comments)  HHA  -SP       Row Name 11/15/24 1340          Functional Mobility    Functional Mobility- Ind. Level contact guard assist  -SP     Functional Mobility- Device other (see comments)  HHA, gait belt  -SP     Functional Mobility-Distance (Feet) 12  -SP     Patient was able to Ambulate yes  -       Row Name 11/15/24 1340          Activities of Daily Living    BADL Assessment/Intervention bathing;upper body dressing;lower body dressing;grooming;feeding;toileting  -       Row Name 11/15/24 1340          Bathing Assessment/Intervention    [place] Level (Bathing) bathing skills;minimum assist (75% patient effort)  -       Row Name 11/15/24 1340          Upper Body Dressing Assessment/Training    [place] Level (Upper Body Dressing) upper body dressing skills;set up;standby assist  -       Row Name 11/15/24 1340          Lower Body Dressing Assessment/Training    [place] Level (Lower Body Dressing) lower body dressing skills;minimum assist (75% patient effort)  -       Row Name 11/15/24 1340          Grooming Assessment/Training    [place] Level (Grooming) grooming skills;set up;standby assist  -       Row Name 11/15/24 1340          Self-Feeding Assessment/Training    [place] Level (Feeding) feeding skills;set up  -       Row Name 11/15/24 1340          Toileting Assessment/Training    [place]  Level (Toileting) toileting skills;minimum assist (75% patient effort)  -SP               User Key  (r) = Recorded By, (t) = Taken By, (c) = Cosigned By      Initials Name Provider Type    Virginie Mcgregor OT Occupational Therapist                   Obj/Interventions       Row Name 11/15/24 1345          Vision Assessment/Intervention    Visual Impairment/Limitations corrective lenses full-time  -SP     Vision Assessment Comment pt reports she has maccular degeneration  -SP       Row Name 11/15/24 1345          Range of Motion Comprehensive    General Range of Motion bilateral upper extremity ROM WFL  -SP       Row Name 11/15/24 1345          Strength Comprehensive (MMT)    General Manual Muscle Testing (MMT) Assessment upper extremity strength deficits identified  -SP       Row Name 11/15/24 1345          Upper Extremity (Manual Muscle Testing)    Comment, MMT: Upper Extremity B UE grossly 4-/5  -SP       Row Name 11/15/24 1341          Balance    Balance Assessment sitting static balance;sitting dynamic balance;standing static balance;standing dynamic balance  -SP     Static Sitting Balance standby assist  -SP     Dynamic Sitting Balance contact guard  -SP     Position, Sitting Balance unsupported;sitting edge of bed  -SP     Static Standing Balance contact guard  -SP     Dynamic Standing Balance contact guard  -SP     Position/Device Used, Standing Balance supported;other (see comments)  HHA, gait belt  -SP               User Key  (r) = Recorded By, (t) = Taken By, (c) = Cosigned By      Initials Name Provider Type    Virginie Mcgregor OT Occupational Therapist                   Goals/Plan       Row Name 11/15/24 1350          Bed Mobility Goal 1 (OT)    Activity/Assistive Device (Bed Mobility Goal 1, OT) bed mobility activities, all  -SP     [place] Level/Cues Needed (Bed Mobility Goal 1, OT) modified independence  -SP     Time Frame (Bed Mobility Goal 1, OT) by discharge  -SP     Progress/Outcomes (Bed  Mobility Goal 1, OT) goal ongoing  -SP       Row Name 11/15/24 1350          Transfer Goal 1 (OT)    Activity/Assistive Device (Transfer Goal 1, OT) transfers, all  -SP     [place] Level/Cues Needed (Transfer Goal 1, OT) modified independence  -SP     Time Frame (Transfer Goal 1, OT) by discharge  -SP     Progress/Outcome (Transfer Goal 1, OT) goal ongoing  -SP       Row Name 11/15/24 1350          Bathing Goal 1 (OT)    Activity/Device (Bathing Goal 1, OT) bathing skills, all;shower chair  -SP     [place] Level/Cues Needed (Bathing Goal 1, OT) supervision required  -SP     Time Frame (Bathing Goal 1, OT) by discharge  -SP     Progress/Outcomes (Bathing Goal 1, OT) goal ongoing  -SP       Row Name 11/15/24 1350          Dressing Goal 1 (OT)    Activity/Device (Dressing Goal 1, OT) dressing skills, all  -SP     [place]/Cues Needed (Dressing Goal 1, OT) supervision required  -SP     Time Frame (Dressing Goal 1, OT) by discharge  -SP     Progress/Outcome (Dressing Goal 1, OT) goal ongoing  -SP       Row Name 11/15/24 1350          Toileting Goal 1 (OT)    Activity/Device (Toileting Goal 1, OT) toileting skills, all  -SP     [place] Level/Cues Needed (Toileting Goal 1, OT) supervision required  -SP     Time Frame (Toileting Goal 1, OT) by discharge  -SP     Progress/Outcome (Toileting Goal 1, OT) goal ongoing  -SP       Row Name 11/15/24 1350          Strength Goal 1 (OT)    Strength Goal 1 (OT) Pt will engage in UE strengthening as tolerated to maximize her functional strength and endurance for ADL and functional mobility engagement.  -SP     Time Frame (Strength Goal 1, OT) short term goal (STG);5 days  -SP     Progress/Outcome (Strength Goal 1, OT) goal ongoing  -SP       Row Name 11/15/24 1350          Therapy Assessment/Plan (OT)    Planned Therapy Interventions (OT) activity tolerance training;BADL retraining;functional balance retraining;IADL retraining;occupation/activity based  interventions;passive ROM/stretching;ROM/therapeutic exercise;strengthening exercise;transfer/mobility retraining;patient/caregiver education/training  -SP               User Key  (r) = Recorded By, (t) = Taken By, (c) = Cosigned By      Initials Name Provider Type    Virginie Mcgregor OT Occupational Therapist                   Clinical Impression       Row Name 11/15/24 1346          Pain Assessment    Pretreatment Pain Rating 3/10  -SP     Posttreatment Pain Rating 3/10  -SP     Pain Location back  -SP     Pain Side/Orientation lower;medial  -SP     Pain Management Interventions positioning techniques utilized;exercise or physical activity utilized  -SP     Response to Pain Interventions functional ability unchanged;no change per patient report  -SP       Row Name 11/15/24 1346          Plan of Care Review    Plan of Care Reviewed With patient  -SP     Progress no change  -SP     Outcome Evaluation Pt was received in the bed for OT evaluation. RN reports pt has been in A-fib but it has been controlled this am and he is agreeable to evaluation. Pt reports living alone in a multi-lvl home with 1 FABRIZIO but she states she is able to reside on the primary lvl. She owns a hospital bed, walker, wc, shower chair and BSC and she reports she using a Rwx for household mobility independently and the wc when she is out in the community for longer distances. She has been modified independent for ADLs using AE PRN. She denies falls since her recent hospitilization. Pt reports 3/10 low back pain at this time and she is A&Ox4. She moved to eob with CGA, transferred to standing with CGA and ambulated 12ft with CGA and HHA. Pt hr was slightly elevated to 125bpm and RN reports pt is moving rooms soon. Pt was assisted back to bed with CGA and she left in the bed with all needs in reach. Pt is expected to benefit from skilled OT services to address ADL, functional mobility, safety awareness, endurance, strength and mild balance deficits.  Pt is safe to discharge home with initial 24/7 assist from her daughter and follow up with  PT/OT.  -SP       Row Name 11/15/24 1346          Therapy Assessment/Plan (OT)    Rehab Potential (OT) good  -SP     Criteria for Skilled Therapeutic Interventions Met (OT) yes;meets criteria  -SP     Therapy Frequency (OT) 3 times/wk  -SP       Row Name 11/15/24 1346          Therapy Plan Review/Discharge Plan (OT)    Anticipated Discharge Disposition (OT) home with assist;home with home health  -SP       Row Name 11/15/24 1346          Vital Signs    Pretreatment Heart Rate (beats/min) 120  -SP     Intratreatment Heart Rate (beats/min) 125  -SP     Posttreatment Heart Rate (beats/min) 101  -SP     O2 Delivery Pre Treatment room air  -SP     O2 Delivery Intra Treatment room air  -SP     O2 Delivery Post Treatment room air  -SP     Pre Patient Position Supine  -SP     Intra Patient Position Standing  -SP     Post Patient Position Supine  -SP       Row Name 11/15/24 0472          Positioning and Restraints    Pre-Treatment Position in bed  -SP     Post Treatment Position bed  -SP     In Bed call light within reach;encouraged to call for assist;supine;notified nsg  -SP               User Key  (r) = Recorded By, (t) = Taken By, (c) = Cosigned By      Initials Name Provider Type    Virginie Mcgregor OT Occupational Therapist                   Outcome Measures       Row Name 11/15/24 4720          How much help from another is currently needed...    Putting on and taking off regular lower body clothing? 3  -SP     Bathing (including washing, rinsing, and drying) 3  -SP     Toileting (which includes using toilet bed pan or urinal) 3  -SP     Putting on and taking off regular upper body clothing 3  -SP     Taking care of personal grooming (such as brushing teeth) 3  -SP     Eating meals 3  -SP     AM-PAC 6 Clicks Score (OT) 18  -SP       Row Name 11/15/24 1354          Functional Assessment    Outcome Measure Options AM-PAC 6  Clicks Daily Activity (OT)  -SP               User Key  (r) = Recorded By, (t) = Taken By, (c) = Cosigned By      Initials Name Provider Type    SP Virginie Irizarry OT Occupational Therapist                    Occupational Therapy Education       Title: PT OT SLP Therapies (In Progress)       Topic: Occupational Therapy (In Progress)       Point: ADL training (Done)       Description:   Instruct learner(s) on proper safety adaptation and remediation techniques during self care or transfers.   Instruct in proper use of assistive devices.                  Learning Progress Summary            Patient Acceptance, E, VU by SP at 11/15/2024 3205    Comment: Pt was educated on the purpose of the OT eval and POC. Pt verbalized understanding.                      Point: Home exercise program (Not Started)       Description:   Instruct learner(s) on appropriate technique for monitoring, assisting and/or progressing therapeutic exercises/activities.                  Learner Progress:  Not documented in this visit.              Point: Precautions (Not Started)       Description:   Instruct learner(s) on prescribed precautions during self-care and functional transfers.                  Learner Progress:  Not documented in this visit.              Point: Body mechanics (Not Started)       Description:   Instruct learner(s) on proper positioning and spine alignment during self-care, functional mobility activities and/or exercises.                  Learner Progress:  Not documented in this visit.                              User Key       Initials Effective Dates Name Provider Type Discipline    SP 09/08/22 -  Virginie Irizarry OT Occupational Therapist OT                  OT Recommendation and Plan  Planned Therapy Interventions (OT): activity tolerance training, BADL retraining, functional balance retraining, IADL retraining, occupation/activity based interventions, passive ROM/stretching, ROM/therapeutic exercise, strengthening  exercise, transfer/mobility retraining, patient/caregiver education/training  Therapy Frequency (OT): 3 times/wk  Plan of Care Review  Plan of Care Reviewed With: patient  Progress: no change  Outcome Evaluation: Pt was received in the bed for OT evaluation. RN reports pt has been in A-fib but it has been controlled this am and he is agreeable to evaluation. Pt reports living alone in a multi-lvl home with 1 FABRIZIO but she states she is able to reside on the primary lvl. She owns a hospital bed, walker, wc, shower chair and BSC and she reports she using a Rwx for household mobility independently and the wc when she is out in the community for longer distances. She has been modified independent for ADLs using AE PRN. She denies falls since her recent hospitilization. Pt reports 3/10 low back pain at this time and she is A&Ox4. She moved to eob with CGA, transferred to standing with CGA and ambulated 12ft with CGA and HHA. Pt hr was slightly elevated to 125bpm and RN reports pt is moving rooms soon. Pt was assisted back to bed with CGA and she left in the bed with all needs in reach. Pt is expected to benefit from skilled OT services to address ADL, functional mobility, safety awareness, endurance, strength and mild balance deficits. Pt is safe to discharge home with initial 24/7 assist from her daughter and follow up with  PT/OT.     Time Calculation:   Evaluation Complexity (OT)  Review Occupational Profile/Medical/Therapy History Complexity: brief/low complexity  Assessment, Occupational Performance/Identification of Deficit Complexity: 1-3 performance deficits  Clinical Decision Making Complexity (OT): problem focused assessment/low complexity  Overall Complexity of Evaluation (OT): low complexity     Time Calculation- OT       Row Name 11/15/24 1354             Time Calculation- OT    OT Start Time 0913  -SP      OT Received On 11/15/24  -SP      OT Goal Re-Cert Due Date 11/25/24  -SP         Untimed Charges    OT  Eval/Re-eval Minutes 38  -SP         Total Minutes    Untimed Charges Total Minutes 38  -SP       Total Minutes 38  -SP                User Key  (r) = Recorded By, (t) = Taken By, (c) = Cosigned By      Initials Name Provider Type    Virginie Mcgregor OT Occupational Therapist                  Therapy Charges for Today       Code Description Service Date Service Provider Modifiers Qty    [id number] HC OT EVAL LOW COMPLEXITY 3 11/15/2024 Virginie Irizarry OT GO 1                 Virginie Irizarry OT  11/15/2024

## 2024-11-15 NOTE — PLAN OF CARE
Goal Outcome Evaluation:              Outcome Evaluation: New Admission

## 2024-11-15 NOTE — THERAPY EVALUATION
"Patient Name: Addie Perez  : 1934    MRN: [id number]                              Today's Date: 11/15/2024       Admit Date: 2024    Visit Dx:     ICD-10-CM ICD-9-CM   1. Orthopnea  R06.01 786.02   2. Acute on chronic congestive heart failure, unspecified heart failure type  I50.9 428.0   3. Acute pulmonary edema  J81.0 518.4   4. Bilateral lower extremity edema  R60.0 782.3   5. Atrial fibrillation with RVR  I48.91 427.31   6. Pleural effusion on right  J90 511.9     Patient Active Problem List   Diagnosis    Abdominal pain    Chronic gastritis    Dyslipidemia    Essential hypertension    Chronic nausea    Controlled diabetes mellitus    Dysphagia    Epigastric pain    Allergic rhinitis    Chronic kidney disease    Macular degeneration    Difficult or painful urination    Fibrocystic disease of breast    Gout    Lumbosacral radiculopathy    Osteoarthritis    Osteopenia    Prediabetes    Stricture of esophagus    Toxic multinodular goiter with no crisis    Nuclear sclerotic cataract of left eye    Gastroesophageal reflux disease without esophagitis    Schatzki's ring    Paroxysmal atrial fibrillation    (HFpEF) heart failure with preserved ejection fraction    Irritable bowel syndrome with diarrhea    Pelvic fracture    Atrial fibrillation, persistent    Acute on chronic diastolic (congestive) heart failure     Past Medical History:   Diagnosis Date    Arthritis     Back pain     Cataract     PT REPORTS EARLY STAGES    Cystitis     Diabetes mellitus      REPORTS \"I AM PRE DIABETIC\" AND REPORTS THAT SHE WATCHS HER DIET AND PCP WATCHES A1C CLOSELY    Disease of thyroid gland     Dysphagia     REPORTS MORE TROUBLE SWALLOWING WHEN EATING OR TAKING PILLS. REPORTS PAIN AND THAT SOMETIMES THINGS FEEL LIKE THEY \"STICK\"    Elevated cholesterol 2018    REPORTS SHE HAS TAKEN MEDICATION IN THE PAST BUT THAT SHE THINKS ALL IS WNL'S AT PRESENT TIME WITHOUT MEDICATION    Fracture of right ankle     GERD " "(gastroesophageal reflux disease)     Hearing loss     REPORTS MILD AND NO USE OF HEARING AIDS    History of cardiac murmur     REPORTS \"I USED TO HAVE ONE BUT NOW THEY SAY THEY DON'T HEAR IT\"    History of colonic polyps     History of pneumonia     Hx of echocardiogram     Hypercholesterolemia     Hypertension     Irregular heart beat     Macular degeneration     Osteoporosis     Seasonal allergies     Seasonal allergies     Sinusitis     Spinal headache 09/20/2018    REPORTS AFTER DELIVERY OF HER SON    Urinary tract infection     Vertigo     Wears glasses      Past Surgical History:   Procedure Laterality Date    BACK SURGERY      REPORTS A SPUR WAS REMOVED THAT WAS HITTING SCIATIC NERVE    BREAST BIOPSY      BREAST SURGERY Left     LUMPECTOMY, BENIGN     CATARACT EXTRACTION W/ INTRAOCULAR LENS IMPLANT Left 03/04/2022    Procedure: CATARACT PHACO EXTRACTION WITH INTRAOCULAR LENS IMPLANT left;  Surgeon: Pritesh Mcnally MD;  Location: [facility] OR;  Service: Ophthalmology;  Laterality: Left;    CATARACT EXTRACTION W/ INTRAOCULAR LENS IMPLANT Right 03/18/2022    Procedure: CATARACT PHACO EXTRACTION WITH INTRAOCULAR LENS IMPLANT RIGHT;  Surgeon: Pritesh Mcnally MD;  Location: [facility] OR;  Service: Ophthalmology;  Laterality: Right;    COLONOSCOPY  2012    REPORTS LAST DONE IN 2012 BUT HAS A HX OF SEVERAL OF THESE PROCEDURES    DILATATION AND CURETTAGE      REPORTS \"I HAD A COUPLE OF THOSE\"    ENDOSCOPY N/A 01/31/2017    Procedure: ESOPHAGOGASTRODUODENOSCOPY;  Surgeon: Robbie Quezada MD;  Location: [facility] ENDOSCOPY;  Service:     ENDOSCOPY N/A 09/21/2018    Procedure: ESOPHAGOGASTRODUODENOSCOPY WITH BIOPSY, DILITATION;  Surgeon: Robbie Quezada MD;  Location: [facility] ENDOSCOPY;  Service: Gastroenterology    ENDOSCOPY N/A 11/15/2023    Procedure: ESOPHAGOGASTRODUODENOSCOPY WITH BIOPSY AND DILATION;  Surgeon: Mil Ma MD;  Location: [facility] ENDOSCOPY;  Service: Gastroenterology;  Laterality: N/A;    " FOOT SURGERY Left     REPORTS SURGICAL INTERVENTION TO CORRECT GREAT TOE ALIGNMENT    GALLBLADDER SURGERY  2009    HEMORRHOIDECTOMY  1973    TONSILLECTOMY  1946    UPPER GASTROINTESTINAL ENDOSCOPY  2014    UPPER GASTROINTESTINAL ENDOSCOPY  01/31/2017      General Information       Row Name 11/15/24 0911          Physical Therapy Time and Intention    Document Type evaluation  -JR     Mode of Treatment physical therapy  -       Row Name 11/15/24 0911          General Information    Patient Profile Reviewed yes  -JR     Prior Level of Function independent:;all household mobility  -JR     Existing Precautions/Restrictions fall  -JR     Barriers to Rehab medically complex  -       Row Name 11/15/24 0911          Living Environment    People in Home alone  -       Row Name 11/15/24 0911          Home Main Entrance    Number of Stairs, Main Entrance one  -       Row Name 11/15/24 0911          Stairs Within Home, Primary    Stairs, Within Home, Primary she stays on the main level  -       Row Name 11/15/24 0911          Cognition    Orientation Status (Cognition) oriented x 4  -       Row Name 11/15/24 0911          Safety Issues/Impairments Affecting Functional Mobility    Safety Issues Affecting Function (Mobility) safety precautions follow-through/compliance;insight into deficits/self-awareness;safety precaution awareness  -JR     Impairments Affecting Function (Mobility) endurance/activity tolerance;strength;pain;balance  -JR               User Key  (r) = Recorded By, (t) = Taken By, (c) = Cosigned By      Initials Name Provider Type    JR Mindy Acosta PT Physical Therapist                   Mobility       Row Name 11/15/24 0911          Bed Mobility    Bed Mobility supine-sit;sit-supine  -JR     Supine-Sit [place] (Bed Mobility) contact guard  -JR     Sit-Supine [place] (Bed Mobility) contact guard  -     Assistive Device (Bed Mobility) head of bed elevated  -       Row Name 11/15/24 0911           Sit-Stand Transfer    Sit-Stand [place] (Transfers) contact guard  -JR     Assistive Device (Sit-Stand Transfers) other (see comments)  gait belt and HHA  -JR       Row Name 11/15/24 0911          Gait/Stairs (Locomotion)    [place] Level (Gait) contact guard  -JR     Assistive Device (Gait) other (see comments)  gait belt and HHA  -JR     Patient was able to Ambulate yes  -JR     Distance in Feet (Gait) 12  -JR     Deviations/Abnormal Patterns (Gait) base of support, narrow;valdez decreased;festinating/shuffling;stride length decreased  -JR               User Key  (r) = Recorded By, (t) = Taken By, (c) = Cosigned By      Initials Name Provider Type    Mindy Abreu PT Physical Therapist                   Obj/Interventions       Row Name 11/15/24 0911          Range of Motion Comprehensive    General Range of Motion bilateral lower extremity ROM WFL  -JR       Row Name 11/15/24 0911          Strength Comprehensive (MMT)    Comment, General Manual Muscle Testing (MMT) Assessment BLE grossly 3+/5  -JR       Row Name 11/15/24 0911          Balance    Balance Assessment sitting static balance;sitting dynamic balance;standing static balance;standing dynamic balance  -JR     Static Sitting Balance standby assist  -JR     Dynamic Sitting Balance contact guard  -JR     Position, Sitting Balance unsupported;sitting edge of bed  -JR     Static Standing Balance contact guard  -JR     Dynamic Standing Balance contact guard  -JR     Position/Device Used, Standing Balance other (see comments)  gait belt and HHA  -JR               User Key  (r) = Recorded By, (t) = Taken By, (c) = Cosigned By      Initials Name Provider Type    Mindy Abreu PT Physical Therapist                   Goals/Plan       Row Name 11/15/24 0911          Bed Mobility Goal 1 (PT)    Activity/Assistive Device (Bed Mobility Goal 1, PT) bed mobility activities, all  -JR     [place] Level/Cues Needed (Bed Mobility Goal 1, PT)  modified independence  -JR     Time Frame (Bed Mobility Goal 1, PT) short term goal (STG)  -JR     Progress/Outcomes (Bed Mobility Goal 1, PT) goal ongoing  -JR       Row Name 11/15/24 0911          Transfer Goal 1 (PT)    Activity/Assistive Device (Transfer Goal 1, PT) sit-to-stand/stand-to-sit;bed-to-chair/chair-to-bed;walker, rolling  -JR     [place] Level/Cues Needed (Transfer Goal 1, PT) supervision required  -JR     Time Frame (Transfer Goal 1, PT) long term goal (LTG)  -JR     Progress/Outcome (Transfer Goal 1, PT) goal ongoing  -JR       Row Name 11/15/24 0911          Gait Training Goal 1 (PT)    Activity/Assistive Device (Gait Training Goal 1, PT) gait (walking locomotion);walker, rolling;increase endurance/gait distance;improve balance and speed  -JR     [place] Level (Gait Training Goal 1, PT) supervision required  -JR     Distance (Gait Training Goal 1, PT) 100  -JR     Time Frame (Gait Training Goal 1, PT) long term goal (LTG)  -JR     Progress/Outcome (Gait Training Goal 1, PT) goal ongoing  -JR       Row Name 11/15/24 0911          Patient Education Goal (PT)    Activity (Patient Education Goal, PT) Perform BLE standing exercises x 20 reps  -JR     [place]/Cues/Accuracy (Memory Goal 2, PT) demonstrates adequately  -JR     Time Frame (Patient Education Goal, PT) 3 days  -JR     Progress/Outcome (Patient Education Goal, PT) goal ongoing  -JR       Row Name 11/15/24 0911          Therapy Assessment/Plan (PT)    Planned Therapy Interventions (PT) strengthening;balance training;bed mobility training;transfer training;gait training;home exercise program;patient/family education  -JR               User Key  (r) = Recorded By, (t) = Taken By, (c) = Cosigned By      Initials Name Provider Type    JR Mindy Acosta, PT Physical Therapist                   Clinical Impression       Row Name 11/15/24 0911          Pain    Pretreatment Pain Rating 3/10  -JR     Posttreatment Pain Rating 3/10  -JR      Pain Location back  -JR     Pain Side/Orientation left;medial  -JR     Pain Management Interventions positioning techniques utilized;exercise or physical activity utilized  -JR     Response to Pain Interventions functional ability improved;no change per patient report  -       Row Name 11/15/24 0911          Plan of Care Review    Plan of Care Reviewed With patient  -JR     Progress no change  -JR     Outcome Evaluation Patient participated well in PT evaluation and demonstrated decreased overall mobility.  She required CGA and demonstrated decreased overall mobility due to decreased activity tolerance and increased heart rate.  She is able to walk 12 feet with CGA and HHA.  Patient is expected to benefit from additional Pt services while hospitalized and upon discharge to home with home health care.  -       Row Name 11/15/24 0911          Therapy Assessment/Plan (PT)    Patient/Family Therapy Goals Statement (PT) Patient  wants to go home  -JR     Rehab Potential (PT) good  -JR     Criteria for Skilled Interventions Met (PT) yes;meets criteria;skilled treatment is necessary  -JR     Therapy Frequency (PT) 5 times/wk  -JR     Predicted Duration of Therapy Intervention (PT) 2 weeks  -       Row Name 11/15/24 0911          Vital Signs    Pretreatment Heart Rate (beats/min) 120  -JR     Intratreatment Heart Rate (beats/min) 125  -JR     Posttreatment Heart Rate (beats/min) 101  -JR       Row Name 11/15/24 0911          Positioning and Restraints    Pre-Treatment Position in bed  -JR     Post Treatment Position bed  -JR     In Bed call light within reach;encouraged to call for assist;supine;notified nsg  -JR               User Key  (r) = Recorded By, (t) = Taken By, (c) = Cosigned By      Initials Name Provider Type    JR Mindy Acosta, PT Physical Therapist                   Outcome Measures       Row Name 11/15/24 1405 11/15/24 0911       How much help from another person do you currently need...    Turning from  your back to your side while in flat bed without using bedrails? 3  -HS 3  -JR    Moving from lying on back to sitting on the side of a flat bed without bedrails? 3  -HS 3  -JR    Moving to and from a bed to a chair (including a wheelchair)? 3  -HS 3  -JR    Standing up from a chair using your arms (e.g., wheelchair, bedside chair)? 3  -HS 3  -JR    Climbing 3-5 steps with a railing? 3  -HS 3  -JR    To walk in hospital room? 3  -HS 3  -JR    AM-PAC 6 Clicks Score (PT) 18  -HS 18  -JR    Highest Level of Mobility Goal 6 --> Walk 10 steps or more  -HS 6 --> Walk 10 steps or more  -JR      Row Name 11/15/24 1352 11/15/24 0911       Functional Assessment    Outcome Measure Options AM-PAC 6 Clicks Daily Activity (OT)  -SP AM-PAC 6 Clicks Basic Mobility (PT)  -              User Key  (r) = Recorded By, (t) = Taken By, (c) = Cosigned By      Initials Name Provider Type     Mindy Acosta, PT Physical Therapist    HS Dionna Flor, RN Registered Nurse    Virginie Mcgregor, OT Occupational Therapist                                 Physical Therapy Education       Title: PT OT SLP Therapies (In Progress)       Topic: Physical Therapy (In Progress)       Point: Mobility training (Done)       Learning Progress Summary            Patient Acceptance, E,TB, VU by  at 11/15/2024 0588    Comment: Role of PT and POC                      Point: Home exercise program (Not Started)       Learner Progress:  Not documented in this visit.              Point: Body mechanics (Not Started)       Learner Progress:  Not documented in this visit.              Point: Precautions (Not Started)       Learner Progress:  Not documented in this visit.                              User Key       Initials Effective Dates Name Provider Type Discipline     08/22/23 -  Mindy Acosta, PT Physical Therapist PT                  PT Recommendation and Plan  Planned Therapy Interventions (PT): strengthening, balance training, bed mobility training,  transfer training, gait training, home exercise program, patient/family education  Progress: no change  Outcome Evaluation: Patient participated well in PT evaluation and demonstrated decreased overall mobility.  She required CGA and demonstrated decreased overall mobility due to decreased activity tolerance and increased heart rate.  She is able to walk 12 feet with CGA and HHA.  Patient is expected to benefit from additional Pt services while hospitalized and upon discharge to home with home health care.     Time Calculation:   PT Evaluation Complexity  History, PT Evaluation Complexity: 1-2 personal factors and/or comorbidities  Examination of Body Systems (PT Eval Complexity): 1-2 elements  Clinical Presentation (PT Evaluation Complexity): evolving  Clinical Decision Making (PT Evaluation Complexity): low complexity  Overall Complexity (PT Evaluation Complexity): low complexity     PT Charges       Row Name 11/15/24 0911             Time Calculation    Start Time 0911  -JR      PT Received On 11/15/24  -JR      PT Goal Re-Cert Due Date 11/25/24  -JR         Untimed Charges    PT Eval/Re-eval Minutes 40  -JR         Total Minutes    Untimed Charges Total Minutes 40  -JR       Total Minutes 40  -JR                User Key  (r) = Recorded By, (t) = Taken By, (c) = Cosigned By      Initials Name Provider Type    Mindy Abreu, PT Physical Therapist                  Therapy Charges for Today       Code Description Service Date Service Provider Modifiers Qty    [id number]  PT EVAL LOW COMPLEXITY 3 11/15/2024 Mindy Acosta, PT GP 1            PT G-Codes  Outcome Measure Options: AM-PAC 6 Clicks Daily Activity (OT)  AM-PAC 6 Clicks Score (PT): 18  AM-PAC 6 Clicks Score (OT): 18  PT Discharge Summary  Anticipated Discharge Disposition (PT): home with home health    Mindy Acosta, PT  11/15/2024

## 2024-11-15 NOTE — PLAN OF CARE
Goal Outcome Evaluation:  Plan of Care Reviewed With: patient        Progress: no change  Outcome Evaluation: Pt was received in the bed for OT evaluation. RN reports pt has been in A-fib but it has been controlled this am and he is agreeable to evaluation. Pt reports living alone in a multi-lvl home with 1 FABRIZIO but she states she is able to reside on the primary lvl. She owns a hospital bed, walker, wc, shower chair and BSC and she reports using a Rwx for household mobility independently and the wc when she is out in the community for longer distances. She has been modified independent for ADLs using AE PRN. She denies falls since her recent hospitilization. Pt reports 3/10 low back pain at this time and she is A&Ox4. She moved to eob with CGA, transferred to standing with CGA and ambulated 12ft with CGA and HHA. Pt hr was slightly elevated to 125bpm and RN reports pt is moving rooms soon requesting she return to bed instead of the chair at this time. Pt was assisted back to bed with CGA and she left in the bed with all needs in reach. Pt is expected to benefit from skilled OT services to address ADL, functional mobility, safety awareness, endurance, strength and mild balance deficits. Pt is safe to discharge home with initial 24/7 assist from her daughter and follow up with  PT/OT.    Anticipated Discharge Disposition (OT): home with assist, home with home health

## 2024-11-16 PROBLEM — I50.33 ACUTE ON CHRONIC DIASTOLIC CONGESTIVE HEART FAILURE: Status: ACTIVE | Noted: 2024-11-16

## 2024-11-16 LAB
ANION GAP SERPL CALCULATED.3IONS-SCNC: 13 MMOL/L (ref 5–15)
BASOPHILS # BLD AUTO: 0.04 10*3/MM3 (ref 0–0.2)
BASOPHILS NFR BLD AUTO: 0.7 % (ref 0–1.5)
BUN SERPL-MCNC: 13 MG/DL (ref 8–23)
BUN/CREAT SERPL: 12 (ref 7–25)
CALCIUM SPEC-SCNC: 8.7 MG/DL (ref 8.2–9.6)
CHLORIDE SERPL-SCNC: 97 MMOL/L (ref 98–107)
CO2 SERPL-SCNC: 26 MMOL/L (ref 22–29)
CREAT SERPL-MCNC: 1.08 MG/DL (ref 0.57–1)
DEPRECATED RDW RBC AUTO: 44.8 FL (ref 37–54)
EGFRCR SERPLBLD CKD-EPI 2021: 48.9 ML/MIN/1.73
EOSINOPHIL # BLD AUTO: 0.17 10*3/MM3 (ref 0–0.4)
EOSINOPHIL NFR BLD AUTO: 2.8 % (ref 0.3–6.2)
ERYTHROCYTE [DISTWIDTH] IN BLOOD BY AUTOMATED COUNT: 13.8 % (ref 12.3–15.4)
GLUCOSE SERPL-MCNC: 121 MG/DL (ref 65–99)
HCT VFR BLD AUTO: 36.1 % (ref 34–46.6)
HGB BLD-MCNC: 11.6 G/DL (ref 12–15.9)
IMM GRANULOCYTES # BLD AUTO: 0.02 10*3/MM3 (ref 0–0.05)
IMM GRANULOCYTES NFR BLD AUTO: 0.3 % (ref 0–0.5)
LYMPHOCYTES # BLD AUTO: 1.63 10*3/MM3 (ref 0.7–3.1)
LYMPHOCYTES NFR BLD AUTO: 27.3 % (ref 19.6–45.3)
MCH RBC QN AUTO: 28.6 PG (ref 26.6–33)
MCHC RBC AUTO-ENTMCNC: 32.1 G/DL (ref 31.5–35.7)
MCV RBC AUTO: 88.9 FL (ref 79–97)
MONOCYTES # BLD AUTO: 1.11 10*3/MM3 (ref 0.1–0.9)
MONOCYTES NFR BLD AUTO: 18.6 % (ref 5–12)
NEUTROPHILS NFR BLD AUTO: 3 10*3/MM3 (ref 1.7–7)
NEUTROPHILS NFR BLD AUTO: 50.3 % (ref 42.7–76)
NRBC BLD AUTO-RTO: 0 /100 WBC (ref 0–0.2)
PLATELET # BLD AUTO: 274 10*3/MM3 (ref 140–450)
PMV BLD AUTO: 9.7 FL (ref 6–12)
POTASSIUM SERPL-SCNC: 3.6 MMOL/L (ref 3.5–5.2)
RBC # BLD AUTO: 4.06 10*6/MM3 (ref 3.77–5.28)
SODIUM SERPL-SCNC: 136 MMOL/L (ref 136–145)
WBC NRBC COR # BLD AUTO: 5.97 10*3/MM3 (ref 3.4–10.8)

## 2024-11-16 PROCEDURE — 85025 COMPLETE CBC W/AUTO DIFF WBC: CPT | Performed by: NURSE PRACTITIONER

## 2024-11-16 PROCEDURE — 25010000002 FUROSEMIDE PER 20 MG: Performed by: INTERNAL MEDICINE

## 2024-11-16 PROCEDURE — 63710000001 DIPHENHYDRAMINE PER 50 MG: Performed by: FAMILY MEDICINE

## 2024-11-16 PROCEDURE — 97110 THERAPEUTIC EXERCISES: CPT

## 2024-11-16 PROCEDURE — 80048 BASIC METABOLIC PNL TOTAL CA: CPT | Performed by: NURSE PRACTITIONER

## 2024-11-16 PROCEDURE — 97530 THERAPEUTIC ACTIVITIES: CPT

## 2024-11-16 PROCEDURE — 99232 SBSQ HOSP IP/OBS MODERATE 35: CPT | Performed by: FAMILY MEDICINE

## 2024-11-16 RX ADMIN — FLUTICASONE PROPIONATE 2 SPRAY: 50 SPRAY, METERED NASAL at 08:34

## 2024-11-16 RX ADMIN — ACETAMINOPHEN 650 MG: 325 TABLET, FILM COATED ORAL at 20:28

## 2024-11-16 RX ADMIN — CETIRIZINE HYDROCHLORIDE 10 MG: 10 TABLET, FILM COATED ORAL at 08:34

## 2024-11-16 RX ADMIN — ACETAMINOPHEN 650 MG: 325 TABLET, FILM COATED ORAL at 12:12

## 2024-11-16 RX ADMIN — POTASSIUM CHLORIDE 40 MEQ: 750 CAPSULE, EXTENDED RELEASE ORAL at 08:31

## 2024-11-16 RX ADMIN — FUROSEMIDE 40 MG: 10 INJECTION, SOLUTION INTRAMUSCULAR; INTRAVENOUS at 18:25

## 2024-11-16 RX ADMIN — DIPHENHYDRAMINE HYDROCHLORIDE 25 MG: 25 CAPSULE ORAL at 20:47

## 2024-11-16 RX ADMIN — METOPROLOL TARTRATE 75 MG: 50 TABLET, FILM COATED ORAL at 20:29

## 2024-11-16 RX ADMIN — APIXABAN 5 MG: 5 TABLET, FILM COATED ORAL at 08:33

## 2024-11-16 RX ADMIN — FUROSEMIDE 40 MG: 10 INJECTION, SOLUTION INTRAMUSCULAR; INTRAVENOUS at 08:34

## 2024-11-16 RX ADMIN — METOPROLOL TARTRATE 75 MG: 50 TABLET, FILM COATED ORAL at 08:32

## 2024-11-16 RX ADMIN — APIXABAN 5 MG: 5 TABLET, FILM COATED ORAL at 20:29

## 2024-11-16 RX ADMIN — PANTOPRAZOLE SODIUM 40 MG: 40 TABLET, DELAYED RELEASE ORAL at 05:54

## 2024-11-16 RX ADMIN — Medication 10 ML: at 08:34

## 2024-11-16 RX ADMIN — Medication 10 ML: at 20:29

## 2024-11-16 RX ADMIN — SPIRONOLACTONE 25 MG: 25 TABLET, FILM COATED ORAL at 08:33

## 2024-11-16 NOTE — PLAN OF CARE
Goal Outcome Evaluation:  Plan of Care Reviewed With: patient        Progress: improving  Outcome Evaluation: Pt A&O x4, VSS, compliant with treatment plan overnight, asked for pain medication one time, standby assist to the bathroom with the use of a walker, no acute episodes throughout the shift, will continue to monitor condition for changes

## 2024-11-16 NOTE — PLAN OF CARE
Goal Outcome Evaluation:  Plan of Care Reviewed With: patient        Progress: improving  Outcome Evaluation: Patient amb 200ft with RWx and SBA.  After sitting in chair decided she needed to use the restroom and was assist to bathroom where she was indep with perciare ; stood at sink for approx 15min while giving herself a bath and brushing her teeth before returning to bedside chair.  She should be able to return home upon discharge.    Anticipated Discharge Disposition (PT): home with home health

## 2024-11-16 NOTE — PROGRESS NOTES
"    [facility]    Name:  Addie Perez   Age:  89 y.o.  Sex:  female  :  1934  MRN:  [id number]   Visit Number:  [id number]  Admission Date:  2024  Date Of Service:  24  Primary Care Physician:  Ariela Hampton DO     LOS: 0 days :    Chief Complaint:      Shortness of air    Subjective:    Patient seen and examined at bedside today.  Daughter at bedside.  Patient sitting up in the chair and appears comfortable with no distress.  Patient reports feeling much better after she was diuresed and denies any more shortness of breath.  Reports that her swelling in her feet both improved.  She is on room air and saturating about 90%, heart rate controlled in the 80s today.  Afebrile.  Discussed management treatment plan with her and her daughter, all questions were answered to her satisfaction.    Hospital Course:    The patient is a 89-year-old female with a history of atrial fibrillation, valvular heart disease, diastolic CHF, with recent hospitalization and rehab stay due to pelvic fractures, who presented from home due to increasing shortness of breath and palpitations.  Patient states she has had the breathing difficulty ever since her pelvic fractures.  This had worsened over the last few days.  She was due to see her cardiologist next week.  She notes that they had previously discussed  a \"shock\" due to her A-fib.  She has been taking 5 mg of torsemide in addition to her metoprolol.  She has noted increased blood pressures over the last week or so.  She does watch her sodium intake.  She is unsure of her most recent weight at home but has not noticed significant gain.  Has noticed increased swelling in her legs.  Patient also mentions she has had intermittent vaginal bleeding since being on Eliquis earlier this year, but has not been severe.     In the ER the patient was overall stable.  Did have A-fib on telemetry with rates in the 100-1 10 range " at rest.  Not hypoxic.  Creatinine of 1, sodium 130, total bilirubin 1.4.  proBNP of 3000.  White count of 6 hemoglobin 12 platelets 283.  Troponins normal.  CT scan of her chest with a moderate right-sided pleural effusion, bilateral pulmonary edema, nodular liver could be due to cirrhosis per radiology.  There is also to suspected kidney cyst on the right.  Patient was given IV Lasix.  Hospitalist consulted for further medical management.  Dr. Nunez consulted for further recommendations.  Patient remained fluid overloaded with IV diuresis continued.  Patient had previously failed to convert with oral amiodarone loading and declined current attempt at electrical cardioversion.  Heart rate fluctuating from 80 to 120 bpm.  Metoprolol uptitrated.  Per cardiology if rate unable to be controlled consider EP evaluation for possible pacemaker and AV node ablation.    Review of Systems:     All systems were reviewed and negative except as mentioned in subjective, assessment and plan.    Vital Signs:    Temp:  [97.1 °F (36.2 °C)-97.7 °F (36.5 °C)] 97.4 °F (36.3 °C)  Heart Rate:  [] 106  Resp:  [16-20] 20  BP: (112-143)/(64-92) 124/88    Intake and output:    I/O last 3 completed shifts:  In: 280 [P.O.:280]  Out: -   I/O this shift:  In: 240 [P.O.:240]  Out: -     Physical Examination:    General Appearance:  Alert and cooperative.  Chronically ill elderly female.   Head:  Atraumatic and normocephalic.   Eyes: Conjunctivae and sclerae normal, no icterus. No pallor.   Throat: No oral lesions, no thrush, oral mucosa moist.   Neck: Supple, trachea midline, no thyromegaly.   Lungs:   Breath sounds heard bilaterally equally.  No wheezing or crackles. No Pleural rub or bronchial breathing.  On room air unlabored.   Heart:  Normal S1 and S2, regularly irregular, murmur, no gallop, no rub. No JVD.   Abdomen:   Normal bowel sounds, no masses, no organomegaly. Soft, nontender, nondistended, no rebound tenderness.   Extremities:  "Supple, left lower extremity edema greater than right, no cyanosis, no clubbing.   Skin: No bleeding or rash.   Neurologic: Alert and oriented x 3. No facial asymmetry. Moves all four limbs. No tremors.  Generalized weakness.     Laboratory results:    Results from last 7 days   Lab Units 11/16/24  0532 11/15/24  0700 11/14/24  2347   SODIUM mmol/L 136 136 130*   POTASSIUM mmol/L 3.6 3.4* 3.4*   CHLORIDE mmol/L 97* 96* 92*   CO2 mmol/L 26.0 25.9 21.6*   BUN mg/dL 13 11 11   CREATININE mg/dL 1.08* 0.99 1.05*   CALCIUM mg/dL 8.7 9.0 9.0   BILIRUBIN mg/dL  --   --  1.4*   ALK PHOS U/L  --   --  265*   ALT (SGPT) U/L  --   --  7   AST (SGOT) U/L  --   --  23   GLUCOSE mg/dL 121* 112* 104*     Results from last 7 days   Lab Units 11/16/24  0532 11/14/24  2347   WBC 10*3/mm3 5.97 6.09   HEMOGLOBIN g/dL 11.6* 12.2   HEMATOCRIT % 36.1 36.1   PLATELETS 10*3/mm3 274 283         Results from last 7 days   Lab Units 11/15/24  0136 11/14/24  2347   HSTROP T ng/L 13 13         No results for input(s): \"PHART\", \"QMY4IXE\", \"PO2ART\", \"GVY1QML\", \"BASEEXCESS\" in the last 8760 hours.   I have reviewed the patient's laboratory results.    Radiology results:    US Venous Doppler Lower Extremity Left (duplex)    Result Date: 11/15/2024  LEFT LOWER EXTREMITY VENOUS DUPLEX DOPPLER EXAMINATION  HISTORY: Left Lower extremity swelling, pain.  COMPARISON:   None  PROCEDURE: Multiple transverse and longitudinal scans were performed of the femoropopliteal deep venous system, with augmentation and compression maneuvers.  FINDINGS: Proper phasic flow was noted in the visualized deep venous system. No intraluminal increased echogenicity is noted to suggest thrombus. There is proper compression and augmentation of the venous structures. Pulsatile venous waveforms noted in the left common femoral vein, greater saphenous vein and left superficial femoral vein.. No abnormal venous collaterals are seen.      Impression: No evidence of left lower " extremity deep venous thrombosis.       This report was signed and finalized on 11/15/2024 5:04 PM by Lanny Mcmullen MD.      XR Chest 1 View    Result Date: 11/15/2024  PROCEDURE: XR CHEST 1 VW-    HISTORY: Shortness of breath  COMPARISON: March 2024.  FINDINGS: The heart is mildly enlarged. There is mild tortuosity of the aorta. There is decreased inspiratory effort. There is a new right basilar linear density which may be due to mild atelectasis versus infiltrate. Blunting of the costophrenic angles may be due to pleural effusions. There is no pneumothorax. There are no acute osseous abnormalities.      Impression: New right basilar linear density may be due to atelectasis versus infiltrate and possible small effusions. Consider mild fluid overload/CHF. Continued follow-up is recommended.        Images were reviewed, interpreted, and dictated by Dr. Lanny Mcmullen MD Transcribed by Amy Corea PA-C.  This report was signed and finalized on 11/15/2024 9:08 AM by Lanny Mcmullen MD.      CT Angiogram Chest Pulmonary Embolism    Result Date: 11/15/2024  FINAL REPORT TECHNIQUE: null CLINICAL HISTORY: Shortness of breath, A-fib COMPARISON: null FINDINGS: CT angiography chest with contrast. 3D Postprocessing. Comparison: None Findings: Cardiomegaly. Unremarkable thoracic aorta and great vessels. No aneurysm or dissection. The visualized thyroid and mediastinum are unremarkable. Moderate right pleural effusion. Partial atelectasis right lower lobe. Mild ground-glass and reticular opacities in both lungs. Likely mostly atelectasis. Small amount of ascites in the upper abdomen. Cholecystectomy. Nodular liver surface. This can be seen with cirrhosis. Hypodense and hyperdense lesions at the right superior kidney. Both are likely cysts. Osteopenia. No acute fracture.     Impression: IMPRESSION: 1. No acute pulmonary embolus. 2. Moderate right pleural effusion. Mild reticular and ground-glass opacities in both lungs could be  mild pulmonary edema. Consider heart failure. 3. Nodular liver could be due to cirrhosis. Small amount of ascites could be related. 4. 2 lesions in the right superior kidney are both favored to be cysts, however can be confirmed with nonemergent ultrasound. Authenticated and Electronically Signed by Miguel A Haro MD on 11/15/2024 01:06:31 AM   I have reviewed the patient's radiology reports.    Medication Review:     I have reviewed the patient's active and prn medications.     Problem List:      Acute on chronic diastolic (congestive) heart failure      Assessment:    Acute on chronic diastolic congestive heart failure, POA  Atrial fibrillation with intermittent RVR, paroxysmal, POA  Valvular heart disease, POA  Recent pelvic fracture with impaired mobility and ADLs  Cirrhosis?  Hematuria    Plan:    CHF:  -Dr. Nunez following.  -Diuresis continued and increased to 40 mg twice daily.  -Patient does have fairly significant mitral valve regurgitation and moderate to severe tricuspid valve regurgitation on prior echo.  May be contributing to volume issues as well.  -Strict I's and O's and daily weights.  -Left lower extremity edema usually greater than right.  Appears worse per patient with pain in calf.  Advised should not have DVT if taking Eliquis.  Venous duplex pending.     A-fib:  -Heart rate currently 80-1 20 lying in bed.  -Metoprolol uptitrated.  Eliquis continued.  -Per Dr. Nunez if no improvement consider EP consultation with possible pacemaker and AV node ablation.     Cirrhosis:  No prior abdominal imaging to compare.  Does have mildly elevated bilirubin.  Could be related to hepatic congestion from heart failure, versus NAFLD.  May need further workup.    -Patient feels as if her mobility is currently at baseline and does not feel that she needs further short-term rehab.  Plans to return home when medically stable.    I have reviewed the copied text and it is accurate as of 11/16/2024     DVT  Prophylaxis: Eliquis  Code Status: DNR  Diet: Cardiac fluid restriction  Discharge Plan: Likely home with home health in 1 to 2 days    Sanjana Harley MD  11/16/24  10:13 EST    Dictated utilizing Dragon dictation.

## 2024-11-16 NOTE — PLAN OF CARE
Problem: Pain Acute  Goal: Optimal Pain Control and Function  Outcome: Progressing     Problem: Heart Failure  Goal: Optimal Coping  Outcome: Progressing  Goal: Optimal Cardiac Output and Blood Flow  Outcome: Progressing  Goal: Stable Heart Rate and Rhythm  Outcome: Progressing  Goal: Fluid and Electrolyte Balance  Outcome: Progressing  Goal: Optimal Functional Ability  Outcome: Progressing  Goal: Improved Oral Intake  Outcome: Progressing  Goal: Effective Oxygenation and Ventilation  Outcome: Progressing  Goal: Effective Breathing Pattern During Sleep  Outcome: Progressing     Problem: Fall Injury Risk  Goal: Absence of Fall and Fall-Related Injury  Outcome: Progressing     Problem: Adult Inpatient Plan of Care  Goal: Plan of Care Review  Outcome: Progressing  Flowsheets (Taken 11/16/2024 7358)  Progress: improving  Plan of Care Reviewed With: patient  Goal: Patient-Specific Goal (Individualized)  Outcome: Progressing  Goal: Absence of Hospital-Acquired Illness or Injury  Outcome: Progressing  Goal: Optimal Comfort and Wellbeing  Outcome: Progressing  Goal: Readiness for Transition of Care  Outcome: Progressing     Problem: Comorbidity Management  Goal: Maintenance of Heart Failure Symptom Control  Outcome: Progressing  Goal: Blood Pressure in Desired Range  Outcome: Progressing   Goal Outcome Evaluation:  Plan of Care Reviewed With: patient        Progress: improving

## 2024-11-17 LAB
ANION GAP SERPL CALCULATED.3IONS-SCNC: 12.9 MMOL/L (ref 5–15)
BUN SERPL-MCNC: 15 MG/DL (ref 8–23)
BUN/CREAT SERPL: 14 (ref 7–25)
CALCIUM SPEC-SCNC: 9.3 MG/DL (ref 8.2–9.6)
CHLORIDE SERPL-SCNC: 95 MMOL/L (ref 98–107)
CO2 SERPL-SCNC: 25.1 MMOL/L (ref 22–29)
CREAT SERPL-MCNC: 1.07 MG/DL (ref 0.57–1)
EGFRCR SERPLBLD CKD-EPI 2021: 49.4 ML/MIN/1.73
GLUCOSE SERPL-MCNC: 119 MG/DL (ref 65–99)
POTASSIUM SERPL-SCNC: 4.1 MMOL/L (ref 3.5–5.2)
SODIUM SERPL-SCNC: 133 MMOL/L (ref 136–145)

## 2024-11-17 PROCEDURE — 97530 THERAPEUTIC ACTIVITIES: CPT

## 2024-11-17 PROCEDURE — 80048 BASIC METABOLIC PNL TOTAL CA: CPT | Performed by: FAMILY MEDICINE

## 2024-11-17 PROCEDURE — 25010000002 FUROSEMIDE PER 20 MG: Performed by: INTERNAL MEDICINE

## 2024-11-17 PROCEDURE — 99232 SBSQ HOSP IP/OBS MODERATE 35: CPT | Performed by: FAMILY MEDICINE

## 2024-11-17 PROCEDURE — 97116 GAIT TRAINING THERAPY: CPT

## 2024-11-17 RX ADMIN — Medication 10 ML: at 21:35

## 2024-11-17 RX ADMIN — ACETAMINOPHEN 650 MG: 325 TABLET, FILM COATED ORAL at 16:09

## 2024-11-17 RX ADMIN — Medication 10 ML: at 08:47

## 2024-11-17 RX ADMIN — SPIRONOLACTONE 25 MG: 25 TABLET, FILM COATED ORAL at 08:48

## 2024-11-17 RX ADMIN — CETIRIZINE HYDROCHLORIDE 10 MG: 10 TABLET, FILM COATED ORAL at 08:48

## 2024-11-17 RX ADMIN — APIXABAN 5 MG: 5 TABLET, FILM COATED ORAL at 08:48

## 2024-11-17 RX ADMIN — METOPROLOL TARTRATE 75 MG: 50 TABLET, FILM COATED ORAL at 21:35

## 2024-11-17 RX ADMIN — APIXABAN 5 MG: 5 TABLET, FILM COATED ORAL at 21:35

## 2024-11-17 RX ADMIN — FUROSEMIDE 40 MG: 10 INJECTION, SOLUTION INTRAMUSCULAR; INTRAVENOUS at 17:08

## 2024-11-17 RX ADMIN — ACETAMINOPHEN 650 MG: 325 TABLET, FILM COATED ORAL at 05:54

## 2024-11-17 RX ADMIN — FUROSEMIDE 40 MG: 10 INJECTION, SOLUTION INTRAMUSCULAR; INTRAVENOUS at 08:48

## 2024-11-17 RX ADMIN — POTASSIUM CHLORIDE 40 MEQ: 750 CAPSULE, EXTENDED RELEASE ORAL at 08:48

## 2024-11-17 RX ADMIN — PANTOPRAZOLE SODIUM 40 MG: 40 TABLET, DELAYED RELEASE ORAL at 05:53

## 2024-11-17 RX ADMIN — FLUTICASONE PROPIONATE 2 SPRAY: 50 SPRAY, METERED NASAL at 08:48

## 2024-11-17 RX ADMIN — HYDROCODONE BITARTRATE AND ACETAMINOPHEN 1 TABLET: 5; 325 TABLET ORAL at 23:15

## 2024-11-17 RX ADMIN — METOPROLOL TARTRATE 75 MG: 50 TABLET, FILM COATED ORAL at 08:48

## 2024-11-17 RX ADMIN — ACETAMINOPHEN 650 MG: 325 TABLET, FILM COATED ORAL at 21:34

## 2024-11-17 NOTE — THERAPY TREATMENT NOTE
"Patient Name: Addie Perez  : 1934    MRN: [id number]                              Today's Date: 2024       Admit Date: 2024    Visit Dx:     ICD-10-CM ICD-9-CM   1. Orthopnea  R06.01 786.02   2. Acute on chronic congestive heart failure, unspecified heart failure type  I50.9 428.0   3. Acute pulmonary edema  J81.0 518.4   4. Bilateral lower extremity edema  R60.0 782.3   5. Atrial fibrillation with RVR  I48.91 427.31   6. Pleural effusion on right  J90 511.9     Patient Active Problem List   Diagnosis    Abdominal pain    Chronic gastritis    Dyslipidemia    Essential hypertension    Chronic nausea    Controlled diabetes mellitus    Dysphagia    Epigastric pain    Allergic rhinitis    Chronic kidney disease    Macular degeneration    Difficult or painful urination    Fibrocystic disease of breast    Gout    Lumbosacral radiculopathy    Osteoarthritis    Osteopenia    Prediabetes    Stricture of esophagus    Toxic multinodular goiter with no crisis    Nuclear sclerotic cataract of left eye    Gastroesophageal reflux disease without esophagitis    Schatzki's ring    Paroxysmal atrial fibrillation    (HFpEF) heart failure with preserved ejection fraction    Irritable bowel syndrome with diarrhea    Pelvic fracture    Atrial fibrillation, persistent    Acute on chronic diastolic (congestive) heart failure    Acute on chronic diastolic congestive heart failure     Past Medical History:   Diagnosis Date    Arthritis     Back pain     Cataract     PT REPORTS EARLY STAGES    Cystitis     Diabetes mellitus      REPORTS \"I AM PRE DIABETIC\" AND REPORTS THAT SHE WATCHS HER DIET AND PCP WATCHES A1C CLOSELY    Disease of thyroid gland     Dysphagia     REPORTS MORE TROUBLE SWALLOWING WHEN EATING OR TAKING PILLS. REPORTS PAIN AND THAT SOMETIMES THINGS FEEL LIKE THEY \"STICK\"    Elevated cholesterol 2018    REPORTS SHE HAS TAKEN MEDICATION IN THE PAST BUT THAT SHE THINKS ALL IS WNL'S AT PRESENT TIME " "WITHOUT MEDICATION    Fracture of right ankle 2020    GERD (gastroesophageal reflux disease)     Hearing loss     REPORTS MILD AND NO USE OF HEARING AIDS    History of cardiac murmur     REPORTS \"I USED TO HAVE ONE BUT NOW THEY SAY THEY DON'T HEAR IT\"    History of colonic polyps     History of pneumonia     Hx of echocardiogram     Hypercholesterolemia     Hypertension     Irregular heart beat     Macular degeneration     Osteoporosis     Seasonal allergies     Seasonal allergies     Sinusitis     Spinal headache 09/20/2018    REPORTS AFTER DELIVERY OF HER SON    Urinary tract infection     Vertigo     Wears glasses      Past Surgical History:   Procedure Laterality Date    BACK SURGERY      REPORTS A SPUR WAS REMOVED THAT WAS HITTING SCIATIC NERVE    BREAST BIOPSY      BREAST SURGERY Left     LUMPECTOMY, BENIGN     CATARACT EXTRACTION W/ INTRAOCULAR LENS IMPLANT Left 03/04/2022    Procedure: CATARACT PHACO EXTRACTION WITH INTRAOCULAR LENS IMPLANT left;  Surgeon: Pritesh Mcnally MD;  Location: [facility];  Service: Ophthalmology;  Laterality: Left;    CATARACT EXTRACTION W/ INTRAOCULAR LENS IMPLANT Right 03/18/2022    Procedure: CATARACT PHACO EXTRACTION WITH INTRAOCULAR LENS IMPLANT RIGHT;  Surgeon: Pritesh Mcnally MD;  Location: [facility] OR;  Service: Ophthalmology;  Laterality: Right;    COLONOSCOPY  2012    REPORTS LAST DONE IN 2012 BUT HAS A HX OF SEVERAL OF THESE PROCEDURES    DILATATION AND CURETTAGE      REPORTS \"I HAD A COUPLE OF THOSE\"    ENDOSCOPY N/A 01/31/2017    Procedure: ESOPHAGOGASTRODUODENOSCOPY;  Surgeon: Robbie Quezada MD;  Location: [facility] ENDOSCOPY;  Service:     ENDOSCOPY N/A 09/21/2018    Procedure: ESOPHAGOGASTRODUODENOSCOPY WITH BIOPSY, DILITATION;  Surgeon: Robbie Quezada MD;  Location: [facility] ENDOSCOPY;  Service: Gastroenterology    ENDOSCOPY N/A 11/15/2023    Procedure: ESOPHAGOGASTRODUODENOSCOPY WITH BIOPSY AND DILATION;  Surgeon: Mil Ma MD;  Location: [facility] " ENDOSCOPY;  Service: Gastroenterology;  Laterality: N/A;    FOOT SURGERY Left     REPORTS SURGICAL INTERVENTION TO CORRECT GREAT TOE ALIGNMENT    GALLBLADDER SURGERY  2009    HEMORRHOIDECTOMY  1973    TONSILLECTOMY  1946    UPPER GASTROINTESTINAL ENDOSCOPY  2014    UPPER GASTROINTESTINAL ENDOSCOPY  01/31/2017      General Information       Row Name 11/17/24 1310          Physical Therapy Time and Intention    Document Type therapy note (daily note)  -CC     Mode of Treatment physical therapy  -CC       Row Name 11/17/24 1310          General Information    Patient Profile Reviewed yes  -CC     Existing Precautions/Restrictions fall  -CC       Row Name 11/17/24 1310          Safety Issues/Impairments Affecting Functional Mobility    Safety Issues Affecting Function (Mobility) insight into deficits/self-awareness;safety precautions follow-through/compliance  -CC     Impairments Affecting Function (Mobility) endurance/activity tolerance  -CC               User Key  (r) = Recorded By, (t) = Taken By, (c) = Cosigned By      Initials Name Provider Type    Rosey Mcfarland PTA Physical Therapist Assistant                   Mobility       Row Name 11/17/24 1312          Gait/Stairs (Locomotion)    Patient was able to Ambulate yes  -CC     Distance in Feet (Gait) 265  -CC               User Key  (r) = Recorded By, (t) = Taken By, (c) = Cosigned By      Initials Name Provider Type    Rosey Mcfarland PTA Physical Therapist Assistant                   Obj/Interventions    No documentation.                  Goals/Plan    No documentation.                  Clinical Impression       Row Name 11/17/24 1312          Pain    Pretreatment Pain Rating 0/10 - no pain  -CC     Posttreatment Pain Rating 0/10 - no pain  -       Row Name 11/17/24 1312          Plan of Care Review    Plan of Care Reviewed With patient;friend  -CC     Progress improving  -CC     Outcome Evaluation Pt agreeable to physical therapy. Performed  supine to sit mod I, sat EOB x5 mins unsupported no LOB noted, sit <->stand mod I with RW,  amb with  feet mod I/S without path deviations or LOB noted. Pt performs all tasks with increased time. Con't with PT POC and progress as tolerated  -CC       Row Name 11/17/24 1312          Positioning and Restraints    Pre-Treatment Position in bed  -CC     Post Treatment Position chair  -CC     In Chair notified nsg;reclined;call light within reach  -CC               User Key  (r) = Recorded By, (t) = Taken By, (c) = Cosigned By      Initials Name Provider Type    Rosey Mcfarland PTA Physical Therapist Assistant                   Outcome Measures       Row Name 11/17/24 1322 11/17/24 0845       How much help from another person do you currently need...    Turning from your back to your side while in flat bed without using bedrails? 4  -CC 4  -HS    Moving from lying on back to sitting on the side of a flat bed without bedrails? 4  -CC 4  -HS    Moving to and from a bed to a chair (including a wheelchair)? 4  -CC 4  -HS    Standing up from a chair using your arms (e.g., wheelchair, bedside chair)? 4  -CC 4  -HS    Climbing 3-5 steps with a railing? 3  -CC 3  -HS    To walk in hospital room? 4  -CC 3  -HS    AM-PAC 6 Clicks Score (PT) 23  -CC 22  -HS    Highest Level of Mobility Goal 7 --> Walk 25 feet or more  -CC 7 --> Walk 25 feet or more  -HS      Row Name 11/17/24 1322          Functional Assessment    Outcome Measure Options AM-PAC 6 Clicks Basic Mobility (PT)  -CC               User Key  (r) = Recorded By, (t) = Taken By, (c) = Cosigned By      Initials Name Provider Type    Rosey Mcfarland PTA Physical Therapist Assistant    Dionna Park RN Registered Nurse                                 Physical Therapy Education       Title: PT OT SLP Therapies (In Progress)       Topic: Physical Therapy (In Progress)       Point: Mobility training (Done)       Learning Progress Summary            Patient  Acceptance, E,TB, VU by  at 11/17/2024 1324    Comment: Increase mobility daily    Acceptance, E,TB, VU by MR at 11/16/2024 1124    Acceptance, E,TB, VU by JR at 11/15/2024 1638    Comment: Role of PT and POC                      Point: Home exercise program (Not Started)       Learner Progress:  Not documented in this visit.              Point: Body mechanics (Not Started)       Learner Progress:  Not documented in this visit.              Point: Precautions (Not Started)       Learner Progress:  Not documented in this visit.                              User Key       Initials Effective Dates Name Provider Type Discipline     08/22/23 -  Mindy Acosta, PT Physical Therapist PT     06/16/21 -  Rosey Herrera PTA Physical Therapist Assistant PT    MR 06/16/21 -  Lenore Al PTA Physical Therapist Assistant PT                  PT Recommendation and Plan     Progress: improving  Outcome Evaluation: Pt agreeable to physical therapy. Performed supine to sit mod I, sat EOB x5 mins unsupported no LOB noted, sit <->stand mod I with RW,  amb with  feet mod I/S without path deviations or LOB noted. Pt performs all tasks with increased time. Con't with PT POC and progress as tolerated     Time Calculation:         PT Charges       Row Name 11/17/24 1326             Time Calculation    PT Received On 11/17/24  -      PT Goal Re-Cert Due Date 11/25/24  -         Time Calculation- PT    Total Timed Code Minutes- PT 30 minute(s)  -CC         Timed Charges    19694 - Gait Training Minutes  22  -CC      23844 - PT Therapeutic Activity Minutes 8  -CC         Total Minutes    Timed Charges Total Minutes 30  -CC       Total Minutes 30  -CC                User Key  (r) = Recorded By, (t) = Taken By, (c) = Cosigned By      Initials Name Provider Type    CC Rosey Herrera, PTA Physical Therapist Assistant                  Therapy Charges for Today       Code Description Service Date Service Provider Modifiers  Qty    19867335279 HC GAIT TRAINING EA 15 MIN 11/17/2024 Rosey Herrera, PTA GP 1    92481345100 HC PT THERAPEUTIC ACT EA 15 MIN 11/17/2024 Rosey Herrera, PTA GP 1            PT G-Codes  Outcome Measure Options: AM-PAC 6 Clicks Basic Mobility (PT)  AM-PAC 6 Clicks Score (PT): 23  AM-PAC 6 Clicks Score (OT): 18       Rosey Herrera PTA  11/17/2024

## 2024-11-17 NOTE — PLAN OF CARE
Goal Outcome Evaluation:  Plan of Care Reviewed With: patient, friend        Progress: improving  Outcome Evaluation: Pt agreeable to physical therapy. Performed supine to sit mod I, sat EOB x5 mins unsupported no LOB noted, sit <->stand mod I with RW,  amb with  feet mod I/S without path deviations or LOB noted. Pt performs all tasks with increased time. Con't with PT POC and progress as tolerated

## 2024-11-17 NOTE — PROGRESS NOTES
"    [facility]    Name:  Addie Perez   Age:  89 y.o.  Sex:  female  :  1934  MRN:  [id number]   Visit Number:  [id number]  Admission Date:  2024  Date Of Service:  24  Primary Care Physician:  Ariela Hampton DO     LOS: 1 day :    Chief Complaint:      Shortness of air    Subjective:    Patient seen and examined at bedside today.  No family at bedside.  Patient laying comfortably in bed with no distress, appears generally weak but at baseline mental status.  She reports doing better on her respiratory status and breathing better.  She denies any pain or discomfort otherwise.  Vital stable on room air and afebrile.    Hospital Course:    The patient is a 89-year-old female with a history of atrial fibrillation, valvular heart disease, diastolic CHF, with recent hospitalization and rehab stay due to pelvic fractures, who presented from home due to increasing shortness of breath and palpitations.  Patient states she has had the breathing difficulty ever since her pelvic fractures.  This had worsened over the last few days.  She was due to see her cardiologist next week.  She notes that they had previously discussed  a \"shock\" due to her A-fib.  She has been taking 5 mg of torsemide in addition to her metoprolol.  She has noted increased blood pressures over the last week or so.  She does watch her sodium intake.  She is unsure of her most recent weight at home but has not noticed significant gain.  Has noticed increased swelling in her legs.  Patient also mentions she has had intermittent vaginal bleeding since being on Eliquis earlier this year, but has not been severe.     In the ER the patient was overall stable.  Did have A-fib on telemetry with rates in the 100-1 10 range at rest.  Not hypoxic.  Creatinine of 1, sodium 130, total bilirubin 1.4.  proBNP of 3000.  White count of 6 hemoglobin 12 platelets 283.  Troponins normal.  CT scan of her " chest with a moderate right-sided pleural effusion, bilateral pulmonary edema, nodular liver could be due to cirrhosis per radiology.  There is also to suspected kidney cyst on the right.  Patient was given IV Lasix.  Hospitalist consulted for further medical management.  Dr. Nunez consulted for further recommendations.  Patient remained fluid overloaded with IV diuresis continued.  Patient had previously failed to convert with oral amiodarone loading and declined current attempt at electrical cardioversion.  Heart rate fluctuating from 80 to 120 bpm.  Metoprolol uptitrated.  Per cardiology if rate unable to be controlled consider EP evaluation for possible pacemaker and AV node ablation.    Review of Systems:     All systems were reviewed and negative except as mentioned in subjective, assessment and plan.    Vital Signs:    Temp:  [97.4 °F (36.3 °C)-98.1 °F (36.7 °C)] 98.1 °F (36.7 °C)  Heart Rate:  [] 103  Resp:  [16-18] 18  BP: (108-127)/(70-89) 121/77    Intake and output:    I/O last 3 completed shifts:  In: 1180 [P.O.:1180]  Out: 750 [Urine:750]  I/O this shift:  In: 200 [P.O.:200]  Out: 500 [Urine:500]    Physical Examination:    General Appearance:  Alert and cooperative.  Chronically ill elderly female.   Head:  Atraumatic and normocephalic.   Eyes: Conjunctivae and sclerae normal, no icterus. No pallor.   Throat: No oral lesions, no thrush, oral mucosa moist.   Neck: Supple, trachea midline, no thyromegaly.   Lungs:   Breath sounds heard bilaterally equally.  No wheezing or crackles. No Pleural rub or bronchial breathing.  On room air unlabored.   Heart:  Normal S1 and S2, regularly irregular, murmur, no gallop, no rub. No JVD.   Abdomen:   Normal bowel sounds, no masses, no organomegaly. Soft, nontender, nondistended, no rebound tenderness.   Extremities: Supple, 1+ bilateral lower extremities edema, no cyanosis, no clubbing.   Skin: No bleeding or rash.   Neurologic: Alert and oriented x 3. No  "facial asymmetry. Moves all four limbs. No tremors.  Generalized weakness.     Laboratory results:    Results from last 7 days   Lab Units 11/17/24  0717 11/16/24  0532 11/15/24  0700 11/14/24  2347   SODIUM mmol/L 133* 136 136 130*   POTASSIUM mmol/L 4.1 3.6 3.4* 3.4*   CHLORIDE mmol/L 95* 97* 96* 92*   CO2 mmol/L 25.1 26.0 25.9 21.6*   BUN mg/dL 15 13 11 11   CREATININE mg/dL 1.07* 1.08* 0.99 1.05*   CALCIUM mg/dL 9.3 8.7 9.0 9.0   BILIRUBIN mg/dL  --   --   --  1.4*   ALK PHOS U/L  --   --   --  265*   ALT (SGPT) U/L  --   --   --  7   AST (SGOT) U/L  --   --   --  23   GLUCOSE mg/dL 119* 121* 112* 104*     Results from last 7 days   Lab Units 11/16/24  0532 11/14/24  2347   WBC 10*3/mm3 5.97 6.09   HEMOGLOBIN g/dL 11.6* 12.2   HEMATOCRIT % 36.1 36.1   PLATELETS 10*3/mm3 274 283         Results from last 7 days   Lab Units 11/15/24  0136 11/14/24  2347   HSTROP T ng/L 13 13         No results for input(s): \"PHART\", \"XMF4WPJ\", \"PO2ART\", \"TPD0ICU\", \"BASEEXCESS\" in the last 8760 hours.   I have reviewed the patient's laboratory results.    Radiology results:    US Venous Doppler Lower Extremity Left (duplex)    Result Date: 11/15/2024  LEFT LOWER EXTREMITY VENOUS DUPLEX DOPPLER EXAMINATION  HISTORY: Left Lower extremity swelling, pain.  COMPARISON:   None  PROCEDURE: Multiple transverse and longitudinal scans were performed of the femoropopliteal deep venous system, with augmentation and compression maneuvers.  FINDINGS: Proper phasic flow was noted in the visualized deep venous system. No intraluminal increased echogenicity is noted to suggest thrombus. There is proper compression and augmentation of the venous structures. Pulsatile venous waveforms noted in the left common femoral vein, greater saphenous vein and left superficial femoral vein.. No abnormal venous collaterals are seen.      Impression: No evidence of left lower extremity deep venous thrombosis.       This report was signed and finalized on " 11/15/2024 5:04 PM by Lanny Mcmullen MD.     I have reviewed the patient's radiology reports.    Medication Review:     I have reviewed the patient's active and prn medications.     Problem List:      Acute on chronic diastolic (congestive) heart failure    Acute on chronic diastolic congestive heart failure      Assessment:    Acute on chronic diastolic congestive heart failure, POA  Atrial fibrillation with intermittent RVR, paroxysmal, POA  Valvular heart disease, POA  Recent pelvic fracture with impaired mobility and ADLs  Cirrhosis?  Hematuria    Plan:    CHF:  -Dr. Nunez following.  -Diuresis continued and increased to 40 mg twice daily.  -Patient does have fairly significant mitral valve regurgitation and moderate to severe tricuspid valve regurgitation on prior echo.  May be contributing to volume issues as well.  -Strict I's and O's and daily weights.  -Left lower extremity edema usually greater than right.  Appears worse per patient with pain in calf.  Advised should not have DVT if taking Eliquis.  Left lower extremity venous duplex negative for DVT     A-fib:  -Heart rate currently 80-1 20 lying in bed.  -Metoprolol uptitrated.  Eliquis continued.  -Per Dr. Nunez if no improvement consider EP consultation with possible pacemaker and AV node ablation.     Cirrhosis:  No prior abdominal imaging to compare.  Does have mildly elevated bilirubin.  Could be related to hepatic congestion from heart failure, versus NAFLD.  May need further workup.    -Patient feels as if her mobility is currently at baseline and does not feel that she needs further short-term rehab.  Plans to return home when medically stable.    I have reviewed the copied text and it is accurate as of 11/17/2024     DVT Prophylaxis: Eliquis  Code Status: DNR  Diet: Cardiac fluid restriction  Discharge Plan: Likely home with home health in 1 to 2 days    Sanjana Harley MD  11/17/24  10:50 EST    Dictated utilizing Dragon dictation.

## 2024-11-17 NOTE — PLAN OF CARE
Goal Outcome Evaluation:  Plan of Care Reviewed With: patient           Outcome Evaluation: No acute events this shift. Vital Signs Stable. IV diuretics continued. Possible D\C tomorrow. Will continue to monitor.

## 2024-11-18 ENCOUNTER — READMISSION MANAGEMENT (OUTPATIENT)
Dept: CALL CENTER | Facility: HOSPITAL | Age: 89
End: 2024-11-18
Payer: MEDICARE

## 2024-11-18 ENCOUNTER — DISEASE STATE MANAGEMENT VISIT (OUTPATIENT)
Dept: PHARMACY | Facility: HOSPITAL | Age: 89
End: 2024-11-18
Payer: MEDICARE

## 2024-11-18 VITALS
HEART RATE: 93 BPM | SYSTOLIC BLOOD PRESSURE: 105 MMHG | OXYGEN SATURATION: 94 % | DIASTOLIC BLOOD PRESSURE: 57 MMHG | TEMPERATURE: 97.6 F | HEIGHT: 55 IN | BODY MASS INDEX: 30.87 KG/M2 | WEIGHT: 133.38 LBS | RESPIRATION RATE: 18 BRPM

## 2024-11-18 PROBLEM — I50.31 ACUTE HEART FAILURE WITH PRESERVED EJECTION FRACTION (HFPEF): Status: ACTIVE | Noted: 2024-11-18

## 2024-11-18 LAB
ANION GAP SERPL CALCULATED.3IONS-SCNC: 16 MMOL/L (ref 5–15)
BUN SERPL-MCNC: 18 MG/DL (ref 8–23)
BUN/CREAT SERPL: 15.5 (ref 7–25)
CALCIUM SPEC-SCNC: 9.2 MG/DL (ref 8.2–9.6)
CHLORIDE SERPL-SCNC: 91 MMOL/L (ref 98–107)
CO2 SERPL-SCNC: 24 MMOL/L (ref 22–29)
CREAT SERPL-MCNC: 1.16 MG/DL (ref 0.57–1)
DEPRECATED RDW RBC AUTO: 44.3 FL (ref 37–54)
EGFRCR SERPLBLD CKD-EPI 2021: 44.9 ML/MIN/1.73
ERYTHROCYTE [DISTWIDTH] IN BLOOD BY AUTOMATED COUNT: 13.7 % (ref 12.3–15.4)
GLUCOSE SERPL-MCNC: 107 MG/DL (ref 65–99)
HCT VFR BLD AUTO: 38.1 % (ref 34–46.6)
HGB BLD-MCNC: 12.7 G/DL (ref 12–15.9)
MCH RBC QN AUTO: 29.4 PG (ref 26.6–33)
MCHC RBC AUTO-ENTMCNC: 33.3 G/DL (ref 31.5–35.7)
MCV RBC AUTO: 88.2 FL (ref 79–97)
PLATELET # BLD AUTO: 323 10*3/MM3 (ref 140–450)
PMV BLD AUTO: 9.5 FL (ref 6–12)
POTASSIUM SERPL-SCNC: 4.3 MMOL/L (ref 3.5–5.2)
RBC # BLD AUTO: 4.32 10*6/MM3 (ref 3.77–5.28)
SODIUM SERPL-SCNC: 131 MMOL/L (ref 136–145)
WBC NRBC COR # BLD AUTO: 6.15 10*3/MM3 (ref 3.4–10.8)

## 2024-11-18 PROCEDURE — 85027 COMPLETE CBC AUTOMATED: CPT | Performed by: FAMILY MEDICINE

## 2024-11-18 PROCEDURE — 25010000002 FUROSEMIDE PER 20 MG: Performed by: INTERNAL MEDICINE

## 2024-11-18 PROCEDURE — 80048 BASIC METABOLIC PNL TOTAL CA: CPT | Performed by: FAMILY MEDICINE

## 2024-11-18 PROCEDURE — 99239 HOSP IP/OBS DSCHRG MGMT >30: CPT | Performed by: NURSE PRACTITIONER

## 2024-11-18 RX ORDER — FUROSEMIDE 40 MG/1
40 TABLET ORAL DAILY
Qty: 30 TABLET | Refills: 0 | Status: SHIPPED | OUTPATIENT
Start: 2024-11-19 | End: 2024-12-10 | Stop reason: SDUPTHER

## 2024-11-18 RX ORDER — METOPROLOL TARTRATE 25 MG/1
75 TABLET, FILM COATED ORAL EVERY 12 HOURS SCHEDULED
Qty: 180 TABLET | Refills: 0 | Status: SHIPPED | OUTPATIENT
Start: 2024-11-18 | End: 2024-12-09

## 2024-11-18 RX ORDER — SPIRONOLACTONE 25 MG/1
25 TABLET ORAL DAILY
Qty: 30 TABLET | Refills: 0 | Status: SHIPPED | OUTPATIENT
Start: 2024-11-19 | End: 2025-03-18 | Stop reason: SDUPTHER

## 2024-11-18 RX ADMIN — METOPROLOL TARTRATE 75 MG: 50 TABLET, FILM COATED ORAL at 08:20

## 2024-11-18 RX ADMIN — FUROSEMIDE 40 MG: 10 INJECTION, SOLUTION INTRAMUSCULAR; INTRAVENOUS at 08:20

## 2024-11-18 RX ADMIN — PANTOPRAZOLE SODIUM 40 MG: 40 TABLET, DELAYED RELEASE ORAL at 06:06

## 2024-11-18 RX ADMIN — APIXABAN 5 MG: 5 TABLET, FILM COATED ORAL at 08:20

## 2024-11-18 RX ADMIN — SPIRONOLACTONE 25 MG: 25 TABLET, FILM COATED ORAL at 08:20

## 2024-11-18 RX ADMIN — Medication 10 ML: at 08:20

## 2024-11-18 RX ADMIN — POTASSIUM CHLORIDE 40 MEQ: 750 CAPSULE, EXTENDED RELEASE ORAL at 08:20

## 2024-11-18 RX ADMIN — CETIRIZINE HYDROCHLORIDE 10 MG: 10 TABLET, FILM COATED ORAL at 08:20

## 2024-11-18 RX ADMIN — FLUTICASONE PROPIONATE 2 SPRAY: 50 SPRAY, METERED NASAL at 08:20

## 2024-11-18 NOTE — PLAN OF CARE
Goal Outcome Evaluation:              Outcome Evaluation: VSS. PT ABLE TO SLEEP FOR INTERVALS THRU NIGHT.   NO ACUTE EVENTS OVERNIGHT.

## 2024-11-18 NOTE — PROGRESS NOTES
"Dietitian Follow-up    Patient Name: Addie Perez  YOB: 1934  MRN: [id number]  Admission date: 11/14/2024    Comment:      Clinical Nutrition Follow-up   Encounter Information        Trending Narrative     11/18: Average PO intake 37% x 4 meals. RD will order Mighty shake daily to help promote PO intake.     11/15: RD consulted per nursing screen. MST=1. EMR shows some wt loss but may be related 2/2 dieretic use.RD will f/up and add ONS if warranted once PO intake has been established.      Anthropometrics        Current Height, Weight Height: 139.7 cm (55\")  Weight: 60.5 kg (133 lb 6.1 oz) (11/17/24 0726)       Trending Weight Hx     This admission:              PTA:     Wt Readings from Last 30 Encounters:   11/17/24 0726 60.5 kg (133 lb 6.1 oz)   11/15/24 1308 64.5 kg (142 lb 3.2 oz)   11/14/24 2254 67.6 kg (149 lb)   10/10/24 1052 66.2 kg (146 lb)   10/02/24 0846 66.2 kg (146 lb)   09/23/24 1250 68.5 kg (151 lb)   09/14/24 1613 69.6 kg (153 lb 7 oz)   09/14/24 1007 65.8 kg (145 lb)   09/09/24 1116 67.1 kg (148 lb)   08/26/24 0925 67.9 kg (149 lb 9.6 oz)   08/19/24 1108 66.2 kg (146 lb)   08/05/24 1122 65.8 kg (145 lb)   06/10/24 1115 65.7 kg (144 lb 12.8 oz)   05/08/24 1421 65.8 kg (145 lb)   05/02/24 1240 65.8 kg (145 lb)   04/29/24 1516 66.7 kg (147 lb)   04/24/24 1323 64 kg (141 lb)   03/26/24 1117 64.9 kg (143 lb)   03/25/24 1118 66.2 kg (146 lb)   03/18/24 1303 67.1 kg (148 lb)   03/03/24 2142 65.8 kg (145 lb)   03/03/24 2141 65.8 kg (145 lb)   03/03/24 1517 66.2 kg (145 lb 15.1 oz)   03/03/24 1243 66.2 kg (146 lb)   02/08/24 1447 66.2 kg (146 lb)   11/13/23 1717 64.8 kg (142 lb 13.7 oz)   10/05/23 1303 64.9 kg (143 lb)   08/09/23 1312 65.1 kg (143 lb 9.6 oz)   06/12/23 1237 65.8 kg (145 lb)   06/06/23 1042 65.8 kg (145 lb)   03/02/23 0919 65.3 kg (144 lb)   08/09/22 1512 65.3 kg (144 lb)   03/17/22 1552 66.2 kg (146 lb)   08/09/21 1001 67 kg (147 lb 9.6 oz)   08/03/20 1025 65.1 kg (143 lb " 8 oz)   11/20/19 1421 64.9 kg (143 lb)      BMI kg/m2 Body mass index is 31 kg/m².     Labs        Pertinent Labs Results from last 7 days   Lab Units 11/18/24  0639 11/17/24  0717 11/16/24  0532 11/15/24  0700 11/14/24  2347   SODIUM mmol/L 131* 133* 136   < > 130*   POTASSIUM mmol/L 4.3 4.1 3.6   < > 3.4*   CHLORIDE mmol/L 91* 95* 97*   < > 92*   CO2 mmol/L 24.0 25.1 26.0   < > 21.6*   BUN mg/dL 18 15 13   < > 11   CREATININE mg/dL 1.16* 1.07* 1.08*   < > 1.05*   CALCIUM mg/dL 9.2 9.3 8.7   < > 9.0   BILIRUBIN mg/dL  --   --   --   --  1.4*   ALK PHOS U/L  --   --   --   --  265*   ALT (SGPT) U/L  --   --   --   --  7   AST (SGOT) U/L  --   --   --   --  23   GLUCOSE mg/dL 107* 119* 121*   < > 104*    < > = values in this interval not displayed.     Results from last 7 days   Lab Units 11/18/24  0639 11/16/24  0532 11/15/24  0136   MAGNESIUM mg/dL  --   --  1.9   HEMOGLOBIN g/dL 12.7   < >  --    HEMATOCRIT % 38.1   < >  --     < > = values in this interval not displayed.         Medications    Scheduled Medications apixaban, 5 mg, Oral, Q12H  cetirizine, 10 mg, Oral, Daily  fluticasone, 2 spray, Nasal, Daily  furosemide, 40 mg, Intravenous, BID  metoprolol tartrate, 75 mg, Oral, Q12H  pantoprazole, 40 mg, Oral, Q AM  potassium chloride, 40 mEq, Oral, Daily  sodium chloride, 10 mL, Intravenous, Q12H  spironolactone, 25 mg, Oral, Daily        Infusions      PRN Medications   acetaminophen **OR** acetaminophen **OR** acetaminophen    aluminum-magnesium hydroxide-simethicone    senna-docusate sodium **AND** polyethylene glycol **AND** bisacodyl **AND** bisacodyl    diphenhydrAMINE    HYDROcodone-acetaminophen    nitroglycerin    ondansetron ODT **OR** ondansetron    [COMPLETED] Insert Peripheral IV **AND** sodium chloride    sodium chloride    sodium chloride     Physical Findings        Trending Physical   Appearance, NFPE    --  Edema  None noted     Bowel Function LBM: 11/15     Tubes Peripheral IV      Chewing/Swallowing WNL     Skin WNL     --  Current Nutrition Orders & Evaluation of Intake       Oral Nutrition     Food Allergies NKFA   Current PO Diet Diet: Cardiac, Fluid Restriction (240 mL/tray); Healthy Heart (2-3 Na+); 2000 mL/day; Fluid Consistency: Thin (IDDSI 0)   Supplement    PO Evaluation     Trending % PO Intake 11/18: 37% x 4 meals  11/15: None recorded at this time     Nutrition Diagnosis         Nutrition Dx Problem 1 Predicted suboptimal intake r/t CHF AEB average PO intake 37% x 4 meals.      Nutrition Dx Problem 2        Intervention Goal         Intervention Goal(s) Maintain CBW  PO intake meet >50% of estimated needs  Adhere to ONS     Nutrition Intervention        RD Action Will order supplement      Nutrition Prescription          Diet Prescription HH, 2,000 mL fluid restriction   Supplement Prescription Mighty shake daily   Enteral Nutrition Prescription     TPN Prescription       Monitor/Evaluation        Monitor Per protocol, I&O, PO intake, Supplement intake, Pertinent labs, Weight, Skin status, GI status, Symptoms, POC/GOC, Swallow function, Hemodynamic stability     RD to f/up    Electronically signed by:  Virginie Quijano RD  11/18/24 10:09 EST

## 2024-11-18 NOTE — DISCHARGE SUMMARY
[facility]      Name:  Addie Perez   Age:  89 y.o.  Sex:  female  :  1934  MRN:  [id number]   Visit Number:  [id number]    Admission Date:  2024  Date of Discharge:  2024  Primary Care Physician:  Ariela Hampton DO    Important issues to note:    -Patient admitted for acute on chronic diastolic congestive heart failure as well as atrial fibrillation with intermittent RVR likely contributing to overall symptomology.  -Initiated on Lasix 40 mg daily/Aldactone/metoprolol uptitrated to 75 mg twice daily.  Continued on Eliquis.  -Did see cardiology during admission Dr. Nunez who advised if no improvement consider EP consultation with possible pacemaker and AV node ablation.  -Patient with overall improved symptoms.  -She will follow closely with heart failure clinic.  If worsening atrial fibrillation would consider outpatient EP referral.  -Follow with PCP as scheduled.  -Patient and daughter updated on plan of care with all questions answered.  Strict return precautions given.    Discharge Diagnoses:     Acute on chronic diastolic congestive heart failure, POA  Atrial fibrillation with intermittent RVR, paroxysmal, POA  Valvular heart disease, POA  Recent pelvic fracture with impaired mobility and ADLs  Cirrhosis?  Hematuria    Problem List:     Active Hospital Problems    Diagnosis  POA    **Acute on chronic diastolic (congestive) heart failure [I50.33]  Yes    Acute heart failure with preserved ejection fraction (HFpEF) [I50.31]  Yes    Acute on chronic diastolic congestive heart failure [I50.33]  Yes      Resolved Hospital Problems   No resolved problems to display.     Presenting Problem:    Chief Complaint   Patient presents with    Back Pain    Shortness of Breath      Consults:     Consulting Physician(s)         Provider   Role Specialty     Je Nunez MD      Consulting Physician Cardiology          Procedures  "Performed:        History of presenting illness/Hospital Course:    The patient is a 89-year-old female with a history of atrial fibrillation, valvular heart disease, diastolic CHF, with recent hospitalization and rehab stay due to pelvic fractures, who presented from home due to increasing shortness of breath and palpitations.  Patient states she has had the breathing difficulty ever since her pelvic fractures.  This had worsened over the last few days.  She was due to see her cardiologist next week.  She notes that they had previously discussed  a \"shock\" due to her A-fib.  She has been taking 5 mg of torsemide in addition to her metoprolol.  She has noted increased blood pressures over the last week or so.  She does watch her sodium intake.  She is unsure of her most recent weight at home but has not noticed significant gain.  Has noticed increased swelling in her legs.  Patient also mentions she has had intermittent vaginal bleeding since being on Eliquis earlier this year, but has not been severe.     In the ER the patient was overall stable.  Did have A-fib on telemetry with rates in the 100-1 10 range at rest.  Not hypoxic.  Creatinine of 1, sodium 130, total bilirubin 1.4.  proBNP of 3000.  White count of 6 hemoglobin 12 platelets 283.  Troponins normal.  CT scan of her chest with a moderate right-sided pleural effusion, bilateral pulmonary edema, nodular liver could be due to cirrhosis per radiology.  There is also to suspected kidney cyst on the right.  Patient was given IV Lasix.  Hospitalist consulted for further medical management.  Dr. Nunez consulted for further recommendations.  Patient remained fluid overloaded with IV diuresis continued.  Patient had previously failed to convert with oral amiodarone loading and declined current attempt at electrical cardioversion.  Heart rate fluctuating from 80 to 120 bpm.  Metoprolol uptitrated.  Per cardiology if rate unable to be controlled consider EP " evaluation for possible pacemaker and AV node ablation.  Patient with overall rate improved.  Symptomology also improved.  Patient will continue increased dose of metoprolol 75 mg twice daily/Lasix 40 mg daily/Aldactone.  She will follow-up closely with heart failure clinic and cardiology for consideration of EP evaluation.  Otherwise reassuring labs and vitals noted.  Strict return precautions given.    Vital Signs:    Temp:  [97.3 °F (36.3 °C)-97.7 °F (36.5 °C)] 97.6 °F (36.4 °C)  Heart Rate:  [] 93  Resp:  [16-18] 18  BP: (103-133)/(57-80) 105/57    Physical Exam:    General Appearance:  Alert and cooperative.  Chronically ill elderly female.   Head:  Atraumatic and normocephalic.   Eyes: Conjunctivae and sclerae normal, no icterus. No pallor.   Ears:  Ears with no abnormalities noted.   Throat: No oral lesions, no thrush, oral mucosa moist.   Neck: Supple, trachea midline, no thyromegaly.   Back:   No kyphoscoliosis present. No tenderness to palpation.   Lungs:   Breath sounds heard bilaterally equally.  No crackles or wheezing. No Pleural rub or bronchial breathing.  Unlabored on room air.   Heart:  Normal S1 and S2, regularly irregular, no murmur, no gallop, no rub. No JVD.   Abdomen:   Normal bowel sounds, no masses, no organomegaly. Soft, nontender, nondistended, no rebound tenderness.   Extremities: Supple, trace edema left greater than right., no cyanosis, no clubbing.   Pulses: Pulses palpable bilaterally.   Skin: No bleeding or rash.   Neurologic: Alert and oriented x 3. No facial asymmetry. Moves all four limbs. No tremors.  Generalized weakness.  Hard of hearing.     Pertinent Lab Results:     Results from last 7 days   Lab Units 11/18/24  0639 11/17/24  0717 11/16/24  0532 11/15/24  0700 11/14/24  2347   SODIUM mmol/L 131* 133* 136   < > 130*   POTASSIUM mmol/L 4.3 4.1 3.6   < > 3.4*   CHLORIDE mmol/L 91* 95* 97*   < > 92*   CO2 mmol/L 24.0 25.1 26.0   < > 21.6*   BUN mg/dL 18 15 13   < > 11    CREATININE mg/dL 1.16* 1.07* 1.08*   < > 1.05*   CALCIUM mg/dL 9.2 9.3 8.7   < > 9.0   BILIRUBIN mg/dL  --   --   --   --  1.4*   ALK PHOS U/L  --   --   --   --  265*   ALT (SGPT) U/L  --   --   --   --  7   AST (SGOT) U/L  --   --   --   --  23   GLUCOSE mg/dL 107* 119* 121*   < > 104*    < > = values in this interval not displayed.     Results from last 7 days   Lab Units 11/18/24  0639 11/16/24  0532 11/14/24  2347   WBC 10*3/mm3 6.15 5.97 6.09   HEMOGLOBIN g/dL 12.7 11.6* 12.2   HEMATOCRIT % 38.1 36.1 36.1   PLATELETS 10*3/mm3 323 274 283         Results from last 7 days   Lab Units 11/15/24  0136 11/14/24  2347   HSTROP T ng/L 13 13     Results from last 7 days   Lab Units 11/14/24  2347   PROBNP pg/mL 2,999.0*                       Pertinent Radiology Results:    Imaging Results (All)       Procedure Component Value Units Date/Time    US Venous Doppler Lower Extremity Left (duplex) [[id number]] Collected: 11/15/24 1703     Updated: 11/15/24 1706    Narrative:      LEFT LOWER EXTREMITY VENOUS DUPLEX DOPPLER EXAMINATION     HISTORY: Left Lower extremity swelling, pain.     COMPARISON:   None     PROCEDURE: Multiple transverse and longitudinal scans were performed of  the femoropopliteal deep venous system, with augmentation and  compression maneuvers.     FINDINGS: Proper phasic flow was noted in the visualized deep venous  system. No intraluminal increased echogenicity is noted to suggest  thrombus. There is proper compression and augmentation of the venous  structures. Pulsatile venous waveforms noted in the left common femoral  vein, greater saphenous vein and left superficial femoral vein.. No  abnormal venous collaterals are seen.       Impression:      No evidence of left lower extremity deep venous thrombosis.                    This report was signed and finalized on 11/15/2024 5:04 PM by Lanny Mcmullen MD.       XR Chest 1 View [[id number]] Collected: 11/15/24 0905     Updated: 11/15/24 0910     Narrative:      PROCEDURE: XR CHEST 1 VW-        HISTORY: Shortness of breath     COMPARISON: March 2024.     FINDINGS: The heart is mildly enlarged. There is mild tortuosity of the  aorta. There is decreased inspiratory effort. There is a new right  basilar linear density which may be due to mild atelectasis versus  infiltrate. Blunting of the costophrenic angles may be due to pleural  effusions. There is no pneumothorax. There are no acute osseous  abnormalities.       Impression:      New right basilar linear density may be due to atelectasis  versus infiltrate and possible small effusions. Consider mild fluid  overload/CHF. Continued follow-up is recommended.                       Images were reviewed, interpreted, and dictated by Dr. Lanny Mcmullen MD  Transcribed by Amy Corea PA-C.     This report was signed and finalized on 11/15/2024 9:08 AM by Lanny Mcmullen MD.       CT Angiogram Chest Pulmonary Embolism [[id number]] Collected: 11/15/24 0106     Updated: 11/15/24 0108    Narrative:      FINAL REPORT    TECHNIQUE:  null    CLINICAL HISTORY:  Shortness of breath, A-fib    COMPARISON:  null    FINDINGS:  CT angiography chest with contrast. 3D Postprocessing.    Comparison: None    Findings:    Cardiomegaly.    Unremarkable thoracic aorta and great vessels. No aneurysm or dissection.    The visualized thyroid and mediastinum are unremarkable.    Moderate right pleural effusion.    Partial atelectasis right lower lobe.    Mild ground-glass and reticular opacities in both lungs. Likely mostly atelectasis.    Small amount of ascites in the upper abdomen.    Cholecystectomy.    Nodular liver surface. This can be seen with cirrhosis.    Hypodense and hyperdense lesions at the right superior kidney. Both are likely cysts.    Osteopenia. No acute fracture.      Impression:      IMPRESSION:    1. No acute pulmonary embolus.    2. Moderate right pleural effusion. Mild reticular and ground-glass opacities in both  lungs could be mild pulmonary edema. Consider heart failure.    3. Nodular liver could be due to cirrhosis. Small amount of ascites could be related.    4. 2 lesions in the right superior kidney are both favored to be cysts, however can be confirmed with nonemergent ultrasound.    Authenticated and Electronically Signed by Miguel A Haro MD  on 11/15/2024 01:06:31 AM            Echo:    Results for orders placed during the hospital encounter of 03/03/24    Adult Transthoracic Echo Complete W/ Cont if Necessary Per Protocol    Interpretation Summary    Left ventricular systolic function is normal. Calculated left ventricular 3D EF = 59% Left ventricular ejection fraction appears to be 56 - 60%.    Left ventricular diastolic dysfunction is noted.    The left atrial cavity is dilated.    The right atrial cavity is dilated.    Moderate mitral valve regurgitation is present. (SBP 150s)    Moderate to severe tricuspid valve regurgitation is present.    Estimated right ventricular systolic pressure from tricuspid regurgitation is moderately elevated (45-55 mmHg).    Condition on Discharge:      Stable.    Code status during the hospital stay:    Code Status and Medical Interventions: No CPR (Do Not Attempt to Resuscitate); Limited Support; No intubation (DNI)   Ordered at: 11/15/24 0210     Medical Intervention Limits:    No intubation (DNI)     Code Status (Patient has no pulse and is not breathing):    No CPR (Do Not Attempt to Resuscitate)     Medical Interventions (Patient has pulse or is breathing):    Limited Support     Discharge Disposition:    Home-Health Care [facility]    Discharge Medications:       Discharge Medications        New Medications        Instructions Start Date   furosemide 40 MG tablet  Commonly known as: Lasix   40 mg, Oral, Daily   Start Date: November 19, 2024     metoprolol tartrate 25 MG tablet  Commonly known as: LOPRESSOR   75 mg, Oral, Every 12 Hours Scheduled      spironolactone 25 MG  tablet  Commonly known as: ALDACTONE   25 mg, Oral, Daily   Start Date: November 19, 2024            Continue These Medications        Instructions Start Date   acetaminophen 650 MG 8 hr tablet  Commonly known as: TYLENOL   650 mg, 2 Times Daily PRN      apixaban 5 MG tablet tablet  Commonly known as: ELIQUIS   5 mg, Oral, Every 12 Hours Scheduled      cetirizine 10 MG tablet  Commonly known as: zyrTEC   Take 1 tablet by mouth Every Night.      CRANBERRY EXTRACT PO   1 tablet, Daily      estradiol 0.5 MG tablet  Commonly known as: ESTRACE   0.5 mg, Oral, 2 Times Weekly, REPORTS TAKES THIS ON Monday AND Friday AT NIGHT TIME      fluticasone 50 MCG/ACT nasal spray  Commonly known as: FLONASE   2 sprays, Daily PRN      multivitamin with minerals tablet tablet   1 tablet, Daily      omeprazole 10 MG capsule  Commonly known as: prilOSEC   1 capsule, Every Morning             Stop These Medications      HYDROcodone-acetaminophen 5-325 MG per tablet  Commonly known as: NORCO     losartan 50 MG tablet  Commonly known as: COZAAR     metoprolol succinate XL 50 MG 24 hr tablet  Commonly known as: TOPROL-XL     torsemide 5 MG tablet  Commonly known as: DEMADEX            Discharge Diet:     Diet Instructions       Diet: Cardiac Diets, Fluid Restriction (240 mL/tray) Diets; Healthy Heart (2-3 Na+); Thin (IDDSI 0); 2000 mL/day      Discharge Diet:  Cardiac Diets  Fluid Restriction (240 mL/tray) Diets       Cardiac Diet: Healthy Heart (2-3 Na+)    Fluid Consistency: Thin (IDDSI 0)    Fluid Restriction Diet (240 mL/tray): 2000 mL/day          Activity at Discharge:     Activity Instructions       Activity as Tolerated            Follow-up Appointments:    Additional Instructions for the Follow-ups that You Need to Schedule       Ambulatory Referral to Disease State Management   As directed      To dept: [facility] [[id number]]   What program(s) are you referring for?: Heart Failure   Follow-up needed: Yes         Ambulatory Referral to Home Health (Hospital)   As directed      Face to Face Visit Date: 11/18/2024   Follow-up provider for Plan of Care?: I treated the patient in an acute care facility and will not continue treatment after discharge.   Follow-up provider: MARCI HAMPTON [[id number]]   Reason/Clinical Findings: debility   Describe mobility limitations that make leaving home difficult: debility   Nursing/Therapeutic Services Requested: Physical Therapy Occupational Therapy   PT orders: Home safety assessment Therapeutic exercise Gait Training Strengthening   Weight Bearing Status: As Tolerated   Occupational orders: Activities of daily living Home safety assessment Strengthening Energy conservation Fine motor               Contact information for follow-up providers       Marci Hampton, DO Follow up in 1 week(s).    Specialty: Family Medicine  Contact information:  [address]  [place] [address]  [phone]                       Contact information for after-discharge care       Home Medical Care       [facility] [place] .    Service: Home Health Services  Contact information:  [address] [place] [address]  [phone]                                 Future Appointments   Date Time Provider Department Center   12/9/2024  1:00 PM Betsy Andrews APRN  [facility]   2/18/2025  1:30 PM Jack Murdock APRN [facility]   3/5/2025 10:40 AM Margoth Chaudhari MD [facility]   8/25/2025 10:40 AM Jose Eduardo Gallegos MD [facility]     Test Results Pending at Discharge:           JOHAN Hernandez  11/18/24  11:34 EST    Time: I spent >30 minutes on this discharge activity which included: face-to-face encounter with the patient, reviewing the data in the system, coordination of the care with the nursing staff as well as consultants, documentation, and entering orders.     Dictated utilizing Dragon dictation.

## 2024-11-18 NOTE — CASE MANAGEMENT/SOCIAL WORK
Case Management Discharge Note      Final Note: Plans to DC home with home health    Provided Post Acute Provider List?: N/A  N/A Provider List Comment: Patient to return home with same services; no new DME needs  Provided Post Acute Provider Quality & Resource List?: N/A  N/A Quality & Resource List Comment: Patient to return home with same services; no new DME needs    Selected Continued Care - Admitted Since 11/14/2024       Destination    No services have been selected for the patient.                Durable Medical Equipment    No services have been selected for the patient.                Dialysis/Infusion    No services have been selected for the patient.                Home Medical Care       Service Provider Services Address Phone Fax Patient Preferred    [facility] [place] [facility] [address] [place] [id number] [phone] --       Internal Comment last updated by Miesha Adhikari RN 11/15/2024 1406    Pt is current with CT HH                         Therapy    No services have been selected for the patient.                Community Resources    No services have been selected for the patient.                Community & DME    No services have been selected for the patient.                    Selected Continued Care - Episodes Includes continued care and service providers with selected services from the active episodes listed below      Heart Failure Episode start date: 3/18/2024   There are no active outsourced providers for this episode.                 Selected Continued Care - Prior Encounters Includes continued care and service providers with selected services from prior encounters from 8/16/2024 to 11/18/2024      Discharged on 9/18/2024 Admission date: 9/14/2024 - Discharge disposition: Skilled Nursing Facility (DC - External)      Destination       Service Provider Services Address Phone Fax Patient Preferred    [facility]  [facility] [address] [place] [address] [phone] --                          Transportation Services  Private: Car    Final Discharge Disposition Code: 01 - home or self-care

## 2024-11-18 NOTE — CASE MANAGEMENT/SOCIAL WORK
Met with pt and her daughter. IMM obtained. Pt plans to return home, has helpful son/daughter, lives alone. Has had HH services with [facility], states she has 2 visits left. She feels she has returned to baseline and does not wish to continue HH services.

## 2024-11-18 NOTE — DISCHARGE INSTRUCTIONS
Patient to be discharged home.  Continue Lasix daily 40 mg.  Continue increased dose of metoprolol.  Follow with heart failure clinic/Dr. Nunez as scheduled.  Home health reordered.  Return to emergency department for any worsening symptoms.

## 2024-11-19 NOTE — OUTREACH NOTE
Prep Survey      Flowsheet Row Responses   [facility] discharged from? [facility]   Is LACE score < 7 ? No   Eligibility Readm Mgmt   Discharge diagnosis Acute on chronic diastolic (congestive) heart failure   Does the patient have one of the following disease processes/diagnoses(primary or secondary)? CHF   Prep survey completed? Yes            Eugenia CHEN - Registered Nurse

## 2024-11-21 ENCOUNTER — READMISSION MANAGEMENT (OUTPATIENT)
Dept: CALL CENTER | Facility: HOSPITAL | Age: 89
End: 2024-11-21
Payer: MEDICARE

## 2024-11-21 NOTE — OUTREACH NOTE
CHF Week 1 Survey      Flowsheet Row Responses   [facility] patient discharged from? [facility]   Does the patient have one of the following disease processes/diagnoses(primary or secondary)? CHF   CHF Week 1 attempt successful? Yes   Call start time 1726   Call end time 1728   Discharge diagnosis Acute on chronic diastolic (congestive) heart failure   Person spoke with today (if not patient) and relationship pt   Meds reviewed with patient/caregiver? Yes   Is the patient having any side effects they believe may be caused by any medication additions or changes? No   Does the patient have all medications ordered at discharge? Yes   Is the patient taking all medications as directed (includes completed medication regime)? Yes   Does the patient have a primary care provider?  Yes   Does the patient have an appointment with their PCP within 7 days of discharge? Yes   Comments regarding PCP 11/22/24   Has the patient kept scheduled appointments due by today? N/A   Psychosocial issues? No   Did the patient receive a copy of their discharge instructions? Yes   Nursing interventions Reviewed instructions with patient   What is the patient's perception of their health status since discharge? Improving   Nursing interventions Nurse provided patient education   Is the patient able to teach back signs and symptoms of worsening condition? (i.e. weight gain, shortness of air, etc.) Yes   If the patient is a current smoker, are they able to teach back resources for cessation? Not a smoker   Is the patient/caregiver able to teach back the hierarchy of who to call/visit for symptoms/problems? PCP, Specialist, Home health nurse, Urgent Care, ED, 911 Yes   Is the patient able to teach back Heart Failure Zones? Yes   CHF Nursing Interventions Education provided on various zones   CHF Zone this Call Green Zone   Green Zone Patient reports doing well, Physical activity level is normal for you, No new or worsening shortness of breath,  No new swelling -  feet, ankles and legs look normal for you, Weight check stable, No chest pain   Green Zone Interventions Daily weight check, Meds as directed, Low sodium diet, Follow up visits planned    CHF Week 1 call completed? Yes   Is the patient interested in additional calls from an ambulatory ? No   Would this patient benefit from a Referral to [facility] Social Work? No   Wrap up additional comments ester Sam states pt is doing better, and less SOB. [facility]   Call end time 2879            Mami MAURO - Registered Nurse

## 2024-12-02 ENCOUNTER — READMISSION MANAGEMENT (OUTPATIENT)
Dept: CALL CENTER | Facility: HOSPITAL | Age: 89
End: 2024-12-02
Payer: MEDICARE

## 2024-12-02 NOTE — OUTREACH NOTE
CHF Week 2 Survey      Flowsheet Row Responses   [facility] patient discharged from? [place]   Does the patient have one of the following disease processes/diagnoses(primary or secondary)? CHF   Week 2 attempt successful? Yes   Call start time 1109   Call end time 1112   List who call center can speak with son.   Meds reviewed with patient/caregiver? Yes   Is the patient taking all medications as directed (includes completed medication regime)? Yes   Comments regarding appointments Pt has had pcp f/u appointment.   Does the patient have a primary care provider?  Yes   Has the patient kept scheduled appointments due by today? Yes   Is the patient able to teach back Heart Failure Zones? Yes   CHF Zone this Call Green Zone   Green Zone Patient reports doing well, No new or worsening shortness of breath   CHF Week 2 call completed? Yes   Is the patient interested in additional calls from an ambulatory ? No   Would this patient benefit from a Referral to Amb Social Work? No   Wrap up additional comments Son reports pt is improving. Pt's breathing stable. Pt is getting a MRI on this day due to chronic back pain. Pt has had pcp f/u appointment.   Call end time 1112            Eugenia SOLIS - Registered Nurse

## 2024-12-09 ENCOUNTER — LAB REQUISITION (OUTPATIENT)
Dept: LAB | Facility: HOSPITAL | Age: 89
End: 2024-12-09
Payer: MEDICARE

## 2024-12-09 ENCOUNTER — DISEASE STATE MANAGEMENT VISIT (OUTPATIENT)
Dept: PHARMACY | Facility: HOSPITAL | Age: 89
End: 2024-12-09
Payer: MEDICARE

## 2024-12-09 VITALS
OXYGEN SATURATION: 97 % | TEMPERATURE: 97.7 F | DIASTOLIC BLOOD PRESSURE: 73 MMHG | WEIGHT: 131 LBS | RESPIRATION RATE: 16 BRPM | HEIGHT: 55 IN | BODY MASS INDEX: 30.32 KG/M2 | SYSTOLIC BLOOD PRESSURE: 136 MMHG | HEART RATE: 90 BPM

## 2024-12-09 DIAGNOSIS — I10 ESSENTIAL (PRIMARY) HYPERTENSION: ICD-10-CM

## 2024-12-09 DIAGNOSIS — I10 ESSENTIAL HYPERTENSION: Primary | Chronic | ICD-10-CM

## 2024-12-09 LAB
ANION GAP SERPL CALCULATED.3IONS-SCNC: 14.1 MMOL/L (ref 5–15)
BUN SERPL-MCNC: 17 MG/DL (ref 8–23)
BUN/CREAT SERPL: 15.7 (ref 7–25)
CALCIUM SPEC-SCNC: 9.6 MG/DL (ref 8.2–9.6)
CHLORIDE SERPL-SCNC: 95 MMOL/L (ref 98–107)
CO2 SERPL-SCNC: 22.9 MMOL/L (ref 22–29)
CREAT SERPL-MCNC: 1.08 MG/DL (ref 0.57–1)
EGFRCR SERPLBLD CKD-EPI 2021: 48.9 ML/MIN/1.73
GLUCOSE SERPL-MCNC: 108 MG/DL (ref 65–99)
POTASSIUM SERPL-SCNC: 4.1 MMOL/L (ref 3.5–5.2)
SODIUM SERPL-SCNC: 132 MMOL/L (ref 136–145)

## 2024-12-09 PROCEDURE — G0463 HOSPITAL OUTPT CLINIC VISIT: HCPCS

## 2024-12-09 PROCEDURE — 80048 BASIC METABOLIC PNL TOTAL CA: CPT | Performed by: NURSE PRACTITIONER

## 2024-12-09 RX ORDER — METOPROLOL SUCCINATE 200 MG/1
200 TABLET, EXTENDED RELEASE ORAL DAILY
Qty: 30 TABLET | Refills: 0 | Status: SHIPPED | OUTPATIENT
Start: 2024-12-09 | End: 2024-12-09

## 2024-12-09 RX ORDER — CYCLOBENZAPRINE HCL 5 MG
5 TABLET ORAL NIGHTLY
COMMUNITY

## 2024-12-09 RX ORDER — METOPROLOL SUCCINATE 200 MG/1
200 TABLET, EXTENDED RELEASE ORAL DAILY
Qty: 30 TABLET | Refills: 0 | Status: SHIPPED | OUTPATIENT
Start: 2024-12-09

## 2024-12-09 NOTE — PROGRESS NOTES
[facility]  Heart Failure Pharmacist Note     Patient Name: Addie Perez  :1934  Age: 89 y.o.  Sex: female  Referring Provider: Annalisa Jeffers,*   Primary Cardiologist: Dr. Je Nunez  Encounter Provider:  JOHAN Peña    HPI: Patient presents for follow-up evaluation in heart failure clinic for HFpEF (56-60%). PMH significant for HTN, hyperlipidemia, CKD, Afib, T2DM.    Since previous visit in HF clinic on , patient was hospitalized (-) for CHF exacerbation. During hospitalization, Losartan and Metoprolol Succinate XL were discontinued and patient was initiated on Lasix 40mg daily, Spironolactone 25mg daily, and Metoprolol Tartrate 75mg twice daily.     During today's visit, patient reports feeling generally well. BP in clinic was 136/73 and HR was 90. Patient's home log shows BP ranges of 100-120s/60-80s. Patient was instructed to stop taking Metoprolol Tartrate 75 mg twice daily and begin taking Metoprolol Succinate XL 200mg daily for better rate control. New prescription was sent to patient's home pharmacy. Labs were drawn during appointment today and patient will be called with results. Patient is scheduled to RTC in 2 weeks for follow-up appointment.     Heart Failure GDMT:    Drug Class   Drug   Dose Last Dose Adjustment   Notes   ACEi/ARB/ARNI -- Losartan discontinued at discharge on 24   Beta Blocker Metoprolol XL 200mg daily  24 Metoprolol tartrate 75 mg BID switched to metoprolol succinate XL   MRA Spironolactone 25mg daily     SGLT2i N/A   Hx of recurrent UTI noted     Other CV Meds:  Furosemide 40mg daily   Eliquis 5mg BID    Medication Use:  Adherence: good  Past hx of medication use/intolerance: N/A  Affordability: difficulty affording Eliquis; possible switch to warfarin    Social Determinants:    Social Drivers of Health     Tobacco Use: Low Risk  (2024)    Patient History     Smoking Tobacco Use: Never     Smokeless Tobacco  Use: Never     Passive Exposure: Never   Alcohol Use: Not At Risk (11/15/2024)    AUDIT-C     Frequency of Alcohol Consumption: Never     Average Number of Drinks: Patient does not drink     Frequency of Binge Drinking: Never   Financial Resource Strain: Low Risk  (11/15/2024)    Overall Financial Resource Strain (CARDIA)     Difficulty of Paying Living Expenses: Not hard at all   Food Insecurity: No Food Insecurity (11/15/2024)    Hunger Vital Sign     Worried About Running Out of Food in the Last Year: Never true     Ran Out of Food in the Last Year: Never true   Transportation Needs: No Transportation Needs (11/15/2024)    PRAPARE - Transportation     Lack of Transportation (Medical): No     Lack of Transportation (Non-Medical): No   Physical Activity: Inactive (11/15/2024)    Exercise Vital Sign     Days of Exercise per Week: 0 days     Minutes of Exercise per Session: 0 min   Stress: No Stress Concern Present (11/15/2024)    [employer] - Occupational Stress Questionnaire     Feeling of Stress : Not at all   Social Connections: Not At Risk (11/15/2024)    Family and Community Support     Help with Day-to-Day Activities: I get all the help I need     Lonely or Isolated: Never   Recent Concern: Social Connections - At Risk (9/15/2024)    Family and Community Support     Help with Day-to-Day Activities: I need a lot more help     Lonely or Isolated: Never   Interpersonal Safety: Not At Risk (11/15/2024)    Abuse Screen     Unsafe at Home or Work/School: no     Feels Threatened by Someone?: no     Does Anyone Keep You from Contacting Others or Doint Things Outside the Home?: no     Physical Sign of Abuse Present: no   Depression: Not at risk (11/15/2024)    PHQ-2     PHQ-2 Score: 0   Housing Stability: Not At Risk (11/15/2024)    Housing Stability     Current Living Arrangements: home     Potentially Unsafe Housing Conditions: none   Utilities: Not At Risk (11/15/2024)    [facility] Utilities  "    Threatened with loss of utilities: No   Health Literacy: Not At Risk (11/15/2024)    Education     Help with school or training?: No     Preferred Language: English   Employment: Not At Risk (11/15/2024)    Employment     Do you want help finding or keeping work or a job?: I do not need or want help   Disabilities: At Risk (11/15/2024)    Disabilities     Concentrating, Remembering, or Making Decisions Difficulty: no     Doing Errands Independently Difficulty: yes       Immunization Status:  Pneumococcal: Yes- PCV13 12/8/2016; PPV23 10/23/2006  Influenza: yearly  COVID-19: Last 9/16/2022    OBJECTIVE:  /73 (BP Location: Right arm, Patient Position: Sitting, Cuff Size: Adult)   Pulse 90 Comment: manually  Temp 97.7 °F (36.5 °C) (Infrared)   Resp 16   Ht 139.7 cm (55\")   Wt 59.4 kg (131 lb)   LMP  (LMP Unknown)   SpO2 97% Comment: RA  BMI 30.45 kg/m²     Body mass index is 30.45 kg/m².  Wt Readings from Last 1 Encounters:   12/09/24 59.4 kg (131 lb)       Home BP Readings:  10-yr ASCVD risk: The ASCVD Risk score ([place] DK, et al., 2019) failed to calculate for the following reasons:    The 2019 ASCVD risk score is only valid for ages 40 to 79    Lab Review:  Renal Function: Estimated Creatinine Clearance: 26.9 mL/min (A) (by C-G formula based on SCr of 1.08 mg/dL (H)).    Lab Results   Component Value Date    PROBNP 2,999.0 (H) 11/14/2024       Results for orders placed during the hospital encounter of 03/03/24    Adult Transthoracic Echo Complete W/ Cont if Necessary Per Protocol    Interpretation Summary    Left ventricular systolic function is normal. Calculated left ventricular 3D EF = 59% Left ventricular ejection fraction appears to be 56 - 60%.    Left ventricular diastolic dysfunction is noted.    The left atrial cavity is dilated.    The right atrial cavity is dilated.    Moderate mitral valve regurgitation is present. (SBP 150s)    Moderate to severe tricuspid valve regurgitation is " present.    Estimated right ventricular systolic pressure from tricuspid regurgitation is moderately elevated (45-55 mmHg).      ASSESSMENT/PLAN:  HFpEF (EF 56-60%)  Switch metoprolol tartrate 75 mg BID to metoprolol  mg daily (patient instructed she may cut tablet in half and take 100mg in AM and 100mg in PM if she prefers).  Continue spironolactone 25mg mg daily  Continue Furosemide 40mg daily pending repeat lab results. Will attempt titration down to furosemide 20 mg daily and eventually PRN at next encounter.   Defer SGLT2 inhibitor -- hx of recurrent UTIs  Repeat labs performed today  Advised patient to call clinic with 2-3 lb weight gain in 24 hrs or >5 lb weight gain in 1 week  Patient to RTC in 2 weeks for follow-up       Kin Gutiérrez   [facility] Heart Failure Clinic  12/09/24    Marylin PERALES [facility], have reviewed the note in full and agree with the assessment and plan.  12/09/24  16:14 EST

## 2024-12-09 NOTE — PROGRESS NOTES
"             [facility] Heart Failure Clinic Follow Up Note    Addie Perez  [id number]  12/09/2024    Primary Care Provider: Ariela Hampton DO   Referring Provider: Annalisa Jeffers,*    Chief Complaint: Follow-up CHF    History of Present Illness:   Mrs. Addie Perez is a 89 y.o. female who presents to the HF Clinic for follow-up CHF.  In 11/24, the patient was admitted for atrial fibrillation with RVR (114 bpm) and CHF exacerbation (proBNP 2,999, CT scan showed R pleural effusion, \"nodular\" liver, and 2 lesions of R kidney).  The patient has a past medical history significant for hyperlipidemia, hypertension, and type 2 diabetes mellitus.  She has a past cardiac history significant for chronic diastolic heart failure and longstanding persistent atrial fibrillation.  She presents today for follow-up.  The patient reports having \"gone downhill\" since she suffered a pelvic fracture in 9/24.  She reports having ongoing back pain which limits her ADLs.  She denies shortness of breath, chest pain, and lower extremity edema.  She continues to ambulate with a walker.  She denies palpitations and dizziness.  She offers no other complaints or concerns at this time.           CXR: 11/14/2024  IMPRESSION:  New right basilar linear density may be due to atelectasis  versus infiltrate and possible small effusions. Consider mild fluid  overload/CHF. Continued follow-up is recommended.    CT Angiogram: 11/14/24  IMPRESSION:    1. No acute pulmonary embolus.    2. Moderate right pleural effusion. Mild reticular and ground-glass opacities in both lungs could be mild pulmonary edema. Consider heart failure.    3. Nodular liver could be due to cirrhosis. Small amount of ascites could be related.    4. 2 lesions in the right superior kidney are both favored to be cysts, however can be confirmed with nonemergent ultrasound.    Component  Ref Range & Units 3 wk ago 9 mo ago 1 yr ago   proBNP  0.0 - 1,800.0 " pg/mL 2,999.0 High  2,324.0 High  723.0         Review of Systems:   Review of Systems  As Noted in HPI.   I have reviewed and confirmed the accuracy of the ROS as documented by the MA/LPN/RN JOHAN Peña    I have reviewed and/or updated the patient's past medical, past surgical, family, social history, problem list and allergies as appropriate.     Medications:     Current Outpatient Medications:     acetaminophen (TYLENOL) 650 MG 8 hr tablet, Take 1 tablet by mouth 2 (Two) Times a Day As Needed., Disp: , Rfl:     apixaban (ELIQUIS) 5 MG tablet tablet, Take 1 tablet by mouth Every 12 (Twelve) Hours., Disp: 60 tablet, Rfl: 6    cetirizine (zyrTEC) 10 MG tablet, Take 1 tablet by mouth Every Night., Disp: , Rfl:     CRANBERRY EXTRACT PO, Take 1 tablet by mouth Daily. Pt taking every other day, Disp: , Rfl:     cyclobenzaprine (FLEXERIL) 5 MG tablet, Take 1 tablet by mouth Every Night., Disp: , Rfl:     estradiol (ESTRACE) 0.5 MG tablet, Take 1 tablet by mouth 2 (Two) Times a Week. REPORTS TAKES THIS ON Monday AND Friday AT NIGHT TIME, Disp: 24 tablet, Rfl: 10    fluticasone (FLONASE) 50 MCG/ACT nasal spray, Administer 2 sprays into the nostril(s) as directed by provider Daily As Needed., Disp: , Rfl:     furosemide (Lasix) 40 MG tablet, Take 1 tablet by mouth Daily., Disp: 30 tablet, Rfl: 0    metoprolol tartrate (LOPRESSOR) 25 MG tablet, Take 3 tablets by mouth Every 12 (Twelve) Hours., Disp: 180 tablet, Rfl: 0    multivitamin with minerals tablet tablet, Take 1 tablet by mouth Daily. One-A-Day every other day, Disp: , Rfl:     omeprazole (prilOSEC) 10 MG capsule, Take 1 capsule by mouth Every Morning., Disp: , Rfl:     spironolactone (ALDACTONE) 25 MG tablet, Take 1 tablet by mouth Daily., Disp: 30 tablet, Rfl: 0    Physical Exam:    Vitals:     09/09/24 1116   BP: 125/75   Pulse: 61   SpO2: 98%   Weight: 67.1 kg (148 lb)         Body mass index is 23.9 kg/m².        Vital Signs:   Vitals:    12/09/24  "1251   BP: 136/73   BP Location: Right arm   Patient Position: Sitting   Cuff Size: Adult   Pulse: 90  Comment: manually   Resp: 16   Temp: 97.7 °F (36.5 °C)   TempSrc: Infrared   SpO2: 97%  Comment: RA   Weight: 59.4 kg (131 lb)   Height: 139.7 cm (55\")     Body mass index is 30.45 kg/m².    Physical Exam  Vitals and nursing note reviewed.   Constitutional:       General: She is not in acute distress.  HENT:      Head: Normocephalic and atraumatic.   Neck:      Trachea: Trachea normal.   Cardiovascular:      Rate and Rhythm: Normal rate. Rhythm irregular.      Pulses: Normal pulses.      Heart sounds: Normal heart sounds. No murmur heard.     No friction rub. No gallop.   Pulmonary:      Effort: Pulmonary effort is normal.      Breath sounds: Normal breath sounds.   Musculoskeletal:      Cervical back: Neck supple.      Right lower leg: No edema.      Left lower leg: No edema.   Skin:     General: Skin is warm and dry.   Neurological:      Mental Status: She is alert and oriented to person, place, and time.   Psychiatric:         Mood and Affect: Mood normal.         Behavior: Behavior normal. Behavior is cooperative.         Thought Content: Thought content does not include suicidal ideation.         Results Review:   I reviewed the patient's new clinical results.    Procedures    Assessment / Plan:     1. Heart failure with preserved ejection fraction (Primary)  3/24, LVEF 56-60%, diastolic dysfunction noted  Stage C  NYHA functional class II  Euvolemic on exam     2.  Primary hypertension  Acceptable blood pressure      Preventative Cardiology:   Tobacco Cessation: N/A   Advance Care Planning: ACP discussion was declined by the patient. Patient has an advance directive in EMR which is still valid.      Follow Up:   2-3 weeks with DSM      Thank you for allowing me to participate in the care of your patient. Please to not hesitate to contact me with additional questions or concerns.     Betsy Andrews, " APRN

## 2024-12-10 DIAGNOSIS — I50.30 HEART FAILURE WITH PRESERVED EJECTION FRACTION, UNSPECIFIED HF CHRONICITY: Primary | ICD-10-CM

## 2024-12-10 RX ORDER — FUROSEMIDE 40 MG/1
40 TABLET ORAL DAILY
Qty: 30 TABLET | Refills: 0 | Status: SHIPPED | OUTPATIENT
Start: 2024-12-10

## 2024-12-10 NOTE — TELEPHONE ENCOUNTER
Pt's daughter called to ask if Mrs. Perez is to continue the furosemide and if she is will need [facility].       Spoke with pharmacists in clinic and pt is to continue, daughter notified and verbalized understanding.

## 2024-12-11 ENCOUNTER — READMISSION MANAGEMENT (OUTPATIENT)
Dept: CALL CENTER | Facility: HOSPITAL | Age: 89
End: 2024-12-11
Payer: MEDICARE

## 2024-12-11 NOTE — OUTREACH NOTE
CHF Week 3 Survey      Flowsheet Row Responses   [facility] facility patient discharged from? [facility]   Does the patient have one of the following disease processes/diagnoses(primary or secondary)? CHF   Week 3 attempt successful? No   Call start time 1127   Unsuccessful attempts Attempt 1            Agueda Garland Registered Nurse

## 2024-12-23 ENCOUNTER — DISEASE STATE MANAGEMENT VISIT (OUTPATIENT)
Dept: PHARMACY | Facility: HOSPITAL | Age: 89
End: 2024-12-23
Payer: MEDICARE

## 2024-12-23 VITALS
SYSTOLIC BLOOD PRESSURE: 118 MMHG | BODY MASS INDEX: 30.32 KG/M2 | OXYGEN SATURATION: 96 % | HEART RATE: 70 BPM | TEMPERATURE: 97.8 F | HEIGHT: 55 IN | RESPIRATION RATE: 16 BRPM | DIASTOLIC BLOOD PRESSURE: 71 MMHG | WEIGHT: 131 LBS

## 2024-12-23 DIAGNOSIS — I50.30 HEART FAILURE WITH PRESERVED EJECTION FRACTION, UNSPECIFIED HF CHRONICITY: ICD-10-CM

## 2024-12-23 PROCEDURE — G0463 HOSPITAL OUTPT CLINIC VISIT: HCPCS

## 2024-12-23 NOTE — PROGRESS NOTES
"             [facility] Heart Failure Clinic Follow Up Note    Addie Perez  [id number]  12/23/2024    Primary Care Provider: Ariela Hampton DO   Referring Provider: Ariela Hampton DO    Chief Complaint: Follow-up CHF    History of Present Illness:   Mrs. Addie Perez is a 89 y.o. female who presents to the HF Clinic for follow up.   In 11/24, the patient was admitted for atrial fibrillation with RVR (114 bpm) and CHF exacerbation (proBNP 2,999, CT scan showed R pleural effusion, \"nodular\" liver, and 2 lesions of R kidney).  The patient has a past medical history significant for hyperlipidemia, hypertension, and type 2 diabetes mellitus.  She has a past cardiac history significant for chronic diastolic heart failure and longstanding persistent atrial fibrillation.  At her last HF Clinic visit, metoprolol tartrate was changed to succinate.  She is accompanied by her daughter and presents today for follow-up.  The patient reports feeling better than her previous visit.  She reports improved mobility (9/24, pelvic fracture), but notes ongoing back pain with standing up or standing for long periods of time.  She denies palpitations, dizziness, and lower extremity edema.  She reports good UOP with daily Lasix.  She offers no other complaints or concerns at this time.    Review of Systems:   Review of Systems  As Noted in HPI.   I have reviewed and confirmed the accuracy of the ROS as documented by the MA/LPN/RN JOHAN Peña    I have reviewed and/or updated the patient's past medical, past surgical, family, social history, problem list and allergies as appropriate.     Medications:     Current Outpatient Medications:     acetaminophen (TYLENOL) 650 MG 8 hr tablet, Take 1 tablet by mouth 2 (Two) Times a Day As Needed., Disp: , Rfl:     apixaban (ELIQUIS) 5 MG tablet tablet, Take 1 tablet by mouth Every 12 (Twelve) Hours., Disp: 60 tablet, Rfl: 6    cetirizine (zyrTEC) 10 MG " "tablet, Take 1 tablet by mouth Every Night., Disp: , Rfl:     cyclobenzaprine (FLEXERIL) 5 MG tablet, Take 1 tablet by mouth Every Night., Disp: , Rfl:     estradiol (ESTRACE) 0.5 MG tablet, Take 1 tablet by mouth 2 (Two) Times a Week. REPORTS TAKES THIS ON Monday AND Friday AT NIGHT TIME, Disp: 24 tablet, Rfl: 10    fluticasone (FLONASE) 50 MCG/ACT nasal spray, Administer 2 sprays into the nostril(s) as directed by provider Daily As Needed., Disp: , Rfl:     furosemide (Lasix) 40 MG tablet, Take 1 tablet by mouth Daily., Disp: 30 tablet, Rfl: 0    metoprolol succinate XL (Toprol XL) 200 MG 24 hr tablet, Take 1 tablet by mouth Daily., Disp: 30 tablet, Rfl: 0    omeprazole (prilOSEC) 10 MG capsule, Take 1 capsule by mouth Every Morning., Disp: , Rfl:     spironolactone (ALDACTONE) 25 MG tablet, Take 1 tablet by mouth Daily., Disp: 30 tablet, Rfl: 0    CRANBERRY EXTRACT PO, Take 1 tablet by mouth Daily. Pt taking every other day (Patient not taking: Reported on 12/23/2024), Disp: , Rfl:     multivitamin with minerals tablet tablet, Take 1 tablet by mouth Daily. One-A-Day every other day (Patient not taking: Reported on 12/23/2024), Disp: , Rfl:     Physical Exam:  Vital Signs:   Vitals:    12/23/24 1457   BP: 118/71   BP Location: Right arm   Patient Position: Sitting   Cuff Size: Adult   Pulse: 70   Resp: 16   Temp: 97.8 °F (36.6 °C)   TempSrc: Infrared   SpO2: 96%  Comment: RA   Weight: 59.4 kg (131 lb)   Height: 139.7 cm (55\")     Body mass index is 30.45 kg/m².    Physical Exam  Vitals and nursing note reviewed.   Constitutional:       General: She is not in acute distress.     Appearance: She is obese.   HENT:      Head: Normocephalic and atraumatic.   Neck:      Trachea: Trachea normal.   Cardiovascular:      Rate and Rhythm: Normal rate. Rhythm irregular.      Pulses: Normal pulses.      Heart sounds: Normal heart sounds. No murmur heard.     No friction rub. No gallop.   Pulmonary:      Effort: Pulmonary " effort is normal.      Breath sounds: Normal breath sounds.   Musculoskeletal:      Cervical back: Neck supple.      Right lower leg: No edema.      Left lower leg: No edema.   Skin:     General: Skin is warm and dry.   Neurological:      Mental Status: She is alert and oriented to person, place, and time.   Psychiatric:         Mood and Affect: Mood normal.         Behavior: Behavior normal. Behavior is cooperative.         Thought Content: Thought content does not include suicidal ideation.         Results Review:   I reviewed the patient's new clinical results.    Procedures    Assessment / Plan:     1. Heart failure with preserved ejection fraction (Primary)  3/24, LVEF 56-60%, diastolic dysfunction noted  Stage C  NYHA functional class II  Euvolemic on exam  Continue metoprolol 200 mg daily, spironolactone 25 mg daily, Lasix 40 daily  No SGLT2i due to hx of recurrent UTI's     2.  Primary hypertension  Acceptable blood pressure      Preventative Cardiology:   Tobacco Cessation: N/A   Advance Care Planning: ACP discussion was declined by the patient. Patient does not have an advance directive, declines further assistance.     Follow Up:   Return in about 3 months (around 3/23/2025) for DSM.      Thank you for allowing me to participate in the care of your patient. Please to not hesitate to contact me with additional questions or concerns.     JOHAN Wallace

## 2024-12-26 RX ORDER — FUROSEMIDE 40 MG/1
40 TABLET ORAL DAILY
Qty: 90 TABLET | Refills: 0 | Status: SHIPPED | OUTPATIENT
Start: 2024-12-26

## 2024-12-26 RX ORDER — METOPROLOL SUCCINATE 200 MG/1
200 TABLET, EXTENDED RELEASE ORAL DAILY
Qty: 90 TABLET | Refills: 1 | Status: SHIPPED | OUTPATIENT
Start: 2024-12-26

## 2024-12-26 NOTE — PROGRESS NOTES
[facility]  Heart Failure Pharmacist Note     Patient Name: Addie Perez  :1934  Age: 89 y.o.  Sex: female  Referring Provider: Ariela Hampton DO   Primary Cardiologist: Dr. Je Nunez  Encounter Provider:  JOHAN Peña    HPI: Ms. Perez presents today for follow-up evaluation in heart failure clinic for HFpEF (56-60%). PMH significant for HTN, hyperlipidemia, CKD, Afib, T2DM.    At previous clinic visit on  patient's metoprolol was increased to metoprolol  mg daily from metoprolol tartrate 75 mg BID for better HR control. Patient stated that she is tolerating the dose increase well and HR today in clinic was much improved to 70 BPM. Lungs were clear to auscultation and BL LE were negative for edema. She also denies any palpitations, dizziness or weight gain. She reports feeling well today and has no complaints from a cardiac standpoint.       Heart Failure GDMT:    Drug Class   Drug   Dose Last Dose Adjustment   Notes   ACEi/ARB/ARNI -- Losartan discontinued at discharge on 24   Beta Blocker Metoprolol XL 200mg daily  24 Metoprolol tartrate 75 mg BID switched to metoprolol succinate    MRA Spironolactone 25mg daily     SGLT2i N/A   Hx of recurrent UTI noted     Other CV Meds:  Furosemide 40mg daily   Eliquis 5mg BID    Medication Use:  Adherence: Good  Past hx of medication use/intolerance: N/A  Affordability: Difficulty affording Eliquis; possible switch to warfarin if she becomes unable to afford     Social Determinants:    Social Drivers of Health     Tobacco Use: Low Risk  (2024)    Patient History     Smoking Tobacco Use: Never     Smokeless Tobacco Use: Never     Passive Exposure: Never   Alcohol Use: Not At Risk (11/15/2024)    AUDIT-C     Frequency of Alcohol Consumption: Never     Average Number of Drinks: Patient does not drink     Frequency of Binge Drinking: Never   Financial Resource Strain: Low Risk  (11/15/2024)    Overall  Financial Resource Strain (CARDIA)     Difficulty of Paying Living Expenses: Not hard at all   Food Insecurity: No Food Insecurity (11/15/2024)    Hunger Vital Sign     Worried About Running Out of Food in the Last Year: Never true     Ran Out of Food in the Last Year: Never true   Transportation Needs: No Transportation Needs (11/15/2024)    PRAPARE - Transportation     Lack of Transportation (Medical): No     Lack of Transportation (Non-Medical): No   Physical Activity: Inactive (11/15/2024)    Exercise Vital Sign     Days of Exercise per Week: 0 days     Minutes of Exercise per Session: 0 min   Stress: No Stress Concern Present (11/15/2024)    [employer] - Occupational Stress Questionnaire     Feeling of Stress : Not at all   Social Connections: Not At Risk (11/15/2024)    Family and Community Support     Help with Day-to-Day Activities: I get all the help I need     Lonely or Isolated: Never   Recent Concern: Social Connections - At Risk (9/15/2024)    Family and Community Support     Help with Day-to-Day Activities: I need a lot more help     Lonely or Isolated: Never   Interpersonal Safety: Not At Risk (11/15/2024)    Abuse Screen     Unsafe at Home or Work/School: no     Feels Threatened by Someone?: no     Does Anyone Keep You from Contacting Others or Doint Things Outside the Home?: no     Physical Sign of Abuse Present: no   Depression: Not at risk (11/15/2024)    PHQ-2     PHQ-2 Score: 0   Housing Stability: Not At Risk (11/15/2024)    Housing Stability     Current Living Arrangements: home     Potentially Unsafe Housing Conditions: none   Utilities: Not At Risk (11/15/2024)    [facility] Utilities     Threatened with loss of utilities: No   Health Literacy: Not At Risk (11/15/2024)    Education     Help with school or training?: No     Preferred Language: English   Employment: Not At Risk (11/15/2024)    Employment     Do you want help finding or keeping work or a job?: I do not  "need or want help   Disabilities: At Risk (11/15/2024)    Disabilities     Concentrating, Remembering, or Making Decisions Difficulty: no     Doing Errands Independently Difficulty: yes       Immunization Status:  Pneumococcal: Yes- PCV13 12/8/2016; PPV23 10/23/2006  Influenza: yearly  COVID-19: Last 9/16/2022    OBJECTIVE:  /71 (BP Location: Right arm, Patient Position: Sitting, Cuff Size: Adult)   Pulse 70   Temp 97.8 °F (36.6 °C) (Infrared)   Resp 16   Ht 139.7 cm (55\")   Wt 59.4 kg (131 lb)   LMP  (LMP Unknown)   SpO2 96% Comment: RA  BMI 30.45 kg/m²     Body mass index is 30.45 kg/m².  Wt Readings from Last 1 Encounters:   12/23/24 59.4 kg (131 lb)       Home BP Readings:  10-yr ASCVD risk: The ASCVD Risk score (Raul BLAKE, et al., 2019) failed to calculate for the following reasons:    The 2019 ASCVD risk score is only valid for ages 40 to 79    Lab Review:  Renal Function: Estimated Creatinine Clearance: 26.9 mL/min (A) (by C-G formula based on SCr of 1.08 mg/dL (H)).    Lab Results   Component Value Date    PROBNP 2,999.0 (H) 11/14/2024       Results for orders placed during the hospital encounter of 03/03/24    Adult Transthoracic Echo Complete W/ Cont if Necessary Per Protocol    Interpretation Summary    Left ventricular systolic function is normal. Calculated left ventricular 3D EF = 59% Left ventricular ejection fraction appears to be 56 - 60%.    Left ventricular diastolic dysfunction is noted.    The left atrial cavity is dilated.    The right atrial cavity is dilated.    Moderate mitral valve regurgitation is present. (SBP 150s)    Moderate to severe tricuspid valve regurgitation is present.    Estimated right ventricular systolic pressure from tricuspid regurgitation is moderately elevated (45-55 mmHg).      ASSESSMENT/PLAN:  HFpEF (EF 56-60%)  Continue increased dose of metoprolol  mg daily   Continue spironolactone 25mg mg daily  Continue Furosemide 40mg daily at this " time.  Will continue to defer initiation of an SGLT2 inhibitor due to history of recurrent UTIs  Advised patient to call clinic with 2-3 lb weight gain in 24 hrs or >5 lb weight gain in 1 week  Patient to RTC in 3 months for follow-up and repeat labs.       Marylin Shirley PharmD  12/26/2024   08:56 EST

## 2024-12-30 ENCOUNTER — TELEPHONE (OUTPATIENT)
Dept: CARDIOLOGY | Facility: CLINIC | Age: 89
End: 2024-12-30
Payer: MEDICARE

## 2024-12-30 NOTE — TELEPHONE ENCOUNTER
Caller: Colleen Rehman    Relationship to patient: Emergency Contact    Best call back number: [phone]      Chief complaint:     Type of visit: FOLLOW UP    Requested date: ASAP     If rescheduling, when is the original appointment: N/A     Additional notes:PATIENTS DAUGHTER, COLLEEN REHMAN IS CALLING IN WANTING TO KNOW IF PATIENT NEEDS A FOLLOW UP APPOINTMENT WITH TONYA HOYOS MD BEFORE PATIENT SEES GABRIEL LIN IN MARCH 17, 2025. PLEASE REACH OUT.

## 2025-01-07 PROBLEM — Z09 HOSPITAL DISCHARGE FOLLOW-UP: Status: ACTIVE | Noted: 2025-01-07

## 2025-01-09 ENCOUNTER — TELEPHONE (OUTPATIENT)
Dept: CARDIOLOGY | Facility: CLINIC | Age: OVER 89
End: 2025-01-09
Payer: MEDICARE

## 2025-01-09 DIAGNOSIS — I48.19 PERSISTENT ATRIAL FIBRILLATION: ICD-10-CM

## 2025-01-09 NOTE — TELEPHONE ENCOUNTER
One script sent to pharmacy.  Encouraged medication compliance and pain control.  Follow up as direct.  Work excuse given.   Yes, thanks

## 2025-01-09 NOTE — TELEPHONE ENCOUNTER
Caller Name: Addie Perez      Relationship: Self      Best Contact Number: [phone]      Patient is requesting samples of ELIQUIS       How many days of medication do you have left? 2 WEEKS

## 2025-01-09 NOTE — TELEPHONE ENCOUNTER
Her weight is below 60 kg and she is >80 years old.  Technically, she can be on the 2.5 mg daily dose.  Can you have her cut those in half?

## 2025-03-05 ENCOUNTER — OFFICE VISIT (OUTPATIENT)
Dept: OBSTETRICS AND GYNECOLOGY | Facility: CLINIC | Age: OVER 89
End: 2025-03-05
Payer: MEDICARE

## 2025-03-05 VITALS
WEIGHT: 133 LBS | DIASTOLIC BLOOD PRESSURE: 80 MMHG | BODY MASS INDEX: 30.78 KG/M2 | SYSTOLIC BLOOD PRESSURE: 108 MMHG | HEIGHT: 55 IN

## 2025-03-05 DIAGNOSIS — Z79.890 HORMONE REPLACEMENT THERAPY (HRT): ICD-10-CM

## 2025-03-05 DIAGNOSIS — Z12.31 ENCOUNTER FOR SCREENING MAMMOGRAM FOR BREAST CANCER: ICD-10-CM

## 2025-03-05 DIAGNOSIS — N95.2 POSTMENOPAUSAL ATROPHIC VAGINITIS: ICD-10-CM

## 2025-03-05 DIAGNOSIS — Z01.411 ENCOUNTER FOR GYNECOLOGICAL EXAMINATION (GENERAL) (ROUTINE) WITH ABNORMAL FINDINGS: Primary | ICD-10-CM

## 2025-03-05 DIAGNOSIS — N39.0 FREQUENT UTI: ICD-10-CM

## 2025-03-05 DIAGNOSIS — N90.89 VULVAR IRRITATION: ICD-10-CM

## 2025-03-05 DIAGNOSIS — N95.0 PMB (POSTMENOPAUSAL BLEEDING): ICD-10-CM

## 2025-03-05 RX ORDER — ESTRADIOL 0.5 MG/1
0.5 TABLET ORAL 2 TIMES WEEKLY
Qty: 24 TABLET | Refills: 10 | Status: CANCELLED | OUTPATIENT
Start: 2025-03-06

## 2025-03-05 RX ORDER — ESTRADIOL 0.1 MG/G
CREAM VAGINAL
Qty: 1 EACH | Refills: 11 | Status: SHIPPED | OUTPATIENT
Start: 2025-03-05

## 2025-03-05 RX ORDER — CLOTRIMAZOLE AND BETAMETHASONE DIPROPIONATE 10; .64 MG/G; MG/G
1 CREAM TOPICAL 2 TIMES DAILY
Qty: 45 G | Refills: 0 | Status: SHIPPED | OUTPATIENT
Start: 2025-03-05

## 2025-03-05 NOTE — PROGRESS NOTES
"Chief Complaint  Gynecologic Exam (No Complaints/concerns )     History of Present Illness:  Patient is 89 y.o.  who presents to [facility] here for her annual examination.  Patient reports having a recent fall.  She reports since that time she has had a couple of episodes of vaginal bleeding.  She reports at 1 time it regular in her legs.  She has been using her estradiol tablets 0.5 mg twice weekly.  Patient has been doing this for a long period of time.  Patient had previously seen Dr. Lewis.  She was started on the estradiol for her frequent UTIs and vaginal atrophy.  She does report having vulvar irritation at present.  She denies any recent bleeding or discharge.  She sees Dr. Hampton for her primary care.  She will be due for her mammogram in July.  Also of note the patient is on Eliquis.  She did have recent hospitalization for her A-fib.    History  Past Medical History:   Diagnosis Date    Arthritis     Back pain     Cataract     PT REPORTS EARLY STAGES    Cystitis     Diabetes mellitus      REPORTS \"I AM PRE DIABETIC\" AND REPORTS THAT SHE WATCHS HER DIET AND PCP WATCHES A1C CLOSELY    Disease of thyroid gland     Dysphagia     REPORTS MORE TROUBLE SWALLOWING WHEN EATING OR TAKING PILLS. REPORTS PAIN AND THAT SOMETIMES THINGS FEEL LIKE THEY \"STICK\"    Elevated cholesterol 2018    REPORTS SHE HAS TAKEN MEDICATION IN THE PAST BUT THAT SHE THINKS ALL IS WNL'S AT PRESENT TIME WITHOUT MEDICATION    Fracture of right ankle     GERD (gastroesophageal reflux disease)     Hearing loss     REPORTS MILD AND NO USE OF HEARING AIDS    History of cardiac murmur     REPORTS \"I USED TO HAVE ONE BUT NOW THEY SAY THEY DON'T HEAR IT\"    History of colonic polyps     History of pneumonia     Hx of echocardiogram     Hypercholesterolemia     Hypertension     Irregular heart beat     Macular degeneration     Osteoporosis     Seasonal allergies     Seasonal allergies     Sinusitis     " "Spinal headache 09/20/2018    REPORTS AFTER DELIVERY OF HER SON    Urinary tract infection     Vertigo     Wears glasses      Current Outpatient Medications on File Prior to Visit   Medication Sig Dispense Refill    acetaminophen (TYLENOL) 650 MG 8 hr tablet Take 1 tablet by mouth 2 (Two) Times a Day As Needed.      apixaban (ELIQUIS) 5 MG tablet tablet Take 1 tablet by mouth Every 12 (Twelve) Hours. 60 tablet 6    cetirizine (zyrTEC) 10 MG tablet Take 1 tablet by mouth Every Night.      CRANBERRY EXTRACT PO Take 1 tablet by mouth Daily. Pt taking every other day (Patient not taking: Reported on 12/23/2024)      cyclobenzaprine (FLEXERIL) 5 MG tablet Take 1 tablet by mouth Every Night.      estradiol (ESTRACE) 0.5 MG tablet Take 1 tablet by mouth 2 (Two) Times a Week. REPORTS TAKES THIS ON Monday AND Friday AT NIGHT TIME 24 tablet 10    fluticasone (FLONASE) 50 MCG/ACT nasal spray Administer 2 sprays into the nostril(s) as directed by provider Daily As Needed.      furosemide (Lasix) 40 MG tablet Take 1 tablet by mouth Daily. 90 tablet 0    metoprolol succinate XL (Toprol XL) 200 MG 24 hr tablet Take 1 tablet by mouth Daily. 90 tablet 1    multivitamin with minerals tablet tablet Take 1 tablet by mouth Daily. One-A-Day every other day (Patient not taking: Reported on 12/23/2024)      omeprazole (prilOSEC) 10 MG capsule Take 1 capsule by mouth Every Morning.      spironolactone (ALDACTONE) 25 MG tablet Take 1 tablet by mouth Daily. 30 tablet 0     No current facility-administered medications on file prior to visit.     Allergies   Allergen Reactions    Codeine Nausea Only    Escitalopram Nausea Only     REPORTS \"LEXAPRO\"    Gabapentin Other (See Comments)     REPORTS \"MAKES MY EYES HURT BECAUSE OF MY MACULAR DEGENERATION\"    Ibuprofen Palpitations     REPORTS \"IT CAUSES MY HEART TO BEAT FAST\"    Levofloxacin Nausea Only    Naproxen Nausea Only    Nitrofurantoin Nausea Only    Sulfa Antibiotics Nausea Only     Past " "Surgical History:   Procedure Laterality Date    BACK SURGERY      REPORTS A SPUR WAS REMOVED THAT WAS HITTING SCIATIC NERVE    BREAST BIOPSY      BREAST SURGERY Left     LUMPECTOMY, BENIGN     CATARACT EXTRACTION W/ INTRAOCULAR LENS IMPLANT Left 03/04/2022    Procedure: CATARACT PHACO EXTRACTION WITH INTRAOCULAR LENS IMPLANT left;  Surgeon: Pritesh Mcnally MD;  Location: [facility] OR;  Service: Ophthalmology;  Laterality: Left;    CATARACT EXTRACTION W/ INTRAOCULAR LENS IMPLANT Right 03/18/2022    Procedure: CATARACT PHACO EXTRACTION WITH INTRAOCULAR LENS IMPLANT RIGHT;  Surgeon: Pritesh Mcnally MD;  Location: [facility] OR;  Service: Ophthalmology;  Laterality: Right;    COLONOSCOPY  2012    REPORTS LAST DONE IN 2012 BUT HAS A HX OF SEVERAL OF THESE PROCEDURES    DILATATION AND CURETTAGE      REPORTS \"I HAD A COUPLE OF THOSE\"    ENDOSCOPY N/A 01/31/2017    Procedure: ESOPHAGOGASTRODUODENOSCOPY;  Surgeon: Robbie Quezada MD;  Location: [facility] ENDOSCOPY;  Service:     ENDOSCOPY N/A 09/21/2018    Procedure: ESOPHAGOGASTRODUODENOSCOPY WITH BIOPSY, DILITATION;  Surgeon: Robbie Quezada MD;  Location: [facility] ENDOSCOPY;  Service: Gastroenterology    ENDOSCOPY N/A 11/15/2023    Procedure: ESOPHAGOGASTRODUODENOSCOPY WITH BIOPSY AND DILATION;  Surgeon: Mil Ma MD;  Location: [facility] ENDOSCOPY;  Service: Gastroenterology;  Laterality: N/A;    FOOT SURGERY Left     REPORTS SURGICAL INTERVENTION TO CORRECT GREAT TOE ALIGNMENT    GALLBLADDER SURGERY  2009    HEMORRHOIDECTOMY  1973    TONSILLECTOMY  1946    UPPER GASTROINTESTINAL ENDOSCOPY  2014    UPPER GASTROINTESTINAL ENDOSCOPY  01/31/2017     Family History   Problem Relation Age of Onset    Diabetes Mother     Cancer Brother         lung    Cancer Brother         brain    Colon cancer Paternal Grandmother         possibly colon cancer    Breast cancer Neg Hx      Social History     Socioeconomic History    Marital status:    Tobacco Use    Smoking " "status: Never     Passive exposure: Never    Smokeless tobacco: Never   Vaping Use    Vaping status: Never Used   Substance and Sexual Activity    Alcohol use: No    Drug use: No    Sexual activity: Defer       Physical Examination:  Vital Signs: /80   Ht 139.7 cm (55\")   Wt 60.3 kg (133 lb)   BMI 30.91 kg/m²     General Appearance: alert, appears stated age, and cooperative  Breasts: Examined in supine position  Symmetric without masses or skin dimpling  Nipples normal without inversion, lesions or discharge  There are no palpable axillary nodes  Abdomen: no masses, no hepatomegaly, no splenomegaly, soft non-tender, no guarding, and no rebound tenderness  Pelvic: Clinical staff was present for exam; pelvic examination required for evaluation.  External genitalia:  +erythema of the vulva noted bilaterally  :  urethral meatus normal;  Vaginal:  marked atrophic changes noted; no blood seen  Cervix:  normal appearance.  Uterus:  normal size, shape and consistency.  Adnexa:  normal bimanual exam of the adnexa.  Pap smear done and specimen sent using Thin-Prep technique    Data Review:  The following data was reviewed by: Margoth Chaudhari MD on 03/05/2025:     Labs:    Imaging:  CT Pelvis Without Contrast (09/14/2024 11:53)  XR Chest 1 View (11/14/2024 23:18)  CT Angiogram Chest Pulmonary Embolism (11/15/2024 00:03)  US Venous Doppler Lower Extremity Left (duplex) (11/15/2024 16:25)  Medical Records:  Discharge Summary by Betsy Day APRN (11/18/2024 11:34)     Assessment and Plan   1. Encounter for gynecological examination (general) (routine) with abnormal findings  I explained to Ora that Pap smears are no longer recommended in patients after 65 years of age who have had adequate negative screening with three consecutive negative pap smears within the previous 10 years and the most recent test performed within the past 5 years. Women who have a history of CODY 2, CODY 3, or ACIS should continue routine " screening for a total of 20 years after regression or treatment.   I stressed to Addie that she still should be seen yearly for a full physical including breast and pelvic exam.  I informed her that women aged 65 and older still do get cervical cancer representing 14.1% of the [place] female population and 19.6% of new cases of cervical cancer.  I also informed the patient regarding medicare guidelines on coverage for pap smear screening.  For this reason, Addie has elected pap smear screening this year.   - LIQUID-BASED PAP SMEAR WITH HPV GENOTYPING IF ASCUS ([facility])    2. Encounter for screening mammogram for breast cancer  It is recommended per ACOG, for women at average risk to start annual mammogram screening at the age of 40 until the age of 75 and an individualized decision be made for women after age 75.  She was encouraged to continue getting yearly mammograms.  She should report any palpable breast lump(s) or skin changes regardless of mammographic findings.  I explained to Addie that notification regarding her mammogram results will come from the center performing the study.  Our office will not be routinely calling with mammogram results.  It is her responsibility to make sure that the results from the mammogram are communicated to her by the breast center.  If she has any questions about the results, she is welcome to call our office anytime.  The patient reports she will schedule her mammogram.    Addie was counseled regarding having clinical breast exams and breast self-awareness.  Women aged 29-39 years of age should have clinical breast exams every 1-3 years and yearly aged 40 and older.  The patient was counseled regarding breast self-awareness focusing on having a sense of what is normal for her breasts so that she can tell if there are changes.  Even small changes should be reported to provider.   - Mammo Screening Digital Tomosynthesis Bilateral With CAD; Future    3. Postmenopausal atrophic  vaginitis  Discussed various options for relief of atrophic vaginal symptoms related to menopause. Discussed local therapy for treatment of vaginal symptoms only.  Discussed the different formulation options including cream, ring, and tablets.  Discussed the low risk of systemic absorption in postmenopausal women with atrophy using 25 mcgs of estradiol on a daily basis.  Recommend low dose use 2-3x/wk for maintenance of treatment.  Other treatment options were discussed including the use of water-based and silicone-based vaginal lubricants and moisturizers.  Also discussed was the FDA approved treatment option of ospemifene for moderate to severe dyspareunia.   - estradiol (ESTRACE VAGINAL) 0.1 MG/GM vaginal cream; May use 1 gram intravaginal qhs x 2 weeks then 1 gram 2x/week.  Dispense: 1 each; Refill: 11    4. Hormone replacement therapy (HRT)  I have discussed with the patient at length the risk versus benefits of systemic hormone replacement therapy.  I recommend patient discontinue her estradiol as it is not recommended to be on unopposed estrogen therapy.  Will schedule transvaginal ultrasound as well.  Prescription is given for estrogen cream.  Patient in agreement with the plan.    5. Frequent UTI  Prescription is given for estrogen cream.  Patient is instructed to apply to the periurethral area twice weekly as discussed.  - estradiol (ESTRACE VAGINAL) 0.1 MG/GM vaginal cream; May use 1 gram intravaginal qhs x 2 weeks then 1 gram 2x/week.  Dispense: 1 each; Refill: 11    6. PMB (postmenopausal bleeding)  Patient reports having frequent episodes of postmenopausal bleeding post her follow-up.  Patient has marked atrophic changes.  There is no blood noted today.  Pap smear is obtained.  I recommend patient discontinue her estradiol tablets.  Transvaginal ultrasound for further evaluation.  Plan pending results.  - US Non-ob Transvaginal; Future    7. Vulvar irritation  Patient with marked erythema of the vulva.   Prescription is given for Lotrisone as noted.  Patient to call if no improvement in her symptoms.  - clotrimazole-betamethasone (LOTRISONE) 1-0.05 % cream; Apply 1 Application topically to the appropriate area as directed 2 (Two) Times a Day. Apply to affected area twice daily as needed  Dispense: 45 g; Refill: 0    Follow Up/Instructions:  Follow up as noted.  Patient was given instructions and counseling regarding her condition or for health maintenance advice. Please see specific information pulled into the AVS if appropriate.     Note: Speech recognition transcription software may have been used to dictate portions of this document.  An attempt at proofreading has been made though minor errors in transcription may still be present.    This note was electronically signed.  Margoth Chaudhari M.D.

## 2025-03-06 ENCOUNTER — OFFICE VISIT (OUTPATIENT)
Dept: GASTROENTEROLOGY | Facility: CLINIC | Age: OVER 89
End: 2025-03-06
Payer: MEDICARE

## 2025-03-06 ENCOUNTER — LAB (OUTPATIENT)
Dept: LAB | Facility: HOSPITAL | Age: OVER 89
End: 2025-03-06
Payer: MEDICARE

## 2025-03-06 VITALS
WEIGHT: 136 LBS | BODY MASS INDEX: 31.61 KG/M2 | DIASTOLIC BLOOD PRESSURE: 78 MMHG | SYSTOLIC BLOOD PRESSURE: 120 MMHG | HEART RATE: 85 BPM | OXYGEN SATURATION: 95 %

## 2025-03-06 DIAGNOSIS — Z11.59 ENCOUNTER FOR SCREENING FOR OTHER VIRAL DISEASES: ICD-10-CM

## 2025-03-06 DIAGNOSIS — I10 ESSENTIAL HYPERTENSION: Chronic | ICD-10-CM

## 2025-03-06 DIAGNOSIS — K74.60 HEPATIC CIRRHOSIS, UNSPECIFIED HEPATIC CIRRHOSIS TYPE, UNSPECIFIED WHETHER ASCITES PRESENT: Chronic | ICD-10-CM

## 2025-03-06 DIAGNOSIS — R13.19 ESOPHAGEAL DYSPHAGIA: Primary | Chronic | ICD-10-CM

## 2025-03-06 DIAGNOSIS — K22.4 ESOPHAGEAL DYSMOTILITY: Chronic | ICD-10-CM

## 2025-03-06 DIAGNOSIS — I50.30 HEART FAILURE WITH PRESERVED EJECTION FRACTION, UNSPECIFIED HF CHRONICITY: ICD-10-CM

## 2025-03-06 DIAGNOSIS — R79.89 ELEVATED LIVER FUNCTION TESTS: Chronic | ICD-10-CM

## 2025-03-06 DIAGNOSIS — K21.9 GASTROESOPHAGEAL REFLUX DISEASE WITHOUT ESOPHAGITIS: Chronic | ICD-10-CM

## 2025-03-06 LAB
DEPRECATED RDW RBC AUTO: 46 FL (ref 37–54)
ERYTHROCYTE [DISTWIDTH] IN BLOOD BY AUTOMATED COUNT: 13.7 % (ref 12.3–15.4)
HBV SURFACE AB SER RIA-ACNC: NORMAL
HBV SURFACE AG SERPL QL IA: NORMAL
HCT VFR BLD AUTO: 41.1 % (ref 34–46.6)
HGB BLD-MCNC: 14 G/DL (ref 12–15.9)
INR PPP: 1.3 (ref 0.9–1.1)
MCH RBC QN AUTO: 31 PG (ref 26.6–33)
MCHC RBC AUTO-ENTMCNC: 34.1 G/DL (ref 31.5–35.7)
MCV RBC AUTO: 91.1 FL (ref 79–97)
PLATELET # BLD AUTO: 302 10*3/MM3 (ref 140–450)
PMV BLD AUTO: 10 FL (ref 6–12)
PROTHROMBIN TIME: 16.7 SECONDS (ref 12.3–15.1)
RBC # BLD AUTO: 4.51 10*6/MM3 (ref 3.77–5.28)
REF LAB TEST METHOD: NORMAL
WBC NRBC COR # BLD AUTO: 6.47 10*3/MM3 (ref 3.4–10.8)

## 2025-03-06 PROCEDURE — 86381 MITOCHONDRIAL ANTIBODY EACH: CPT

## 2025-03-06 PROCEDURE — 80053 COMPREHEN METABOLIC PANEL: CPT

## 2025-03-06 PROCEDURE — 85027 COMPLETE CBC AUTOMATED: CPT

## 2025-03-06 PROCEDURE — 86704 HEP B CORE ANTIBODY TOTAL: CPT

## 2025-03-06 PROCEDURE — 86706 HEP B SURFACE ANTIBODY: CPT

## 2025-03-06 PROCEDURE — 1160F RVW MEDS BY RX/DR IN RCRD: CPT | Performed by: NURSE PRACTITIONER

## 2025-03-06 PROCEDURE — 1159F MED LIST DOCD IN RCRD: CPT | Performed by: NURSE PRACTITIONER

## 2025-03-06 PROCEDURE — 87902 NFCT AGT GNTYP ALYS HEP C: CPT

## 2025-03-06 PROCEDURE — 87340 HEPATITIS B SURFACE AG IA: CPT

## 2025-03-06 PROCEDURE — 99214 OFFICE O/P EST MOD 30 MIN: CPT | Performed by: NURSE PRACTITIONER

## 2025-03-06 PROCEDURE — 36415 COLL VENOUS BLD VENIPUNCTURE: CPT

## 2025-03-06 PROCEDURE — 86038 ANTINUCLEAR ANTIBODIES: CPT

## 2025-03-06 PROCEDURE — 86015 ACTIN ANTIBODY EACH: CPT

## 2025-03-06 PROCEDURE — 85610 PROTHROMBIN TIME: CPT

## 2025-03-06 PROCEDURE — 86708 HEPATITIS A ANTIBODY: CPT

## 2025-03-06 NOTE — PATIENT INSTRUCTIONS
Antireflux measures: Avoid fried, fatty foods, alcohol, chocolate, coffee, tea,  soft drinks, all carbonated beverages (including sparkling water), peppermint and spearmint, spicy foods, tomatoes and tomato based foods, onions, peppers, and garlic.   Other antireflux measures include weight reduction if overweight, avoiding tight clothing around the abdomen, elevating the head of the bed 6 inches with blocks under the head board, and don't drink or eat before going to bed and avoid lying down immediately after meals.  Omeprazole 20 mg 1 by mouth in the am 30 minutes before breakfast.  Eat relatively soft diet, eat in upright position and chew food well.  Drink water after 2-3 bites and take medications with liberal amounts of water.   After eating and taking medications, remain in upright position for 5-10 minutes.  Labs  Liver ultrasound  Upper endoscopy-EGD: The indications, technique, alternatives and potential risk and complications were discussed with the patient including but not limited to bleeding, perforations, missing lesions and anesthetic complications. The patient understands and wishes to proceed with the procedure and has given their verbal consent. Written patient education information was given to the patient.   The patient will call if they have further questions before procedure.

## 2025-03-06 NOTE — PROGRESS NOTES
"     Follow Up Note     Date: 2025   Patient Name: Addie Perez  MRN: [id number]  : 1934     Primary Care Provider: Ariela Hampton DO     Chief Complaint   Patient presents with    Difficulty Swallowing     3/6/2025  History of Present Illness  The patient is a 89-year-old female here for a follow-up regarding difficulty swallowing. She is here today with her daughter.    She has been experiencing persistent dysphagia, which is unchanged. It has been challenging for her to swallow medications at times so she has been taking some with applesauce which helps. She reports that her esophagus \"does not rapidly transport food\", causing discomfort. She can taste it if it stays there long enough and when it does start to go down, it really hurts. An orthopedic specialist prescribed muscle relaxants after she had a bone fracture, which subsequently helped with her swallowing, but led to bowel incontinence, so she stopped it. After discontinuing the muscle relaxants, the difficulty swallowing worsened again, but the incontinence resolved. Her last episode of diarrhea occurred approximately 2-3 months ago. She would like to find out why she continues to have trouble swallowing.     Upon review of records, nodule contour of liver was noted on chest CT. She denies history of liver disease or elevated liver enzymes. She reports no history of intravenous drug use, alcohol consumption, tattoos, or blood transfusions. She also reports no family history of liver disease or cancer.    FAMILY HISTORY  She reports no family history of liver disease or liver cancers.     Interval History:  2024  Addie Perez is a 89 y.o. female who is here today for follow up for difficulty swallowing. It has gotten better since her EGD, but she still feels like her food \"takes too long\" to go down. No constipation or diarrhea. Denies GI bleeding.      10/5/2023  She has been having difficulty swallowing for the past 2 years " "that occurs 2-3 times per week. She has difficulty swallowing solids and liquids. She feels it gets \"backed up\" in her esophagus and takes a long time to go down. No history of weight loss. She has reflux for many years that is controlled with Omeprazole daily.      Denies changes in bowel habits, constipation or diarrhea. Denies GI bleeding. Her last colonoscopy was in 2012 by Dr. Gallegos. Her last EGD was in 2018 by Dr. Quezada.    Subjective      Past Medical History:   Diagnosis Date    Arthritis     Back pain     Cataract     PT REPORTS EARLY STAGES    Cystitis     Diabetes mellitus      REPORTS \"I AM PRE DIABETIC\" AND REPORTS THAT SHE WATCHS HER DIET AND PCP WATCHES A1C CLOSELY    Disease of thyroid gland     Dysphagia     REPORTS MORE TROUBLE SWALLOWING WHEN EATING OR TAKING PILLS. REPORTS PAIN AND THAT SOMETIMES THINGS FEEL LIKE THEY \"STICK\"    Elevated cholesterol 09/20/2018    REPORTS SHE HAS TAKEN MEDICATION IN THE PAST BUT THAT SHE THINKS ALL IS WNL'S AT PRESENT TIME WITHOUT MEDICATION    Fracture of right ankle 2020    GERD (gastroesophageal reflux disease)     Hearing loss     REPORTS MILD AND NO USE OF HEARING AIDS    History of cardiac murmur     REPORTS \"I USED TO HAVE ONE BUT NOW THEY SAY THEY DON'T HEAR IT\"    History of colonic polyps     History of pneumonia     Hx of echocardiogram     Hypercholesterolemia     Hypertension     Irregular heart beat     Macular degeneration     Osteoporosis     Seasonal allergies     Seasonal allergies     Sinusitis     Spinal headache 09/20/2018    REPORTS AFTER DELIVERY OF HER SON    Urinary tract infection     Vertigo     Wears glasses      Past Surgical History:   Procedure Laterality Date    BACK SURGERY      REPORTS A SPUR WAS REMOVED THAT WAS HITTING SCIATIC NERVE    BREAST BIOPSY      BREAST SURGERY Left     LUMPECTOMY, BENIGN     CATARACT EXTRACTION W/ INTRAOCULAR LENS IMPLANT Left 03/04/2022    Procedure: CATARACT PHACO EXTRACTION WITH INTRAOCULAR LENS " "IMPLANT left;  Surgeon: Pritesh Mcnally MD;  Location: [facility];  Service: Ophthalmology;  Laterality: Left;    CATARACT EXTRACTION W/ INTRAOCULAR LENS IMPLANT Right 03/18/2022    Procedure: CATARACT PHACO EXTRACTION WITH INTRAOCULAR LENS IMPLANT RIGHT;  Surgeon: Pritesh Mcnally MD;  Location: [facility];  Service: Ophthalmology;  Laterality: Right;    COLONOSCOPY  2012    REPORTS LAST DONE IN 2012 BUT HAS A HX OF SEVERAL OF THESE PROCEDURES    DILATATION AND CURETTAGE      REPORTS \"I HAD A COUPLE OF THOSE\"    ENDOSCOPY N/A 01/31/2017    Procedure: ESOPHAGOGASTRODUODENOSCOPY;  Surgeon: Robbie Quezada MD;  Location: [facility];  Service:     ENDOSCOPY N/A 09/21/2018    Procedure: ESOPHAGOGASTRODUODENOSCOPY WITH BIOPSY, DILITATION;  Surgeon: Robbie Quezada MD;  Location: [facility];  Service: Gastroenterology    ENDOSCOPY N/A 11/15/2023    Procedure: ESOPHAGOGASTRODUODENOSCOPY WITH BIOPSY AND DILATION;  Surgeon: Mil Ma MD;  Location: [facility];  Service: Gastroenterology;  Laterality: N/A;    FOOT SURGERY Left     REPORTS SURGICAL INTERVENTION TO CORRECT GREAT TOE ALIGNMENT    GALLBLADDER SURGERY  2009    HEMORRHOIDECTOMY  1973    TONSILLECTOMY  1946    UPPER GASTROINTESTINAL ENDOSCOPY  2014    UPPER GASTROINTESTINAL ENDOSCOPY  01/31/2017     Family History   Problem Relation Age of Onset    Diabetes Mother     Cancer Brother         lung    Cancer Brother         brain    Colon cancer Paternal Grandmother         possibly colon cancer    Breast cancer Neg Hx     Liver cancer Neg Hx     Liver disease Neg Hx      Social History     Socioeconomic History    Marital status:    Tobacco Use    Smoking status: Never     Passive exposure: Never    Smokeless tobacco: Never   Vaping Use    Vaping status: Never Used   Substance and Sexual Activity    Alcohol use: No    Drug use: No    Sexual activity: Defer       Current Outpatient Medications:     acetaminophen (TYLENOL) 650 " "MG 8 hr tablet, Take 1 tablet by mouth 2 (Two) Times a Day As Needed., Disp: , Rfl:     apixaban (ELIQUIS) 5 MG tablet tablet, Take 1 tablet by mouth Every 12 (Twelve) Hours., Disp: 60 tablet, Rfl: 6    cetirizine (zyrTEC) 10 MG tablet, Take 1 tablet by mouth Every Night., Disp: , Rfl:     clotrimazole-betamethasone (LOTRISONE) 1-0.05 % cream, Apply 1 Application topically to the appropriate area as directed 2 (Two) Times a Day. Apply to affected area twice daily as needed, Disp: 45 g, Rfl: 0    CRANBERRY EXTRACT PO, Take 1 tablet by mouth Daily. Pt taking every other day, Disp: , Rfl:     cyclobenzaprine (FLEXERIL) 5 MG tablet, Take 1 tablet by mouth Every Night., Disp: , Rfl:     estradiol (ESTRACE VAGINAL) 0.1 MG/GM vaginal cream, May use 1 gram intravaginal qhs x 2 weeks then 1 gram 2x/week., Disp: 1 each, Rfl: 11    estradiol (ESTRACE) 0.5 MG tablet, Take 1 tablet by mouth 2 (Two) Times a Week. REPORTS TAKES THIS ON Monday AND Friday AT NIGHT TIME, Disp: 24 tablet, Rfl: 10    fluticasone (FLONASE) 50 MCG/ACT nasal spray, Administer 2 sprays into the nostril(s) as directed by provider Daily As Needed., Disp: , Rfl:     furosemide (Lasix) 40 MG tablet, Take 1 tablet by mouth Daily., Disp: 90 tablet, Rfl: 0    metoprolol succinate XL (Toprol XL) 200 MG 24 hr tablet, Take 1 tablet by mouth Daily., Disp: 90 tablet, Rfl: 1    multivitamin with minerals tablet tablet, Take 1 tablet by mouth Daily. One-A-Day every other day, Disp: , Rfl:     omeprazole (prilOSEC) 10 MG capsule, Take 1 capsule by mouth Every Morning., Disp: , Rfl:     spironolactone (ALDACTONE) 25 MG tablet, Take 1 tablet by mouth Daily., Disp: 30 tablet, Rfl: 0    Allergies   Allergen Reactions    Codeine Nausea Only    Escitalopram Nausea Only     REPORTS \"LEXAPRO\"    Gabapentin Other (See Comments)     REPORTS \"MAKES MY EYES HURT BECAUSE OF MY MACULAR DEGENERATION\"    Ibuprofen Palpitations     REPORTS \"IT CAUSES MY HEART TO BEAT FAST\"    " Levofloxacin Nausea Only    Naproxen Nausea Only    Nitrofurantoin Nausea Only    Sulfa Antibiotics Nausea Only     The following portions of the patient's history were reviewed and updated as appropriate: allergies, current medications, past family history, past medical history, past social history, past surgical history and problem list.  Objective     Physical Exam  Vitals and nursing note reviewed.   Constitutional:       General: She is not in acute distress.     Appearance: Normal appearance. She is well-developed.   HENT:      Head: Normocephalic and atraumatic.      Mouth/Throat:      Mouth: Mucous membranes are not pale, not dry and not cyanotic.   Eyes:      General: Lids are normal.   Neck:      Trachea: Trachea normal.   Cardiovascular:      Rate and Rhythm: Normal rate.   Pulmonary:      Effort: Pulmonary effort is normal. No respiratory distress.      Breath sounds: Normal breath sounds.   Abdominal:      Tenderness: There is no abdominal tenderness.   Skin:     General: Skin is warm and dry.   Neurological:      Mental Status: She is alert and oriented to person, place, and time.   Psychiatric:         Mood and Affect: Mood normal.         Speech: Speech normal.         Behavior: Behavior normal. Behavior is cooperative.       Vitals:    03/06/25 1506   BP: 120/78   Pulse: 85   SpO2: 95%   Weight: 61.7 kg (136 lb)     Body mass index is 31.61 kg/m².     Results Review:   I reviewed the patient's new clinical results.    Lab Requisition on 12/09/2024   Component Date Value Ref Range Status    Glucose 12/09/2024 108 (H)  65 - 99 mg/dL Final    BUN 12/09/2024 17  8 - 23 mg/dL Final    Creatinine 12/09/2024 1.08 (H)  0.57 - 1.00 mg/dL Final    Sodium 12/09/2024 132 (L)  136 - 145 mmol/L Final    Potassium 12/09/2024 4.1  3.5 - 5.2 mmol/L Final    Chloride 12/09/2024 95 (L)  98 - 107 mmol/L Final    CO2 12/09/2024 22.9  22.0 - 29.0 mmol/L Final    Calcium 12/09/2024 9.6  8.2 - 9.6 mg/dL Final     BUN/Creatinine Ratio 12/09/2024 15.7  7.0 - 25.0 Final    Anion Gap 12/09/2024 14.1  5.0 - 15.0 mmol/L Final    eGFR 12/09/2024 48.9 (L)  >60.0 mL/min/1.73 Final      CBC Auto Differential (09/14/2024 10:20)  Comprehensive Metabolic Panel (09/14/2024 10:20)  Protime-INR (09/14/2024 12:21)    CBC & Differential (11/14/2024 23:47)  Comprehensive Metabolic Panel (11/14/2024 23:47)  CBC & Differential (11/16/2024 05:32)  CBC (No Diff) (11/18/2024 06:39)     Labs dated 1/11/2023 alkaline phosphatase 139 AST 22 ALT 17 total bilirubin 0.7 magnesium 1.8 hemoglobin 12.3 hematocrit 37.8 platelet count 256 TSH 1.680 vitamin B12 884 folic acid >20.0     EGD dated January 31, 2017 per Dr. Quezada   - Distal esophageal stricture which was serially dilated to 18 mm.   - A small sliding hiatal hernia less than 3 cm  - Erosive distal esophagitis-LA class A  - 2 small early mid esophageal diverticula and erosive gastritis.   - Significant tortuosity and tertiary contractions of esophageal body were noted.   - Biopsies from the mid and distal esophageal revealed reactive mucosal changes consistent with reflux. No specialized mucosa. No increased eosinophils were noted. Gastric biopsies from the antrum body and angularis were negative for Helicobacter pylori. Biopsies from the second portion of duodenum were negative for celiac disease.      EGD dated 9/21/2018 per Dr. Quezada  Schazuleyma's-type ring. Status post serial dilation to 18 mm.  Small sliding hiatal hernia less than 3 cm.  Erythematous distal esophagitis.  Erythematous-erosive gastritis.  No Valdes's esophagus.  -Duodenum biopsy unremarkable.  Antrum body and fundus biopsy with mild chronic gastritis, no H. pylori.     EGD dated 11/15/2023 per Dr. Ma  - Normal oropharynx.  - Z-line regular, 37 cm from the incisors.  - 2 cm hiatal hernia.  - Obstructing and mild Schatzki ring. Dilated. Biopsied.  - Erythematous mucosa in the posterior wall of the stomach and antrum.  Biopsied.  - Normal duodenal bulb, first portion of the duodenum, second portion of the duodenum and third portion of the duodenum.  A.     ANTRUM AND BODY, BIOPSY:  Gastric antral type mucosa with mild chronic inactive gastritis  Negative for H. pylori organisms on routine stain  Negative for intestinal metaplasia, dysplasia, or malignancy  B.     ESOPHAGUS, BIOPSY, RANDOM:  Squamous epithelium with reactive changes  Negative for intraepithelial eosinophils, intestinal metaplasia, dysplasia,  or malignancy     FL Esophagram     11/1/2024  Severe dysmotility. Tablet delayed in the distal esophagus.  Moderate gastroesophageal reflux.  Consider EGD for further evaluation.    CT angiogram chest    11/15/2024  1. No acute pulmonary embolus.  2. Moderate right pleural effusion. Mild reticular and ground-glass opacities in both lungs could be mild pulmonary edema. Consider heart failure.  3. Nodular liver could be due to cirrhosis. Small amount of ascites could be related.  4. 2 lesions in the right superior kidney are both favored to be cysts, however can be confirmed with nonemergent ultrasound.    Assessment / Plan      1. Esophageal dysphagia  2. Esophageal dysmotility  3. Gastroesophageal reflux disease without esophagitis  She has a longstanding history of difficulty swallowing that has gradually worsened.  She is on low-dose PPI daily with reasonable control.  EGD dated 11/15/2023 with obstructing and mild Schatzki's ring dilated.  Esophagram dated 11/1/2024 with severe dysmotility noted, tablet delayed in distal esophagus.    Antireflux measures.  Advised eat a softer diet, chew food very well and take sips of water between bites.  Recommended taking medications with applesauce and drink plenty of water afterwards.  EGD to rule out esophageal stricture.    4. Elevated liver function tests  5. Hepatic cirrhosis, unspecified hepatic cirrhosis type, unspecified whether ascites present  Liver enzymes normal in the  past, recent labs dated 9/14/2024 and 11/14/2024 with elevated alkaline phosphatase and total bilirubin.  PT/INR mildly elevated.  No history of thrombocytopenia.  No abdominal imaging.  CT angiogram of chest on 11/15/2024 with nodular liver noted, small amount ascites could be related.  Patient denies personal or family history of liver disease.  Labs  Liver ultrasound    - US Liver; Future  - CBC (No Diff); Future  - Comprehensive Metabolic Panel; Future  - Protime-INR; Future  - Hepatitis A Antibody, Total; Future  - Hepatitis B Surface Antibody; Future  - Hepatitis B Core Antibody, Total; Future  - Hepatitis B Surface Antigen; Future  - Hepatitis C Genotype; Future  - MERLYN; Future  - Anti-Smooth Muscle Antibody Titer; Future  - Mitochondrial Antibodies, M2; Future    Patient Instructions   Antireflux measures: Avoid fried, fatty foods, alcohol, chocolate, coffee, tea,  soft drinks, all carbonated beverages (including sparkling water), peppermint and spearmint, spicy foods, tomatoes and tomato based foods, onions, peppers, and garlic.   Other antireflux measures include weight reduction if overweight, avoiding tight clothing around the abdomen, elevating the head of the bed 6 inches with blocks under the head board, and don't drink or eat before going to bed and avoid lying down immediately after meals.  Omeprazole 20 mg 1 by mouth in the am 30 minutes before breakfast.  Eat relatively soft diet, eat in upright position and chew food well.  Drink water after 2-3 bites and take medications with liberal amounts of water.   After eating and taking medications, remain in upright position for 5-10 minutes.  Labs  Liver ultrasound  Upper endoscopy-EGD: The indications, technique, alternatives and potential risk and complications were discussed with the patient including but not limited to bleeding, perforations, missing lesions and anesthetic complications. The patient understands and wishes to proceed with the  procedure and has given their verbal consent. Written patient education information was given to the patient.   The patient will call if they have further questions before procedure.     JOHAN Mclain  3/6/2025    Please note that portions of this note were completed with a voice recognition program.     Patient or patient representative verbalized consent for the use of Ambient Listening during the visit with  JOHAN Mclain for chart documentation. 3/6/2025  15:35 EST

## 2025-03-07 ENCOUNTER — TELEPHONE (OUTPATIENT)
Dept: PHARMACY | Facility: HOSPITAL | Age: OVER 89
End: 2025-03-07
Payer: MEDICARE

## 2025-03-07 ENCOUNTER — TELEPHONE (OUTPATIENT)
Dept: OBSTETRICS AND GYNECOLOGY | Facility: CLINIC | Age: OVER 89
End: 2025-03-07
Payer: MEDICARE

## 2025-03-07 LAB
ALBUMIN SERPL-MCNC: 3.7 G/DL (ref 3.5–5.2)
ALBUMIN/GLOB SERPL: 0.9 G/DL
ALP SERPL-CCNC: 171 U/L (ref 39–117)
ALT SERPL W P-5'-P-CCNC: 10 U/L (ref 1–33)
ANION GAP SERPL CALCULATED.3IONS-SCNC: 13.9 MMOL/L (ref 5–15)
AST SERPL-CCNC: 23 U/L (ref 1–32)
BILIRUB SERPL-MCNC: 0.8 MG/DL (ref 0–1.2)
BUN SERPL-MCNC: 25 MG/DL (ref 8–23)
BUN/CREAT SERPL: 17.4 (ref 7–25)
CALCIUM SPEC-SCNC: 9.6 MG/DL (ref 8.2–9.6)
CHLORIDE SERPL-SCNC: 92 MMOL/L (ref 98–107)
CO2 SERPL-SCNC: 24.1 MMOL/L (ref 22–29)
CREAT SERPL-MCNC: 1.44 MG/DL (ref 0.57–1)
EGFRCR SERPLBLD CKD-EPI 2021: 34.6 ML/MIN/1.73
GLOBULIN UR ELPH-MCNC: 4 GM/DL
GLUCOSE SERPL-MCNC: 102 MG/DL (ref 65–99)
POTASSIUM SERPL-SCNC: 4.2 MMOL/L (ref 3.5–5.2)
PROT SERPL-MCNC: 7.7 G/DL (ref 6–8.5)
SODIUM SERPL-SCNC: 130 MMOL/L (ref 136–145)

## 2025-03-07 NOTE — TELEPHONE ENCOUNTER
----- Message from Margoth Fara sent at 3/7/2025  9:10 AM EST -----  Please inform patient I reviewed her transvaginal ultrasound.  The endometrium is thicker than it should be for menopause.  Given the patient has been on the estradiol I recommend either endometrial biopsy or D&C.  D&C would probably be the patient's best option.  She needs to stay off of her estradiol as previously discussed.  Thank you.

## 2025-03-07 NOTE — TELEPHONE ENCOUNTER
Called patient regarding lab results from yesterday. Instructed her to stop daily lasix due to a slight increase in creatinine, and to start taking only as needed for 2-3 lb weight gain overnight or 5 lbs in 1 week. She expressed understanding.

## 2025-03-07 NOTE — TELEPHONE ENCOUNTER
Patient informed and she has an appointment on Thursday and she will discuss this with Dr Chaudhari

## 2025-03-08 LAB
HAV AB SER QL IA: POSITIVE
HBV CORE AB SERPL QL IA: NEGATIVE
MITOCHONDRIA M2 IGG SER-ACNC: <20 UNITS (ref 0–20)
SMA IGG SER-ACNC: 8 UNITS (ref 0–19)

## 2025-03-10 LAB — ANA SER QL: NEGATIVE

## 2025-03-10 NOTE — TELEPHONE ENCOUNTER
Spoke with patient's daughter, had appointment on Thursday to repeat pap smear, is going to cancel this, wants to proceed with D&C. Please place case request to Talya.  Talya Please call daughter with appointment. Thanks.

## 2025-03-11 ENCOUNTER — RESULTS FOLLOW-UP (OUTPATIENT)
Dept: GASTROENTEROLOGY | Facility: CLINIC | Age: OVER 89
End: 2025-03-11
Payer: MEDICARE

## 2025-03-11 ENCOUNTER — PREP FOR SURGERY (OUTPATIENT)
Dept: OTHER | Facility: HOSPITAL | Age: OVER 89
End: 2025-03-11
Payer: MEDICARE

## 2025-03-11 DIAGNOSIS — N95.0 PMB (POSTMENOPAUSAL BLEEDING): Primary | ICD-10-CM

## 2025-03-11 LAB — HCV GENTYP SERPL NAA+PROBE: NORMAL

## 2025-03-11 RX ORDER — SODIUM CHLORIDE 0.9 % (FLUSH) 0.9 %
3 SYRINGE (ML) INJECTION EVERY 12 HOURS SCHEDULED
OUTPATIENT
Start: 2025-03-11

## 2025-03-11 RX ORDER — SODIUM CHLORIDE 9 MG/ML
40 INJECTION, SOLUTION INTRAVENOUS AS NEEDED
OUTPATIENT
Start: 2025-03-11

## 2025-03-11 RX ORDER — SODIUM CHLORIDE 0.9 % (FLUSH) 0.9 %
10 SYRINGE (ML) INJECTION AS NEEDED
OUTPATIENT
Start: 2025-03-11

## 2025-03-13 ENCOUNTER — PRE-ADMISSION TESTING (OUTPATIENT)
Dept: PREADMISSION TESTING | Facility: HOSPITAL | Age: OVER 89
End: 2025-03-13
Payer: MEDICARE

## 2025-03-13 ENCOUNTER — OFFICE VISIT (OUTPATIENT)
Dept: OBSTETRICS AND GYNECOLOGY | Facility: CLINIC | Age: OVER 89
End: 2025-03-13
Payer: MEDICARE

## 2025-03-13 VITALS
DIASTOLIC BLOOD PRESSURE: 68 MMHG | HEIGHT: 55 IN | WEIGHT: 136 LBS | BODY MASS INDEX: 31.47 KG/M2 | SYSTOLIC BLOOD PRESSURE: 124 MMHG

## 2025-03-13 DIAGNOSIS — N95.0 PMB (POSTMENOPAUSAL BLEEDING): ICD-10-CM

## 2025-03-13 DIAGNOSIS — N39.0 FREQUENT UTI: ICD-10-CM

## 2025-03-13 DIAGNOSIS — N95.0 PMB (POSTMENOPAUSAL BLEEDING): Primary | ICD-10-CM

## 2025-03-13 DIAGNOSIS — Z79.890 HORMONE REPLACEMENT THERAPY (HRT): ICD-10-CM

## 2025-03-13 DIAGNOSIS — R93.5 ABNORMAL ULTRASOUND OF ENDOMETRIUM: ICD-10-CM

## 2025-03-13 LAB
ANION GAP SERPL CALCULATED.3IONS-SCNC: 12.5 MMOL/L (ref 5–15)
BACTERIA UR QL AUTO: ABNORMAL /HPF
BILIRUB UR QL STRIP: NEGATIVE
BUN SERPL-MCNC: 25 MG/DL (ref 8–23)
BUN/CREAT SERPL: 20.7 (ref 7–25)
CALCIUM SPEC-SCNC: 9.3 MG/DL (ref 8.2–9.6)
CHLORIDE SERPL-SCNC: 95 MMOL/L (ref 98–107)
CLARITY UR: ABNORMAL
CO2 SERPL-SCNC: 22.5 MMOL/L (ref 22–29)
COLOR UR: YELLOW
CREAT SERPL-MCNC: 1.21 MG/DL (ref 0.57–1)
EGFRCR SERPLBLD CKD-EPI 2021: 42.4 ML/MIN/1.73
GLUCOSE SERPL-MCNC: 109 MG/DL (ref 65–99)
GLUCOSE UR STRIP-MCNC: NEGATIVE MG/DL
HGB UR QL STRIP.AUTO: NEGATIVE
HYALINE CASTS UR QL AUTO: ABNORMAL /LPF
KETONES UR QL STRIP: NEGATIVE
LEUKOCYTE ESTERASE UR QL STRIP.AUTO: ABNORMAL
NITRITE UR QL STRIP: NEGATIVE
PH UR STRIP.AUTO: 5.5 [PH] (ref 5–8)
POTASSIUM SERPL-SCNC: 4.7 MMOL/L (ref 3.5–5.2)
PROT UR QL STRIP: NEGATIVE
RBC # UR STRIP: ABNORMAL /HPF
REF LAB TEST METHOD: ABNORMAL
SODIUM SERPL-SCNC: 130 MMOL/L (ref 136–145)
SP GR UR STRIP: 1.01 (ref 1–1.03)
SQUAMOUS #/AREA URNS HPF: ABNORMAL /HPF
UROBILINOGEN UR QL STRIP: ABNORMAL
WBC # UR STRIP: ABNORMAL /HPF

## 2025-03-13 PROCEDURE — 1159F MED LIST DOCD IN RCRD: CPT | Performed by: OBSTETRICS & GYNECOLOGY

## 2025-03-13 PROCEDURE — 99214 OFFICE O/P EST MOD 30 MIN: CPT | Performed by: OBSTETRICS & GYNECOLOGY

## 2025-03-13 PROCEDURE — 81001 URINALYSIS AUTO W/SCOPE: CPT

## 2025-03-13 PROCEDURE — 1160F RVW MEDS BY RX/DR IN RCRD: CPT | Performed by: OBSTETRICS & GYNECOLOGY

## 2025-03-13 PROCEDURE — 80048 BASIC METABOLIC PNL TOTAL CA: CPT

## 2025-03-13 PROCEDURE — 87086 URINE CULTURE/COLONY COUNT: CPT

## 2025-03-13 PROCEDURE — 36415 COLL VENOUS BLD VENIPUNCTURE: CPT

## 2025-03-13 RX ORDER — METOPROLOL TARTRATE 25 MG/1
1 TABLET, FILM COATED ORAL 3 TIMES DAILY
COMMUNITY
End: 2025-03-13 | Stop reason: DRUGHIGH

## 2025-03-13 RX ORDER — HYDROCODONE BITARTRATE AND ACETAMINOPHEN 5; 325 MG/1; MG/1
TABLET ORAL
COMMUNITY
Start: 2024-09-20

## 2025-03-13 RX ORDER — ASPIRIN 81 MG
TABLET, DELAYED RELEASE (ENTERIC COATED) ORAL EVERY 12 HOURS
COMMUNITY
Start: 2024-09-26

## 2025-03-13 RX ORDER — LOSARTAN POTASSIUM 50 MG/1
50 TABLET ORAL EVERY 24 HOURS
COMMUNITY
Start: 2024-09-19

## 2025-03-13 RX ORDER — TORSEMIDE 5 MG/1
5 TABLET ORAL EVERY 24 HOURS
COMMUNITY
Start: 2024-09-19

## 2025-03-13 NOTE — PROGRESS NOTES
"Chief Complaint  Follow-up (Follow up discuss surgery. )     History of Present Illness:  Patient is 89 y.o.  who presents to [facility] here for follow-up evaluation of her postmenopausal bleeding.  Patient did have transvaginal ultrasound.  Patient had been on unopposed estradiol for approximately 5 years.  She had been seen by Dr. Lewis in the past.  She was taking 0.5 mg twice weekly.  She had not had any bleeding until recently with her fall.  She has subsequently stopped her estradiol.  She has history of frequent UTIs.  She has been using the estrogen cream as previously given.  She has continued on her Eliquis.  The patient did have her Pap smear last visit which returned nondiagnostic.  She was not on her estrogen cream at that time.  Patient has also been evaluated by GI since her last visit for difficulty swallowing.    History  Past Medical History:   Diagnosis Date    Ambulates with cane     Arthritis     Atrial fibrillation     denies any recent episodes of palpitations, SOB, dizziness, chest pain; followed by Dr. Nunez    Back pain     Cataract     PT REPORTS EARLY STAGES    Cystitis     Diabetes mellitus      REPORTS \"I AM PRE DIABETIC\" AND REPORTS THAT SHE WATCHS HER DIET AND PCP WATCHES A1C CLOSELY    Disease of thyroid gland     Dysphagia     REPORTS MORE TROUBLE SWALLOWING WHEN EATING OR TAKING PILLS. REPORTS PAIN AND THAT SOMETIMES THINGS FEEL LIKE THEY \"STICK\"    Elevated cholesterol 2018    REPORTS SHE HAS TAKEN MEDICATION IN THE PAST BUT THAT SHE THINKS ALL IS WNL'S AT PRESENT TIME WITHOUT MEDICATION    Fracture of right ankle     Fractured pelvis     was sent to [facility] in [place] for rehab    GERD (gastroesophageal reflux disease)     Hearing loss     USE OF HEARING AIDS but states still does not hear well    History of cardiac murmur     REPORTS \"I USED TO HAVE ONE BUT NOW THEY SAY THEY DON'T HEAR IT\"    History of colonic polyps     History " of pneumonia     Hx of echocardiogram     Hypercholesterolemia     Hypertension     Irregular heart beat     Macular degeneration     Osteoporosis     Seasonal allergies     Sinusitis     Spinal headache     REPORTS AFTER DELIVERY OF HER SON    Urinary tract infection     Vertigo     Wears glasses      Current Outpatient Medications on File Prior to Visit   Medication Sig Dispense Refill    Docusate Sodium 100 MG capsule Take  by mouth Every 12 (Twelve) Hours.      HYDROcodone-acetaminophen (NORCO) 5-325 MG per tablet Take 1 tablet 3 times a day by oral route as needed for 30 days, for for moderate to severe pain.      losartan (COZAAR) 50 MG tablet Take 1 tablet by mouth Daily.      torsemide (DEMADEX) 5 MG tablet Take 1 tablet by mouth Daily.      acetaminophen (TYLENOL) 650 MG 8 hr tablet Take 1 tablet by mouth 2 (Two) Times a Day As Needed.      apixaban (ELIQUIS) 5 MG tablet tablet Take 1 tablet by mouth Every 12 (Twelve) Hours. 60 tablet 6    cetirizine (zyrTEC) 10 MG tablet Take 1 tablet by mouth Every Night.      clotrimazole-betamethasone (LOTRISONE) 1-0.05 % cream Apply 1 Application topically to the appropriate area as directed 2 (Two) Times a Day. Apply to affected area twice daily as needed 45 g 0    CRANBERRY EXTRACT PO Take 1 tablet by mouth Daily. Pt taking every other day (Patient not taking: Reported on 3/13/2025)      cyclobenzaprine (FLEXERIL) 5 MG tablet Take 1 tablet by mouth Every Night.      estradiol (ESTRACE VAGINAL) 0.1 MG/GM vaginal cream May use 1 gram intravaginal qhs x 2 weeks then 1 gram 2x/week. 1 each 11    estradiol (ESTRACE) 0.5 MG tablet Take 1 tablet by mouth 2 (Two) Times a Week. REPORTS TAKES THIS ON Monday AND Friday AT NIGHT TIME 24 tablet 10    fluticasone (FLONASE) 50 MCG/ACT nasal spray Administer 2 sprays into the nostril(s) as directed by provider Daily As Needed.      furosemide (Lasix) 40 MG tablet Take 1 tablet by mouth Daily. (Patient taking differently: Take 1  "tablet by mouth Daily As Needed.) 90 tablet 0    metoprolol succinate XL (Toprol XL) 200 MG 24 hr tablet Take 1 tablet by mouth Daily. 90 tablet 1    multivitamin with minerals tablet tablet Take 1 tablet by mouth Daily. One-A-Day every other day (Patient not taking: Reported on 3/13/2025)      omeprazole (prilOSEC) 10 MG capsule Take 1 capsule by mouth Every Morning.      spironolactone (ALDACTONE) 25 MG tablet Take 1 tablet by mouth Daily. 30 tablet 0    [DISCONTINUED] metoprolol tartrate (LOPRESSOR) 25 MG tablet Take 1 tablet by mouth 3 (Three) Times a Day.       No current facility-administered medications on file prior to visit.     Allergies   Allergen Reactions    Codeine Nausea Only    Escitalopram Nausea Only     REPORTS \"LEXAPRO\"    Gabapentin Other (See Comments)     REPORTS \"MAKES MY EYES HURT BECAUSE OF MY MACULAR DEGENERATION\"    Ibuprofen Palpitations     REPORTS \"IT CAUSES MY HEART TO BEAT FAST\"    Levofloxacin Nausea Only    Naproxen Nausea Only    Nitrofurantoin Nausea Only    Sulfa Antibiotics Nausea Only     Past Surgical History:   Procedure Laterality Date    BACK SURGERY      REPORTS A SPUR WAS REMOVED THAT WAS HITTING SCIATIC NERVE    BREAST BIOPSY      BREAST SURGERY Left     LUMPECTOMY, BENIGN     CATARACT EXTRACTION W/ INTRAOCULAR LENS IMPLANT Left 03/04/2022    Procedure: CATARACT PHACO EXTRACTION WITH INTRAOCULAR LENS IMPLANT left;  Surgeon: Pritesh Mcnally MD;  Location: [facility] OR;  Service: Ophthalmology;  Laterality: Left;    CATARACT EXTRACTION W/ INTRAOCULAR LENS IMPLANT Right 03/18/2022    Procedure: CATARACT PHACO EXTRACTION WITH INTRAOCULAR LENS IMPLANT RIGHT;  Surgeon: Pritesh Mcnally MD;  Location: [facility];  Service: Ophthalmology;  Laterality: Right;    COLONOSCOPY  2012    REPORTS LAST DONE IN 2012 BUT HAS A HX OF SEVERAL OF THESE PROCEDURES    DILATATION AND CURETTAGE      REPORTS \"I HAD A COUPLE OF THOSE\"    ENDOSCOPY N/A 01/31/2017    Procedure: " "ESOPHAGOGASTRODUODENOSCOPY;  Surgeon: Robbie Quezada MD;  Location: [facility] ENDOSCOPY;  Service:     ENDOSCOPY N/A 09/21/2018    Procedure: ESOPHAGOGASTRODUODENOSCOPY WITH BIOPSY, DILITATION;  Surgeon: Robbie Quezada MD;  Location: [facility] ENDOSCOPY;  Service: Gastroenterology    ENDOSCOPY N/A 11/15/2023    Procedure: ESOPHAGOGASTRODUODENOSCOPY WITH BIOPSY AND DILATION;  Surgeon: Mil Ma MD;  Location: [facility] ENDOSCOPY;  Service: Gastroenterology;  Laterality: N/A;    FOOT SURGERY Left     REPORTS SURGICAL INTERVENTION TO CORRECT GREAT TOE ALIGNMENT    GALLBLADDER SURGERY  2009    HEMORRHOIDECTOMY  1973    TONSILLECTOMY  1946    UPPER GASTROINTESTINAL ENDOSCOPY  2014    UPPER GASTROINTESTINAL ENDOSCOPY  01/31/2017     Family History   Problem Relation Age of Onset    Diabetes Mother     Cancer Brother         lung    Cancer Brother         brain    Colon cancer Paternal Grandmother         possibly colon cancer    Breast cancer Neg Hx     Liver cancer Neg Hx     Liver disease Neg Hx      Social History     Socioeconomic History    Marital status:    Tobacco Use    Smoking status: Never     Passive exposure: Never    Smokeless tobacco: Never   Vaping Use    Vaping status: Never Used   Substance and Sexual Activity    Alcohol use: No    Drug use: No    Sexual activity: Defer       Physical Examination:  Vital Signs: /68   Ht 139.7 cm (55\")   Wt 61.7 kg (136 lb)   BMI 31.61 kg/m²     General Appearance: alert, appears stated age, and cooperative  Breasts: Not performed.  Abdomen: no masses, no hepatomegaly, no splenomegaly, soft non-tender, no guarding, and no rebound tenderness  Pelvic: Not performed.    Data Review:  The following data was reviewed by: Margoth Chaudhari MD on 03/13/2025:     Labs:  LIQUID-BASED PAP SMEAR WITH HPV GENOTYPING IF ASCUS (LYNDA,COR,MAD) (03/05/2025 10:34)  MERLYN (03/06/2025 16:20)  CBC (No Diff) (03/06/2025 16:20)  Comprehensive Metabolic Panel (03/06/2025 " 16:20)  Protime-INR (03/06/2025 16:20)  Hepatitis A Antibody, Total (03/06/2025 16:20)  Hepatitis B Surface Antibody (03/06/2025 16:20)  Hepatitis B Core Antibody, Total (03/06/2025 16:20)  Hepatitis B Surface Antigen (03/06/2025 16:20)  Hepatitis C Genotype (03/06/2025 16:20)  Anti-Smooth Muscle Antibody Titer (03/06/2025 16:20)  Mitochondrial Antibodies, M2 (03/06/2025 16:20)  Imaging:  US Non-ob Transvaginal (03/06/2025 11:51)   Medical Records:  Progress Notes by Jack Murdock APRN (03/06/2025 15:30)     Assessment and Plan   1. PMB (postmenopausal bleeding)  Patient with postmenopausal bleeding who has been on unopposed estrogen as noted.  I discussed with the patient the findings from her transvaginal ultrasound.  I recommend patient stay off of her estradiol.  I discussed with the patient the only definitive diagnosis is surgical or endometrial biopsy.  I discussed with the patient the pros and cons of each.  Patient desires to proceed with D&C with diagnostic hysteroscopy.  I have discussed with the patient the risk, complications, benefits, as well as other alternatives.    2. Frequent UTI  Patient with frequent UTIs.  Patient is to continue the use of her estrogen cream.  She is to apply a small amount of the estradiol to the periurethral area twice weekly.    3. Hormone replacement therapy (HRT)  I recommend patient stay off of her systemic hormone replacement therapy.  Prescription has been given for estrogen cream as noted.  Instructions and precautions have been given.    4. Abnormal ultrasound of endometrium  Patient informed regarding the findings from her recent transvaginal ultrasound.  Patient desires to proceed with D&C and diagnostic hysteroscopy.  Patient to have fractional D&C as well.  Plan pending results.    Follow Up/Instructions:  Follow up as noted.  Patient was given instructions and counseling regarding her condition or for health maintenance advice. Please see specific  information pulled into the AVS if appropriate.     Note: Speech recognition transcription software may have been used to dictate portions of this document.  An attempt at proofreading has been made though minor errors in transcription may still be present.    This note was electronically signed.  Margoth Chaudhari M.D.

## 2025-03-13 NOTE — DISCHARGE INSTRUCTIONS
Pre-Admission testing appointment completed today for patient's upcoming procedure on 3/25/25 at [facility].     PAT PASS reviewed with patient and they verbalize understanding of the following:     Do not eat or drink anything after midnight the night before procedure unless otherwise instructed by physician/surgeon's office, this includes no gum, candy, mints, tobacco products or e-cigarettes.  Do not shave the area to be operated on at least 48 hours prior to procedure.  Do not wear makeup, lotion, hair products, or nail polish.  Do not wear any jewelry and remove all piercings.  Do not wear any adhesive if you wear dentures.  Do not wear contacts; bring in glasses if needed.  Only take medications on the morning of procedure as instructed by PAT nurse per anesthesia guidelines or as instructed by physician's office.  If you are on any blood thinners reach out to the physician/surgeon's office for instructions on when/if they will need to be stopped prior to procedure.  Bring in picture ID and insurance card, advanced directive copies if applicable, CPAP/BIPAP/Inhalers if indicated morning of procedure, leave any other valuables at home.  Ensure you have arranged for someone to drive you home the day of your procedure and someone to care for you at home afterwards. It is recommended that you do not drive, drink alcohol, or make any major legal decisions for at least 24 hours after your procedure is complete.   Chlorhexidine sponge along with instruction/information sheet given to patient. Readybath wipes given in lieu of CHG if patient has CHG allergy. Instructed patient to date, time, and initial the verification sheet once skin prep has been completed, and to return to [facility] the day of the procedure.      Anesthesia FAQ tip sheet given to patient.  Instructions and information given to patient about parking, hospital entrance, and registration location.    Instructions and information given to  patient about parking, hospital entrance, and registration location.

## 2025-03-14 LAB — BACTERIA SPEC AEROBE CULT: NO GROWTH

## 2025-03-17 ENCOUNTER — DISEASE STATE MANAGEMENT VISIT (OUTPATIENT)
Dept: PHARMACY | Facility: HOSPITAL | Age: OVER 89
End: 2025-03-17
Payer: MEDICARE

## 2025-03-17 VITALS
WEIGHT: 138 LBS | RESPIRATION RATE: 16 BRPM | SYSTOLIC BLOOD PRESSURE: 127 MMHG | HEIGHT: 55 IN | DIASTOLIC BLOOD PRESSURE: 76 MMHG | OXYGEN SATURATION: 96 % | TEMPERATURE: 97.1 F | BODY MASS INDEX: 31.94 KG/M2 | HEART RATE: 59 BPM

## 2025-03-17 DIAGNOSIS — I50.30 HEART FAILURE WITH PRESERVED EJECTION FRACTION, UNSPECIFIED HF CHRONICITY: Primary | ICD-10-CM

## 2025-03-17 PROCEDURE — G0463 HOSPITAL OUTPT CLINIC VISIT: HCPCS

## 2025-03-17 NOTE — PROGRESS NOTES
[facility]  Heart Failure Pharmacist Note     Patient Name: Addie Perez  :1934  Age: 89 y.o.  Sex: female  Referring Provider: Ariela Hampton DO   Primary Cardiologist: Dr. Je Nunez  Encounter Provider:  JOHAN Peña    HPI: Ms. Perez presents today for follow-up evaluation in heart failure clinic for HFpEF (56-60%). PMH significant for HTN, hyperlipidemia, CKD, Afib, T2DM.    At previous clinic visit on 2025, no changes were made. However, following lab results from 3/6/2025, patient was contacted to change Lasix 40mg daily use to PRN for weight gain of 2-3lbs overnight or 5lbs in a week. SCr went from 1.44 on 3/6/25 to 1.21 on 3/13/25. Lungs were clear to auscultation and BL LE were negative for edema. She also denies any palpitations, dizziness or weight gain. She reports feeling well today and has no complaints from a cardiac standpoint.       Heart Failure GDMT:    Drug Class   Drug   Dose Last Dose Adjustment   Notes   ACEi/ARB/ARNI -- Losartan discontinued at discharge on 24   Beta Blocker Metoprolol XL 200mg daily  24 Metoprolol tartrate 75 mg BID switched to metoprolol succinate    MRA Spironolactone 25mg daily     SGLT2i N/A   Hx of recurrent UTI noted     Other CV Meds:  Furosemide 40mg PRN for weight gain of 2-3lbs overnight or 5lbs in a week.  Eliquis 5mg BID    Medication Use:  Adherence: Good  Past hx of medication use/intolerance: N/A  Affordability: Difficulty affording Eliquis; possible switch to warfarin if she becomes unable to afford     Social Determinants:    Social Drivers of Health     Tobacco Use: Low Risk  (3/13/2025)    Patient History     Smoking Tobacco Use: Never     Smokeless Tobacco Use: Never     Passive Exposure: Never   Alcohol Use: Not At Risk (11/15/2024)    AUDIT-C     Frequency of Alcohol Consumption: Never     Average Number of Drinks: Patient does not drink     Frequency of Binge Drinking: Never   Financial  Resource Strain: Low Risk  (11/15/2024)    Overall Financial Resource Strain (CARDIA)     Difficulty of Paying Living Expenses: Not hard at all   Food Insecurity: No Food Insecurity (11/15/2024)    Hunger Vital Sign     Worried About Running Out of Food in the Last Year: Never true     Ran Out of Food in the Last Year: Never true   Transportation Needs: No Transportation Needs (11/15/2024)    PRAPARE - Transportation     Lack of Transportation (Medical): No     Lack of Transportation (Non-Medical): No   Physical Activity: Inactive (11/15/2024)    Exercise Vital Sign     Days of Exercise per Week: 0 days     Minutes of Exercise per Session: 0 min   Stress: No Stress Concern Present (11/15/2024)    [employer] [place] [employer] - Occupational Stress Questionnaire     Feeling of Stress : Not at all   Social Connections: Not At Risk (11/15/2024)    Family and Community Support     Help with Day-to-Day Activities: I get all the help I need     Lonely or Isolated: Never   Recent Concern: Social Connections - At Risk (9/15/2024)    Family and Community Support     Help with Day-to-Day Activities: I need a lot more help     Lonely or Isolated: Never   Interpersonal Safety: Not At Risk (3/13/2025)    Abuse Screen     Unsafe at Home or Work/School: no     Feels Threatened by Someone?: no     Does Anyone Keep You from Contacting Others or Doint Things Outside the Home?: no     Physical Sign of Abuse Present: no   Depression: Not at risk (11/15/2024)    PHQ-2     PHQ-2 Score: 0   Housing Stability: Not At Risk (11/15/2024)    Housing Stability     Current Living Arrangements: home     Potentially Unsafe Housing Conditions: none   Utilities: Not At Risk (11/15/2024)    [facility] Utilities     Threatened with loss of utilities: No   Health Literacy: Not At Risk (11/15/2024)    Education     Help with school or training?: No     Preferred Language: English   Employment: Not At Risk (11/15/2024)    Employment     Do you want  "help finding or keeping work or a job?: I do not need or want help   Disabilities: At Risk (11/15/2024)    Disabilities     Concentrating, Remembering, or Making Decisions Difficulty: no     Doing Errands Independently Difficulty: yes       Immunization Status:  Pneumococcal: Yes- PCV13 12/8/2016; PPV23 10/23/2006  Influenza: yearly  COVID-19: Last 9/16/2022    OBJECTIVE:  /76 (BP Location: Right arm, Patient Position: Sitting, Cuff Size: Adult)   Pulse 59   Temp 97.1 °F (36.2 °C) (Infrared)   Resp 16   Ht 139.7 cm (55\")   Wt 62.6 kg (138 lb)   LMP  (LMP Unknown)   SpO2 96% Comment: RA  BMI 32.07 kg/m²     Body mass index is 32.07 kg/m².  Wt Readings from Last 1 Encounters:   03/17/25 62.6 kg (138 lb)       Home BP Readings:  10-yr ASCVD risk: The ASCVD Risk score (Raul BLAKE, et al., 2019) failed to calculate for the following reasons:    The 2019 ASCVD risk score is only valid for ages 40 to 79    Lab Review:  Renal Function: Estimated Creatinine Clearance: 24.1 mL/min (A) (by C-G formula based on SCr of 1.21 mg/dL (H)).    Lab Results   Component Value Date    PROBNP 2,999.0 (H) 11/14/2024       Results for orders placed during the hospital encounter of 03/03/24    Adult Transthoracic Echo Complete W/ Cont if Necessary Per Protocol    Interpretation Summary    Left ventricular systolic function is normal. Calculated left ventricular 3D EF = 59% Left ventricular ejection fraction appears to be 56 - 60%.    Left ventricular diastolic dysfunction is noted.    The left atrial cavity is dilated.    The right atrial cavity is dilated.    Moderate mitral valve regurgitation is present. (SBP 150s)    Moderate to severe tricuspid valve regurgitation is present.    Estimated right ventricular systolic pressure from tricuspid regurgitation is moderately elevated (45-55 mmHg).      ASSESSMENT/PLAN:  HFpEF (EF 56-60%)  Continue metoprolol  mg daily   Continue spironolactone 25mg daily  Continue Furosemide " 40mg PRN for weight gain of 2-3lbs overnight or 5lbs in one week. Re-discussed and BP log paperwork.   Will continue to defer initiation of an SGLT2 inhibitor due to history of recurrent UTIs  Advised patient to call clinic with 2-3 lb weight gain in 24 hrs or >5 lb weight gain in 1 week  Patient to RTC in 6 months for follow-up and repeat labs.       Daphney Ibrahim, PharmD  3/17/2025   13:06 EDT

## 2025-03-17 NOTE — PROGRESS NOTES
"             [facility] Follow Up Note    Addie Perez  [id number]  03/17/2025    Primary Care Provider: Ariela Hampton DO   Referring Provider: Ariela Hampton DO    Chief Complaint: Follow-up CHF    History of Present Illness:   Mrs. Addie Perez is a 89 y.o. female who presents to the HF Clinic for follow up.  In 11/24, the patient was admitted for atrial fibrillation with RVR (114 bpm) and CHF exacerbation (proBNP 2,999, CT scan showed R pleural effusion, \"nodular\" liver, and 2 lesions of R kidney).  The patient has a past medical history significant for hyperlipidemia, hypertension, and type 2 diabetes mellitus.  She has a past cardiac history significant for chronic diastolic heart failure and longstanding persistent atrial fibrillation.  At her last heart failure clinic visit, Lasix was changed to as needed use for weight gain and lower extremity edema.  She is accompanied by her daughter and presents today for routine follow-up.  The patient states that she is supposed to undergo a D&C with her OB/GYN within the next couple weeks.  She states she has been advised to hold her Eliquis for 5 days for this and wants to know if Dr. Nunez would be okay with that.  She specifically denies chest pain and dyspnea.  She denies lower extremity edema.  She continues to weigh herself in the morning but also in the evening.  She reports having gained some weight, but she also recently was able to swallow more easily.  In addition, last week she reports having eaten pizza and birthday cake as she celebrated her 89st birthday.  She does report some abdominal fullness, but reports normal/improved bowel movements since discontinuation of narcotic pain medication for back pain.  She offers no other cardiac complaints or concerns at this time.    Review of Systems:   Review of Systems  As Noted in HPI.   I have reviewed and confirmed the accuracy of the ROS as documented by the " MA/KATIEN/RN Betsy Andrews, JOHAN    I have reviewed and/or updated the patient's past medical, past surgical, family, social history, problem list and allergies as appropriate.     Medications:     Current Outpatient Medications:     acetaminophen (TYLENOL) 650 MG 8 hr tablet, Take 1 tablet by mouth 2 (Two) Times a Day As Needed., Disp: , Rfl:     apixaban (ELIQUIS) 5 MG tablet tablet, Take 1 tablet by mouth Every 12 (Twelve) Hours., Disp: 60 tablet, Rfl: 6    cetirizine (zyrTEC) 10 MG tablet, Take 1 tablet by mouth Every Night., Disp: , Rfl:     clotrimazole-betamethasone (LOTRISONE) 1-0.05 % cream, Apply 1 Application topically to the appropriate area as directed 2 (Two) Times a Day. Apply to affected area twice daily as needed, Disp: 45 g, Rfl: 0    estradiol (ESTRACE VAGINAL) 0.1 MG/GM vaginal cream, May use 1 gram intravaginal qhs x 2 weeks then 1 gram 2x/week., Disp: 1 each, Rfl: 11    fluticasone (FLONASE) 50 MCG/ACT nasal spray, Administer 2 sprays into the nostril(s) as directed by provider Daily As Needed., Disp: , Rfl:     furosemide (Lasix) 40 MG tablet, Take 1 tablet by mouth Daily As Needed (For weight gain >2-3 pounds overnight or >5 pounds in a week's time)., Disp: , Rfl:     metoprolol succinate XL (Toprol XL) 200 MG 24 hr tablet, Take 1 tablet by mouth Daily., Disp: 90 tablet, Rfl: 1    omeprazole (prilOSEC) 10 MG capsule, Take 1 capsule by mouth Every Morning., Disp: , Rfl:     torsemide (DEMADEX) 5 MG tablet, Take 1 tablet by mouth Daily., Disp: , Rfl:     losartan (COZAAR) 50 MG tablet, Take 1 tablet by mouth Daily. (Patient not taking: Reported on 3/17/2025), Disp: , Rfl:     spironolactone (ALDACTONE) 25 MG tablet, Take 1 tablet by mouth Daily., Disp: 30 tablet, Rfl: 5    Physical Exam:       3/13/25 3/6/25 3/5/25 11/14/24   /68 120/78 108/80 105/57   Heart Rate -- 85 -- 93   Resp -- -- -- 18   Temp -- -- -- 97.6 °F (36.4 °C)   Temp src -- -- -- Oral   SpO2 -- 95 % -- 94 %   Weight  "61.7 kg (136 lb) 61.7 kg (136 lb) 60.3 kg (133 lb) 60.5 kg (133 lb 6.1 oz)     Vital Signs:   Vitals:    03/17/25 1255   BP: 127/76   BP Location: Right arm   Patient Position: Sitting   Cuff Size: Adult   Pulse: 59   Resp: 16   Temp: 97.1 °F (36.2 °C)   TempSrc: Infrared   SpO2: 96%  Comment: RA   Weight: 62.6 kg (138 lb)   Height: 139.7 cm (55\")     Body mass index is 32.07 kg/m².    Physical Exam  Vitals and nursing note reviewed.   Constitutional:       General: She is not in acute distress.  HENT:      Head: Normocephalic and atraumatic.   Neck:      Trachea: Trachea normal.   Cardiovascular:      Rate and Rhythm: Normal rate and regular rhythm.      Pulses: Normal pulses.      Heart sounds: Normal heart sounds. No murmur heard.     No friction rub. No gallop.   Pulmonary:      Effort: Pulmonary effort is normal.      Breath sounds: Normal breath sounds.   Abdominal:      General: There is distension.      Comments: Mild abdominal distention without tenderness or mass appreciated   Musculoskeletal:      Cervical back: Neck supple.      Right lower leg: No edema.      Left lower leg: No edema.   Skin:     General: Skin is warm and dry.   Neurological:      Mental Status: She is alert and oriented to person, place, and time.   Psychiatric:         Mood and Affect: Mood normal.         Behavior: Behavior normal. Behavior is cooperative.         Thought Content: Thought content does not include suicidal ideation.         Results Review:   I reviewed the patient's new clinical results.    Procedures    Assessment / Plan:   Diagnoses and all orders for this visit:    1. Heart failure with preserved ejection fraction, unspecified HF chronicity (Primary)  Stage C  NYHA functional class II  No change to GDMT today  Patient verbalized understanding on indications for as needed Lasix doses  Continue to defer SGLT2 inhibitor due to recurrent UTIs      Notes.  Patient may hold Eliquis 2 to 3 days prior to procedure in " conjunction with the anticoagulation clinic protocol.  May resume DOAC postprocedure day 1.  ------------------------------------------------------------------    ADDENDUM ON 3/24/25 AS REQUESTED FOR PREOP CLEARANCE FOR PATIENT'S D&C WITH OB/GYN TOMORROW.    2. Preoperative cardiovascular examination  Revised Cardiac Risk Index:  Estimated Risk of Adverse Outcome with Non-cardiac Surgery  Low Risk  Estimated Rate of Myocardial Infarction, Pulmonary Edema, Ventricular Fibrillation, Cardiac Arrest, or Complete Heart Block  0.9 %      Preventative Cardiology:   Tobacco Cessation: N/A   Advance Care Planning: ACP discussion was declined by the patient. Patient has an advance directive in EMR which is still valid.      Follow Up:   Return in about 6 months (around 9/17/2025).      Thank you for allowing me to participate in the care of your patient. Please to not hesitate to contact me with additional questions or concerns.     Betsy Andrews, APRN

## 2025-03-18 RX ORDER — SPIRONOLACTONE 25 MG/1
25 TABLET ORAL DAILY
Qty: 30 TABLET | Refills: 5 | Status: SHIPPED | OUTPATIENT
Start: 2025-03-18

## 2025-03-18 RX ORDER — FUROSEMIDE 40 MG/1
40 TABLET ORAL DAILY PRN
Start: 2025-03-18

## 2025-03-18 NOTE — TELEPHONE ENCOUNTER
Pt called requesting refill on spironolactone 25mg. States that last bottle she got from her pharmacy was only for 45 tablets and to take one every other day. Per the chart, she is supposed to be taking once daily. Pt requested 30 days with refills as she is afraid 90 days all at once would be to much to have if her dose were to need changed again.     Pt uses [facility] in [place].

## 2025-03-25 ENCOUNTER — ANESTHESIA (OUTPATIENT)
Dept: PERIOP | Facility: HOSPITAL | Age: OVER 89
End: 2025-03-25
Payer: MEDICARE

## 2025-03-25 ENCOUNTER — ANESTHESIA EVENT (OUTPATIENT)
Dept: PERIOP | Facility: HOSPITAL | Age: OVER 89
End: 2025-03-25
Payer: MEDICARE

## 2025-03-25 ENCOUNTER — HOSPITAL ENCOUNTER (OUTPATIENT)
Facility: HOSPITAL | Age: OVER 89
Setting detail: HOSPITAL OUTPATIENT SURGERY
Discharge: HOME | End: 2025-03-25
Attending: OBSTETRICS & GYNECOLOGY | Admitting: OBSTETRICS & GYNECOLOGY
Payer: MEDICARE

## 2025-03-25 VITALS
DIASTOLIC BLOOD PRESSURE: 83 MMHG | SYSTOLIC BLOOD PRESSURE: 123 MMHG | TEMPERATURE: 98.1 F | OXYGEN SATURATION: 99 % | RESPIRATION RATE: 16 BRPM | HEART RATE: 83 BPM

## 2025-03-25 DIAGNOSIS — N95.0 PMB (POSTMENOPAUSAL BLEEDING): ICD-10-CM

## 2025-03-25 LAB
ANION GAP SERPL CALCULATED.3IONS-SCNC: 11.9 MMOL/L (ref 5–15)
BUN SERPL-MCNC: 16 MG/DL (ref 8–23)
BUN/CREAT SERPL: 16.5 (ref 7–25)
CALCIUM SPEC-SCNC: 9 MG/DL (ref 8.2–9.6)
CHLORIDE SERPL-SCNC: 98 MMOL/L (ref 98–107)
CO2 SERPL-SCNC: 21.1 MMOL/L (ref 22–29)
CREAT SERPL-MCNC: 0.97 MG/DL (ref 0.57–1)
DEPRECATED RDW RBC AUTO: 42.7 FL (ref 37–54)
EGFRCR SERPLBLD CKD-EPI 2021: 55.3 ML/MIN/1.73
ERYTHROCYTE [DISTWIDTH] IN BLOOD BY AUTOMATED COUNT: 12.9 % (ref 12.3–15.4)
GLUCOSE SERPL-MCNC: 108 MG/DL (ref 65–99)
HCT VFR BLD AUTO: 37.6 % (ref 34–46.6)
HGB BLD-MCNC: 12.4 G/DL (ref 12–15.9)
MCH RBC QN AUTO: 30 PG (ref 26.6–33)
MCHC RBC AUTO-ENTMCNC: 33 G/DL (ref 31.5–35.7)
MCV RBC AUTO: 90.8 FL (ref 79–97)
PLATELET # BLD AUTO: 208 10*3/MM3 (ref 140–450)
PMV BLD AUTO: 9.9 FL (ref 6–12)
POTASSIUM SERPL-SCNC: 4.7 MMOL/L (ref 3.5–5.2)
RBC # BLD AUTO: 4.14 10*6/MM3 (ref 3.77–5.28)
SODIUM SERPL-SCNC: 131 MMOL/L (ref 136–145)
WBC NRBC COR # BLD AUTO: 6.6 10*3/MM3 (ref 3.4–10.8)

## 2025-03-25 PROCEDURE — 25010000002 PROPOFOL 10 MG/ML EMULSION: Performed by: NURSE ANESTHETIST, CERTIFIED REGISTERED

## 2025-03-25 PROCEDURE — 25010000002 LIDOCAINE (CARDIAC): Performed by: NURSE ANESTHETIST, CERTIFIED REGISTERED

## 2025-03-25 PROCEDURE — 88305 TISSUE EXAM BY PATHOLOGIST: CPT

## 2025-03-25 PROCEDURE — 80048 BASIC METABOLIC PNL TOTAL CA: CPT | Performed by: OBSTETRICS & GYNECOLOGY

## 2025-03-25 PROCEDURE — 85027 COMPLETE CBC AUTOMATED: CPT | Performed by: OBSTETRICS & GYNECOLOGY

## 2025-03-25 PROCEDURE — 25010000002 FENTANYL CITRATE (PF) 50 MCG/ML SOLUTION PREFILLED SYRINGE: Performed by: NURSE ANESTHETIST, CERTIFIED REGISTERED

## 2025-03-25 PROCEDURE — 58558 HYSTEROSCOPY BIOPSY: CPT | Performed by: OBSTETRICS & GYNECOLOGY

## 2025-03-25 PROCEDURE — 25010000002 ONDANSETRON PER 1 MG: Performed by: NURSE ANESTHETIST, CERTIFIED REGISTERED

## 2025-03-25 PROCEDURE — 25810000003 LACTATED RINGERS PER 1000 ML: Performed by: OBSTETRICS & GYNECOLOGY

## 2025-03-25 PROCEDURE — S0260 H&P FOR SURGERY: HCPCS | Performed by: OBSTETRICS & GYNECOLOGY

## 2025-03-25 PROCEDURE — 25010000002 LIDOCAINE 1 % SOLUTION: Performed by: OBSTETRICS & GYNECOLOGY

## 2025-03-25 RX ORDER — SODIUM CHLORIDE 0.9 % (FLUSH) 0.9 %
10 SYRINGE (ML) INJECTION AS NEEDED
Status: DISCONTINUED | OUTPATIENT
Start: 2025-03-25 | End: 2025-03-25 | Stop reason: HOSPADM

## 2025-03-25 RX ORDER — LIDOCAINE HYDROCHLORIDE 10 MG/ML
INJECTION, SOLUTION INFILTRATION; PERINEURAL AS NEEDED
Status: DISCONTINUED | OUTPATIENT
Start: 2025-03-25 | End: 2025-03-25 | Stop reason: HOSPADM

## 2025-03-25 RX ORDER — ONDANSETRON 2 MG/ML
INJECTION INTRAMUSCULAR; INTRAVENOUS AS NEEDED
Status: DISCONTINUED | OUTPATIENT
Start: 2025-03-25 | End: 2025-03-25 | Stop reason: SURG

## 2025-03-25 RX ORDER — ONDANSETRON 2 MG/ML
4 INJECTION INTRAMUSCULAR; INTRAVENOUS ONCE AS NEEDED
Status: DISCONTINUED | OUTPATIENT
Start: 2025-03-25 | End: 2025-03-25 | Stop reason: HOSPADM

## 2025-03-25 RX ORDER — FENTANYL CITRATE 50 UG/ML
INJECTION, SOLUTION INTRAMUSCULAR; INTRAVENOUS AS NEEDED
Status: DISCONTINUED | OUTPATIENT
Start: 2025-03-25 | End: 2025-03-25 | Stop reason: SURG

## 2025-03-25 RX ORDER — PROPOFOL 10 MG/ML
VIAL (ML) INTRAVENOUS CONTINUOUS PRN
Status: DISCONTINUED | OUTPATIENT
Start: 2025-03-25 | End: 2025-03-25 | Stop reason: SURG

## 2025-03-25 RX ORDER — SODIUM CHLORIDE, SODIUM LACTATE, POTASSIUM CHLORIDE, CALCIUM CHLORIDE 600; 310; 30; 20 MG/100ML; MG/100ML; MG/100ML; MG/100ML
1000 INJECTION, SOLUTION INTRAVENOUS CONTINUOUS
Status: DISCONTINUED | OUTPATIENT
Start: 2025-03-25 | End: 2025-03-25 | Stop reason: HOSPADM

## 2025-03-25 RX ORDER — SODIUM CHLORIDE 9 MG/ML
40 INJECTION, SOLUTION INTRAVENOUS AS NEEDED
Status: DISCONTINUED | OUTPATIENT
Start: 2025-03-25 | End: 2025-03-25 | Stop reason: HOSPADM

## 2025-03-25 RX ORDER — ONDANSETRON 8 MG/1
8 TABLET, FILM COATED ORAL EVERY 8 HOURS PRN
Qty: 15 TABLET | Refills: 0 | Status: SHIPPED | OUTPATIENT
Start: 2025-03-25

## 2025-03-25 RX ORDER — ONDANSETRON 4 MG/1
4 TABLET, ORALLY DISINTEGRATING ORAL ONCE AS NEEDED
Status: DISCONTINUED | OUTPATIENT
Start: 2025-03-25 | End: 2025-03-25 | Stop reason: HOSPADM

## 2025-03-25 RX ORDER — PROMETHAZINE HYDROCHLORIDE 25 MG/1
12.5 TABLET ORAL ONCE AS NEEDED
Status: DISCONTINUED | OUTPATIENT
Start: 2025-03-25 | End: 2025-03-25 | Stop reason: HOSPADM

## 2025-03-25 RX ORDER — SODIUM CHLORIDE 0.9 % (FLUSH) 0.9 %
3 SYRINGE (ML) INJECTION EVERY 12 HOURS SCHEDULED
Status: DISCONTINUED | OUTPATIENT
Start: 2025-03-25 | End: 2025-03-25 | Stop reason: HOSPADM

## 2025-03-25 RX ADMIN — PROPOFOL 75 MCG/KG/MIN: 10 INJECTION, EMULSION INTRAVENOUS at 09:52

## 2025-03-25 RX ADMIN — ONDANSETRON 4 MG: 2 INJECTION INTRAMUSCULAR; INTRAVENOUS at 09:52

## 2025-03-25 RX ADMIN — LIDOCAINE HYDROCHLORIDE 60 MG: 20 INJECTION, SOLUTION INTRAVENOUS at 09:52

## 2025-03-25 RX ADMIN — SODIUM CHLORIDE, POTASSIUM CHLORIDE, SODIUM LACTATE AND CALCIUM CHLORIDE: 600; 310; 30; 20 INJECTION, SOLUTION INTRAVENOUS at 09:47

## 2025-03-25 RX ADMIN — FENTANYL CITRATE 25 MCG: 50 INJECTION, SOLUTION INTRAMUSCULAR; INTRAVENOUS at 09:52

## 2025-03-25 RX ADMIN — FENTANYL CITRATE 25 MCG: 50 INJECTION, SOLUTION INTRAMUSCULAR; INTRAVENOUS at 10:08

## 2025-03-25 NOTE — OP NOTE
GIL Inocencio Perez  : 1934  MRN: [id number]  CSN: [id number]  Date: 3/25/2025    Operative Report    Pre-op Diagnosis:  PMB (postmenopausal bleeding) [N95.0]  Abnormal endometrium     Post-op Diagnosis:  Post-Op Diagnosis Codes:     * PMB (postmenopausal bleeding) [N95.0]        Same     Procedure: Procedure(s):  DILATATION AND CURETTAGE HYSTEROSCOPY     Surgeon: Margoth Chaudhari M.D.     Assist: Staff was responsible for performing the following activities: Retraction and Suction and their skilled assistance was necessary for the success of this case.     OR Staff: Circulator: Kandi Patel RN  Scrub Person: Oxana Campo PCT; Eugenia Zleaya     Anesthesia: IV sedation with 1% lidocaine paracervical block     Estimated Blood Loss: 10 mls     Specimens:  Endometrial     Findings: Markedly retroverted uterus.  Moderate amount of tissue seen in the lower uterine segment.     Complications: none       Description of Procedure:  After the appropriate time out and adequate dosing of her anesthesia, the patient had been prepped and draped in the usual sterile fashion.  She was placed in the dorsal lithotomy position using Butch stirrups.  The bladder had been drained with a red rubber catheter per nursing.  A weighted speculum was placed in the vagina.  The anterior lip of the cervix was grasped with a single-tooth tenaculum.  The cervix was injected at the 3 o'clock and 9 o'clock position with 1% lidocaine plain without any complications.  The cervix was then progressively dilated using Nieves dilators.  Rigid hysteroscopy was then performed with the above findings noted.  Sharp curettings were then obtained with a good cry throughout with tissue retrieved and sent for pathologic specimen.  The cervical tenaculum was removed and the cervix was noted to be hemostatic.  All instrument and sponge counts were correct at the end of the procedure.  The patient tolerated the procedure well.  There were no  complications.  She was taken to the postoperative recovery room in stable condition.    Margoth Chaudhari M.D.  3/25/2025  10:16 EDT

## 2025-03-25 NOTE — ANESTHESIA PREPROCEDURE EVALUATION
Anesthesia Evaluation     Patient summary reviewed and Nursing notes reviewed   history of anesthetic complications:   NPO Solid Status: > 8 hours  NPO Liquid Status: > 8 hours           Airway   Mallampati: II  TM distance: >3 FB  Neck ROM: full  Possible difficult intubation  Dental      Pulmonary    (+) ,shortness of breath  Cardiovascular     (+) hypertension, dysrhythmias Atrial Fib, CHF , hyperlipidemia      Neuro/Psych  (+) headaches, dizziness/light headedness, numbness  GI/Hepatic/Renal/Endo    (+) GERD, liver disease cirrhosis, renal disease- CRI, diabetes mellitus, thyroid problem thyroid nodules    Musculoskeletal     (+) back pain  Abdominal    Substance History      OB/GYN          Other   arthritis,     ROS/Med Hx Other: (HFpEF) heart failure with preserved ejection fraction  Abdominal pain  Acute heart failure with preserved ejection fraction (HFpEF)  Acute on chronic diastolic (congestive) heart failure  Acute on chronic diastolic congestive heart failure  Allergic rhinitis  Atrial fibrillation, persistent  Chronic gastritis  Chronic kidney disease  Chronic nausea  Controlled diabetes mellitus  Difficult or painful urination  Dyslipidemia  Dysphagia  Elevated liver function tests  Epigastric pain  Esophageal dysmotility  Essential hypertension  Fibrocystic disease of breast  Gastroesophageal reflux disease without esophagitis  Gout  Hepatic cirrhosis  Hospital discharge follow-up  Irritable bowel syndrome with diarrhea  Lumbosacral radiculopathy  Macular degeneration  Nuclear sclerotic cataract of left eye  Osteoarthritis  Osteopenia  PMB (postmenopausal bleeding)  Paroxysmal atrial fibrillation  Pelvic fracture  Prediabetes  Schatzki's ring  Stricture of esophagus  Toxic multinodular goiter with no crisis                  Anesthesia Plan    ASA 3     MAC     intravenous induction     Anesthetic plan, risks, benefits, and alternatives have been provided, discussed and informed consent has been  obtained with: patient.  Pre-procedure education provided  Plan discussed with CRNA.    CODE STATUS:

## 2025-03-25 NOTE — H&P
"GIL Perez  : 1934  MRN: [id number]  CSN: [id number]    History and Physical  Subjective   Addie Perez is a 89 y.o. year old  who presents for surgery due to postmenopausal bleeding.  Patient has been on unopposed estrogen therapy.  Patient presented to the office.  She had a transvaginal ultrasound.  She was noted to have a thickened endometrium.  Patient was counseled to stop her estrogen therapy.  She is here for further evaluation with D&C and diagnostic hysteroscopy.    History  Past Medical History:   Diagnosis Date    Ambulates with cane     Arthritis     Atrial fibrillation     denies any recent episodes of palpitations, SOB, dizziness, chest pain; next cardiology visit scheduled for 3/17/25    Back pain     Cataract     PT REPORTS EARLY STAGES    CHF (congestive heart failure)     LOV with cardiology 3/17/25    Cystitis     Diabetes mellitus      REPORTS \"I AM PRE DIABETIC\" AND REPORTS THAT SHE WATCHS HER DIET AND PCP WATCHES A1C CLOSELY    Disease of thyroid gland     Dysphagia     REPORTS MORE TROUBLE SWALLOWING WHEN EATING OR TAKING PILLS. 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     Current Outpatient Medications on File Prior to Encounter   Medication Sig Dispense Refill    acetaminophen (TYLENOL) 650 MG 8 hr tablet Take 1 tablet by mouth 2 (Two) Times a Day As Needed.      apixaban (ELIQUIS) 5 MG tablet tablet Take 1 tablet by mouth Every 12 (Twelve) Hours. 60 tablet 6    cetirizine (zyrTEC) 10 MG tablet Take 1 tablet by mouth Every Night.      clotrimazole-betamethasone (LOTRISONE) 1-0.05 % cream Apply 1 Application topically to the appropriate area as directed 2 (Two) Times a Day. Apply to affected area twice daily as needed 45 g 0    estradiol (ESTRACE VAGINAL) 0.1 MG/GM vaginal cream May use 1 gram intravaginal qhs x 2 weeks then 1 gram 2x/week. 1 each 11    fluticasone (FLONASE) 50 MCG/ACT nasal spray Administer 2 sprays into the nostril(s) as directed by provider Daily As Needed.      metoprolol succinate XL (Toprol XL) 200 MG 24 hr tablet Take 1 tablet by mouth Daily. 90 tablet 1    omeprazole (prilOSEC) 10 MG capsule Take 1 capsule by mouth Every Morning.       Allergies   Allergen Reactions    Codeine Nausea Only    Escitalopram Nausea Only     REPORTS \"LEXAPRO\"    Gabapentin Other (See Comments)     REPORTS \"MAKES MY EYES HURT BECAUSE OF MY MACULAR DEGENERATION\"    Ibuprofen Palpitations     REPORTS \"IT CAUSES MY HEART TO BEAT FAST\"    Levofloxacin Nausea Only    Naproxen Nausea Only    Nitrofurantoin Nausea Only    Sulfa Antibiotics Nausea Only     Past Surgical History:   Procedure Laterality Date    BACK SURGERY      REPORTS A SPUR WAS REMOVED THAT WAS HITTING SCIATIC NERVE    BREAST BIOPSY      BREAST SURGERY Left     LUMPECTOMY, BENIGN     CATARACT EXTRACTION W/ INTRAOCULAR LENS IMPLANT Left 03/04/2022    Procedure: CATARACT PHACO EXTRACTION WITH INTRAOCULAR LENS IMPLANT left;  Surgeon: Pritesh Mcnally MD;  Location: [facility];  Service: Ophthalmology;  " "Laterality: Left;    CATARACT EXTRACTION W/ INTRAOCULAR LENS IMPLANT Right 03/18/2022    Procedure: CATARACT PHACO EXTRACTION WITH INTRAOCULAR LENS IMPLANT RIGHT;  Surgeon: Pritesh Mcnally MD;  Location: [facility] OR;  Service: Ophthalmology;  Laterality: Right;    COLONOSCOPY  2012    REPORTS LAST DONE IN 2012 BUT HAS A HX OF SEVERAL OF THESE PROCEDURES    DILATATION AND CURETTAGE      REPORTS \"I HAD A COUPLE OF THOSE\"    ENDOSCOPY N/A 01/31/2017    Procedure: ESOPHAGOGASTRODUODENOSCOPY;  Surgeon: Robbie Quezada MD;  Location: [facility] ENDOSCOPY;  Service:     ENDOSCOPY N/A 09/21/2018    Procedure: ESOPHAGOGASTRODUODENOSCOPY WITH BIOPSY, DILITATION;  Surgeon: Robbie Quezada MD;  Location: [facility] ENDOSCOPY;  Service: Gastroenterology    ENDOSCOPY N/A 11/15/2023    Procedure: ESOPHAGOGASTRODUODENOSCOPY WITH BIOPSY AND DILATION;  Surgeon: Mil Ma MD;  Location: [facility] ENDOSCOPY;  Service: Gastroenterology;  Laterality: N/A;    FOOT SURGERY Left     REPORTS SURGICAL INTERVENTION TO CORRECT GREAT TOE ALIGNMENT    GALLBLADDER SURGERY  2009    HEMORRHOIDECTOMY  1973    TONSILLECTOMY  1946    UPPER GASTROINTESTINAL ENDOSCOPY  2014    UPPER GASTROINTESTINAL ENDOSCOPY  01/31/2017     Family History   Problem Relation Age of Onset    Diabetes Mother     Cancer Brother         lung    Cancer Brother         brain    Colon cancer Paternal Grandmother         possibly colon cancer    Breast cancer Neg Hx     Liver cancer Neg Hx     Liver disease Neg Hx      Social History     Socioeconomic History    Marital status:    Tobacco Use    Smoking status: Never     Passive exposure: Never    Smokeless tobacco: Never   Vaping Use    Vaping status: Never Used   Substance and Sexual Activity    Alcohol use: No    Drug use: No    Sexual activity: Defer     Review of Systems  The following systems were reviewed and negative:  constitution, eyes, ENT, respiratory, cardiovascular, gastrointestinal, genitourinary, " integument, breast, hematologic / lymphatic, musculoskeletal, neurological, behavioral/psych, endocrine, and allergies / immunologic    Objective  Recent Vitals         12/23/2024 3/5/2025 3/6/2025       BP: 118/71 108/80 120/78     Pulse: 70 -- 85     Temp: 97.8 °F (36.6 °C) -- --     Weight: 59.4 kg (131 lb) 60.3 kg (133 lb) 61.7 kg (136 lb)     BMI (Calculated): 30.4 30.9 31.6           Physical Exam:  General Appearance:  Alert and cooperative, not in any acute distress.   Head:  Atraumatic and normocephalic, without obvious abnormality.   Eyes:          PERRLA, conjunctivae and sclerae normal, no Icterus. No pallor. Extraocular movements are within normal limits.   Ears:  Ears appear intact with no abnormalities noted.   Throat: No oral lesions, no thrush, oral mucosa moist.   Neck: Supple, trachea midline, no thyromegaly, no carotid bruit.   Back:   No kyphoscoliosis present. No tenderness to palpation,   range of motion normal.  No CVAT.   Lungs:   Chest shape is normal. Breath sounds heard bilaterally equally.  No crackles or wheezing. No Pleural rub or bronchial breathing. Normal respiratory effort.   Heart:  Normal S1 and S2, no murmur, no gallop, no rub. No JVD.   Abdomen:   Normal bowel sounds, no masses, no hepatomegaly, no splenomegaly. Soft, non-tender, no guarding, no rebound tenderness.   Extremities: Moves all extremities well, no edema, no cyanosis, no clubbing.  Normal range of motion. Normal strength and tone.   Skin: No bleeding, bruising or rash. No palpable masses noted or induration.   Psychiatric: Alert and oriented  to time, place, and person. Appropriate mood and affect. Thought content organized and appropriate judgment.       Recent Labs  Lab Results (last 7 days)       ** No results found for the last 168 hours. **             Recent Imaging  No radiology results for the last 10 days        Assessment   Postmenopausal bleeding  Unopposed estrogen therapy  Abnormal endometrium     Plan    D&C with diagnostic hysteroscopy.  ABx and DVT prophylaxis as indicated.  Risks, complications, benefits, and other alternatives discussed.  All questions answered and pt in agreement with plan.    Margoth Chaudhari M.D.  3/25/2025  06:29 EDT

## 2025-03-25 NOTE — ANESTHESIA POSTPROCEDURE EVALUATION
Patient: Addie Perez    Procedure Summary       Date: 03/25/25 Room / Location: [facility] OR 1 / [facility] OR    Anesthesia Start: 0947 Anesthesia Stop: 1027    Procedure: DILATATION AND CURETTAGE HYSTEROSCOPY (Uterus) Diagnosis:       PMB (postmenopausal bleeding)      (PMB (postmenopausal bleeding) [N95.0])    Surgeons: Margoth Chaudhari MD Provider: Andreia Augustine CRNA    Anesthesia Type: MAC ASA Status: 3            Anesthesia Type: MAC    Vitals  Vitals Value Taken Time   /69 03/25/25 10:27   Temp 98.1 °F (36.7 °C) 03/25/25 10:27   Pulse 77 03/25/25 10:27   Resp 16 03/25/25 10:27   SpO2 97 % 03/25/25 10:27           Post Anesthesia Care and Evaluation    Patient location during evaluation: PHASE II  Patient participation: complete - patient participated  Level of consciousness: awake and alert  Pain score: 0  Pain management: satisfactory to patient    Airway patency: patent  Anesthetic complications: No anesthetic complications  PONV Status: none  Cardiovascular status: acceptable and stable  Respiratory status: acceptable  Hydration status: acceptable    Comments: Vitals signs as noted in nursing documentation as per protocol.

## 2025-03-26 LAB — REF LAB TEST METHOD: NORMAL

## 2025-04-02 ENCOUNTER — OFFICE VISIT (OUTPATIENT)
Dept: OBSTETRICS AND GYNECOLOGY | Facility: CLINIC | Age: OVER 89
End: 2025-04-02
Payer: MEDICARE

## 2025-04-02 VITALS
HEIGHT: 55 IN | SYSTOLIC BLOOD PRESSURE: 126 MMHG | WEIGHT: 138.4 LBS | DIASTOLIC BLOOD PRESSURE: 68 MMHG | BODY MASS INDEX: 32.03 KG/M2

## 2025-04-02 DIAGNOSIS — Z98.890 S/P D&C (STATUS POST DILATION AND CURETTAGE): ICD-10-CM

## 2025-04-02 DIAGNOSIS — Z09 POSTOPERATIVE FOLLOW-UP: Primary | ICD-10-CM

## 2025-04-02 RX ORDER — CYCLOBENZAPRINE HCL 5 MG
1 TABLET ORAL 3 TIMES DAILY
COMMUNITY

## 2025-04-02 NOTE — PROGRESS NOTES
"Subjective   Chief Complaint   Patient presents with    Post-op     8 days post-op D&C Hysteroscopy, doing well       Oreusebio Perez is a 89 y.o. year old  presenting to be seen for post op check.  Doing well post procedure  Having light vaginal spotting and lighter than it was a few days ago   Pathology benign-polyp    Past Medical History:   Diagnosis Date    Ambulates with cane     Arthritis     Atrial fibrillation     denies any recent episodes of palpitations, SOB, dizziness, chest pain; next cardiology visit scheduled for 3/17/25    Back pain     Cataract     PT REPORTS EARLY STAGES    CHF (congestive heart failure)     LOV with cardiology 3/17/25    Cystitis     Diabetes mellitus      REPORTS \"I AM PRE DIABETIC\" AND REPORTS THAT SHE WATCHS HER DIET AND PCP WATCHES A1C CLOSELY    Disease of thyroid gland     Dysphagia     REPORTS MORE TROUBLE SWALLOWING WHEN EATING OR TAKING PILLS. 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    History of cardiac murmur     REPORTS \"I USED TO HAVE ONE BUT NOW THEY SAY THEY DON'T HEAR IT\"    History of colonic polyps     History of pneumonia     Hx of echocardiogram     Hypercholesterolemia     Hypertension     Irregular heart beat     Macular degeneration     Osteoporosis     Seasonal allergies     Sinusitis     Spinal headache     REPORTS AFTER DELIVERY OF HER SON    Urinary tract infection     Vertigo     Wears glasses         Current Outpatient Medications:     acetaminophen (TYLENOL) 650 MG 8 hr tablet, Take 1 tablet by mouth 2 (Two) Times a Day As Needed., Disp: , Rfl:     apixaban " "(ELIQUIS) 5 MG tablet tablet, Take 1 tablet by mouth Every 12 (Twelve) Hours., Disp: 60 tablet, Rfl: 6    cetirizine (zyrTEC) 10 MG tablet, Take 1 tablet by mouth Every Night., Disp: , Rfl:     clotrimazole-betamethasone (LOTRISONE) 1-0.05 % cream, Apply 1 Application topically to the appropriate area as directed 2 (Two) Times a Day. Apply to affected area twice daily as needed, Disp: 45 g, Rfl: 0    cyclobenzaprine (FLEXERIL) 5 MG tablet, Take 1 tablet by mouth 3 (Three) Times a Day., Disp: , Rfl:     estradiol (ESTRACE VAGINAL) 0.1 MG/GM vaginal cream, May use 1 gram intravaginal qhs x 2 weeks then 1 gram 2x/week., Disp: 1 each, Rfl: 11    fluticasone (FLONASE) 50 MCG/ACT nasal spray, Administer 2 sprays into the nostril(s) as directed by provider Daily As Needed., Disp: , Rfl:     furosemide (Lasix) 40 MG tablet, Take 1 tablet by mouth Daily As Needed (For weight gain >2-3 pounds overnight or >5 pounds in a week's time)., Disp: , Rfl:     losartan (COZAAR) 50 MG tablet, Take 1 tablet by mouth Daily., Disp: , Rfl:     metoprolol succinate XL (Toprol XL) 200 MG 24 hr tablet, Take 1 tablet by mouth Daily., Disp: 90 tablet, Rfl: 1    omeprazole (prilOSEC) 10 MG capsule, Take 1 capsule by mouth Every Morning., Disp: , Rfl:     ondansetron (ZOFRAN) 8 MG tablet, Take 1 tablet by mouth Every 8 (Eight) Hours As Needed for Nausea or Vomiting., Disp: 15 tablet, Rfl: 0    spironolactone (ALDACTONE) 25 MG tablet, Take 1 tablet by mouth Daily., Disp: 30 tablet, Rfl: 5    torsemide (DEMADEX) 5 MG tablet, Take 1 tablet by mouth Daily., Disp: , Rfl:    Allergies   Allergen Reactions    Codeine Nausea Only    Escitalopram Nausea Only     REPORTS \"LEXAPRO\"    Gabapentin Other (See Comments)     REPORTS \"MAKES MY EYES HURT BECAUSE OF MY MACULAR DEGENERATION\"    Ibuprofen Palpitations     REPORTS \"IT CAUSES MY HEART TO BEAT FAST\"    Levofloxacin Nausea Only    Naproxen Nausea Only    Nitrofurantoin Nausea Only    Sulfa Antibiotics " "Nausea Only      Past Surgical History:   Procedure Laterality Date    BACK SURGERY      REPORTS A SPUR WAS REMOVED THAT WAS HITTING SCIATIC NERVE    BREAST BIOPSY      BREAST SURGERY Left     LUMPECTOMY, BENIGN     CATARACT EXTRACTION W/ INTRAOCULAR LENS IMPLANT Left 03/04/2022    Procedure: CATARACT PHACO EXTRACTION WITH INTRAOCULAR LENS IMPLANT left;  Surgeon: Pritesh Mcnally MD;  Location: [facility] OR;  Service: Ophthalmology;  Laterality: Left;    CATARACT EXTRACTION W/ INTRAOCULAR LENS IMPLANT Right 03/18/2022    Procedure: CATARACT PHACO EXTRACTION WITH INTRAOCULAR LENS IMPLANT RIGHT;  Surgeon: Pritesh Mcnally MD;  Location: [facility] OR;  Service: Ophthalmology;  Laterality: Right;    COLONOSCOPY  2012    REPORTS LAST DONE IN 2012 BUT HAS A HX OF SEVERAL OF THESE PROCEDURES    D & C HYSTEROSCOPY N/A 03/25/2025    Procedure: DILATATION AND CURETTAGE HYSTEROSCOPY;  Surgeon: Margoth Chaudhari MD;  Location: [facility] OR;  Service: Obstetrics/Gynecology;  Laterality: N/A;    DILATATION AND CURETTAGE      REPORTS \"I HAD A COUPLE OF THOSE\"    ENDOSCOPY N/A 01/31/2017    Procedure: ESOPHAGOGASTRODUODENOSCOPY;  Surgeon: Robbie Quezada MD;  Location: [facility] ENDOSCOPY;  Service:     ENDOSCOPY N/A 09/21/2018    Procedure: ESOPHAGOGASTRODUODENOSCOPY WITH BIOPSY, DILITATION;  Surgeon: Robbie Quezada MD;  Location: [facility] ENDOSCOPY;  Service: Gastroenterology    ENDOSCOPY N/A 11/15/2023    Procedure: ESOPHAGOGASTRODUODENOSCOPY WITH BIOPSY AND DILATION;  Surgeon: Mil Ma MD;  Location: [facility] ENDOSCOPY;  Service: Gastroenterology;  Laterality: N/A;    FOOT SURGERY Left     REPORTS SURGICAL INTERVENTION TO CORRECT GREAT TOE ALIGNMENT    GALLBLADDER SURGERY  2009    HEMORRHOIDECTOMY  1973    HYSTEROSCOPY      TONSILLECTOMY  1946    UPPER GASTROINTESTINAL ENDOSCOPY  2014    UPPER GASTROINTESTINAL ENDOSCOPY  01/31/2017      Social History     Socioeconomic History    Marital status:    Tobacco Use    " "Smoking status: Never     Passive exposure: Never    Smokeless tobacco: Never   Vaping Use    Vaping status: Never Used   Substance and Sexual Activity    Alcohol use: No    Drug use: No    Sexual activity: Not Currently     Birth control/protection: Post-menopausal      Family History   Problem Relation Age of Onset    Diabetes Mother     Cancer Brother         lung    Cancer Brother         brain    Colon cancer Paternal Grandmother         possibly colon cancer    Breast cancer Neg Hx     Liver cancer Neg Hx     Liver disease Neg Hx        Review of Systems   Constitutional: Negative.    Gastrointestinal: Negative.    Genitourinary: Negative.            Objective   /68   Ht 139.7 cm (55\")   Wt 62.8 kg (138 lb 6.4 oz)   LMP  (LMP Unknown)   BMI 32.17 kg/m²     Physical Exam  Constitutional:       Appearance: Normal appearance. She is well-developed and well-groomed.   Eyes:      General: Lids are normal.      Extraocular Movements: Extraocular movements intact.      Conjunctiva/sclera: Conjunctivae normal.   Neurological:      General: No focal deficit present.      Mental Status: She is alert and oriented to person, place, and time.   Psychiatric:         Attention and Perception: Attention normal.         Mood and Affect: Mood normal.         Speech: Speech normal.         Behavior: Behavior is cooperative.            Result Review :           TISSUE EXAM, [facility]) (03/25/2025 10:14)         Assessment and Plan  Diagnoses and all orders for this visit:    1. Postoperative follow-up (Primary)    2. S/P D&C (status post dilation and curettage)      Patient Instructions   RTO prn           This note was electronically signed.    Janie Smith PA-C   April 2, 2025  "

## 2025-04-16 DIAGNOSIS — I48.19 PERSISTENT ATRIAL FIBRILLATION: ICD-10-CM

## 2025-04-16 NOTE — TELEPHONE ENCOUNTER
Caller: SHELBIE REHMAN    Relationship:CHILD    Callback number: [phone]   Is it ok to leave a message: [x] Yes [] No    Requested medication for samples: ELIQUIS     How much medication does the patient currently have left: FEW WEEKS    Who will be picking up the samples: SHELBIE REHMAN    Do you need information about patient financial assistance for this medication: [] Yes [x] No

## 2025-04-16 NOTE — TELEPHONE ENCOUNTER
I spoke with Colleen Jim and advised that samples will be placed at the  for  as well as patient assistance paperwork.     2boxes Eliquis 5mg  Lot: [id number]  Exp: 3/31/26

## 2025-05-07 ENCOUNTER — HOSPITAL ENCOUNTER (OUTPATIENT)
Dept: ULTRASOUND IMAGING | Facility: HOSPITAL | Age: OVER 89
Discharge: HOME | End: 2025-05-07
Admitting: NURSE PRACTITIONER
Payer: MEDICARE

## 2025-05-07 ENCOUNTER — PREP FOR SURGERY (OUTPATIENT)
Dept: OTHER | Facility: HOSPITAL | Age: OVER 89
End: 2025-05-07
Payer: MEDICARE

## 2025-05-07 DIAGNOSIS — R79.89 ELEVATED LIVER FUNCTION TESTS: Chronic | ICD-10-CM

## 2025-05-07 DIAGNOSIS — K74.60 HEPATIC CIRRHOSIS, UNSPECIFIED HEPATIC CIRRHOSIS TYPE, UNSPECIFIED WHETHER ASCITES PRESENT: Chronic | ICD-10-CM

## 2025-05-07 DIAGNOSIS — R13.19 ESOPHAGEAL DYSPHAGIA: Primary | ICD-10-CM

## 2025-05-07 PROCEDURE — 76705 ECHO EXAM OF ABDOMEN: CPT

## 2025-05-07 RX ORDER — SODIUM CHLORIDE 9 MG/ML
70 INJECTION, SOLUTION INTRAVENOUS CONTINUOUS PRN
OUTPATIENT
Start: 2025-05-07 | End: 2025-05-08

## 2025-05-13 NOTE — PRE-PROCEDURE INSTRUCTIONS
PAT phone history completed with patient for upcoming procedure on 5/22/25.    PAT PASS reviewed with patient and he/she verbalized understanding of the following:     Do not eat or drink anything after midnight the night before procedure unless otherwise instructed by physician/surgeon's office, this includes no gum, candy, mints, tobacco products or e-cigarettes.  Do not shave the area to be operated on at least 48 hours prior to procedure.  Do not wear makeup, lotion, hair products, or nail polish.  Do not wear any jewelry and remove all piercings.  Do not wear any adhesive if you wear dentures.  Do not wear contacts; bring in glasses if needed.  Only take medications on the morning of procedure as instructed by PAT nurse per anesthesia guidelines or as instructed by physician's office.   If you are on any blood thinners reach out to the physician/surgeon's office for instructions on when/if they will need to be stopped prior to procedure.   Bring in picture ID and insurance card, advanced directive copies if applicable, CPAP/BIPAP/Inhalers if indicated morning of procedure, leave any other valuables at home.  Ensure you have arranged for someone to drive you home the day of your procedure and someone to care for you at home afterwards. It is recommended that you do not drive, drink alcohol, or make any major legal decisions for at least 24 hours after your procedure is complete.    Instructions given on hospital entrance and registration location.

## 2025-05-22 ENCOUNTER — HOSPITAL ENCOUNTER (OUTPATIENT)
Facility: HOSPITAL | Age: OVER 89
Setting detail: HOSPITAL OUTPATIENT SURGERY
Discharge: HOME | End: 2025-05-22
Attending: INTERNAL MEDICINE | Admitting: INTERNAL MEDICINE
Payer: MEDICARE

## 2025-05-22 ENCOUNTER — ANESTHESIA EVENT (OUTPATIENT)
Dept: GASTROENTEROLOGY | Facility: HOSPITAL | Age: OVER 89
End: 2025-05-22
Payer: MEDICARE

## 2025-05-22 ENCOUNTER — ANESTHESIA (OUTPATIENT)
Dept: GASTROENTEROLOGY | Facility: HOSPITAL | Age: OVER 89
End: 2025-05-22
Payer: MEDICARE

## 2025-05-22 VITALS
OXYGEN SATURATION: 100 % | WEIGHT: 132 LBS | HEART RATE: 66 BPM | TEMPERATURE: 97.5 F | DIASTOLIC BLOOD PRESSURE: 47 MMHG | RESPIRATION RATE: 16 BRPM | BODY MASS INDEX: 30.68 KG/M2 | SYSTOLIC BLOOD PRESSURE: 91 MMHG

## 2025-05-22 DIAGNOSIS — R13.19 ESOPHAGEAL DYSPHAGIA: ICD-10-CM

## 2025-05-22 PROCEDURE — 43249 ESOPH EGD DILATION <30 MM: CPT | Performed by: INTERNAL MEDICINE

## 2025-05-22 PROCEDURE — 25810000003 SODIUM CHLORIDE 0.9 % SOLUTION: Performed by: NURSE PRACTITIONER

## 2025-05-22 PROCEDURE — 43239 EGD BIOPSY SINGLE/MULTIPLE: CPT | Performed by: INTERNAL MEDICINE

## 2025-05-22 PROCEDURE — 25010000002 PROPOFOL 10 MG/ML EMULSION: Performed by: NURSE ANESTHETIST, CERTIFIED REGISTERED

## 2025-05-22 PROCEDURE — C1726 CATH, BAL DIL, NON-VASCULAR: HCPCS | Performed by: INTERNAL MEDICINE

## 2025-05-22 RX ORDER — PROPOFOL 10 MG/ML
VIAL (ML) INTRAVENOUS AS NEEDED
Status: DISCONTINUED | OUTPATIENT
Start: 2025-05-22 | End: 2025-05-22 | Stop reason: SURG

## 2025-05-22 RX ORDER — LIDOCAINE HCL/PF 100 MG/5ML
SYRINGE (ML) INJECTION AS NEEDED
Status: DISCONTINUED | OUTPATIENT
Start: 2025-05-22 | End: 2025-05-22 | Stop reason: SURG

## 2025-05-22 RX ORDER — SODIUM CHLORIDE 9 MG/ML
70 INJECTION, SOLUTION INTRAVENOUS CONTINUOUS PRN
Status: DISCONTINUED | OUTPATIENT
Start: 2025-05-22 | End: 2025-05-22 | Stop reason: HOSPADM

## 2025-05-22 RX ADMIN — SODIUM CHLORIDE 70 ML/HR: 0.9 INJECTION, SOLUTION INTRAVENOUS at 07:32

## 2025-05-22 RX ADMIN — PROPOFOL 20 MG: 10 INJECTION, EMULSION INTRAVENOUS at 08:28

## 2025-05-22 RX ADMIN — Medication 50 MG: at 08:20

## 2025-05-22 RX ADMIN — PROPOFOL 50 MG: 10 INJECTION, EMULSION INTRAVENOUS at 08:20

## 2025-05-22 NOTE — H&P
"    [facility]  HISTORY AND PHYSICAL    Patient Name: Addie Perez  : 1934  MRN: [id number]    Chief Complaint:   For EGD    History Of Presenting Illness:    Odynophagia   Dysphagia    Past Medical History:   Diagnosis Date    Ambulates with cane     Arthritis     Atrial fibrillation     denies any recent episodes of palpitations, SOB, dizziness, chest pain; next cardiology visit scheduled for 3/17/25    Back pain     Cataract     PT REPORTS EARLY STAGES    CHF (congestive heart failure)     LOV with cardiology 3/17/25    Cystitis     Diabetes mellitus      REPORTS \"I AM PRE DIABETIC\" AND REPORTS THAT SHE WATCHS HER DIET AND PCP WATCHES A1C CLOSELY    Disease of thyroid gland     Dysphagia     REPORTS MORE TROUBLE SWALLOWING WHEN EATING OR TAKING PILLS. REPORTS PAIN AND THAT SOMETIMES THINGS FEEL LIKE THEY \"STICK\"    Dyspnea     occasionally - states due CHF - worse when humidity is high    Elevated cholesterol 2018    REPORTS SHE HAS TAKEN MEDICATION IN THE PAST BUT THAT SHE THINKS ALL IS WNL'S AT PRESENT TIME WITHOUT MEDICATION    Fracture of right ankle     Fractured pelvis     was sent to [facility] in [place] for rehab    GERD (gastroesophageal reflux disease)     Hearing loss     USE OF HEARING AIDS but states still does not hear well    History of cardiac murmur     REPORTS \"I USED TO HAVE ONE BUT NOW THEY SAY THEY DON'T HEAR IT\"    History of colonic polyps     History of pneumonia     Hx of echocardiogram     Hypercholesterolemia     Hypertension     Macular degeneration     Osteoporosis     Seasonal allergies     Sinusitis     Spinal headache     REPORTS AFTER DELIVERY OF HER SON    Urinary tract infection     history    Vertigo     Wears glasses        Past Surgical History:   Procedure Laterality Date    BACK SURGERY      REPORTS A SPUR WAS REMOVED THAT WAS HITTING SCIATIC NERVE    BREAST BIOPSY      BREAST SURGERY Left     LUMPECTOMY, BENIGN     CATARACT EXTRACTION " "W/ INTRAOCULAR LENS IMPLANT Left 03/04/2022    Procedure: CATARACT PHACO EXTRACTION WITH INTRAOCULAR LENS IMPLANT left;  Surgeon: Pritesh Mcnally MD;  Location: [facility] OR;  Service: Ophthalmology;  Laterality: Left;    CATARACT EXTRACTION W/ INTRAOCULAR LENS IMPLANT Right 03/18/2022    Procedure: CATARACT PHACO EXTRACTION WITH INTRAOCULAR LENS IMPLANT RIGHT;  Surgeon: Pritesh Mcnally MD;  Location: [facility] OR;  Service: Ophthalmology;  Laterality: Right;    COLONOSCOPY  2012    REPORTS LAST DONE IN 2012 BUT HAS A HX OF SEVERAL OF THESE PROCEDURES    D & C HYSTEROSCOPY N/A 03/25/2025    Procedure: DILATATION AND CURETTAGE HYSTEROSCOPY;  Surgeon: Margoth Chaudhari MD;  Location: [facility] OR;  Service: Obstetrics/Gynecology;  Laterality: N/A;    DILATATION AND CURETTAGE      REPORTS \"I HAD A COUPLE OF THOSE\"    ENDOSCOPY N/A 01/31/2017    Procedure: ESOPHAGOGASTRODUODENOSCOPY;  Surgeon: Robbie Quezada MD;  Location: [facility] ENDOSCOPY;  Service:     ENDOSCOPY N/A 09/21/2018    Procedure: ESOPHAGOGASTRODUODENOSCOPY WITH BIOPSY, DILITATION;  Surgeon: Robbie Quezada MD;  Location: [facility] ENDOSCOPY;  Service: Gastroenterology    ENDOSCOPY N/A 11/15/2023    Procedure: ESOPHAGOGASTRODUODENOSCOPY WITH BIOPSY AND DILATION;  Surgeon: Mil Ma MD;  Location: [facility] ENDOSCOPY;  Service: Gastroenterology;  Laterality: N/A;    FOOT SURGERY Left     REPORTS SURGICAL INTERVENTION TO CORRECT GREAT TOE ALIGNMENT    GALLBLADDER SURGERY  2009    HEMORRHOIDECTOMY  1973    HYSTEROSCOPY      TONSILLECTOMY  1946    UPPER GASTROINTESTINAL ENDOSCOPY  2014    UPPER GASTROINTESTINAL ENDOSCOPY  01/31/2017       Social History     Socioeconomic History    Marital status:    Tobacco Use    Smoking status: Never     Passive exposure: Never    Smokeless tobacco: Never   Vaping Use    Vaping status: Never Used   Substance and Sexual Activity    Alcohol use: No    Drug use: No    Sexual activity: Not Currently     Birth " control/protection: Post-menopausal       Family History   Problem Relation Age of Onset    Diabetes Mother     Cancer Brother         lung    Cancer Brother         brain    Colon cancer Paternal Grandmother         possibly colon cancer    Breast cancer Neg Hx     Liver cancer Neg Hx     Liver disease Neg Hx        Prior to Admission Medications:  Medications Prior to Admission   Medication Sig Dispense Refill Last Dose/Taking    acetaminophen (TYLENOL) 650 MG 8 hr tablet Take 1 tablet by mouth 2 (Two) Times a Day As Needed.   5/21/2025    apixaban (ELIQUIS) 5 MG tablet tablet Take 1 tablet by mouth Every 12 (Twelve) Hours. (Patient taking differently: Take 0.5 tablets by mouth Every 12 (Twelve) Hours.) 28 tablet 0 5/20/2025    cetirizine (zyrTEC) 10 MG tablet Take 1 tablet by mouth Every Night.   5/21/2025    clotrimazole-betamethasone (LOTRISONE) 1-0.05 % cream Apply 1 Application topically to the appropriate area as directed 2 (Two) Times a Day. Apply to affected area twice daily as needed (Patient taking differently: Apply 1 Application topically to the appropriate area as directed As Needed. Apply to affected area twice daily as needed) 45 g 0 5/21/2025    cyclobenzaprine (FLEXERIL) 5 MG tablet Take 1 tablet by mouth 3 (Three) Times a Day.   5/21/2025    estradiol (ESTRACE VAGINAL) 0.1 MG/GM vaginal cream May use 1 gram intravaginal qhs x 2 weeks then 1 gram 2x/week. 1 each 11 5/21/2025    fluticasone (FLONASE) 50 MCG/ACT nasal spray Administer 2 sprays into the nostril(s) as directed by provider Daily As Needed.   5/21/2025    furosemide (Lasix) 40 MG tablet Take 1 tablet by mouth Daily As Needed (For weight gain >2-3 pounds overnight or >5 pounds in a week's time).   5/21/2025    metoprolol succinate XL (Toprol XL) 200 MG 24 hr tablet Take 1 tablet by mouth Daily. 90 tablet 1 5/21/2025    omeprazole (prilOSEC) 10 MG capsule Take 1 capsule by mouth Every Morning.   5/21/2025    spironolactone (ALDACTONE) 25  "MG tablet Take 1 tablet by mouth Daily. 30 tablet 5 5/21/2025    losartan (COZAAR) 50 MG tablet Take 1 tablet by mouth Daily.       ondansetron (ZOFRAN) 8 MG tablet Take 1 tablet by mouth Every 8 (Eight) Hours As Needed for Nausea or Vomiting. 15 tablet 0 Unknown    torsemide (DEMADEX) 5 MG tablet Take 1 tablet by mouth Daily.          Allergies:  Allergies   Allergen Reactions    Codeine Nausea Only    Escitalopram Nausea Only     REPORTS \"LEXAPRO\"    Gabapentin Other (See Comments)     REPORTS \"MAKES MY EYES HURT BECAUSE OF MY MACULAR DEGENERATION\"    Ibuprofen Palpitations     REPORTS \"IT CAUSES MY HEART TO BEAT FAST\"    Levofloxacin Nausea Only    Naproxen Nausea Only    Nitrofurantoin Nausea Only    Sulfa Antibiotics Nausea Only        Vitals: Temp:  [97.6 °F (36.4 °C)] 97.6 °F (36.4 °C)  Heart Rate:  [94] 94  Resp:  [18] 18  BP: (138)/(97) 138/97    Review Of Systems:  Constitutional:  Negative for chills, fever, and unexpected weight change.  Respiratory:  Negative for cough, chest tightness, shortness of breath, and wheezing.  Cardiovascular:  Negative for chest pain, palpitations, and leg swelling.  Gastrointestinal:  Negative for abdominal distention, abdominal pain, nausea, vomiting.  Neurological:  Negative for weakness, numbness, and headaches.     Physical Exam:    General Appearance:  Alert, cooperative, in no acute distress.   Lungs:   Clear to auscultation, respirations regular, even and                 unlabored.   Heart:  Regular rhythm and normal rate.   Abdomen:   Normal bowel sounds, no masses, no organomegaly. Soft, nontender, nondistended   Neurologic: Alert and oriented x 3. Moves all four limbs equally       Assessment & Plan     Assessment:  Principal Problem:    Dysphagia      Plan: Esophagogastroduodenoscopy with possible biopsy, polypectomy, dilatation, endoscopic band ligation, ablation of arteriovenous malformations or control of bleeding (N/A)     Mil Ma, " MD  5/22/2025

## 2025-05-22 NOTE — DISCHARGE INSTRUCTIONS
Rest today  No pushing,pulling,tugging,heavy lifting, or strenuous activity   No major decision making,driving,or drinking alcoholic beverages for 24 hours due to the sedation you received  Always use good hand hygiene/washing technique  No driving on pain medication.    To assist you in voiding:  Drink plenty of fluids  Listen to running water while attempting to void.    If you are unable to urinate and you have an uncomfortable urge to void or it has been   6 hours since you were discharged, return to the Emergency Room.    - Discharge patient to home (ambulatory).   - Mechanical soft diet today.   - Continue present medications.   - Follow an antireflux regimen.   - Hold eliquis for 3 days   - PPI daily  - Await pathology results.   - Return to my office in 8 weeks.

## 2025-05-22 NOTE — ANESTHESIA PREPROCEDURE EVALUATION
Anesthesia Evaluation     Patient summary reviewed and Nursing notes reviewed   history of anesthetic complications:   NPO Solid Status: > 8 hours  NPO Liquid Status: > 8 hours           Airway   Mallampati: II  TM distance: >3 FB  Neck ROM: full  Possible difficult intubation  Dental      Pulmonary    (+) ,shortness of breath  Cardiovascular     (+) hypertension, dysrhythmias, CHF , hyperlipidemia      Neuro/Psych  (+) headaches, dizziness/light headedness, numbness  GI/Hepatic/Renal/Endo    (+) GERD, liver disease, renal disease-, diabetes mellitus, thyroid problem thyroid nodules    Musculoskeletal     (+) back pain  Abdominal    Substance History      OB/GYN          Other   arthritis,                 Anesthesia Plan    ASA 3     MAC     intravenous induction     Anesthetic plan, risks, benefits, and alternatives have been provided, discussed and informed consent has been obtained with: patient.  Pre-procedure education provided  Plan discussed with CRNA.    CODE STATUS:

## 2025-05-22 NOTE — ANESTHESIA POSTPROCEDURE EVALUATION
Patient: Addie Perez    Procedure Summary       Date: 05/22/25 Room / Location: [facility] ENDOSCOPY 2 / [facility] ENDOSCOPY    Anesthesia Start: 0818 Anesthesia Stop: 0838    Procedure: Esophagogastroduodenoscopy with dilitation and biopsy (Esophagus) Diagnosis:       Esophageal dysphagia      (Esophageal dysphagia [R13.19])    Surgeons: Mil Ma MD Provider: Harrison Crisostomo CRNA    Anesthesia Type: MAC ASA Status: 3            Anesthesia Type: MAC    Vitals  No vitals data found for the desired time range.          Post Anesthesia Care and Evaluation    Patient location during evaluation: bedside  Patient participation: complete - patient participated  Level of consciousness: awake  Pain score: 0  Pain management: adequate    Airway patency: patent  Anesthetic complications: No anesthetic complications  PONV Status: controlled  Cardiovascular status: acceptable and stable  Respiratory status: acceptable and room air  Hydration status: acceptable    Comments: Vital signs as noted in nursing documentation as per protocol

## 2025-05-23 LAB — REF LAB TEST METHOD: NORMAL

## 2025-06-12 DIAGNOSIS — I48.19 PERSISTENT ATRIAL FIBRILLATION: ICD-10-CM

## 2025-06-12 NOTE — TELEPHONE ENCOUNTER
Will you call Mrs. Perez or approved  and see if she is taking 5 mg or 2.5 mg of Eliquis?    So if you have two or more of the following: you can be on low-dose Eliquis:     >80 years old  Weight <60 kg  Creatinine >1.5 (her kidney fx is good)    Her last documented weight was right at 60 kg so it would be okay if she was doing low-dose.    Just let me know.  Thanks!

## 2025-06-12 NOTE — TELEPHONE ENCOUNTER
Pt takes 2.5 mg but cuts the 5 mg in half. Pt daughter asked for a 30 day supply sent as she hopefully will reach her copay deductible on the next refill

## 2025-06-12 NOTE — TELEPHONE ENCOUNTER
Hub staff attempted to follow warm transfer process and was unsuccessful     Caller: Colleen Jim    Relationship to patient: Emergency Contact    Best call back number: [phone]    Patient is needing: PATIENT RETURNING A CALL.WT TO THE OFFICE

## 2025-06-12 NOTE — TELEPHONE ENCOUNTER
Patients daughter called requesting a refill of Eliquis to be sent in for her mother.    Please advise.

## 2025-06-19 ENCOUNTER — RESULTS FOLLOW-UP (OUTPATIENT)
Dept: GASTROENTEROLOGY | Facility: CLINIC | Age: OVER 89
End: 2025-06-19
Payer: MEDICARE

## 2025-06-19 ENCOUNTER — HOSPITAL ENCOUNTER (OUTPATIENT)
Dept: CT IMAGING | Facility: HOSPITAL | Age: OVER 89
Discharge: HOME | End: 2025-06-19
Admitting: NURSE PRACTITIONER
Payer: MEDICARE

## 2025-06-19 DIAGNOSIS — K86.9 PANCREATIC LESION: ICD-10-CM

## 2025-06-19 PROCEDURE — 25510000001 IOPAMIDOL 61 % SOLUTION: Performed by: NURSE PRACTITIONER

## 2025-06-19 PROCEDURE — 74177 CT ABD & PELVIS W/CONTRAST: CPT

## 2025-06-19 RX ORDER — IOPAMIDOL 612 MG/ML
100 INJECTION, SOLUTION INTRAVASCULAR
Status: COMPLETED | OUTPATIENT
Start: 2025-06-19 | End: 2025-06-19

## 2025-06-19 RX ADMIN — IOPAMIDOL 100 ML: 612 INJECTION, SOLUTION INTRAVENOUS at 14:23

## 2025-07-07 DIAGNOSIS — I48.19 PERSISTENT ATRIAL FIBRILLATION: ICD-10-CM

## 2025-07-07 RX ORDER — METOPROLOL SUCCINATE 200 MG/1
200 TABLET, EXTENDED RELEASE ORAL DAILY
Qty: 90 TABLET | Refills: 1 | Status: SHIPPED | OUTPATIENT
Start: 2025-07-07

## 2025-07-07 RX ORDER — APIXABAN 5 MG/1
2.5 TABLET, FILM COATED ORAL
Qty: 30 TABLET | Refills: 0 | Status: SHIPPED | OUTPATIENT
Start: 2025-07-07

## 2025-07-18 ENCOUNTER — HOSPITAL ENCOUNTER (OUTPATIENT)
Dept: MAMMOGRAPHY | Facility: HOSPITAL | Age: OVER 89
Discharge: HOME | End: 2025-07-18
Admitting: OBSTETRICS & GYNECOLOGY
Payer: MEDICARE

## 2025-07-18 DIAGNOSIS — Z12.31 ENCOUNTER FOR SCREENING MAMMOGRAM FOR BREAST CANCER: ICD-10-CM

## 2025-07-18 PROCEDURE — 77063 BREAST TOMOSYNTHESIS BI: CPT

## 2025-07-18 PROCEDURE — 77067 SCR MAMMO BI INCL CAD: CPT

## 2025-07-21 DIAGNOSIS — R92.8 ABNORMAL MAMMOGRAM: Primary | ICD-10-CM

## 2025-07-28 ENCOUNTER — HOSPITAL ENCOUNTER (INPATIENT)
Facility: HOSPITAL | Age: OVER 89
LOS: 1 days | Discharge: HOME | End: 2025-07-30
Attending: EMERGENCY MEDICINE | Admitting: INTERNAL MEDICINE
Payer: MEDICARE

## 2025-07-28 ENCOUNTER — APPOINTMENT (OUTPATIENT)
Dept: GENERAL RADIOLOGY | Facility: HOSPITAL | Age: OVER 89
End: 2025-07-28
Payer: MEDICARE

## 2025-07-28 DIAGNOSIS — N30.01 ACUTE CYSTITIS WITH HEMATURIA: ICD-10-CM

## 2025-07-28 DIAGNOSIS — E87.1 HYPONATREMIA: Primary | ICD-10-CM

## 2025-07-28 LAB
ALBUMIN SERPL-MCNC: 4.1 G/DL (ref 3.5–5.2)
ALBUMIN/GLOB SERPL: 1.2 G/DL
ALP SERPL-CCNC: 175 U/L (ref 39–117)
ALT SERPL W P-5'-P-CCNC: 12 U/L (ref 1–33)
ANION GAP SERPL CALCULATED.3IONS-SCNC: 11.9 MMOL/L (ref 5–15)
AST SERPL-CCNC: 24 U/L (ref 1–32)
BACTERIA UR QL AUTO: ABNORMAL /HPF
BASOPHILS # BLD AUTO: 0.03 10*3/MM3 (ref 0–0.2)
BASOPHILS NFR BLD AUTO: 0.5 % (ref 0–1.5)
BILIRUB SERPL-MCNC: 1.1 MG/DL (ref 0–1.2)
BILIRUB UR QL STRIP: NEGATIVE
BUN SERPL-MCNC: 28 MG/DL (ref 8–23)
BUN/CREAT SERPL: 23.7 (ref 7–25)
CALCIUM SPEC-SCNC: 9.4 MG/DL (ref 8.2–9.6)
CHLORIDE SERPL-SCNC: 88 MMOL/L (ref 98–107)
CLARITY UR: CLEAR
CO2 SERPL-SCNC: 22.1 MMOL/L (ref 22–29)
COLOR UR: YELLOW
CREAT SERPL-MCNC: 1.18 MG/DL (ref 0.57–1)
D-LACTATE SERPL-SCNC: 1.1 MMOL/L (ref 0.5–2)
DEPRECATED RDW RBC AUTO: 46.5 FL (ref 37–54)
EGFRCR SERPLBLD CKD-EPI 2021: 43.7 ML/MIN/1.73
EOSINOPHIL # BLD AUTO: 0.08 10*3/MM3 (ref 0–0.4)
EOSINOPHIL NFR BLD AUTO: 1.2 % (ref 0.3–6.2)
ERYTHROCYTE [DISTWIDTH] IN BLOOD BY AUTOMATED COUNT: 15.5 % (ref 12.3–15.4)
GEN 5 1HR TROPONIN T REFLEX: 11 NG/L
GLOBULIN UR ELPH-MCNC: 3.4 GM/DL
GLUCOSE SERPL-MCNC: 89 MG/DL (ref 65–99)
GLUCOSE UR STRIP-MCNC: NEGATIVE MG/DL
HCT VFR BLD AUTO: 38.8 % (ref 34–46.6)
HGB BLD-MCNC: 12.7 G/DL (ref 12–15.9)
HGB UR QL STRIP.AUTO: ABNORMAL
HOLD SPECIMEN: NORMAL
HOLD SPECIMEN: NORMAL
HYALINE CASTS UR QL AUTO: ABNORMAL /LPF
IMM GRANULOCYTES # BLD AUTO: 0.03 10*3/MM3 (ref 0–0.05)
IMM GRANULOCYTES NFR BLD AUTO: 0.5 % (ref 0–0.5)
KETONES UR QL STRIP: ABNORMAL
LEUKOCYTE ESTERASE UR QL STRIP.AUTO: ABNORMAL
LYMPHOCYTES # BLD AUTO: 1.5 10*3/MM3 (ref 0.7–3.1)
LYMPHOCYTES NFR BLD AUTO: 22.6 % (ref 19.6–45.3)
MAGNESIUM SERPL-MCNC: 2 MG/DL (ref 1.7–2.3)
MCH RBC QN AUTO: 27.1 PG (ref 26.6–33)
MCHC RBC AUTO-ENTMCNC: 32.7 G/DL (ref 31.5–35.7)
MCV RBC AUTO: 82.7 FL (ref 79–97)
MONOCYTES # BLD AUTO: 1.13 10*3/MM3 (ref 0.1–0.9)
MONOCYTES NFR BLD AUTO: 17 % (ref 5–12)
MUCOUS THREADS URNS QL MICRO: ABNORMAL /HPF
NEUTROPHILS NFR BLD AUTO: 3.87 10*3/MM3 (ref 1.7–7)
NEUTROPHILS NFR BLD AUTO: 58.2 % (ref 42.7–76)
NITRITE UR QL STRIP: NEGATIVE
NRBC BLD AUTO-RTO: 0 /100 WBC (ref 0–0.2)
OSMOLALITY SERPL: 268 MOSM/KG (ref 280–301)
OSMOLALITY UR: 152 MOSM/KG
PH UR STRIP.AUTO: 8 [PH] (ref 5–8)
PLATELET # BLD AUTO: 259 10*3/MM3 (ref 140–450)
PMV BLD AUTO: 9.7 FL (ref 6–12)
POTASSIUM SERPL-SCNC: 5 MMOL/L (ref 3.5–5.2)
PROT SERPL-MCNC: 7.5 G/DL (ref 6–8.5)
PROT UR QL STRIP: ABNORMAL
RBC # BLD AUTO: 4.69 10*6/MM3 (ref 3.77–5.28)
RBC # UR STRIP: ABNORMAL /HPF
REF LAB TEST METHOD: ABNORMAL
SODIUM SERPL-SCNC: 122 MMOL/L (ref 136–145)
SODIUM UR-SCNC: 34 MMOL/L
SP GR UR STRIP: 1.03 (ref 1–1.03)
SQUAMOUS #/AREA URNS HPF: ABNORMAL /HPF
TROPONIN T NUMERIC DELTA: -1 NG/L
TROPONIN T SERPL HS-MCNC: 12 NG/L
UROBILINOGEN UR QL STRIP: ABNORMAL
WBC # UR STRIP: ABNORMAL /HPF
WBC NRBC COR # BLD AUTO: 6.64 10*3/MM3 (ref 3.4–10.8)
WHOLE BLOOD HOLD COAG: NORMAL
WHOLE BLOOD HOLD SPECIMEN: NORMAL

## 2025-07-28 PROCEDURE — 25810000003 SODIUM CHLORIDE 0.9 % SOLUTION: Performed by: FAMILY MEDICINE

## 2025-07-28 PROCEDURE — 71045 X-RAY EXAM CHEST 1 VIEW: CPT

## 2025-07-28 PROCEDURE — 99223 1ST HOSP IP/OBS HIGH 75: CPT | Performed by: FAMILY MEDICINE

## 2025-07-28 PROCEDURE — G0378 HOSPITAL OBSERVATION PER HR: HCPCS

## 2025-07-28 PROCEDURE — 83935 ASSAY OF URINE OSMOLALITY: CPT | Performed by: EMERGENCY MEDICINE

## 2025-07-28 PROCEDURE — 25810000003 SODIUM CHLORIDE 0.9 % SOLUTION: Performed by: EMERGENCY MEDICINE

## 2025-07-28 PROCEDURE — 87040 BLOOD CULTURE FOR BACTERIA: CPT | Performed by: EMERGENCY MEDICINE

## 2025-07-28 PROCEDURE — 85025 COMPLETE CBC W/AUTO DIFF WBC: CPT | Performed by: EMERGENCY MEDICINE

## 2025-07-28 PROCEDURE — 84300 ASSAY OF URINE SODIUM: CPT | Performed by: EMERGENCY MEDICINE

## 2025-07-28 PROCEDURE — 36415 COLL VENOUS BLD VENIPUNCTURE: CPT

## 2025-07-28 PROCEDURE — 83930 ASSAY OF BLOOD OSMOLALITY: CPT | Performed by: EMERGENCY MEDICINE

## 2025-07-28 PROCEDURE — 83735 ASSAY OF MAGNESIUM: CPT | Performed by: EMERGENCY MEDICINE

## 2025-07-28 PROCEDURE — 25010000002 CEFTRIAXONE PER 250 MG: Performed by: EMERGENCY MEDICINE

## 2025-07-28 PROCEDURE — 80053 COMPREHEN METABOLIC PANEL: CPT | Performed by: EMERGENCY MEDICINE

## 2025-07-28 PROCEDURE — 83605 ASSAY OF LACTIC ACID: CPT | Performed by: EMERGENCY MEDICINE

## 2025-07-28 PROCEDURE — 87086 URINE CULTURE/COLONY COUNT: CPT | Performed by: EMERGENCY MEDICINE

## 2025-07-28 PROCEDURE — 81001 URINALYSIS AUTO W/SCOPE: CPT | Performed by: EMERGENCY MEDICINE

## 2025-07-28 PROCEDURE — 99285 EMERGENCY DEPT VISIT HI MDM: CPT | Performed by: EMERGENCY MEDICINE

## 2025-07-28 PROCEDURE — 84484 ASSAY OF TROPONIN QUANT: CPT | Performed by: EMERGENCY MEDICINE

## 2025-07-28 PROCEDURE — 93005 ELECTROCARDIOGRAM TRACING: CPT | Performed by: EMERGENCY MEDICINE

## 2025-07-28 RX ORDER — NITROGLYCERIN 0.4 MG/1
0.4 TABLET SUBLINGUAL
Status: DISCONTINUED | OUTPATIENT
Start: 2025-07-28 | End: 2025-07-30 | Stop reason: HOSPADM

## 2025-07-28 RX ORDER — SODIUM CHLORIDE 0.9 % (FLUSH) 0.9 %
10 SYRINGE (ML) INJECTION AS NEEDED
Status: DISCONTINUED | OUTPATIENT
Start: 2025-07-28 | End: 2025-07-30 | Stop reason: HOSPADM

## 2025-07-28 RX ORDER — BISACODYL 5 MG/1
5 TABLET, DELAYED RELEASE ORAL DAILY PRN
Status: DISCONTINUED | OUTPATIENT
Start: 2025-07-28 | End: 2025-07-30 | Stop reason: HOSPADM

## 2025-07-28 RX ORDER — ACETAMINOPHEN 325 MG/1
650 TABLET ORAL EVERY 4 HOURS PRN
Status: DISCONTINUED | OUTPATIENT
Start: 2025-07-28 | End: 2025-07-30 | Stop reason: HOSPADM

## 2025-07-28 RX ORDER — SODIUM CHLORIDE 9 MG/ML
50 INJECTION, SOLUTION INTRAVENOUS CONTINUOUS
Status: ACTIVE | OUTPATIENT
Start: 2025-07-28 | End: 2025-07-29

## 2025-07-28 RX ORDER — ACETAMINOPHEN 650 MG/1
650 SUPPOSITORY RECTAL EVERY 4 HOURS PRN
Status: DISCONTINUED | OUTPATIENT
Start: 2025-07-28 | End: 2025-07-30 | Stop reason: HOSPADM

## 2025-07-28 RX ORDER — ACETAMINOPHEN 160 MG/5ML
650 SOLUTION ORAL EVERY 4 HOURS PRN
Status: DISCONTINUED | OUTPATIENT
Start: 2025-07-28 | End: 2025-07-30 | Stop reason: HOSPADM

## 2025-07-28 RX ORDER — PANTOPRAZOLE SODIUM 40 MG/1
40 TABLET, DELAYED RELEASE ORAL
Status: DISCONTINUED | OUTPATIENT
Start: 2025-07-29 | End: 2025-07-30 | Stop reason: HOSPADM

## 2025-07-28 RX ORDER — ONDANSETRON 2 MG/ML
4 INJECTION INTRAMUSCULAR; INTRAVENOUS EVERY 6 HOURS PRN
Status: DISCONTINUED | OUTPATIENT
Start: 2025-07-28 | End: 2025-07-30 | Stop reason: HOSPADM

## 2025-07-28 RX ORDER — SODIUM CHLORIDE 9 MG/ML
40 INJECTION, SOLUTION INTRAVENOUS AS NEEDED
Status: DISCONTINUED | OUTPATIENT
Start: 2025-07-28 | End: 2025-07-30 | Stop reason: HOSPADM

## 2025-07-28 RX ORDER — POLYETHYLENE GLYCOL 3350 17 G/17G
17 POWDER, FOR SOLUTION ORAL DAILY PRN
Status: DISCONTINUED | OUTPATIENT
Start: 2025-07-28 | End: 2025-07-30 | Stop reason: HOSPADM

## 2025-07-28 RX ORDER — CETIRIZINE HYDROCHLORIDE 10 MG/1
10 TABLET ORAL NIGHTLY
Status: DISCONTINUED | OUTPATIENT
Start: 2025-07-28 | End: 2025-07-30 | Stop reason: HOSPADM

## 2025-07-28 RX ORDER — BISACODYL 10 MG
10 SUPPOSITORY, RECTAL RECTAL DAILY PRN
Status: DISCONTINUED | OUTPATIENT
Start: 2025-07-28 | End: 2025-07-30 | Stop reason: HOSPADM

## 2025-07-28 RX ORDER — AMOXICILLIN 250 MG
2 CAPSULE ORAL 2 TIMES DAILY PRN
Status: DISCONTINUED | OUTPATIENT
Start: 2025-07-28 | End: 2025-07-30 | Stop reason: HOSPADM

## 2025-07-28 RX ORDER — METOPROLOL SUCCINATE 100 MG/1
200 TABLET, EXTENDED RELEASE ORAL DAILY
Status: DISCONTINUED | OUTPATIENT
Start: 2025-07-29 | End: 2025-07-30

## 2025-07-28 RX ORDER — SODIUM CHLORIDE 0.9 % (FLUSH) 0.9 %
10 SYRINGE (ML) INJECTION EVERY 12 HOURS SCHEDULED
Status: DISCONTINUED | OUTPATIENT
Start: 2025-07-28 | End: 2025-07-30 | Stop reason: HOSPADM

## 2025-07-28 RX ADMIN — CEFTRIAXONE SODIUM 1000 MG: 1 INJECTION, POWDER, FOR SOLUTION INTRAMUSCULAR; INTRAVENOUS at 16:07

## 2025-07-28 RX ADMIN — SODIUM CHLORIDE 500 ML: 9 INJECTION, SOLUTION INTRAVENOUS at 16:07

## 2025-07-28 RX ADMIN — Medication 5 MG: at 20:06

## 2025-07-28 RX ADMIN — SODIUM CHLORIDE 100 ML/HR: 9 INJECTION, SOLUTION INTRAVENOUS at 20:06

## 2025-07-28 RX ADMIN — APIXABAN 2.5 MG: 2.5 TABLET, FILM COATED ORAL at 20:06

## 2025-07-28 RX ADMIN — Medication 10 ML: at 20:05

## 2025-07-28 RX ADMIN — CETIRIZINE HYDROCHLORIDE 10 MG: 10 TABLET, FILM COATED ORAL at 20:06

## 2025-07-28 NOTE — PROGRESS NOTES
Pharmacokinetic Consult - Ceftriaxone Dosing  Addie Perez is a 89 y.o. female who has been consulted to dose Ceftriaxone for UTI.    Current Antimicrobial Therapy    Anti-Infectives (From admission, onward)      Ordered     Dose/Rate Route Frequency Start Stop    07/28/25 1923  Pharmacy To Dose: Ceftriaxone        Ordering Provider: Sanjana Harley MD     Not Applicable Continuous PRN 07/28/25 1922 08/02/25 1921 07/28/25 1548  cefTRIAXone (ROCEPHIN) 1,000 mg in sodium chloride 0.9 % 100 mL IVPB-VTB        Ordering Provider: Stuart Lafleur DO    1,000 mg  200 mL/hr over 30 Minutes Intravenous Once 07/28/25 1604 07/28/25 1643            Microbiology Results (last 10 days)       ** No results found for the last 240 hours. **             Allergies  Codeine, Escitalopram, Gabapentin, Ibuprofen, Levofloxacin, Naproxen, Nitrofurantoin, and Sulfa antibiotics    Relevant clinical data and objective history reviewed:  Creatinine   Date Value Ref Range Status   07/28/2025 1.18 (H) 0.57 - 1.00 mg/dL Final     Estimated Creatinine Clearance: 29.6 mL/min (A) (by C-G formula based on SCr of 1.18 mg/dL (H)).  I/O last 3 completed shifts:  In: 340 [P.O.:240; IV Piggyback:100]  Out: -   Patient weight: 60.4 kg (133 lb 2.5 oz)    Asessment/Plan  Initiate Ceftriaxone 1g IV every 24 hours  Pharmacy will monitor Ms. Perez's renal function and clinical status and adjust the Ceftriaxone dose and/or frequency as needed.    Thanks,   Linda Morataya, PharmD  7/28/2025  19:42 EDT

## 2025-07-28 NOTE — ED PROVIDER NOTES
"     [facility]  Emergency Department Encounter  Emergency Medicine Physician Note     Pt Name:Addie Perez  MRN: [id number]  Birthdate 1934  Date of evaluation: 7/28/2025  PCP:  Ariela Hampton DO  Note Started: 12:27 PM EDT      CHIEF COMPLAINT       Chief Complaint   Patient presents with    Weakness - Generalized    Abnormal Lab       HISTORY OF PRESENT ILLNESS  (Location/Symptom, Timing/Onset, Context/Setting, Quality, Duration, Modifying Factors, Severity.)      Addie Perez is a 89 y.o. female who presents with dizziness and lightheadedness.  Patient with a history of atrial fibrillation on Eliquis. Patient denies any fever or viral-like syndrome.  Patient is hard of hearing and difficult to obtain full history.  Patient describes having a lab of hyponatremia.  Patient does describe increased urination.    PAST MEDICAL / SURGICAL / SOCIAL / FAMILY HISTORY     Past Medical History:   Diagnosis Date    Ambulates with cane     Arthritis     Atrial fibrillation     denies any recent episodes of palpitations, SOB, dizziness, chest pain; next cardiology visit scheduled for 3/17/25    Back pain     Cataract     PT REPORTS EARLY STAGES    CHF (congestive heart failure)     LOV with cardiology 3/17/25    Cystitis     Diabetes mellitus      REPORTS \"I AM PRE DIABETIC\" AND REPORTS THAT SHE WATCHS HER DIET AND PCP WATCHES A1C CLOSELY    Disease of thyroid gland     Dysphagia     REPORTS MORE TROUBLE SWALLOWING WHEN EATING OR TAKING PILLS. REPORTS PAIN AND THAT SOMETIMES THINGS FEEL LIKE THEY \"STICK\"    Dyspnea     occasionally - states due CHF - worse when humidity is high    Elevated cholesterol 09/20/2018    REPORTS SHE HAS TAKEN MEDICATION IN THE PAST BUT THAT SHE THINKS ALL IS WNL'S AT PRESENT TIME WITHOUT MEDICATION    Fracture of right ankle 2020    Fractured pelvis 2024    was sent to [facility] for rehab    GERD (gastroesophageal reflux disease)     Hearing loss  " "   USE OF HEARING AIDS but states still does not hear well    History of cardiac murmur     REPORTS \"I USED TO HAVE ONE BUT NOW THEY SAY THEY DON'T HEAR IT\"    History of colonic polyps     History of pneumonia     Hx of echocardiogram     Hypercholesterolemia     Hypertension     Macular degeneration     Osteoporosis     Seasonal allergies     Sinusitis     Spinal headache     REPORTS AFTER DELIVERY OF HER SON    Urinary tract infection     history    Vertigo     Wears glasses      No additional pertinent       Past Surgical History:   Procedure Laterality Date    BACK SURGERY      REPORTS A SPUR WAS REMOVED THAT WAS HITTING SCIATIC NERVE    BREAST BIOPSY      BREAST SURGERY Left     LUMPECTOMY, BENIGN     CATARACT EXTRACTION W/ INTRAOCULAR LENS IMPLANT Left 03/04/2022    Procedure: CATARACT PHACO EXTRACTION WITH INTRAOCULAR LENS IMPLANT left;  Surgeon: Pritesh Mcnally MD;  Location: [facility] OR;  Service: Ophthalmology;  Laterality: Left;    CATARACT EXTRACTION W/ INTRAOCULAR LENS IMPLANT Right 03/18/2022    Procedure: CATARACT PHACO EXTRACTION WITH INTRAOCULAR LENS IMPLANT RIGHT;  Surgeon: Pritesh Mcnally MD;  Location: [facility] OR;  Service: Ophthalmology;  Laterality: Right;    COLONOSCOPY  2012    REPORTS LAST DONE IN 2012 BUT HAS A HX OF SEVERAL OF THESE PROCEDURES    D & C HYSTEROSCOPY N/A 03/25/2025    Procedure: DILATATION AND CURETTAGE HYSTEROSCOPY;  Surgeon: Margoth Chaudhari MD;  Location: [facility] OR;  Service: Obstetrics/Gynecology;  Laterality: N/A;    DILATATION AND CURETTAGE      REPORTS \"I HAD A COUPLE OF THOSE\"    ENDOSCOPY N/A 01/31/2017    Procedure: ESOPHAGOGASTRODUODENOSCOPY;  Surgeon: Robbie Quezada MD;  Location: [facility] ENDOSCOPY;  Service:     ENDOSCOPY N/A 09/21/2018    Procedure: ESOPHAGOGASTRODUODENOSCOPY WITH BIOPSY, DILITATION;  Surgeon: Robbie Quezada MD;  Location: [facility] ENDOSCOPY;  Service: Gastroenterology    ENDOSCOPY N/A 11/15/2023    Procedure: ESOPHAGOGASTRODUODENOSCOPY WITH " BIOPSY AND DILATION;  Surgeon: Mil Ma MD;  Location: [facility] ENDOSCOPY;  Service: Gastroenterology;  Laterality: N/A;    ENDOSCOPY N/A 5/22/2025    Procedure: Esophagogastroduodenoscopy with dilitation and biopsy;  Surgeon: Mil Ma MD;  Location: [facility] ENDOSCOPY;  Service: Gastroenterology;  Laterality: N/A;    FOOT SURGERY Left     REPORTS SURGICAL INTERVENTION TO CORRECT GREAT TOE ALIGNMENT    GALLBLADDER SURGERY  2009    HEMORRHOIDECTOMY  1973    HYSTEROSCOPY      TONSILLECTOMY  1946    UPPER GASTROINTESTINAL ENDOSCOPY  2014    UPPER GASTROINTESTINAL ENDOSCOPY  01/31/2017     No additional pertinent       Social History     Socioeconomic History    Marital status:    Tobacco Use    Smoking status: Never     Passive exposure: Never    Smokeless tobacco: Never   Vaping Use    Vaping status: Never Used   Substance and Sexual Activity    Alcohol use: No    Drug use: No    Sexual activity: Not Currently     Birth control/protection: Post-menopausal       Family History   Problem Relation Age of Onset    Diabetes Mother     Cancer Brother         lung    Cancer Brother         brain    Colon cancer Paternal Grandmother         possibly colon cancer    Breast cancer Neg Hx     Liver cancer Neg Hx     Liver disease Neg Hx        Allergies:  Codeine, Escitalopram, Gabapentin, Ibuprofen, Levofloxacin, Naproxen, Nitrofurantoin, and Sulfa antibiotics    Home Medications:  Prior to Admission medications    Medication Sig Start Date End Date Taking? Authorizing Provider   acetaminophen (TYLENOL) 650 MG 8 hr tablet Take 1 tablet by mouth 2 (Two) Times a Day As Needed.    Provider, MD Renu   cetirizine (zyrTEC) 10 MG tablet Take 1 tablet by mouth Every Night.    Provider, MD Renu   clotrimazole-betamethasone (LOTRISONE) 1-0.05 % cream Apply 1 Application topically to the appropriate area as directed 2 (Two) Times a Day. Apply to affected area twice daily as needed  Patient  "taking differently: Apply 1 Application topically to the appropriate area as directed As Needed. Apply to affected area twice daily as needed 3/5/25   Margoth Chaudhari MD   cyclobenzaprine (FLEXERIL) 5 MG tablet Take 1 tablet by mouth 3 (Three) Times a Day.    ProviderRenu MD   Eliquis 5 MG tablet tablet TAKE 0.5 TABLETS BY MOUTH EVERY 12 (TWELVE) HOURS. 7/7/25   Betsy Andrews APRN   estradiol (ESTRACE VAGINAL) 0.1 MG/GM vaginal cream May use 1 gram intravaginal qhs x 2 weeks then 1 gram 2x/week. 3/5/25   Margoth Chaudhari MD   fluticasone (FLONASE) 50 MCG/ACT nasal spray Administer 2 sprays into the nostril(s) as directed by provider Daily As Needed. 7/16/24   Renu Nolen MD   furosemide (Lasix) 40 MG tablet Take 1 tablet by mouth Daily As Needed (For weight gain >2-3 pounds overnight or >5 pounds in a week's time). 3/18/25   Betsy Andrews APRN   metoprolol succinate XL (TOPROL-XL) 200 MG 24 hr tablet TAKE 1 TABLET BY MOUTH DAILY 7/7/25   Betsy Andrews APRN   omeprazole (prilOSEC) 10 MG capsule Take 1 capsule by mouth Every Morning.    ProviderRenu MD   ondansetron (ZOFRAN) 8 MG tablet Take 1 tablet by mouth Every 8 (Eight) Hours As Needed for Nausea or Vomiting. 3/25/25   Margoth Chaudhari MD   spironolactone (ALDACTONE) 25 MG tablet Take 1 tablet by mouth Daily. 3/18/25   Betsy Andrews APRN         REVIEW OF SYSTEMS       Review of Systems   Constitutional:  Negative for chills and fever.   Respiratory:  Negative for shortness of breath.    Gastrointestinal:  Negative for abdominal pain.       PHYSICAL EXAM      INITIAL VITALS:   /69 (BP Location: Right arm, Patient Position: Lying)   Pulse 86   Temp 98.1 °F (36.7 °C) (Oral)   Resp 18   Ht 162.6 cm (64\")   Wt 60.4 kg (133 lb 2.5 oz)   LMP  (LMP Unknown)   SpO2 95%   BMI 22.86 kg/m²     Physical Exam  Constitutional:       Appearance: Normal appearance. She is ill-appearing.   HENT:      Head: " Normocephalic and atraumatic.      Mouth/Throat:      Mouth: Mucous membranes are moist.      Pharynx: Oropharynx is clear.   Cardiovascular:      Rate and Rhythm: Normal rate and regular rhythm.      Pulses: Normal pulses.   Pulmonary:      Effort: Pulmonary effort is normal.      Breath sounds: Normal breath sounds.   Abdominal:      General: Abdomen is flat. There is no distension.      Palpations: Abdomen is soft.      Tenderness: There is no abdominal tenderness. There is no guarding.   Musculoskeletal:         General: Normal range of motion.   Skin:     General: Skin is warm and dry.   Neurological:      General: No focal deficit present.      Mental Status: She is alert and oriented to person, place, and time.   Psychiatric:         Mood and Affect: Mood normal.         Behavior: Behavior normal.           DDX/DIAGNOSTIC RESULTS / EMERGENCY DEPARTMENT COURSE / MDM     Differential Diagnosis included but not limited: Acute cystitis, hyponatremia, electrolyte abnormalities, dehydration, viral illness, pneumonia, malignant disease    Diagnoses Considered but Do Not Suspect: Sepsis or severe infectious processes    Decision Rules/Scores utilized: N/A     Tests considered but not ordered and why:  N/A     MIPS: N/A     Code Status Discussion:  Not Discussed    Additional Patient Education Provided: None     Medical Decision Making    Medical Decision Making   Patient is a 89-year-old female presenting with hyponatremia, very mild LANETTE, and possible acute cystitis on urine evaluation.  Patient presented for multiple days of weakness and dizziness.  Patient denies any chest pain, shortness of breath fever or chills.  Patient sodium noted to be 122, sent serum osms , urine osms and urine sodium.  With patient being 89, weak and dizzy with hyponatremia and possible acute cystitis I do feel the patient needs to be admitted for further sodium improvement and improvement of symptoms.    Problems Addressed:  Acute  cystitis with hematuria: complicated acute illness or injury  Hyponatremia: complicated acute illness or injury    Amount and/or Complexity of Data Reviewed  External Data Reviewed: labs and notes.  Labs: ordered. Decision-making details documented in ED Course.  Radiology: ordered.  ECG/medicine tests: ordered.  Discussion of management or test interpretation with external provider(s): Hospitalist for admission    Risk  Prescription drug management.  Decision regarding hospitalization.        See ED COURSE for additional MDM statements    EKG    EKG Interpretation    Evaluated and interpreted by emergency department physician    Rhythm: afib   Rate: Normal  Axis: left  Ectopy: none  Conduction: Normal  ST Segments: Normal  T Waves: Nonspecific T wave flattening in aVL  Q Waves: None    Clinical Impression: Atrial fibrillation with normal rate    Stuart Lafleur DO      All EKG's are interpreted by the Emergency Department Physician who either signs or Co-signs this chart in the absence of a cardiologist.    Additional Scores                   EMERGENCY DEPARTMENT COURSE:    ED Course as of 07/29/25 0604   Mon Jul 28, 2025   1417 RBC, UA(!): 6-10 [CR]   1514 Sodium(!): 122 [CR]   1552 First evaluated chest x-ray, no obvious signs of mass that could cause hyponatremia.  Plan to admit patient for further management of hyponatremia, most likely secondary to UTI, will treat with ceftriaxone.  Culture sent. [CR]      ED Course User Index  [CR] Stuart Lafleur DO       PROCEDURES:  None Performed   Procedures    DATA FOR LAB AND RADIOLOGY TESTS ORDERED BELOW ARE REVIEWED BY THE ED CLINICIAN:    RADIOLOGY: All x-rays, CT, MRI, and formal ultrasound images (except ED bedside ultrasound) are read by the radiologist, see reports below, unless otherwise noted in MDM or here.  Reports below are reviewed by myself.  XR Chest 1 View   Final Result   Unremarkable portable chest.                This report was  signed and finalized on 7/28/2025 1:11 PM by Angel Kincaid MD.              LABS: Lab orders shown below, the results are reviewed by myself, and all abnormals are listed below.  Labs Reviewed   COMPREHENSIVE METABOLIC PANEL - Abnormal; Notable for the following components:       Result Value    BUN 28.0 (*)     Creatinine 1.18 (*)     Sodium 122 (*)     Chloride 88 (*)     Alkaline Phosphatase 175 (*)     eGFR 43.7 (*)     All other components within normal limits    Narrative:     GFR Categories in Chronic Kidney Disease (CKD)              GFR Category          GFR (mL/min/1.73)    Interpretation  G1                    90 or greater        Normal or high (1)  G2                    60-89                Mild decrease (1)  G3a                   45-59                Mild to moderate decrease  G3b                   30-44                Moderate to severe decrease  G4                    15-29                Severe decrease  G5                    14 or less           Kidney failure    (1)In the absence of evidence of kidney disease, neither GFR category G1 or G2 fulfill the criteria for CKD.    eGFR calculation 2021 CKD-EPI creatinine equation, which does not include race as a factor   URINALYSIS W/ MICROSCOPIC IF INDICATED (NO CULTURE) - Abnormal; Notable for the following components:    Ketones, UA Trace (*)     Blood, UA Moderate (2+) (*)     Protein, UA 30 mg/dL (1+) (*)     Leuk Esterase, UA Trace (*)     All other components within normal limits   CBC WITH AUTO DIFFERENTIAL - Abnormal; Notable for the following components:    RDW 15.5 (*)     Monocyte % 17.0 (*)     Monocytes, Absolute 1.13 (*)     All other components within normal limits   URINALYSIS, MICROSCOPIC ONLY - Abnormal; Notable for the following components:    RBC, UA 6-10 (*)     Bacteria, UA 1+ (*)     Squamous Epithelial Cells, UA 3-6 (*)     Mucus, UA Small/1+ (*)     All other components within normal limits   OSMOLALITY, SERUM - Abnormal;  Notable for the following components:    Osmolality 268 (*)     All other components within normal limits   BASIC METABOLIC PANEL - Abnormal; Notable for the following components:    Glucose 114 (*)     BUN 25.0 (*)     Creatinine 1.40 (*)     Sodium 126 (*)     Chloride 96 (*)     CO2 18.6 (*)     eGFR 35.6 (*)     All other components within normal limits    Narrative:     GFR Categories in Chronic Kidney Disease (CKD)              GFR Category          GFR (mL/min/1.73)    Interpretation  G1                    90 or greater        Normal or high (1)  G2                    60-89                Mild decrease (1)  G3a                   45-59                Mild to moderate decrease  G3b                   30-44                Moderate to severe decrease  G4                    15-29                Severe decrease  G5                    14 or less           Kidney failure    (1)In the absence of evidence of kidney disease, neither GFR category G1 or G2 fulfill the criteria for CKD.    eGFR calculation 2021 CKD-EPI creatinine equation, which does not include race as a factor   TROPONIN - Normal    Narrative:     High Sensitive Troponin T Reference Range:  <14.0 ng/L- Negative Female for AMI  <22.0 ng/L- Negative Male for AMI  >=14 - Abnormal Female indicating possible myocardial injury.  >=22 - Abnormal Male indicating possible myocardial injury.   Clinicians would have to utilize clinical acumen, EKG, Troponin, and serial changes to determine if it is an Acute Myocardial Infarction or myocardial injury due to an underlying chronic condition.        MAGNESIUM - Normal   HIGH SENSITIVITIY TROPONIN T 1HR - Normal    Narrative:     High Sensitive Troponin T Reference Range:  <14.0 ng/L- Negative Female for AMI  <22.0 ng/L- Negative Male for AMI  >=14 - Abnormal Female indicating possible myocardial injury.  >=22 - Abnormal Male indicating possible myocardial injury.   Clinicians would have to utilize clinical acumen, EKG,  Troponin, and serial changes to determine if it is an Acute Myocardial Infarction or myocardial injury due to an underlying chronic condition.        LACTIC ACID, PLASMA - Normal   BLOOD CULTURE   BLOOD CULTURE   URINE CULTURE   RAINBOW DRAW    Narrative:     The following orders were created for panel order Barn[facility] Draw.  Procedure                               Abnormality         Status                     ---------                               -----------         ------                     Green Top (Gel)[[id number]]                                  Final result               Lavender Top[[id number]]                                     Final result               Gold Top - SST[[id number]]                                   Final result               Light Blue Top[[id number]]                                   Final result                 Please view results for these tests on the individual orders.   SODIUM, URINE, RANDOM    Narrative:     Reference intervals for random urine have not been established.  Clinical usage is dependent upon physician's interpretation in combination with other laboratory tests.      OSMOLALITY, URINE    Narrative:     Osmo Normal Reference Ranges:    Random:  mOsm/kg H2O, depending on fluid intake.  Random: >850 mOsm/kg H20, after 12 hour fluid restriction.    24 Hour: 300-900 mOsm/kg H2O.   BASIC METABOLIC PANEL   CBC (NO DIFF)   BASIC METABOLIC PANEL   POCT GLUCOSE FINGERSTICK   CBC AND DIFFERENTIAL    Narrative:     The following orders were created for panel order CBC & Differential.  Procedure                               Abnormality         Status                     ---------                               -----------         ------                     CBC Auto Differential[[id number]]        Abnormal            Final result                 Please view results for these tests on the individual orders.   GREEN TOP   LAVENDER TOP   GOLD TOP - SST   LIGHT BLUE TOP       Vitals  Reviewed:    Vitals:    07/28/25 1708 07/28/25 1858 07/28/25 2301 07/29/25 0336   BP: 135/88 135/78 115/66 114/69   BP Location:  Right arm Left arm Right arm   Patient Position:  Lying Lying Lying   Pulse: 86      Resp: 18 16 16 18   Temp: 97.5 °F (36.4 °C) 97.5 °F (36.4 °C) 97.5 °F (36.4 °C) 98.1 °F (36.7 °C)   TempSrc: Oral Oral Oral Oral   SpO2: 95%      Weight: 60.4 kg (133 lb 2.5 oz)      Height:           MEDICATIONS GIVEN TO PATIENT THIS ENCOUNTER:  Medications   sodium chloride 0.9 % flush 10 mL (has no administration in time range)   cetirizine (zyrTEC) tablet 10 mg (10 mg Oral Given 7/28/25 2006)   apixaban (ELIQUIS) tablet 2.5 mg (2.5 mg Oral Given 7/28/25 2006)   metoprolol succinate XL (TOPROL-XL) 24 hr tablet 200 mg (has no administration in time range)   pantoprazole (PROTONIX) EC tablet 40 mg (40 mg Oral Given 7/29/25 0526)   sodium chloride 0.9 % flush 10 mL (10 mL Intravenous Given 7/28/25 2005)   sodium chloride 0.9 % flush 10 mL (has no administration in time range)   sodium chloride 0.9 % infusion 40 mL (has no administration in time range)   acetaminophen (TYLENOL) tablet 650 mg (has no administration in time range)     Or   acetaminophen (TYLENOL) 160 MG/5ML oral solution 650 mg (has no administration in time range)     Or   acetaminophen (TYLENOL) suppository 650 mg (has no administration in time range)   ondansetron (ZOFRAN) injection 4 mg (has no administration in time range)   nitroglycerin (NITROSTAT) SL tablet 0.4 mg (has no administration in time range)   sodium chloride 0.9 % infusion (100 mL/hr Intravenous Currently Infusing 7/29/25 0403)   Potassium Replacement - Follow Nurse / BPA Driven Protocol (has no administration in time range)   Magnesium Standard Dose Replacement - Follow Nurse / BPA Driven Protocol (has no administration in time range)   sennosides-docusate (PERICOLACE) 8.6-50 MG per tablet 2 tablet (has no administration in time range)     And   polyethylene glycol  (MIRALAX) packet 17 g (has no administration in time range)     And   bisacodyl (DULCOLAX) EC tablet 5 mg (has no administration in time range)     And   bisacodyl (DULCOLAX) suppository 10 mg (has no administration in time range)   melatonin tablet 5 mg (5 mg Oral Given 7/28/25 2006)   Pharmacy To Dose: Ceftriaxone (has no administration in time range)   cefTRIAXone (ROCEPHIN) 1,000 mg in sodium chloride 0.9 % 100 mL IVPB-VTB (has no administration in time range)   sodium chloride 0.9 % bolus 500 mL (0 mL Intravenous Stopped 7/28/25 1732)   cefTRIAXone (ROCEPHIN) 1,000 mg in sodium chloride 0.9 % 100 mL IVPB-VTB (0 mg Intravenous Stopped 7/28/25 1643)       CONSULTS:  IP CONSULT TO CASE MANAGEMENT   IP CONSULT TO CASE MANAGEMENT     CRITICAL CARE:  There was significant risk of life threatening deterioration of patient's condition requiring my direct management. Critical care time 0 minutes, excluding any documented procedures.    FINAL IMPRESSION      1. Hyponatremia    2. Acute cystitis with hematuria          DISPOSITION / PLAN     ED Disposition       ED Disposition   Decision to Admit    Condition   --    Comment   Level of Care: Telemetry [5]   Diagnosis: Hyponatremia [807704]                 PATIENT REFERRED TO:  No follow-up provider specified.    DISCHARGE MEDICATIONS:     Medication List        CHANGE how you take these medications      clotrimazole-betamethasone 1-0.05 % cream  Commonly known as: LOTRISONE  Apply 1 Application topically to the appropriate area as directed 2 (Two) Times a Day. Apply to affected area twice daily as needed  What changed:   when to take this  reasons to take this            CONTINUE taking these medications      acetaminophen 650 MG 8 hr tablet  Commonly known as: TYLENOL     cetirizine 10 MG tablet  Commonly known as: zyrTEC     cyclobenzaprine 5 MG tablet  Commonly known as: FLEXERIL     Eliquis 5 MG tablet tablet  Generic drug: apixaban  TAKE  0.5 TABLETS BY MOUTH EVERY 12 (TWELVE) HOURS.     estradiol 0.1 MG/GM vaginal cream  Commonly known as: ESTRACE VAGINAL  May use 1 gram intravaginal qhs x 2 weeks then 1 gram 2x/week.     fluticasone 50 MCG/ACT nasal spray  Commonly known as: FLONASE     furosemide 40 MG tablet  Commonly known as: Lasix  Take 1 tablet by mouth Daily As Needed (For weight gain >2-3 pounds overnight or >5 pounds in a week's time).     metoprolol succinate  MG 24 hr tablet  Commonly known as: TOPROL-XL  TAKE 1 TABLET BY MOUTH DAILY     omeprazole 10 MG capsule  Commonly known as: prilOSEC     ondansetron 8 MG tablet  Commonly known as: ZOFRAN  Take 1 tablet by mouth Every 8 (Eight) Hours As Needed for Nausea or Vomiting.     spironolactone 25 MG tablet  Commonly known as: ALDACTONE  Take 1 tablet by mouth Daily.              Electronically signed by Stuart Lafleur DO, 07/28/25, 12:27 PM EDT.    Emergency Medicine Physician  [facility]  (Please note that portions of thisnote were completed with a voice recognition program.  Efforts were made to edit the dictations but occasionally words are mis-transcribed.)       Stuart Lafleur DO  07/29/25 0605

## 2025-07-28 NOTE — H&P
[facility]      Name:  Addie Perez   Age:  89 y.o.  Sex:  female  :  1934  MRN:  [id number]   Visit Number:  [id number]  Admission Date:  2025  Date Of Service:  25  Primary Care Physician:  Ariela Hampton DO    Chief Complaint:     Generalized weakness    History Of Presenting Illness:      Patient is a 89 years old female with a past medical history of A-fib on Eliquis, diabetes mellitus, CHF, GERD, hypertension, hyperlipidemia, who presented to the ER with a chief complaint of generalized weakness.  Patient reports progressively worsening fatigue and generalized weakness over the past 2 to 3 days, she also reports associated lightheadedness at times.  Her generalized weakness gotten worse and was unable to take care of her daily activities so she presented to the ER for evaluation.  Denies any recent fever, chills, abdominal pain, nausea, vomiting or diarrhea.  No chest pain, shortness of breath or cough.    On ER evaluation, Blood pressure was 137/115, other vital stable and afebrile.  Workup in the ER was significant for sodium of 122 with normal glucose, creatinine 1.18, BUN 28 otherwise her CBC and CMP nonactionable.  Urinalysis showed trace leukocytes, bacteria 1+ with squamous epithelial cells.  Chest x-ray unremarkable.  Patient received half liter bolus of normal saline and was started on Rocephin for UTI.  Hospitalist consulted for admission due to hyponatremia, UTI and advanced age, further management and treatment.    Review Of Systems:    All systems were reviewed and negative except as mentioned in history of presenting illness, assessment and plan.    Past Medical History: Patient  has a past medical history of Ambulates with cane, Arthritis, Atrial fibrillation, Back pain, Cataract, CHF (congestive heart failure), Cystitis, Diabetes mellitus, Disease of thyroid gland, Dysphagia, Dyspnea, Elevated cholesterol  (09/20/2018), Fracture of right ankle (2020), Fractured pelvis (2024), GERD (gastroesophageal reflux disease), Hearing loss, History of cardiac murmur, History of colonic polyps, History of pneumonia, echocardiogram, Hypercholesterolemia, Hypertension, Macular degeneration, Osteoporosis, Seasonal allergies, Sinusitis, Spinal headache, Urinary tract infection, Vertigo, and Wears glasses.    Past Surgical History: Patient  has a past surgical history that includes Gallbladder surgery (2009); Dilation and curettage of uterus; Hemorrhoid surgery (1973); Tonsillectomy (1946); Back surgery; Breast surgery (Left); Esophagogastroduodenoscopy (N/A, 01/31/2017); Upper gastrointestinal endoscopy (2014); Upper gastrointestinal endoscopy (01/31/2017); Colonoscopy (2012); Foot surgery (Left); Esophagogastroduodenoscopy (N/A, 09/21/2018); Cataract extraction w/ intraocular lens implant (Left, 03/04/2022); Cataract extraction w/ intraocular lens implant (Right, 03/18/2022); Breast biopsy; Esophagogastroduodenoscopy (N/A, 11/15/2023); d & c hysteroscopy (N/A, 03/25/2025); Hysteroscopy; and Esophagogastroduodenoscopy (N/A, 5/22/2025).    Social History: Patient  reports that she has never smoked. She has never been exposed to tobacco smoke. She has never used smokeless tobacco. She reports that she does not drink alcohol and does not use drugs.    Family History:  Patient's family history has been reviewed and found to be noncontributory.     Allergies:      Codeine, Escitalopram, Gabapentin, Ibuprofen, Levofloxacin, Naproxen, Nitrofurantoin, and Sulfa antibiotics    Home Medications:    Prior to Admission Medications       Prescriptions Last Dose Informant Patient Reported? Taking?    acetaminophen (TYLENOL) 650 MG 8 hr tablet  Self Yes No    Take 1 tablet by mouth 2 (Two) Times a Day As Needed.    cetirizine (zyrTEC) 10 MG tablet  Self Yes No    Take 1 tablet by mouth Every Night.    clotrimazole-betamethasone (LOTRISONE) 1-0.05 %  cream   No No    Apply 1 Application topically to the appropriate area as directed 2 (Two) Times a Day. Apply to affected area twice daily as needed    Patient taking differently:  Apply 1 Application topically to the appropriate area as directed As Needed. Apply to affected area twice daily as needed    cyclobenzaprine (FLEXERIL) 5 MG tablet   Yes No    Take 1 tablet by mouth 3 (Three) Times a Day.    Eliquis 5 MG tablet tablet   No No    TAKE 0.5 TABLETS BY MOUTH EVERY 12 (TWELVE) HOURS.    estradiol (ESTRACE VAGINAL) 0.1 MG/GM vaginal cream   No No    May use 1 gram intravaginal qhs x 2 weeks then 1 gram 2x/week.    fluticasone (FLONASE) 50 MCG/ACT nasal spray   Yes No    Administer 2 sprays into the nostril(s) as directed by provider Daily As Needed.    furosemide (Lasix) 40 MG tablet   No No    Take 1 tablet by mouth Daily As Needed (For weight gain >2-3 pounds overnight or >5 pounds in a week's time).    metoprolol succinate XL (TOPROL-XL) 200 MG 24 hr tablet   No No    TAKE 1 TABLET BY MOUTH DAILY    omeprazole (prilOSEC) 10 MG capsule   Yes No    Take 1 capsule by mouth Every Morning.    ondansetron (ZOFRAN) 8 MG tablet   No No    Take 1 tablet by mouth Every 8 (Eight) Hours As Needed for Nausea or Vomiting.    spironolactone (ALDACTONE) 25 MG tablet   No No    Take 1 tablet by mouth Daily.          ED Medications:    Medications   sodium chloride 0.9 % flush 10 mL (has no administration in time range)   sodium chloride 0.9 % bolus 500 mL (500 mL Intravenous New Bag 7/28/25 1607)   cefTRIAXone (ROCEPHIN) 1,000 mg in sodium chloride 0.9 % 100 mL IVPB-VTB (1,000 mg Intravenous New Bag 7/28/25 1607)     Vital Signs:  Temp:  [98 °F (36.7 °C)] 98 °F (36.7 °C)  Heart Rate:  [82-84] 82  Resp:  [16-18] 16  BP: (115-137)/() 115/62        07/28/25  1223   Weight: 59.4 kg (131 lb)     Body mass index is 22.49 kg/m².    Physical Exam:     Most recent vital Signs: /62   Pulse 82   Temp 98 °F (36.7 °C)  "(Oral)   Resp 16   Ht 162.6 cm (64\")   Wt 59.4 kg (131 lb)   LMP  (LMP Unknown)   SpO2 99%   BMI 22.49 kg/m²     Physical Exam  Vitals and nursing note reviewed.   Constitutional:       General: She is not in acute distress.     Comments: Generalized weakness noted.  Frail.   HENT:      Head: Normocephalic and atraumatic.      Right Ear: External ear normal.      Left Ear: External ear normal.      Nose: Nose normal.      Mouth/Throat:      Mouth: Mucous membranes are dry.   Eyes:      Extraocular Movements: Extraocular movements intact.      Conjunctiva/sclera: Conjunctivae normal.      Pupils: Pupils are equal, round, and reactive to light.   Cardiovascular:      Rate and Rhythm: Normal rate and regular rhythm.      Pulses: Normal pulses.      Heart sounds: Normal heart sounds.   Pulmonary:      Effort: Pulmonary effort is normal. No respiratory distress.      Breath sounds: Normal breath sounds. No wheezing or rhonchi.   Abdominal:      General: Bowel sounds are normal. There is no distension.      Palpations: Abdomen is soft.      Tenderness: There is no abdominal tenderness.   Musculoskeletal:         General: Normal range of motion.      Cervical back: Normal range of motion and neck supple.      Right lower leg: No edema.      Left lower leg: No edema.   Skin:     General: Skin is warm and dry.      Findings: No rash.   Neurological:      General: No focal deficit present.      Mental Status: She is alert and oriented to person, place, and time. Mental status is at baseline.      Cranial Nerves: No cranial nerve deficit.      Sensory: No sensory deficit.      Motor: No weakness.   Psychiatric:         Mood and Affect: Mood normal.         Behavior: Behavior normal.         Thought Content: Thought content normal.         Laboratory data:    I have reviewed the labs done in the emergency room.    Results from last 7 days   Lab Units 07/28/25  1307   SODIUM mmol/L 122*   POTASSIUM mmol/L 5.0   CHLORIDE " mmol/L 88*   CO2 mmol/L 22.1   BUN mg/dL 28.0*   CREATININE mg/dL 1.18*   CALCIUM mg/dL 9.4   BILIRUBIN mg/dL 1.1   ALK PHOS U/L 175*   ALT (SGPT) U/L 12   AST (SGOT) U/L 24   GLUCOSE mg/dL 89     Results from last 7 days   Lab Units 07/28/25  1307   WBC 10*3/mm3 6.64   HEMOGLOBIN g/dL 12.7   HEMATOCRIT % 38.8   PLATELETS 10*3/mm3 259         Results from last 7 days   Lab Units 07/28/25  1448 07/28/25  1307   HSTROP T ng/L 11 12                     Results from last 7 days   Lab Units 07/28/25  1349   COLOR UA  Yellow   GLUCOSE UA  Negative   KETONES UA  Trace*   BLOOD UA  Moderate (2+)*   LEUKOCYTES UA  Trace*   PH, URINE  8.0   BILIRUBIN UA  Negative   UROBILINOGEN UA  1.0 E.U./dL   RBC UA /HPF 6-10*   WBC UA /HPF 0-2       Pain Management Panel           No data to display                EKG:      A-fib, heart rate 78, nonspecific ST/T wave changes.    Radiology:    XR Chest 1 View  Result Date: 7/28/2025  PROCEDURE: XR CHEST 1 VW-  HISTORY: Weak/Dizzy/AMS triage protocol  COMPARISON: 11/14/2024  FINDINGS:  Portable view of the chest demonstrates the lungs to be grossly clear. There is no evidence of effusion, pneumothorax or other significant pleural disease. The mediastinum is unremarkable.  The heart size is normal.      Unremarkable portable chest.    This report was signed and finalized on 7/28/2025 1:11 PM by Angel Kincaid MD.        Assessment:    Hyponatremia, POA  Possible UTI, POA  Generalized weakness, POA  A-fib on Eliquis  Diabetes mellitus  CHF  GERD  Hypertension  Hyperlipidemia    Plan:    Patient is admitted to observation for further management and treatment.    Hyponatremia  - Appears acute on chronic per previous labs on file  - Likely dehydrated and hypovolemic  - Continue with IV fluids  - Urine studies  - Monitor sodium with BMP every 6 hours  - Consider consulting nephrology in a.m. if not better or worse    UTI  - Rocephin  - Follow-up on urine and blood cultures  - Patient is not septic  and did not meet SIRS criteria    Generalized weakness  - In settings of hyponatremia and UTI  - PT/OT consulted    -Continue home meds as warranted.  -Further orders as indicated per clinical course.  - Discussed with the patient regarding CODE STATUS, patient with full decision-making capacity, she does not want to be intubated or resuscitated with CPR.  CODE STATUS was ordered as DNR/DNI per her wishes.    Risk Assessment: Moderate to high  DVT Prophylaxis: Eliquis  Code Status: DNR/DNI  Diet: Cardiac    Advance Care Planning   ACP discussion was held with the patient during this visit. Patient does not have an advance directive, information provided.       Sanjana Harley MD  07/28/25  16:14 EDT    Dictated utilizing Dragon dictation.

## 2025-07-29 LAB
ANION GAP SERPL CALCULATED.3IONS-SCNC: 11.4 MMOL/L (ref 5–15)
ANION GAP SERPL CALCULATED.3IONS-SCNC: 11.7 MMOL/L (ref 5–15)
ANION GAP SERPL CALCULATED.3IONS-SCNC: 13.9 MMOL/L (ref 5–15)
BUN SERPL-MCNC: 21 MG/DL (ref 8–23)
BUN SERPL-MCNC: 22 MG/DL (ref 8–23)
BUN SERPL-MCNC: 25 MG/DL (ref 8–23)
BUN/CREAT SERPL: 17.9 (ref 7–25)
BUN/CREAT SERPL: 20.6 (ref 7–25)
BUN/CREAT SERPL: 21.9 (ref 7–25)
CALCIUM SPEC-SCNC: 8.9 MG/DL (ref 8.2–9.6)
CALCIUM SPEC-SCNC: 9 MG/DL (ref 8.2–9.6)
CALCIUM SPEC-SCNC: 9.2 MG/DL (ref 8.2–9.6)
CHLORIDE SERPL-SCNC: 96 MMOL/L (ref 98–107)
CHLORIDE SERPL-SCNC: 96 MMOL/L (ref 98–107)
CHLORIDE SERPL-SCNC: 97 MMOL/L (ref 98–107)
CO2 SERPL-SCNC: 18.3 MMOL/L (ref 22–29)
CO2 SERPL-SCNC: 18.6 MMOL/L (ref 22–29)
CO2 SERPL-SCNC: 20.1 MMOL/L (ref 22–29)
CREAT SERPL-MCNC: 0.96 MG/DL (ref 0.57–1)
CREAT SERPL-MCNC: 1.07 MG/DL (ref 0.57–1)
CREAT SERPL-MCNC: 1.4 MG/DL (ref 0.57–1)
DEPRECATED RDW RBC AUTO: 48.8 FL (ref 37–54)
EGFRCR SERPLBLD CKD-EPI 2021: 35.6 ML/MIN/1.73
EGFRCR SERPLBLD CKD-EPI 2021: 49.1 ML/MIN/1.73
EGFRCR SERPLBLD CKD-EPI 2021: 56 ML/MIN/1.73
ERYTHROCYTE [DISTWIDTH] IN BLOOD BY AUTOMATED COUNT: 15.6 % (ref 12.3–15.4)
GLUCOSE SERPL-MCNC: 103 MG/DL (ref 65–99)
GLUCOSE SERPL-MCNC: 114 MG/DL (ref 65–99)
GLUCOSE SERPL-MCNC: 127 MG/DL (ref 65–99)
HCT VFR BLD AUTO: 37.3 % (ref 34–46.6)
HGB BLD-MCNC: 11.7 G/DL (ref 12–15.9)
MCH RBC QN AUTO: 26.9 PG (ref 26.6–33)
MCHC RBC AUTO-ENTMCNC: 31.4 G/DL (ref 31.5–35.7)
MCV RBC AUTO: 85.7 FL (ref 79–97)
PLATELET # BLD AUTO: 171 10*3/MM3 (ref 140–450)
PMV BLD AUTO: 10.5 FL (ref 6–12)
POTASSIUM SERPL-SCNC: 4.8 MMOL/L (ref 3.5–5.2)
POTASSIUM SERPL-SCNC: 4.9 MMOL/L (ref 3.5–5.2)
POTASSIUM SERPL-SCNC: 5.3 MMOL/L (ref 3.5–5.2)
RBC # BLD AUTO: 4.35 10*6/MM3 (ref 3.77–5.28)
SODIUM SERPL-SCNC: 126 MMOL/L (ref 136–145)
SODIUM SERPL-SCNC: 127 MMOL/L (ref 136–145)
SODIUM SERPL-SCNC: 130 MMOL/L (ref 136–145)
WBC NRBC COR # BLD AUTO: 5.6 10*3/MM3 (ref 3.4–10.8)

## 2025-07-29 PROCEDURE — 85027 COMPLETE CBC AUTOMATED: CPT | Performed by: FAMILY MEDICINE

## 2025-07-29 PROCEDURE — 97165 OT EVAL LOW COMPLEX 30 MIN: CPT

## 2025-07-29 PROCEDURE — 80048 BASIC METABOLIC PNL TOTAL CA: CPT | Performed by: FAMILY MEDICINE

## 2025-07-29 PROCEDURE — 99232 SBSQ HOSP IP/OBS MODERATE 35: CPT | Performed by: INTERNAL MEDICINE

## 2025-07-29 PROCEDURE — 97161 PT EVAL LOW COMPLEX 20 MIN: CPT

## 2025-07-29 RX ORDER — ESTRADIOL 0.1 MG/G
1 CREAM VAGINAL 2 TIMES WEEKLY
Status: DISCONTINUED | OUTPATIENT
Start: 2025-07-31 | End: 2025-07-30 | Stop reason: HOSPADM

## 2025-07-29 RX ORDER — IPRATROPIUM BROMIDE 42 UG/1
1 SPRAY, METERED NASAL 2 TIMES DAILY
COMMUNITY

## 2025-07-29 RX ORDER — LEVOCETIRIZINE DIHYDROCHLORIDE 5 MG/1
5 TABLET, FILM COATED ORAL EVERY EVENING
COMMUNITY

## 2025-07-29 RX ADMIN — APIXABAN 2.5 MG: 2.5 TABLET, FILM COATED ORAL at 09:29

## 2025-07-29 RX ADMIN — Medication 10 ML: at 20:28

## 2025-07-29 RX ADMIN — METOPROLOL SUCCINATE 200 MG: 100 TABLET, EXTENDED RELEASE ORAL at 09:26

## 2025-07-29 RX ADMIN — MICONAZOLE NITRATE 200 MG: 200 SUPPOSITORY VAGINAL at 20:24

## 2025-07-29 RX ADMIN — Medication 10 ML: at 09:29

## 2025-07-29 RX ADMIN — PANTOPRAZOLE SODIUM 40 MG: 40 TABLET, DELAYED RELEASE ORAL at 05:26

## 2025-07-29 RX ADMIN — Medication 5 MG: at 20:23

## 2025-07-29 RX ADMIN — APIXABAN 2.5 MG: 2.5 TABLET, FILM COATED ORAL at 20:23

## 2025-07-29 RX ADMIN — CETIRIZINE HYDROCHLORIDE 10 MG: 10 TABLET, FILM COATED ORAL at 20:23

## 2025-07-29 NOTE — CASE MANAGEMENT/SOCIAL WORK
Discharge Planning Assessment   [facility]     Patient Name: Addie Perez  MRN: [id number]  Today's Date: 7/29/2025    Admit Date: 7/28/2025    Plan: DCP-Home. Lives alone. Agrees to home health if needed.   Discharge Needs Assessment       Row Name 07/29/25 1325       Living Environment    People in Home alone    Current Living Arrangements home    Duration at Residence 58 years    Potentially Unsafe Housing Conditions unable to assess    In the past 12 months has the electric, gas, oil, or water company threatened to shut off services in your home? No    Primary Care Provided by self    Provides Primary Care For no one, unable/limited ability to care for self    Family Caregiver if Needed child(naga), adult    Family Caregiver Names Colleen Jim/Daughter    Quality of Family Relationships supportive    Able to Return to Prior Arrangements yes       Resource/Environmental Concerns    Resource/Environmental Concerns none    Transportation Concerns none       Transportation Needs    In the past 12 months, has lack of transportation kept you from medical appointments or from getting medications? no    In the past 12 months, has lack of transportation kept you from meetings, work, or from getting things needed for daily living? No       Food Insecurity    Within the past 12 months, you worried that your food would run out before you got the money to buy more. Never true    Within the past 12 months, the food you bought just didn't last and you didn't have money to get more. Never true       Transition Planning    Patient/Family Anticipates Transition to home with help/services    Patient/Family Anticipated Services at Transition     Transportation Anticipated family or friend will provide       Discharge Needs Assessment    Readmission Within the Last 30 Days no previous admission in last 30 days    Current Outpatient/Agency/Support Group homecare agency    Equipment Currently Used at Home commode;hospital  "bed;shower chair;wheelchair;walker, rolling    Concerns to be Addressed basic needs;discharge planning    Do you want help finding or keeping work or a job? I do not need or want help    Do you want help with school or training? For example, starting or completing job training or getting a high school diploma, GED or equivalent No    Anticipated Changes Related to Illness none    Equipment Needed After Discharge none    Outpatient/Agency/Support Group Needs homecare agency    Provided Post Acute Provider List? N/A                   Discharge Plan       Row Name 25 [id number]       Plan    Plan DCP-Home. Lives alone. Agrees to home health if needed.    Plan Comments CM spoke with pt and daughter/Colleen at bedside. Permission given to discuss DCP with both. Pt confirmed name,,address, and insurance. Yes to LW and Yes to POA. No  service. PCP confirmed as MAGDI Hampton, last seen \"Last Wednesday\" Pharmacy used [facility]. Agreed to meds to bed. DME listed above. Denies any additional DME needs. Lives alone but has daughter/Colleen that is close and assist with any needs. Pt states (I) with ADL's and (I) with mobility. uses walker since her fall last 2024. Daughter transports to all appointments. Denied any finanical,food or transportation issues. DCP to return home. Agrees to home heatlh if needed.                  Continued Care and Services - Admitted Since 2025    No active coordination exists.       Selected Continued Care - Episodes Includes continued care and service providers with selected services from the active episodes listed below      Heart Failure Episode start date: 3/18/2024   There are no active outsourced providers for this episode.                    Demographic Summary       Row Name 25 [id number]       General Information    Admission Type inpatient    Arrived From emergency department    Required Notices Provided Observation Status Notice;Important Message from Medicare    " Referral Source admission list    Reason for Consult discharge planning    Preferred Language English       Contact Information    Permission Granted to Share Info With                    Functional Status       Row Name 07/29/25 1323       Functional Status    Usual Activity Tolerance good    Current Activity Tolerance moderate       Physical Activity    On average, how many days per week do you engage in moderate to strenuous exercise (like a brisk walk)? 0 days    On average, how many minutes do you engage in exercise at this level? 0 min    Number of minutes of exercise per week 0       Assessment of Health Literacy    How often do you have someone help you read hospital materials? Never    How often do you have problems learning about your medical condition because of difficulty understanding written information? Never    How often do you have a problem understanding what is told to you about your medical condition? Never    How confident are you filling out medical forms by yourself? Quite a bit    Health Literacy Good       Functional Status, IADL    Medications independent    Meal Preparation independent    Housekeeping independent    Laundry independent    Shopping assistive person    If for any reason you need help with day-to-day activities such as bathing, preparing meals, shopping, managing finances, etc., do you get the help you need? I don't need any help       Mental Status    General Appearance WDL WDL       Mental Status Summary    Recent Changes in Mental Status/Cognitive Functioning no changes       Employment/    Employment Status retired    Current or Previous Occupation factory work                   Psychosocial       Row Name 07/29/25 1321       Developmental Stage (Eriksson's Stages of Development)    Developmental Stage Stage 8 (65 years-death/Late Adulthood) Integrity vs. Despair                   Abuse/Neglect    No documentation.                  Legal    No  documentation.                  Substance Abuse    No documentation.                  Patient Forms    No documentation.                     Karen Abraham RN

## 2025-07-29 NOTE — PLAN OF CARE
Goal Outcome Evaluation:  Plan of Care Reviewed With: patient        Progress: improving  Outcome Evaluation: Pt participated well in PT evaluation this date. Pt supine upon arrival, daughter at bedside. Pt reports that she lives alone and is previously IND with ADLs and uses various devices for ambulation. Pt uses a RW at night, a SPC in the community, and typically no AD in the home during the day. Pt does have a life alert system. Pt is very [place]. Pt came from supine to sitting IND and completed STS SBA using RW. Pt ambulated 172 ft using RW SBA. Pt returned to room and was left sitting UIC, all needs met. Pt has no skilled PT needs at this time due to being at baseline, PT to sign off.    Anticipated Discharge Disposition (PT): home with assist, home

## 2025-07-29 NOTE — PROGRESS NOTES
"Patient Name: Addie Perez  YOB: 1934  MRN: [id number]  Admission date: 7/28/2025  Reason for Encounter: MST 2-3 or Nursing Admission Screen    [facility] Clinical Nutrition Assessment     Subjective    Subjective Information     7/29: Pt w/ MST score of 2 - unsure of recent weight loss. PO intake averaging 75% x 1 meal.      Objective   H&P and Current Problems      H&P  Past Medical History:   Diagnosis Date    Ambulates with cane     Arthritis     Atrial fibrillation     denies any recent episodes of palpitations, SOB, dizziness, chest pain; next cardiology visit scheduled for 3/17/25    Back pain     Cataract     PT REPORTS EARLY STAGES    CHF (congestive heart failure)     LOV with cardiology 3/17/25    Cystitis     Diabetes mellitus      REPORTS \"I AM PRE DIABETIC\" AND REPORTS THAT SHE WATCHS HER DIET AND PCP WATCHES A1C CLOSELY    Disease of thyroid gland     Dysphagia     REPORTS MORE TROUBLE SWALLOWING WHEN EATING OR TAKING PILLS. REPORTS PAIN AND THAT SOMETIMES THINGS FEEL LIKE THEY \"STICK\"    Dyspnea     occasionally - states due CHF - worse when humidity is high    Elevated cholesterol 09/20/2018    REPORTS SHE HAS TAKEN MEDICATION IN THE PAST BUT THAT SHE THINKS ALL IS WNL'S AT PRESENT TIME WITHOUT MEDICATION    Fracture of right ankle 2020    Fractured pelvis 2024    was sent to [facility] for rehab    GERD (gastroesophageal reflux disease)     Hearing loss     USE OF HEARING AIDS but states still does not hear well    History of cardiac murmur     REPORTS \"I USED TO HAVE ONE BUT NOW THEY SAY THEY DON'T HEAR IT\"    History of colonic polyps     History of pneumonia     Hx of echocardiogram     Hypercholesterolemia     Hypertension     Macular degeneration     Osteoporosis     Seasonal allergies     Sinusitis     Spinal headache     REPORTS AFTER DELIVERY OF HER SON    Urinary tract infection     history    Vertigo     Wears glasses       Past Surgical History:   Procedure " "Laterality Date    BACK SURGERY      REPORTS A SPUR WAS REMOVED THAT WAS HITTING SCIATIC NERVE    BREAST BIOPSY      BREAST SURGERY Left     LUMPECTOMY, BENIGN     CATARACT EXTRACTION W/ INTRAOCULAR LENS IMPLANT Left 03/04/2022    Procedure: CATARACT PHACO EXTRACTION WITH INTRAOCULAR LENS IMPLANT left;  Surgeon: Pritesh Mcnally MD;  Location: [facility] OR;  Service: Ophthalmology;  Laterality: Left;    CATARACT EXTRACTION W/ INTRAOCULAR LENS IMPLANT Right 03/18/2022    Procedure: CATARACT PHACO EXTRACTION WITH INTRAOCULAR LENS IMPLANT RIGHT;  Surgeon: Pritesh Mcnally MD;  Location: [facility] OR;  Service: Ophthalmology;  Laterality: Right;    COLONOSCOPY  2012    REPORTS LAST DONE IN 2012 BUT HAS A HX OF SEVERAL OF THESE PROCEDURES    D & C HYSTEROSCOPY N/A 03/25/2025    Procedure: DILATATION AND CURETTAGE HYSTEROSCOPY;  Surgeon: Margoth Chaudhari MD;  Location: [facility] OR;  Service: Obstetrics/Gynecology;  Laterality: N/A;    DILATATION AND CURETTAGE      REPORTS \"I HAD A COUPLE OF THOSE\"    ENDOSCOPY N/A 01/31/2017    Procedure: ESOPHAGOGASTRODUODENOSCOPY;  Surgeon: Robbie Quezada MD;  Location: [facility] ENDOSCOPY;  Service:     ENDOSCOPY N/A 09/21/2018    Procedure: ESOPHAGOGASTRODUODENOSCOPY WITH BIOPSY, DILITATION;  Surgeon: Robbie Quezada MD;  Location: [facility] ENDOSCOPY;  Service: Gastroenterology    ENDOSCOPY N/A 11/15/2023    Procedure: ESOPHAGOGASTRODUODENOSCOPY WITH BIOPSY AND DILATION;  Surgeon: Mil Ma MD;  Location: [facility] ENDOSCOPY;  Service: Gastroenterology;  Laterality: N/A;    ENDOSCOPY N/A 5/22/2025    Procedure: Esophagogastroduodenoscopy with dilitation and biopsy;  Surgeon: Mil Ma MD;  Location: [facility] ENDOSCOPY;  Service: Gastroenterology;  Laterality: N/A;    FOOT SURGERY Left     REPORTS SURGICAL INTERVENTION TO CORRECT GREAT TOE ALIGNMENT    GALLBLADDER SURGERY  2009    HEMORRHOIDECTOMY  1973    HYSTEROSCOPY      TONSILLECTOMY  1946    UPPER GASTROINTESTINAL " "ENDOSCOPY  2014    UPPER GASTROINTESTINAL ENDOSCOPY  01/31/2017      Current Problems   Admission Diagnosis:  Hyponatremia [E87.1]    Problem List:    Hyponatremia      Other Applicable Nutrition Information:        Anthropometrics       Height: 162.6 cm (64\")  Weight: 60.4 kg (133 lb 2.5 oz) (07/28/25 1708)  Weight Method: Bed scale  BMI (Calculated): 22.8       Trending Weight Changes 07/29/25:No significant changes       Weight History  Wt Readings from Last 20 Encounters:   07/28/25 1708 60.4 kg (133 lb 2.5 oz)   07/28/25 1223 59.4 kg (131 lb)   05/13/25 1445 59.9 kg (132 lb)   04/02/25 1255 62.8 kg (138 lb 6.4 oz)   03/17/25 1255 62.6 kg (138 lb)   03/13/25 0907 61.7 kg (136 lb)   03/06/25 1506 61.7 kg (136 lb)   03/05/25 1035 60.3 kg (133 lb)   12/23/24 1457 59.4 kg (131 lb)   12/09/24 1251 59.4 kg (131 lb)   11/17/24 0726 60.5 kg (133 lb 6.1 oz)   11/15/24 1308 64.5 kg (142 lb 3.2 oz)   11/14/24 2254 67.6 kg (149 lb)   10/10/24 1052 66.2 kg (146 lb)   10/02/24 0846 66.2 kg (146 lb)   09/23/24 1250 68.5 kg (151 lb)   09/14/24 1613 69.6 kg (153 lb 7 oz)   09/14/24 1007 65.8 kg (145 lb)   09/09/24 1116 67.1 kg (148 lb)   08/26/24 0925 67.9 kg (149 lb 9.6 oz)   08/19/24 1108 66.2 kg (146 lb)   08/05/24 1122 65.8 kg (145 lb)   06/10/24 1115 65.7 kg (144 lb 12.8 oz)   05/08/24 1421 65.8 kg (145 lb)        Labs      Comment: Labs reviewed      Results from last 7 days   Lab Units 07/29/25  0656 07/29/25  0207 07/28/25  1542 07/28/25  1307   SODIUM mmol/L 127* 126*  --  122*   POTASSIUM mmol/L 5.3* 4.9  --  5.0   GLUCOSE mg/dL 103* 114*  --  89   BUN mg/dL 22.0 25.0*  --  28.0*   CREATININE mg/dL 1.07* 1.40*  --  1.18*   CALCIUM mg/dL 9.0 8.9  --  9.4   MAGNESIUM mg/dL  --   --   --  2.0   ALBUMIN g/dL  --   --   --  4.1   LACTATE mmol/L  --   --  1.1  --    BILIRUBIN mg/dL  --   --   --  1.1   ALK PHOS U/L  --   --   --  175*   AST (SGOT) U/L  --   --   --  24   ALT (SGPT) U/L  --   --   --  12     Results from last " 7 days   Lab Units 07/29/25  0656 07/28/25  1307   PLATELETS 10*3/mm3 171 259   HEMOGLOBIN g/dL 11.7* 12.7   HEMATOCRIT % 37.3 38.8     Lab Results   Component Value Date    HGBA1C 5.90 (H) 03/03/2024          Medications       Scheduled Medications apixaban, 2.5 mg, Oral, Q12H  cefTRIAXone, 1,000 mg, Intravenous, Q24H  cetirizine, 10 mg, Oral, Nightly  melatonin, 5 mg, Oral, Nightly  metoprolol succinate XL, 200 mg, Oral, Daily  pantoprazole, 40 mg, Oral, Q AM  sodium chloride, 10 mL, Intravenous, Q12H        Infusions Pharmacy To Dose:,   sodium chloride, 100 mL/hr, Last Rate: 100 mL/hr (07/29/25 0315)        PRN Medications   acetaminophen **OR** acetaminophen **OR** acetaminophen    senna-docusate sodium **AND** polyethylene glycol **AND** bisacodyl **AND** bisacodyl    Magnesium Standard Dose Replacement - Follow Nurse / BPA Driven Protocol    nitroglycerin    ondansetron    Pharmacy To Dose:    Potassium Replacement - Follow Nurse / BPA Driven Protocol    sodium chloride    sodium chloride    sodium chloride     Physical Findings      Chewing/Swallowing  Teeth Status: Mouth/Teeth WDL: .WDL except    Chewing/Swallowing Issues: No issues identified at this time   Edema                            Bowel Function     Last Bowel Movement: 07/27/25   I/Os  Intake & Output (last 3 days)         07/26 0701 07/27 0700 07/27 0701 07/28 0700 07/28 0701 07/29 0700 07/29 0701 07/30 0700    P.O.   240     IV Piggyback   100     Total Intake(mL/kg)   340 (5.6)     Net   +340             Urine Unmeasured Occurrence   5 x 1 x           Lines, Drains, Airways, & Wounds       Active LDAs       Name Placement date Placement time Site Days Last dressing change    Peripheral IV 07/28/25 1607 20 G Left Antecubital 07/28/25  1607  Antecubital  less than 1                       Nutrition Focused Physical Exam     Trending NFPE 07/29/25:N/a     Malnutrition Severity Assessment            (1)   Current Nutrition Orders & Evaluation  of Intake      Oral Nutrition     Food Allergies  and Intolerances NKFA   Current PO Diet Diet: Cardiac; Healthy Heart (2-3 Na+); Fluid Consistency: Thin (IDDSI 0)   Oral Nutrition Supplement None     Trending % PO Intake 07/29/25:75% x 1 meal    (2)  Assessment & Plan   Nutrition Diagnosis and Goals       Nutrition Diagnosis 1 No nutrition diagnosis at this time       Nutrition Diagnosis 2         Goal(s) Maintain PO Intake , Meets Estimated Needs , Tolerates PO Diet , Maintain Weight, and No Significant Weight Loss     Nutrition Intervention and Prescription       Intervention  Continue to monitor for plan of care      Diet Prescription     Supplement Prescription     Education Provided     (3)  Monitoring/Evaluation       Monitor/Evaluation Per Protocol, PO Intake, Pertinent Labs, Weight, Skin Status, GI Status, Symptoms, Swallow Function, and Hemodynamic Stability      RD Follow-Up Encounter 3-5 days     Electronically signed by:  Amandeep Chamorro RD  07/29/25 09:09 EDT

## 2025-07-29 NOTE — PROGRESS NOTES
[facility] HOSPITALIST    PROGRESS NOTE    Name:  Addie Perez   Age:  89 y.o.  Sex:  female  :  1934  MRN:  [id number]   Visit Number:  [id number]  Admission Date:  2025  Date Of Service:  25  Primary Care Physician:  Ariela Hampton DO     LOS: 0 days :    Chief Complaint:      Low sodium    Subjective:    2025: Admitting provider documentation reviewed. Sodium has improved from 122 to 127. Improved fatigue, weakness, lightheadedness. Creatinine improved. Getting IV fluids. Denies any LUTS. Uses vaginal estrogen cream. Walked well with therapy.    History Of Presenting Illness:       Patient is a 89 years old female with a past medical history of A-fib on Eliquis, diabetes mellitus, CHF, GERD, hypertension, hyperlipidemia, who presented to the ER with a chief complaint of generalized weakness.  Patient reports progressively worsening fatigue and generalized weakness over the past 2 to 3 days, she also reports associated lightheadedness at times.  Her generalized weakness gotten worse and was unable to take care of her daily activities so she presented to the ER for evaluation.  Denies any recent fever, chills, abdominal pain, nausea, vomiting or diarrhea.  No chest pain, shortness of breath or cough.     On ER evaluation, Blood pressure was 137/115, other vital stable and afebrile.  Workup in the ER was significant for sodium of 122 with normal glucose, creatinine 1.18, BUN 28 otherwise her CBC and CMP nonactionable.  Urinalysis showed trace leukocytes, bacteria 1+ with squamous epithelial cells.  Chest x-ray unremarkable.  Patient received half liter bolus of normal saline and was started on Rocephin for UTI.  Hospitalist consulted for admission due to hyponatremia, UTI and advanced age, further management and treatment.  Edited by: Lauri Oliver DO at 2025 1213     Review of Systems:     All systems were reviewed and negative except as mentioned in  "subjective, assessment and plan.    Vital Signs:    Temp:  [97.5 °F (36.4 °C)-98.1 °F (36.7 °C)] 97.5 °F (36.4 °C)  Heart Rate:  [64-88] 64  Resp:  [16-18] 18  BP: (114-137)/() 119/68    Intake and output:    I/O last 3 completed shifts:  In: 340 [P.O.:240; IV Piggyback:100]  Out: -   I/O this shift:  In: 325 [P.O.:325]  Out: -     Physical Examination:    Constitutional: No acute distress, awake, alert  HENT: NCAT, mucous membranes moist  Respiratory: trace basilar rales bilaterally, respiratory effort normal   Cardiovascular: irregularly irregular, no murmurs, rubs, or gallops  Gastrointestinal: Positive bowel sounds, soft, nontender, nondistended  Musculoskeletal: No bilateral ankle edema, weakness and frailty noted  Psychiatric: Appropriate affect, cooperative  Neurologic: Oriented x 3, speech clear  Skin: No rashes  Edited by: Lauri Oliver DO at 7/29/2025 0810     Laboratory results:    Results from last 7 days   Lab Units 07/29/25  0656 07/29/25  0207 07/28/25  1307   SODIUM mmol/L 127* 126* 122*   POTASSIUM mmol/L 5.3* 4.9 5.0   CHLORIDE mmol/L 97* 96* 88*   CO2 mmol/L 18.3* 18.6* 22.1   BUN mg/dL 22.0 25.0* 28.0*   CREATININE mg/dL 1.07* 1.40* 1.18*   CALCIUM mg/dL 9.0 8.9 9.4   BILIRUBIN mg/dL  --   --  1.1   ALK PHOS U/L  --   --  175*   ALT (SGPT) U/L  --   --  12   AST (SGOT) U/L  --   --  24   GLUCOSE mg/dL 103* 114* 89     Results from last 7 days   Lab Units 07/29/25  0656 07/28/25  1307   WBC 10*3/mm3 5.60 6.64   HEMOGLOBIN g/dL 11.7* 12.7   HEMATOCRIT % 37.3 38.8   PLATELETS 10*3/mm3 171 259         Results from last 7 days   Lab Units 07/28/25  1448 07/28/25  1307   HSTROP T ng/L 11 12     Results from last 7 days   Lab Units 07/28/25  1349   URINECX  No growth     No results for input(s): \"PHART\", \"KBP4DUF\", \"PO2ART\", \"EXT2NZC\", \"BASEEXCESS\" in the last 8760 hours.   I have reviewed the patient's laboratory results.    Radiology results:    XR Chest 1 View  Result Date: " 7/28/2025  PROCEDURE: XR CHEST 1 VW-  HISTORY: Weak/Dizzy/AMS triage protocol  COMPARISON: 11/14/2024  FINDINGS:  Portable view of the chest demonstrates the lungs to be grossly clear. There is no evidence of effusion, pneumothorax or other significant pleural disease. The mediastinum is unremarkable.  The heart size is normal.      Impression: Unremarkable portable chest.    This report was signed and finalized on 7/28/2025 1:11 PM by Angel Kincaid MD.      I have reviewed the patient's radiology reports.    Medication Review:     I have reviewed the patient's active and prn medications.     Problem List:      Hyponatremia      Assessment/Plan:    This patient's problems and plans were partially entered by my partner and updated as appropriate by me 07/29/25.    Hyponatremia, POA  Possible UTI, POA  Generalized weakness, POA  A-fib on Eliquis  Diabetes mellitus  CHF  GERD  Hypertension  Hyperlipidemia     Plan:       Hyponatremia  - Acute on chronic, with hypovolemia.  - IV fluids.  - serial BMP     UTI, ruled out  Vaginitis  - UA no pyuria, minimal bacteruria  - hx of yeast  symptoms consistent will rx miconazole, Estrogen cream     Generalized weakness  - In settings of hyponatremia   - PT/OT consulted       DVT Prophylaxis: Eliquis  Code Status: DNR/DNI  Diet: Cardiac  Disposition: Plan with home independently   Edited by: Lauri Oliver DO at 7/29/2025 1213           Lauri Oliver DO  07/29/25  12:13 EDT    Dictated utilizing Dragon dictation.

## 2025-07-29 NOTE — PLAN OF CARE
Goal Outcome Evaluation:   Up in chair , ivf continues with decrease rate 50ml/hr, plan home tomorrow

## 2025-07-29 NOTE — PLAN OF CARE
Goal Outcome Evaluation:  Plan of Care Reviewed With: patient        Progress: no change  Outcome Evaluation: Pt seen for OT evaluation today.  Pt lives at home alone and is normally independent with self care and functional mobility tasks.  She uses a RW at night in her home and a cane for community mobility tasks.  Pt has a shower chair, bedside commode, RW and cane at home.  Pt received supine in bed and was independent to sit eob, sba to stand and walked 172' with sba using RW.  Pt able to manage her grooming and toileting tasks per conversation with her daughter.  She was able to doff/don her socks without difficulty and demonstrates good balance for ADL tasks.  Pt appears to be at her baseline functional level.  Her and her family are not interested in home health therapy services.  Pt has inpatient OT needs at this time.  OT will sign off.    Anticipated Discharge Disposition (OT): home

## 2025-07-29 NOTE — THERAPY DISCHARGE NOTE
"Acute Care - Occupational Therapy Discharge  [facility]    Patient Name: Addie Perez  : 1934    MRN: [id number]                              Today's Date: 2025       Admit Date: 2025    Visit Dx:     ICD-10-CM ICD-9-CM   1. Hyponatremia  E87.1 276.1   2. Acute cystitis with hematuria  N30.01 595.0     Patient Active Problem List   Diagnosis    Abdominal pain    Chronic gastritis    Dyslipidemia    Essential hypertension    Chronic nausea    Controlled diabetes mellitus    Dysphagia    Epigastric pain    Allergic rhinitis    Chronic kidney disease    Macular degeneration    Difficult or painful urination    Fibrocystic disease of breast    Gout    Lumbosacral radiculopathy    Osteoarthritis    Osteopenia    Prediabetes    Stricture of esophagus    Toxic multinodular goiter with no crisis    Nuclear sclerotic cataract of left eye    Gastroesophageal reflux disease without esophagitis    Schatzki's ring    Paroxysmal atrial fibrillation    (HFpEF) heart failure with preserved ejection fraction    Irritable bowel syndrome with diarrhea    Pelvic fracture    Atrial fibrillation, persistent    Acute on chronic diastolic (congestive) heart failure    Acute on chronic diastolic congestive heart failure    Acute heart failure with preserved ejection fraction (HFpEF)    Hospital discharge follow-up    Esophageal dysmotility    Elevated liver function tests    Hepatic cirrhosis    PMB (postmenopausal bleeding)    Hyponatremia     Past Medical History:   Diagnosis Date    Ambulates with cane     Arthritis     Atrial fibrillation     denies any recent episodes of palpitations, SOB, dizziness, chest pain; next cardiology visit scheduled for 3/17/25    Back pain     Cataract     PT REPORTS EARLY STAGES    CHF (congestive heart failure)     LOV with cardiology 3/17/25    Cystitis     Diabetes mellitus      REPORTS \"I AM PRE DIABETIC\" AND REPORTS THAT SHE WATCHS HER DIET AND PCP WATCHES A1C CLOSELY    Disease " "of thyroid gland     Dysphagia     REPORTS MORE TROUBLE SWALLOWING WHEN EATING OR TAKING PILLS. 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    History of cardiac murmur     REPORTS \"I USED TO HAVE ONE BUT NOW THEY SAY THEY DON'T HEAR IT\"    History of colonic polyps     History of pneumonia     Hx of echocardiogram     Hypercholesterolemia     Hypertension     Macular degeneration     Osteoporosis     Seasonal allergies     Sinusitis     Spinal headache     REPORTS AFTER DELIVERY OF HER SON    Urinary tract infection     history    Vertigo     Wears glasses      Past Surgical History:   Procedure Laterality Date    BACK SURGERY      REPORTS A SPUR WAS REMOVED THAT WAS HITTING SCIATIC NERVE    BREAST BIOPSY      BREAST SURGERY Left     LUMPECTOMY, BENIGN     CATARACT EXTRACTION W/ INTRAOCULAR LENS IMPLANT Left 03/04/2022    Procedure: CATARACT PHACO EXTRACTION WITH INTRAOCULAR LENS IMPLANT left;  Surgeon: Pritesh Mcnally MD;  Location: [facility] OR;  Service: Ophthalmology;  Laterality: Left;    CATARACT EXTRACTION W/ INTRAOCULAR LENS IMPLANT Right 03/18/2022    Procedure: CATARACT PHACO EXTRACTION WITH INTRAOCULAR LENS IMPLANT RIGHT;  Surgeon: Pritesh Mcnally MD;  Location: [facility];  Service: Ophthalmology;  Laterality: Right;    COLONOSCOPY  2012    REPORTS LAST DONE IN 2012 BUT HAS A HX OF SEVERAL OF THESE PROCEDURES    D & C HYSTEROSCOPY N/A 03/25/2025    Procedure: DILATATION AND CURETTAGE HYSTEROSCOPY;  Surgeon: Margoth Chaudhari MD;  Location: [facility]" [facility];  Service: Obstetrics/Gynecology;  Laterality: N/A;    DILATATION AND CURETTAGE      REPORTS \"I HAD A COUPLE OF THOSE\"    ENDOSCOPY N/A 01/31/2017    Procedure: ESOPHAGOGASTRODUODENOSCOPY;  Surgeon: Robbie Quezada MD;  Location: [facility] ENDOSCOPY;  Service:     ENDOSCOPY N/A 09/21/2018    Procedure: ESOPHAGOGASTRODUODENOSCOPY WITH BIOPSY, DILITATION;  Surgeon: Robbie Quezada MD;  Location: [facility] ENDOSCOPY;  Service: Gastroenterology    ENDOSCOPY N/A 11/15/2023    Procedure: ESOPHAGOGASTRODUODENOSCOPY WITH BIOPSY AND DILATION;  Surgeon: Mil Ma MD;  Location: [facility] ENDOSCOPY;  Service: Gastroenterology;  Laterality: N/A;    ENDOSCOPY N/A 5/22/2025    Procedure: Esophagogastroduodenoscopy with dilitation and biopsy;  Surgeon: Mil Ma MD;  Location: [facility] ENDOSCOPY;  Service: Gastroenterology;  Laterality: N/A;    FOOT SURGERY Left     REPORTS SURGICAL INTERVENTION TO CORRECT GREAT TOE ALIGNMENT    GALLBLADDER SURGERY  2009    HEMORRHOIDECTOMY  1973    HYSTEROSCOPY      TONSILLECTOMY  1946    UPPER GASTROINTESTINAL ENDOSCOPY  2014    UPPER GASTROINTESTINAL ENDOSCOPY  01/31/2017      General Information       Row Name 07/29/25 1244          OT Time and Intention    Document Type discharge evaluation/summary  -     Mode of Treatment occupational therapy  -     Patient Effort good  -       Row Name 07/29/25 1244          General Information    Patient Profile Reviewed yes  -     Prior Level of Function independent:;ADL's;all household mobility;community mobility  -     Existing Precautions/Restrictions fall  -       Row Name 07/29/25 1244          Living Environment    Current Living Arrangements home  -     People in Home alone  -       Row Name 07/29/25 1244          Home Main Entrance    Number of Stairs, Main Entrance one  -     Stair Railings, Main Entrance none  -       Row Name 07/29/25 1244          Stairs Within Home, Primary    Number of Stairs, Within " Home, Primary none  -[facility] Name 07/29/25 1244          Cognition    Orientation Status (Cognition) oriented x 4  -[facility] Name 07/29/25 1244          Safety Issues/Impairments Affecting Functional Mobility    Safety Issues Affecting Function (Mobility) awareness of need for assistance  -               User Key  (r) = Recorded By, (t) = Taken By, (c) = Cosigned By      Initials Name Provider Type     Jaymie Figueroa Occupational Therapist                   Mobility/ADL's       Row Name 07/29/25 1250          Bed Mobility    Bed Mobility bed mobility (all) activities  -     All Activities, [place] (Bed Mobility) independent  -[facility] Name 07/29/25 1250          Sit-Stand Transfer    Sit-Stand [place] (Transfers) standby assist  -     Assistive Device (Sit-Stand Transfers) walker, front-wheeled  -[facility] Name 07/29/25 1250          Functional Mobility    Functional Mobility- Ind. Level standby assist  -     Functional Mobility- Device walker, front-wheeled  -     Functional Mobility-Distance (Feet) 172  -     Patient was able to Ambulate yes  -[facility] Name 07/29/25 1250          Activities of Daily Living    BADL Assessment/Intervention bathing;upper body dressing;lower body dressing;grooming;feeding;toileting  -[facility] Name 07/29/25 1250          Bathing Assessment/Intervention    [place] Level (Bathing) supervision  -[facility] Name 07/29/25 1250          Upper Body Dressing Assessment/Training    [place] Level (Upper Body Dressing) independent  -[facility] Name 07/29/25 1250          Lower Body Dressing Assessment/Training    [place] Level (Lower Body Dressing) independent  -[facility] Name 07/29/25 1250          Grooming Assessment/Training    [place] Level (Grooming) independent  -[facility] Name 07/29/25 1250          Self-Feeding Assessment/Training    [place] Level (Feeding) independent  -[facility] Name 07/29/25 1250           Toileting Assessment/Training    [place] Level (Toileting) independent  -               User Key  (r) = Recorded By, (t) = Taken By, (c) = Cosigned By      Initials Name Provider Type    Jaymie Rand Occupational Therapist                   Obj/Interventions       Row Name 07/29/25 1251          Sensory Assessment (Somatosensory)    Sensory Assessment (Somatosensory) sensation intact  -AH       Row Name 07/29/25 1251          Vision Assessment/Intervention    Vision Assessment Comment pt has decreased vision d/t macular degeration  -AH       Row Name 07/29/25 1251          Range of Motion Comprehensive    General Range of Motion bilateral upper extremity ROM WFL  -AH       Row Name 07/29/25 1251          Strength Comprehensive (MMT)    Comment, General Manual Muscle Testing (MMT) Assessment BUE WFL  [facility]               User Key  (r) = Recorded By, (t) = Taken By, (c) = Cosigned By      Initials Name Provider Type    Jaymie Rand Occupational Therapist                   Goals/Plan    No documentation.                  Clinical Impression       Row Name 07/29/25 1259          Pain Assessment    Pretreatment Pain Rating 0/10 - no pain  -     Posttreatment Pain Rating 0/10 - no pain  -AH       Row Name 07/29/25 1259          Plan of Care Review    Plan of Care Reviewed With patient  -     Progress no change  -     Outcome Evaluation Pt seen for OT evaluation today.  Pt lives at home alone and is normally independent with self care and functional mobility tasks.  She uses a RW at night in her home and a cane for community mobility tasks.  Pt has a shower chair, bedside commode, RW and cane at home.  Pt received supine in bed and was independent to sit eob, sba to stand and walked 172' with sba using RW.  Pt able to manage her grooming and toileting tasks per conversation with her daughter.  She was able to doff/don her socks without difficulty and demonstrates good balance for ADL tasks.  Pt  appears to be at her baseline functional level.  Her and her family are not interested in home health therapy services.  Pt has inpatient OT needs at this time.  OT will sign off.  -       Row Name 07/29/25 1259          Therapy Assessment/Plan (OT)    Patient/Family Therapy Goal Statement (OT) d/c home  -     Criteria for Skilled Therapeutic Interventions Met (OT) no problems identified which require skilled intervention  -     Therapy Frequency (OT) evaluation only  -       Row Name 07/29/25 1259          Therapy Plan Review/Discharge Plan (OT)    Anticipated Discharge Disposition (OT) home  -[facility] Name 07/29/25 1259          Positioning and Restraints    Pre-Treatment Position in bed  -     Post Treatment Position chair  -     In Chair sitting;call light within reach;encouraged to call for assist;exit alarm on;with family/caregiver  -               User Key  (r) = Recorded By, (t) = Taken By, (c) = Cosigned By      Initials Name Provider Type    Jaymie Rand Occupational Therapist                   Outcome Measures       Row Name 07/29/25 1315          How much help from another is currently needed...    Putting on and taking off regular lower body clothing? 4  -AH     Bathing (including washing, rinsing, and drying) 4  -AH     Toileting (which includes using toilet bed pan or urinal) 4  -AH     Putting on and taking off regular upper body clothing 4  -AH     Taking care of personal grooming (such as brushing teeth) 4  -AH     Eating meals 4  -AH     AM-PAC 6 Clicks Score (OT) 24  -       Row Name 07/29/25 1236 07/29/25 0800       How much help from another person do you currently need...    Turning from your back to your side while in flat bed without using bedrails? 4  -MK 3  -BT    Moving from lying on back to sitting on the side of a flat bed without bedrails? 4  -MK 3  -BT    Moving to and from a bed to a chair (including a wheelchair)? 4  -MK 3  -BT    Standing up from a chair  using your arms (e.g., wheelchair, bedside chair)? 4  -MK 3  -BT    Climbing 3-5 steps with a railing? 2  -MK 2  -BT    To walk in hospital room? 4  -MK 3  -BT    AM-PAC 6 Clicks Score (PT) 22  -MK 17  -BT    Highest Level of Mobility Goal Walk 25 Feet or More-7  -MK Stand (1 or More Minutes)-5  -BT      Row Name 07/29/25 1315 07/29/25 1236       Functional Assessment    Outcome Measure Options AM-PAC 6 Clicks Daily Activity (OT)  - AM-PAC 6 Clicks Basic Mobility (PT)  -              User Key  (r) = Recorded By, (t) = Taken By, (c) = Cosigned By      Initials Name Provider Type     Jaymie Figueroa Occupational Therapist    Monique Soler, RN Registered Nurse    Luther Wolff, PT Physical Therapist                  Occupational Therapy Education       Title: PT OT SLP Therapies (In Progress)       Topic: Occupational Therapy (Done)       Point: ADL training (Done)       Learning Progress Summary            Patient Acceptance, E,TB, VU by  at 7/29/2025 1316    Comment: Role of OT   Family Acceptance, E,TB, VU by  at 7/29/2025 1316    Comment: Role of OT                                      User Key       Initials Effective Dates Name Provider Type Replaced by [facility] 06/16/21 -  Jaymie Figueroa Occupational Therapist OT                  OT Recommendation and Plan  Therapy Frequency (OT): evaluation only  Plan of Care Review  Plan of Care Reviewed With: patient  Progress: no change  Outcome Evaluation: Pt seen for OT evaluation today.  Pt lives at home alone and is normally independent with self care and functional mobility tasks.  She uses a RW at night in her home and a cane for community mobility tasks.  Pt has a shower chair, bedside commode, RW and cane at home.  Pt received supine in bed and was independent to sit eob, sba to stand and walked 172' with sba using RW.  Pt able to manage her grooming and toileting tasks per conversation with her daughter.  She was able to doff/don her socks without  difficulty and demonstrates good balance for ADL tasks.  Pt appears to be at her baseline functional level.  Her and her family are not interested in home health therapy services.  Pt has inpatient OT needs at this time.  OT will sign off.  Plan of Care Reviewed With: patient  Outcome Evaluation: Pt seen for OT evaluation today.  Pt lives at home alone and is normally independent with self care and functional mobility tasks.  She uses a RW at night in her home and a cane for community mobility tasks.  Pt has a shower chair, bedside commode, RW and cane at home.  Pt received supine in bed and was independent to sit eob, sba to stand and walked 172' with sba using RW.  Pt able to manage her grooming and toileting tasks per conversation with her daughter.  She was able to doff/don her socks without difficulty and demonstrates good balance for ADL tasks.  Pt appears to be at her baseline functional level.  Her and her family are not interested in home health therapy services.  Pt has inpatient OT needs at this time.  OT will sign off.     Time Calculation:   Evaluation Complexity (OT)  Review Occupational Profile/Medical/Therapy History Complexity: brief/low complexity  Assessment, Occupational Performance/Identification of Deficit Complexity: 1-3 performance deficits  Clinical Decision Making Complexity (OT): problem focused assessment/low complexity  Overall Complexity of Evaluation (OT): low complexity     Time Calculation- OT       Row Name 07/29/25 1316             Time Calculation- OT    OT Start Time 1051  -AH      OT Received On 07/29/25  -         Untimed Charges    OT Eval/Re-eval Minutes 45  -AH         Total Minutes    Untimed Charges Total Minutes 45  -AH       Total Minutes 45  -AH                User Key  (r) = Recorded By, (t) = Taken By, (c) = Cosigned By      Initials Name Provider Type    Jaymie Rand Occupational Therapist                  Therapy Charges for Today       Code Description  Service Date Service Provider Modifiers Qty    [id number] HC OT EVAL LOW COMPLEXITY 3 7/29/2025 Jaymie Figueroa GO 1               OT Discharge Summary  Anticipated Discharge Disposition (OT): home    Jaymie Figueroa  7/29/2025

## 2025-07-29 NOTE — THERAPY DISCHARGE NOTE
"Patient Name: Addie Perez  : 1934    MRN: [id number]                              Today's Date: 2025       Admit Date: 2025    Visit Dx:     ICD-10-CM ICD-9-CM   1. Hyponatremia  E87.1 276.1   2. Acute cystitis with hematuria  N30.01 595.0     Patient Active Problem List   Diagnosis    Abdominal pain    Chronic gastritis    Dyslipidemia    Essential hypertension    Chronic nausea    Controlled diabetes mellitus    Dysphagia    Epigastric pain    Allergic rhinitis    Chronic kidney disease    Macular degeneration    Difficult or painful urination    Fibrocystic disease of breast    Gout    Lumbosacral radiculopathy    Osteoarthritis    Osteopenia    Prediabetes    Stricture of esophagus    Toxic multinodular goiter with no crisis    Nuclear sclerotic cataract of left eye    Gastroesophageal reflux disease without esophagitis    Schatzki's ring    Paroxysmal atrial fibrillation    (HFpEF) heart failure with preserved ejection fraction    Irritable bowel syndrome with diarrhea    Pelvic fracture    Atrial fibrillation, persistent    Acute on chronic diastolic (congestive) heart failure    Acute on chronic diastolic congestive heart failure    Acute heart failure with preserved ejection fraction (HFpEF)    Hospital discharge follow-up    Esophageal dysmotility    Elevated liver function tests    Hepatic cirrhosis    PMB (postmenopausal bleeding)    Hyponatremia     Past Medical History:   Diagnosis Date    Ambulates with cane     Arthritis     Atrial fibrillation     denies any recent episodes of palpitations, SOB, dizziness, chest pain; next cardiology visit scheduled for 3/17/25    Back pain     Cataract     PT REPORTS EARLY STAGES    CHF (congestive heart failure)     LOV with cardiology 3/17/25    Cystitis     Diabetes mellitus      REPORTS \"I AM PRE DIABETIC\" AND REPORTS THAT SHE WATCHS HER DIET AND PCP WATCHES A1C CLOSELY    Disease of thyroid gland     Dysphagia     REPORTS MORE TROUBLE " "SWALLOWING WHEN EATING OR TAKING PILLS. 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 hear well    History of cardiac murmur     REPORTS \"I USED TO HAVE ONE BUT NOW THEY SAY THEY DON'T HEAR IT\"    History of colonic polyps     History of pneumonia     Hx of echocardiogram     Hypercholesterolemia     Hypertension     Macular degeneration     Osteoporosis     Seasonal allergies     Sinusitis     Spinal headache     REPORTS AFTER DELIVERY OF HER SON    Urinary tract infection     history    Vertigo     Wears glasses      Past Surgical History:   Procedure Laterality Date    BACK SURGERY      REPORTS A SPUR WAS REMOVED THAT WAS HITTING SCIATIC NERVE    BREAST BIOPSY      BREAST SURGERY Left     LUMPECTOMY, BENIGN     CATARACT EXTRACTION W/ INTRAOCULAR LENS IMPLANT Left 03/04/2022    Procedure: CATARACT PHACO EXTRACTION WITH INTRAOCULAR LENS IMPLANT left;  Surgeon: Pritesh Mcnally MD;  Location: [facility] OR;  Service: Ophthalmology;  Laterality: Left;    CATARACT EXTRACTION W/ INTRAOCULAR LENS IMPLANT Right 03/18/2022    Procedure: CATARACT PHACO EXTRACTION WITH INTRAOCULAR LENS IMPLANT RIGHT;  Surgeon: Pritesh Mcnally MD;  Location: [facility] OR;  Service: Ophthalmology;  Laterality: Right;    COLONOSCOPY  2012    REPORTS LAST DONE IN 2012 BUT HAS A HX OF SEVERAL OF THESE PROCEDURES    D & C HYSTEROSCOPY N/A 03/25/2025    Procedure: DILATATION AND CURETTAGE HYSTEROSCOPY;  Surgeon: Margoth Chaudhari MD;  Location: [facility] OR;  Service: Obstetrics/Gynecology;  Laterality: " "N/A;    DILATATION AND CURETTAGE      REPORTS \"I HAD A COUPLE OF THOSE\"    ENDOSCOPY N/A 01/31/2017    Procedure: ESOPHAGOGASTRODUODENOSCOPY;  Surgeon: Robbie Quezada MD;  Location: [facility] ENDOSCOPY;  Service:     ENDOSCOPY N/A 09/21/2018    Procedure: ESOPHAGOGASTRODUODENOSCOPY WITH BIOPSY, DILITATION;  Surgeon: Robbie Quezada MD;  Location: [facility] ENDOSCOPY;  Service: Gastroenterology    ENDOSCOPY N/A 11/15/2023    Procedure: ESOPHAGOGASTRODUODENOSCOPY WITH BIOPSY AND DILATION;  Surgeon: Mil Ma MD;  Location: [facility] ENDOSCOPY;  Service: Gastroenterology;  Laterality: N/A;    ENDOSCOPY N/A 5/22/2025    Procedure: Esophagogastroduodenoscopy with dilitation and biopsy;  Surgeon: Mil Ma MD;  Location: [facility] ENDOSCOPY;  Service: Gastroenterology;  Laterality: N/A;    FOOT SURGERY Left     REPORTS SURGICAL INTERVENTION TO CORRECT GREAT TOE ALIGNMENT    GALLBLADDER SURGERY  2009    HEMORRHOIDECTOMY  1973    HYSTEROSCOPY      TONSILLECTOMY  1946    UPPER GASTROINTESTINAL ENDOSCOPY  2014    UPPER GASTROINTESTINAL ENDOSCOPY  01/31/2017      General Information       Row Name 07/29/25 1230          Physical Therapy Time and Intention    Document Type evaluation  -     Mode of Treatment physical therapy  -       Row Name 07/29/25 1230          General Information    Patient Profile Reviewed yes  -     Prior Level of Function independent:;ADL's;all household mobility;community mobility  -     Existing Precautions/Restrictions fall  -       Row Name 07/29/25 1230          Living Environment    Current Living Arrangements home  -     People in Home alone  -       Row Name 07/29/25 1230          Home Main Entrance    Number of Stairs, Main Entrance one  -     Stair Railings, Main Entrance none  -       Row Name 07/29/25 1230          Stairs Within Home, Primary    Number of Stairs, Within Home, Primary none  -       Row Name 07/29/25 1230          Cognition    Orientation " Status (Cognition) oriented x 4  -       Row Name 07/29/25 1230          Safety Issues/Impairments Affecting Functional Mobility    Safety Issues Affecting Function (Mobility) awareness of need for assistance  -               User Key  (r) = Recorded By, (t) = Taken By, (c) = Cosigned By      Initials Name Provider Type    Luther Wolff, PT Physical Therapist                   Mobility       Row Name 07/29/25 1231          Bed Mobility    Bed Mobility bed mobility (all) activities  -     All Activities, [place] (Bed Mobility) independent  -       Row Name 07/29/25 1231          Sit-Stand Transfer    Sit-Stand [place] (Transfers) standby assist  -     Assistive Device (Sit-Stand Transfers) walker, front-wheeled  -       Row Name 07/29/25 1231          Gait/Stairs (Locomotion)    [place] Level (Gait) standby assist  -     Assistive Device (Gait) walker, front-wheeled  -     Patient was able to Ambulate yes  -MK     Distance in Feet (Gait) 172  -               User Key  (r) = Recorded By, (t) = Taken By, (c) = Cosigned By      Initials Name Provider Type    Luther Wolff PT Physical Therapist                   Obj/Interventions       Row Name 07/29/25 1232          Range of Motion Comprehensive    General Range of Motion no range of motion deficits identified  -[facility] Name 07/29/25 1232          Strength Comprehensive (MMT)    General Manual Muscle Testing (MMT) Assessment no strength deficits identified  -MK       Row Name 07/29/25 1232          Balance    Balance Assessment sitting static balance;sitting dynamic balance;standing static balance;standing dynamic balance  -     Static Sitting Balance independent  -     Dynamic Sitting Balance independent  -MK     Position, Sitting Balance unsupported;sitting edge of bed  -     Static Standing Balance standby assist  -     Dynamic Standing Balance standby assist  -     Position/Device Used, Standing Balance walker,  front-wheeled  -     Balance Interventions sitting;standing;sit to stand  -               User Key  (r) = Recorded By, (t) = Taken By, (c) = Cosigned By      Initials Name Provider Type    Luther Wolff, PT Physical Therapist                   Goals/Plan    No documentation.                  Clinical Impression       Row Name 07/29/25 1232          Pain    Pretreatment Pain Rating 0/10 - no pain  -     Posttreatment Pain Rating 0/10 - no pain  -       Row Name 07/29/25 1232          Plan of Care Review    Plan of Care Reviewed With patient  -MK     Progress improving  -     Outcome Evaluation Pt participated well in PT evaluation this date. Pt supine upon arrival, daughter at bedside. Pt reports that she lives alone and is previously IND with ADLs and uses various devices for ambulation. Pt uses a RW at night, a SPC in the community, and typically no AD in the home during the day. Pt does have a life alert system. Pt is very [place]. Pt came from supine to sitting IND and completed STS SBA using RW. Pt ambulated 172 ft using RW SBA. Pt returned to room and was left sitting UI, all needs met. Pt has no skilled PT needs at this time due to being at baseline, PT to sign off.  -       Row Name 07/29/25 1232          Therapy Assessment/Plan (PT)    Patient/Family Therapy Goals Statement (PT) pt would like to return home IND  -     Criteria for Skilled Interventions Met (PT) no;no problems identified which require skilled intervention  -     Therapy Frequency (PT) evaluation only  -       Row Name 07/29/25 1232          Vital Signs    O2 Delivery Pre Treatment room air  -     O2 Delivery Intra Treatment room air  -     O2 Delivery Post Treatment room air  -     Pre Patient Position Supine  -     Intra Patient Position Standing  -MK     Post Patient Position Sitting  -       Row Name 07/29/25 1232          Positioning and Restraints    Pre-Treatment Position in bed  -     Post Treatment  Position chair  -     In Chair notified nsg;sitting;call light within reach;encouraged to call for assist;with family/caregiver;exit alarm on  -               User Key  (r) = Recorded By, (t) = Taken By, (c) = Cosigned By      Initials Name Provider Type    Luther Wolff, [place] Physical Therapist                   Outcome Measures       Row Name 07/29/25 1236 07/29/25 0800       How much help from another person do you currently need...    Turning from your back to your side while in flat bed without using bedrails? 4  - 3  -BT    Moving from lying on back to sitting on the side of a flat bed without bedrails? 4  - 3  -BT    Moving to and from a bed to a chair (including a wheelchair)? 4  - 3  -BT    Standing up from a chair using your arms (e.g., wheelchair, bedside chair)? 4  - 3  -BT    Climbing 3-5 steps with a railing? 2  - 2  -BT    To walk in hospital room? 4  - 3  -BT    AM-PAC 6 Clicks Score (PT) 22  - 17  -BT    Highest Level of Mobility Goal Walk 25 Feet or More-7  -MK Stand (1 or More Minutes)-5  -BT      Row Name 07/29/25 1236          Functional Assessment    Outcome Measure Options AM-PAC 6 Clicks Basic Mobility (PT)  -               User Key  (r) = Recorded By, (t) = Taken By, (c) = Cosigned By      Initials Name Provider Type    Monique Soler, RN Registered Nurse    Luther Wolff, PT Physical Therapist                  Physical Therapy Education       Title: PT OT SLP Therapies (In Progress)       Topic: Physical Therapy (In Progress)       Point: Mobility training (Done)       Learning Progress Summary            Patient Acceptance, E,D, VU,DU by  at 7/29/2025 1236    Comment: importance of mobility                      Point: Home exercise program (Not Started)       Learner Progress:  Not documented in this visit.              Point: Body mechanics (Done)       Learning Progress Summary            Patient Acceptance, E,D, VU,DU by  at 7/29/2025 1236    Comment:  importance of mobility                      Point: Precautions (Not Started)       Learner Progress:  Not documented in this visit.                              User Key       Initials Effective Dates Name Provider Type Discipline     06/13/23 -  Luther Serrano PT Physical Therapist PT                  PT Recommendation and Plan     Progress: improving  Outcome Evaluation: Pt participated well in PT evaluation this date. Pt supine upon arrival, daughter at bedside. Pt reports that she lives alone and is previously IND with ADLs and uses various devices for ambulation. Pt uses a RW at night, a SPC in the community, and typically no AD in the home during the day. Pt does have a life alert system. Pt is very [place]. Pt came from supine to sitting IND and completed STS SBA using RW. Pt ambulated 172 ft using RW SBA. Pt returned to room and was left sitting UIC, all needs met. Pt has no skilled PT needs at this time due to being at baseline, PT to sign off.     Time Calculation:   PT Evaluation Complexity  History, PT Evaluation Complexity: 1-2 personal factors and/or comorbidities  Examination of Body Systems (PT Eval Complexity): 1-2 elements  Clinical Presentation (PT Evaluation Complexity): evolving  Clinical Decision Making (PT Evaluation Complexity): low complexity  Overall Complexity (PT Evaluation Complexity): low complexity     PT Charges       Row Name 07/29/25 1050             Time Calculation    Start Time 1050  -MK      PT Received On 07/29/25  -MK         Untimed Charges    PT Eval/Re-eval Minutes 44  -MK         Total Minutes    Untimed Charges Total Minutes 44  -MK       Total Minutes 44  -MK                User Key  (r) = Recorded By, (t) = Taken By, (c) = Cosigned By      Initials Name Provider Type     Luther Serrano, PT Physical Therapist                  Therapy Charges for Today       Code Description Service Date Service Provider Modifiers Qty    [id number]  PT EVAL LOW COMPLEXITY 3 7/29/2025  Luther Serrano, PT GP 1            PT G-Codes  Outcome Measure Options: AM-PAC 6 Clicks Basic Mobility (PT)  AM-PAC 6 Clicks Score (PT): 22    PT Discharge Summary  Anticipated Discharge Disposition (PT): home with assist, home    Luther Serrano, PT  7/29/2025

## 2025-07-29 NOTE — PLAN OF CARE
Goal Outcome Evaluation:  Plan of Care Reviewed With: patient        Progress: improving  Outcome Evaluation: VSS on RA. no acute changes or events or changs noted at this time. IV fluids started per MAR

## 2025-07-30 ENCOUNTER — READMISSION MANAGEMENT (OUTPATIENT)
Dept: CALL CENTER | Facility: HOSPITAL | Age: OVER 89
End: 2025-07-30
Payer: MEDICARE

## 2025-07-30 VITALS
RESPIRATION RATE: 16 BRPM | BODY MASS INDEX: 23.56 KG/M2 | HEART RATE: 71 BPM | OXYGEN SATURATION: 94 % | SYSTOLIC BLOOD PRESSURE: 123 MMHG | DIASTOLIC BLOOD PRESSURE: 84 MMHG | TEMPERATURE: 98.2 F | WEIGHT: 138 LBS | HEIGHT: 64 IN

## 2025-07-30 LAB
ANION GAP SERPL CALCULATED.3IONS-SCNC: 11.8 MMOL/L (ref 5–15)
BACTERIA SPEC AEROBE CULT: NO GROWTH
BUN SERPL-MCNC: 22 MG/DL (ref 8–23)
BUN/CREAT SERPL: 18.8 (ref 7–25)
CALCIUM SPEC-SCNC: 8.7 MG/DL (ref 8.2–9.6)
CHLORIDE SERPL-SCNC: 98 MMOL/L (ref 98–107)
CO2 SERPL-SCNC: 17.2 MMOL/L (ref 22–29)
CREAT SERPL-MCNC: 1.17 MG/DL (ref 0.57–1)
DEPRECATED RDW RBC AUTO: 49.2 FL (ref 37–54)
EGFRCR SERPLBLD CKD-EPI 2021: 44.1 ML/MIN/1.73
ERYTHROCYTE [DISTWIDTH] IN BLOOD BY AUTOMATED COUNT: 15.8 % (ref 12.3–15.4)
GLUCOSE SERPL-MCNC: 111 MG/DL (ref 65–99)
HCT VFR BLD AUTO: 35.3 % (ref 34–46.6)
HGB BLD-MCNC: 11.3 G/DL (ref 12–15.9)
MCH RBC QN AUTO: 27.5 PG (ref 26.6–33)
MCHC RBC AUTO-ENTMCNC: 32 G/DL (ref 31.5–35.7)
MCV RBC AUTO: 85.9 FL (ref 79–97)
PLATELET # BLD AUTO: 199 10*3/MM3 (ref 140–450)
PMV BLD AUTO: 9.9 FL (ref 6–12)
POTASSIUM SERPL-SCNC: 5.1 MMOL/L (ref 3.5–5.2)
RBC # BLD AUTO: 4.11 10*6/MM3 (ref 3.77–5.28)
SODIUM SERPL-SCNC: 127 MMOL/L (ref 136–145)
WBC NRBC COR # BLD AUTO: 6.2 10*3/MM3 (ref 3.4–10.8)

## 2025-07-30 PROCEDURE — 99239 HOSP IP/OBS DSCHRG MGMT >30: CPT | Performed by: INTERNAL MEDICINE

## 2025-07-30 PROCEDURE — 80048 BASIC METABOLIC PNL TOTAL CA: CPT | Performed by: INTERNAL MEDICINE

## 2025-07-30 PROCEDURE — 85027 COMPLETE CBC AUTOMATED: CPT | Performed by: INTERNAL MEDICINE

## 2025-07-30 RX ORDER — SODIUM BICARBONATE 650 MG/1
650 TABLET ORAL 3 TIMES DAILY
Status: DISCONTINUED | OUTPATIENT
Start: 2025-07-30 | End: 2025-07-30 | Stop reason: HOSPADM

## 2025-07-30 RX ORDER — METOPROLOL SUCCINATE 100 MG/1
100 TABLET, EXTENDED RELEASE ORAL 2 TIMES DAILY
Status: DISCONTINUED | OUTPATIENT
Start: 2025-07-30 | End: 2025-07-30 | Stop reason: HOSPADM

## 2025-07-30 RX ORDER — SODIUM BICARBONATE 650 MG/1
650 TABLET ORAL 3 TIMES DAILY
Qty: 90 TABLET | Refills: 0 | Status: SHIPPED | OUTPATIENT
Start: 2025-07-30 | End: 2025-08-29

## 2025-07-30 RX ADMIN — APIXABAN 2.5 MG: 2.5 TABLET, FILM COATED ORAL at 08:21

## 2025-07-30 RX ADMIN — Medication 10 ML: at 08:21

## 2025-07-30 RX ADMIN — PANTOPRAZOLE SODIUM 40 MG: 40 TABLET, DELAYED RELEASE ORAL at 05:36

## 2025-07-30 RX ADMIN — SODIUM BICARBONATE 650 MG TABLET 650 MG: at 10:42

## 2025-07-30 RX ADMIN — ACETAMINOPHEN 650 MG: 325 TABLET ORAL at 05:36

## 2025-07-30 RX ADMIN — ACETAMINOPHEN 650 MG: 325 TABLET ORAL at 01:08

## 2025-07-30 RX ADMIN — METOPROLOL SUCCINATE 100 MG: 100 TABLET, EXTENDED RELEASE ORAL at 08:21

## 2025-07-30 NOTE — DISCHARGE SUMMARY
[facility]      Name:  Addie Perez   Age:  89 y.o.  Sex:  female  :  1934  MRN:  [id number]   Visit Number:  [id number]    Admission Date:  2025  Date of Discharge:  2025  Primary Care Physician:  Ariela Hampton DO    Important issues to note:    Start: miconazole, sodium bicarb  Stop: spironolactone  Follow up: PCP   Brief Summary: Presented with weakness due to hypovolemia and hyponatremia. Initially had fluids which improved sodium by the time of discharge. Could not give further fluids so added sodium bicarb tabs for DC.    Discharge Diagnoses:     Hyponatremia, POA  Possible UTI, POA  Generalized weakness, POA  A-fib on Eliquis  Diabetes mellitus  CHF  GERD  Hypertension  Hyperlipidemia      Problem List:     Active Hospital Problems    Diagnosis  POA    **Hyponatremia [E87.1]  Yes      Resolved Hospital Problems   No resolved problems to display.     Presenting Problem:    Chief Complaint   Patient presents with    Weakness - Generalized    Abnormal Lab      Consults:     Consulting Physician(s)                     None                History Of Presenting Illness:       Patient is a 89 years old female with a past medical history of A-fib on Eliquis, diabetes mellitus, CHF, GERD, hypertension, hyperlipidemia, who presented to the ER with a chief complaint of generalized weakness.  Patient reports progressively worsening fatigue and generalized weakness over the past 2 to 3 days, she also reports associated lightheadedness at times.  Her generalized weakness gotten worse and was unable to take care of her daily activities so she presented to the ER for evaluation.  Denies any recent fever, chills, abdominal pain, nausea, vomiting or diarrhea.  No chest pain, shortness of breath or cough.     On ER evaluation, Blood pressure was 137/115, other vital stable and afebrile.  Workup in the ER was significant for sodium of 122 with normal  glucose, creatinine 1.18, BUN 28 otherwise her CBC and CMP nonactionable.  Urinalysis showed trace leukocytes, bacteria 1+ with squamous epithelial cells.  Chest x-ray unremarkable.  Patient received half liter bolus of normal saline and was started on Rocephin for UTI.  Hospitalist consulted for admission due to hyponatremia, UTI and advanced age, further management and treatment.  Edited by: Lauri Oliver DO at 7/29/2025 1213    Hospital Course:    This patient's problems and plans were partially entered by my partner and updated as appropriate by me 07/29/25.    Hyponatremia  - Acute on chronic, with hypovolemia.  - IV fluids, improved sodium but was developing some crackles, so didn't give more fluids. Started on sodium bicarbonate tablets to try to give sodium back but not too much fluids. Recommend that she continue these and have close follow-up of her sodium outpatient.       UTI, ruled out  Vaginitis  - UA no pyuria, minimal bacteruria  - hx of yeast  symptoms consistent will rx miconazole, Estrogen cream     Generalized weakness  - In settings of hyponatremia   - PT/OT recommended home independently       DVT Prophylaxis: Eliquis  Code Status: DNR/DNI  Diet: Cardiac  Disposition: Plan with home independently   Edited by: Lauri Oliver DO at 7/29/2025 1213      Vital Signs:    Temp:  [97.3 °F (36.3 °C)-98.2 °F (36.8 °C)] 98.2 °F (36.8 °C)  Heart Rate:  [64-82] 71  Resp:  [16-18] 16  BP: (114-132)/(66-91) 123/84    Physical Exam:    Constitutional: No acute distress, awake, alert  HENT: NCAT, mucous membranes moist  Respiratory: trace basilar rales bilaterally, respiratory effort normal   Cardiovascular: irregularly irregular, no murmurs, rubs, or gallops  Gastrointestinal: Positive bowel sounds, soft, nontender, nondistended  Musculoskeletal: No bilateral ankle edema, weakness and frailty noted  Psychiatric: Appropriate affect, cooperative  Neurologic: Oriented x 3, speech clear  Skin: No  rashes  Exam stable 7/30/2025    Pertinent Lab Results:     Results from last 7 days   Lab Units 07/30/25  0619 07/29/25  1319 07/29/25  0656 07/29/25  0207 07/28/25  1307   SODIUM mmol/L 127* 130* 127*   < > 122*   POTASSIUM mmol/L 5.1 4.8 5.3*   < > 5.0   CHLORIDE mmol/L 98 96* 97*   < > 88*   CO2 mmol/L 17.2* 20.1* 18.3*   < > 22.1   BUN mg/dL 22.0 21.0 22.0   < > 28.0*   CREATININE mg/dL 1.17* 0.96 1.07*   < > 1.18*   CALCIUM mg/dL 8.7 9.2 9.0   < > 9.4   BILIRUBIN mg/dL  --   --   --   --  1.1   ALK PHOS U/L  --   --   --   --  175*   ALT (SGPT) U/L  --   --   --   --  12   AST (SGOT) U/L  --   --   --   --  24   GLUCOSE mg/dL 111* 127* 103*   < > 89    < > = values in this interval not displayed.     Results from last 7 days   Lab Units 07/30/25  0619 07/29/25  0656 07/28/25  1307   WBC 10*3/mm3 6.20 5.60 6.64   HEMOGLOBIN g/dL 11.3* 11.7* 12.7   HEMATOCRIT % 35.3 37.3 38.8   PLATELETS 10*3/mm3 199 171 259         Results from last 7 days   Lab Units 07/28/25  1448 07/28/25  1307   HSTROP T ng/L 11 12                     Results from last 7 days   Lab Units 07/28/25  1557 07/28/25  1548 07/28/25  1349   BLOODCX  No growth at 24 hours No growth at 24 hours  --    URINECX   --   --  No growth       Pertinent Radiology Results:    Imaging Results (All)       Procedure Component Value Units Date/Time    XR Chest 1 View [[id number]] Collected: 07/28/25 1311     Updated: 07/28/25 1313    Narrative:      PROCEDURE: XR CHEST 1 VW-     HISTORY: Weak/Dizzy/AMS triage protocol     COMPARISON: 11/14/2024     FINDINGS:  Portable view of the chest demonstrates the lungs to be  grossly clear. There is no evidence of effusion, pneumothorax or other  significant pleural disease. The mediastinum is unremarkable.     The heart size is normal.       Impression:      Unremarkable portable chest.            This report was signed and finalized on 7/28/2025 1:11 PM by Angel Kincaid MD.               Echo:    Results for orders  placed during the hospital encounter of 03/03/24    Adult Transthoracic Echo Complete W/ Cont if Necessary Per Protocol 03/03/2024  3:56 PM    Interpretation Summary    Left ventricular systolic function is normal. Calculated left ventricular 3D EF = 59% Left ventricular ejection fraction appears to be 56 - 60%.    Left ventricular diastolic dysfunction is noted.    The left atrial cavity is dilated.    The right atrial cavity is dilated.    Moderate mitral valve regurgitation is present. (SBP 150s)    Moderate to severe tricuspid valve regurgitation is present.    Estimated right ventricular systolic pressure from tricuspid regurgitation is moderately elevated (45-55 mmHg).    Condition on Discharge:      Stable.    Code status during the hospital stay:    Code Status and Medical Interventions: No CPR (Do Not Attempt to Resuscitate); Limited Support; No intubation (DNI)   Ordered at: 07/28/25 1923     Code Status (Patient has no pulse and is not breathing):    No CPR (Do Not Attempt to Resuscitate)     Medical Interventions (Patient has pulse or is breathing):    Limited Support     Medical Intervention Limits:    No intubation (DNI)     Level Of Support Discussed With:    Patient     Discharge Disposition:    Home or Self Care    Discharge Medications:       Discharge Medications        New Medications        Instructions Start Date   miconazole 200 MG vaginal suppository  Commonly known as: MICOTIN   200 mg, Vaginal, Nightly      sodium bicarbonate 650 MG tablet   650 mg, Oral, 3 Times Daily             Changes to Medications        Instructions Start Date   clotrimazole-betamethasone 1-0.05 % cream  Commonly known as: LOTRISONE  What changed:   when to take this  reasons to take this   1 Application, Topical, 2 Times Daily, Apply to affected area twice daily as needed             Continue These Medications        Instructions Start Date   acetaminophen 650 MG 8 hr tablet  Commonly known as: TYLENOL   650 mg, 2  Times Daily PRN      cetirizine 10 MG tablet  Commonly known as: zyrTEC   Take 1 tablet by mouth Every Night.      cyclobenzaprine 5 MG tablet  Commonly known as: FLEXERIL   1 tablet, 3 Times Daily      Eliquis 5 MG tablet tablet  Generic drug: apixaban   2.5 mg, Oral      estradiol 0.1 MG/GM vaginal cream  Commonly known as: ESTRACE VAGINAL   May use 1 gram intravaginal qhs x 2 weeks then 1 gram 2x/week.      fluticasone 50 MCG/ACT nasal spray  Commonly known as: FLONASE   2 sprays, Daily PRN      furosemide 40 MG tablet  Commonly known as: Lasix   40 mg, Oral, Daily PRN      ipratropium 0.06 % nasal spray  Commonly known as: ATROVENT   1 spray, 2 Times Daily      levocetirizine 5 MG tablet  Commonly known as: XYZAL   5 mg, Every Evening      metoprolol succinate  MG 24 hr tablet  Commonly known as: TOPROL-XL   200 mg, Oral, Daily      omeprazole 10 MG capsule  Commonly known as: prilOSEC   1 capsule, Every Morning      ondansetron 8 MG tablet  Commonly known as: ZOFRAN   8 mg, Oral, Every 8 Hours PRN             Stop These Medications      spironolactone 25 MG tablet  Commonly known as: ALDACTONE            Discharge Diet:     Diet Instructions       Advance Diet As Tolerated -Target Diet: Regular no sodium restriction.      Target Diet: Regular no sodium restriction.          Activity at Discharge:       Follow-up Appointments:    Additional Instructions for the Follow-ups that You Need to Schedule       Discharge Follow-up with PCP   As directed       Currently Documented PCP:    Ariela Hampton DO    PCP Phone Number:    [phone]     Follow Up Details: 1 week        Discharge Follow-up with PCP   As directed       Currently Documented PCP:    Ariela Hampton DO    PCP Phone Number:    [phone]     Follow Up Details: 1 week               Follow-up Information       Ariela Hampton DO .    Specialty: Family Medicine  Why: 1 week  Contact information:  [address] [place], [place] [address]  [place] [address]  [phone]               Ariela Hampton DO .    Specialty: Family Medicine  Why: 1 week  Contact information:  [place] [id number]  [phone]                           Future Appointments   Date Time Provider Department Center   8/19/2025  3:30 PM Mil Ma MD [facility]   8/25/2025 10:40 AM Jose Eduardo Gallegos MD [facility]   9/18/2025  1:00 PM Betsy Andrews APRN  [facility]   10/27/2025  1:30 PM [facility]M 1  [facility]   10/27/2025  2:00 PM [facility] US 2  [facility]     Test Results Pending at Discharge:    Pending Labs       Order Current Status    Blood Culture - Blood, Arm, Right Preliminary result    Blood Culture - Blood, Hand, Right Preliminary result               Lauri Oliver DO  07/30/25  09:51 EDT    Time: I spent 45 minutes on this discharge activity which included: face-to-face encounter with the patient, reviewing the data in the system, coordination of the care with the nursing staff as well as consultants, documentation, and entering orders.     Dictated utilizing Dragon dictation.

## 2025-07-30 NOTE — PLAN OF CARE
Goal Outcome Evaluation:                 Discharge appropriate per attending

## 2025-07-30 NOTE — PLAN OF CARE
Goal Outcome Evaluation:         VSS during shift. No s/s of acute changes noted during shift. No s/s of acute distress noted at this time. POC ongoing.

## 2025-07-30 NOTE — OUTREACH NOTE
Prep Survey      Flowsheet Row Responses   [facility] facility patient discharged from? [facility]   Is LACE score < 7 ? No   Eligibility Readm Mgmt   Discharge diagnosis Generalized weakness   Does the patient have one of the following disease processes/diagnoses(primary or secondary)? Other   Does the patient have Home health ordered? No   Is there a DME ordered? No   Prep survey completed? Yes            JEROME MASTERSON - Registered Nurse

## 2025-07-30 NOTE — CASE MANAGEMENT/SOCIAL WORK
Case Management Discharge Note           Provided Post Acute Provider List?: N/A    Selected Continued Care - Admitted Since 7/28/2025       Destination    No services have been selected for the patient.                Durable Medical Equipment    No services have been selected for the patient.                Dialysis/Infusion    No services have been selected for the patient.                Home Medical Care    No services have been selected for the patient.                Therapy    No services have been selected for the patient.                Community Resources    No services have been selected for the patient.                Community & DME    No services have been selected for the patient.                    Selected Continued Care - Episodes Includes continued care and service providers with selected services from the active episodes listed below      Heart Failure Episode start date: 3/18/2024   There are no active outsourced providers for this episode.                 Transportation Services  Transportation: Private Transportation  Private: Car    Final Discharge Disposition Code: 01 - home or self-care

## 2025-07-30 NOTE — CASE MANAGEMENT/SOCIAL WORK
Case Management/Social Work    Patient Name:  Addie Perez  YOB: 1934  MRN: [id number]  Admit Date:  7/28/2025        08:58 EDT   Discharge Plan       Row Name 07/30/25 0858       Plan    Plan home/alone  daughter nearby  did well with PT                        Electronically signed by:  Miesha Adhikari RN  07/30/25 08:58 EDT

## 2025-07-31 ENCOUNTER — READMISSION MANAGEMENT (OUTPATIENT)
Dept: CALL CENTER | Facility: HOSPITAL | Age: OVER 89
End: 2025-07-31
Payer: MEDICARE

## 2025-07-31 NOTE — OUTREACH NOTE
Medical Week 1 Survey      Flowsheet Row Responses   [facility] patient discharged from? [facility]   Does the patient have one of the following disease processes/diagnoses(primary or secondary)? Other   Week 1 attempt successful? Yes   Call start time 1727   Call end time 1742   Person spoke with today (if not patient) and relationship dtr   Meds reviewed with patient/caregiver? Yes   Is the patient having any side effects they believe may be caused by any medication additions or changes? No   Does the patient have all medications ordered at discharge? Yes   Is the patient taking all medications as directed (includes completed medication regime)? Yes   Does the patient have a primary care provider?  Yes   Does the patient have an appointment with their PCP within 7 days of discharge? Yes   Has the patient kept scheduled appointments due by today? N/A   Psychosocial issues? No   Did the patient receive a copy of their discharge instructions? Yes   Nursing interventions Reviewed instructions with patient   What is the patient's perception of their health status since discharge? Improving   Is the patient/caregiver able to teach back signs and symptoms related to disease process for when to call PCP? Yes   Is the patient/caregiver able to teach back signs and symptoms related to disease process for when to call 911? Yes   Is the patient/caregiver able to teach back the hierarchy of who to call/visit for symptoms/problems? PCP, Specialist, Home health nurse, Urgent Care, ED, 911 Yes   If the patient is a current smoker, are they able to teach back resources for cessation? Not a smoker   Week 1 call completed? Yes   Would this patient benefit from a Referral to Amb Social Work? No   Is the patient interested in additional calls from an ambulatory ? No   Wrap up additional comments Pt reports some dizziness. Reviewed meds with dtr. Dtr will inquire about labs for Sodium recheck with PCP. Pt does see PCP  this Monday, 8/4/25.   Call end time 1458            Mami MAURO - Registered Nurse

## 2025-08-02 LAB
BACTERIA SPEC AEROBE CULT: NORMAL
BACTERIA SPEC AEROBE CULT: NORMAL

## 2025-08-05 DIAGNOSIS — I48.19 PERSISTENT ATRIAL FIBRILLATION: ICD-10-CM

## 2025-08-05 RX ORDER — APIXABAN 5 MG/1
TABLET, FILM COATED ORAL
Qty: 30 TABLET | Refills: 0 | Status: SHIPPED | OUTPATIENT
Start: 2025-08-05

## 2025-08-13 ENCOUNTER — OFFICE VISIT (OUTPATIENT)
Dept: CARDIOLOGY | Facility: CLINIC | Age: OVER 89
End: 2025-08-13
Payer: MEDICARE

## 2025-08-13 ENCOUNTER — READMISSION MANAGEMENT (OUTPATIENT)
Dept: CALL CENTER | Facility: HOSPITAL | Age: OVER 89
End: 2025-08-13
Payer: MEDICARE

## 2025-08-13 VITALS
BODY MASS INDEX: 24.59 KG/M2 | OXYGEN SATURATION: 95 % | SYSTOLIC BLOOD PRESSURE: 128 MMHG | RESPIRATION RATE: 20 BRPM | HEIGHT: 64 IN | DIASTOLIC BLOOD PRESSURE: 70 MMHG | HEART RATE: 73 BPM | WEIGHT: 144 LBS

## 2025-08-13 DIAGNOSIS — I10 PRIMARY HYPERTENSION: ICD-10-CM

## 2025-08-13 DIAGNOSIS — I48.19 PERSISTENT ATRIAL FIBRILLATION: ICD-10-CM

## 2025-08-13 DIAGNOSIS — I50.32 CHRONIC DIASTOLIC CHF (CONGESTIVE HEART FAILURE): Primary | ICD-10-CM

## 2025-08-19 ENCOUNTER — OFFICE VISIT (OUTPATIENT)
Dept: GASTROENTEROLOGY | Facility: CLINIC | Age: OVER 89
End: 2025-08-19
Payer: MEDICARE

## 2025-08-19 VITALS
SYSTOLIC BLOOD PRESSURE: 158 MMHG | HEART RATE: 86 BPM | HEIGHT: 64 IN | OXYGEN SATURATION: 98 % | WEIGHT: 143 LBS | BODY MASS INDEX: 24.41 KG/M2 | DIASTOLIC BLOOD PRESSURE: 70 MMHG | TEMPERATURE: 96.6 F

## 2025-08-19 DIAGNOSIS — Z87.898 HISTORY OF DYSPHAGIA: ICD-10-CM

## 2025-08-19 DIAGNOSIS — K74.60 HEPATIC CIRRHOSIS, UNSPECIFIED HEPATIC CIRRHOSIS TYPE, UNSPECIFIED WHETHER ASCITES PRESENT: Primary | ICD-10-CM

## 2025-08-19 DIAGNOSIS — K59.00 CONSTIPATION, UNSPECIFIED CONSTIPATION TYPE: ICD-10-CM

## 2025-08-19 DIAGNOSIS — K86.2 PANCREATIC CYST: ICD-10-CM

## 2025-08-19 PROCEDURE — 99214 OFFICE O/P EST MOD 30 MIN: CPT | Performed by: INTERNAL MEDICINE

## 2025-08-19 PROCEDURE — 1159F MED LIST DOCD IN RCRD: CPT | Performed by: INTERNAL MEDICINE

## 2025-08-19 PROCEDURE — 1160F RVW MEDS BY RX/DR IN RCRD: CPT | Performed by: INTERNAL MEDICINE

## 2025-08-19 RX ORDER — DOCUSATE SODIUM 100 MG/1
100 CAPSULE, LIQUID FILLED ORAL 2 TIMES DAILY
Qty: 60 CAPSULE | Refills: 5 | Status: SHIPPED | OUTPATIENT
Start: 2025-08-19

## 2025-08-19 RX ORDER — LACTULOSE 10 G/15ML
10 SOLUTION ORAL DAILY PRN
Qty: 450 ML | Refills: 2 | Status: SHIPPED | OUTPATIENT
Start: 2025-08-19

## 2025-08-25 ENCOUNTER — OFFICE VISIT (OUTPATIENT)
Dept: SURGERY | Facility: CLINIC | Age: OVER 89
End: 2025-08-25
Payer: MEDICARE

## 2025-08-25 VITALS
SYSTOLIC BLOOD PRESSURE: 136 MMHG | DIASTOLIC BLOOD PRESSURE: 76 MMHG | TEMPERATURE: 98 F | HEART RATE: 92 BPM | HEIGHT: 64 IN | OXYGEN SATURATION: 98 % | WEIGHT: 146 LBS | BODY MASS INDEX: 24.92 KG/M2

## 2025-08-25 DIAGNOSIS — R92.8 ABNORMAL MAMMOGRAM: Primary | ICD-10-CM

## 2025-08-25 PROCEDURE — 1159F MED LIST DOCD IN RCRD: CPT | Performed by: SURGERY

## 2025-08-25 PROCEDURE — 99213 OFFICE O/P EST LOW 20 MIN: CPT | Performed by: SURGERY

## 2025-08-25 PROCEDURE — 1160F RVW MEDS BY RX/DR IN RCRD: CPT | Performed by: SURGERY

## 2025-08-25 RX ORDER — SPIRONOLACTONE 25 MG/1
25 TABLET ORAL DAILY
COMMUNITY

## 2025-08-27 ENCOUNTER — OFFICE VISIT (OUTPATIENT)
Dept: CARDIOLOGY | Facility: CLINIC | Age: OVER 89
End: 2025-08-27
Payer: MEDICARE

## 2025-08-27 ENCOUNTER — LAB (OUTPATIENT)
Dept: LAB | Facility: HOSPITAL | Age: OVER 89
End: 2025-08-27
Payer: MEDICARE

## 2025-08-27 VITALS
OXYGEN SATURATION: 99 % | SYSTOLIC BLOOD PRESSURE: 136 MMHG | WEIGHT: 145.4 LBS | BODY MASS INDEX: 24.82 KG/M2 | HEIGHT: 64 IN | HEART RATE: 96 BPM | DIASTOLIC BLOOD PRESSURE: 88 MMHG | RESPIRATION RATE: 19 BRPM

## 2025-08-27 DIAGNOSIS — I10 ESSENTIAL HYPERTENSION: Chronic | ICD-10-CM

## 2025-08-27 DIAGNOSIS — I50.32 CHRONIC DIASTOLIC HEART FAILURE: Primary | ICD-10-CM

## 2025-08-27 DIAGNOSIS — I48.19 ATRIAL FIBRILLATION, PERSISTENT: ICD-10-CM

## 2025-08-27 DIAGNOSIS — I50.32 CHRONIC DIASTOLIC HEART FAILURE: ICD-10-CM

## 2025-08-27 LAB
ANION GAP SERPL CALCULATED.3IONS-SCNC: 9.7 MMOL/L (ref 5–15)
BUN SERPL-MCNC: 14 MG/DL (ref 8–23)
BUN/CREAT SERPL: 13.7 (ref 7–25)
CALCIUM SPEC-SCNC: 9.4 MG/DL (ref 8.2–9.6)
CHLORIDE SERPL-SCNC: 96 MMOL/L (ref 98–107)
CO2 SERPL-SCNC: 25.3 MMOL/L (ref 22–29)
CREAT SERPL-MCNC: 1.02 MG/DL (ref 0.57–1)
EGFRCR SERPLBLD CKD-EPI 2021: 52 ML/MIN/1.73
GLUCOSE SERPL-MCNC: 104 MG/DL (ref 65–99)
POTASSIUM SERPL-SCNC: 4 MMOL/L (ref 3.5–5.2)
SODIUM SERPL-SCNC: 131 MMOL/L (ref 136–145)

## 2025-08-27 PROCEDURE — 36415 COLL VENOUS BLD VENIPUNCTURE: CPT

## 2025-08-27 PROCEDURE — 80048 BASIC METABOLIC PNL TOTAL CA: CPT

## (undated) DEVICE — CONMED SCOPE SAVER BITE BLOCK, 20X27 MM: Brand: SCOPE SAVER

## (undated) DEVICE — TOWEL,OR,DSP,ST,BLUE,STD,4/PK,20PK/CS: Brand: MEDLINE

## (undated) DEVICE — CANN HYDRODISSECTION

## (undated) DEVICE — NDL FLTR2 THN 19G 1IN

## (undated) DEVICE — ENDOGATOR AUXILIARY WATER JET CONNECTOR: Brand: ENDOGATOR

## (undated) DEVICE — CANISTER, RIGID, 2000CC: Brand: MEDLINE INDUSTRIES, INC.

## (undated) DEVICE — DEV INFL ALLIANCE2 SYS

## (undated) DEVICE — ESOPHAGEAL BALLOON DILATATION CATHETER: Brand: CRE FIXED WIRE

## (undated) DEVICE — ENDOSCOPY PORT CONNECTOR FOR OLYMPUS® SCOPES: Brand: ERBE

## (undated) DEVICE — Device

## (undated) DEVICE — 2000CC GUARDIAN II: Brand: GUARDIAN

## (undated) DEVICE — HP CONCL INTREPID COAX I/A CRV .3MM

## (undated) DEVICE — SET,IRRIGATION,CYSTO/TUR: Brand: MEDLINE

## (undated) DEVICE — MICROSURGICAL INSTRUMENT IRR CYSTITOME 27GA FORMED-BAFFLE CUTTING: Brand: ALCON

## (undated) DEVICE — KT ORCA VLV SXN AIR/H2O W/SEAL 1P/U STRL

## (undated) DEVICE — RICH MINOR LITHOTOMY: Brand: MEDLINE INDUSTRIES, INC.

## (undated) DEVICE — FRCP BX RADJAW4 NDL 2.8 240 STD OG

## (undated) DEVICE — SOL IRR H2O BO 1000ML STRL

## (undated) DEVICE — HYBRID TUBING/CAP SET FOR OLYMPUS® SCOPES: Brand: ERBE

## (undated) DEVICE — HYBRID CO2 TUBING/CAP SET FOR OLYMPUS® SCOPES & CO2 SOURCE: Brand: ERBE

## (undated) DEVICE — GLV SURG SENSICARE W/ALOE PF LF 7.5 STRL

## (undated) DEVICE — SYR LL 3CC

## (undated) DEVICE — FRCP BIOP COLD ENDOJAW ALLGTR W/NDL 2.8X2300MM BLU

## (undated) DEVICE — 0.8MM CLEARPORT PARA KNIFE: Brand: SHARPOINT

## (undated) DEVICE — EYE CARE POST OP KIT: Brand: MEDLINE INDUSTRIES, INC.

## (undated) DEVICE — CANN IRR/INJ AIR ANT CHAMBER 6MM BEND 27G

## (undated) DEVICE — CLEAR CORNEAL KNIFE 2.4MM ANG: Brand: SHARPOINT

## (undated) DEVICE — SOL IRRIG H2O 1000ML STRL

## (undated) DEVICE — SUT GUT CHRM 2/0 SH 27IN G123H

## (undated) DEVICE — GLV SURG BIOGEL M LTX PF 6 1/2

## (undated) DEVICE — VLV SXN AIR/H2O ORCAPOD3 1P/U STRL

## (undated) DEVICE — SOL NACL 0.9PCT 1000ML